# Patient Record
Sex: FEMALE | Race: WHITE | HISPANIC OR LATINO | Employment: UNEMPLOYED | URBAN - METROPOLITAN AREA
[De-identification: names, ages, dates, MRNs, and addresses within clinical notes are randomized per-mention and may not be internally consistent; named-entity substitution may affect disease eponyms.]

---

## 2017-03-24 ENCOUNTER — APPOINTMENT (OUTPATIENT)
Dept: PHYSICAL THERAPY | Facility: REHABILITATION | Age: 37
End: 2017-03-24
Payer: COMMERCIAL

## 2017-12-26 ENCOUNTER — HOSPITAL ENCOUNTER (OUTPATIENT)
Facility: MEDICAL CENTER | Age: 37
End: 2017-12-27
Attending: EMERGENCY MEDICINE | Admitting: INTERNAL MEDICINE
Payer: MEDICAID

## 2017-12-26 ENCOUNTER — APPOINTMENT (OUTPATIENT)
Dept: RADIOLOGY | Facility: MEDICAL CENTER | Age: 37
End: 2017-12-26
Attending: INTERNAL MEDICINE
Payer: MEDICAID

## 2017-12-26 ENCOUNTER — RESOLUTE PROFESSIONAL BILLING HOSPITAL PROF FEE (OUTPATIENT)
Dept: HOSPITALIST | Facility: MEDICAL CENTER | Age: 37
End: 2017-12-26
Payer: MEDICAID

## 2017-12-26 DIAGNOSIS — M54.59 INTRACTABLE LOW BACK PAIN: ICD-10-CM

## 2017-12-26 PROBLEM — E66.01 MORBID OBESITY (HCC): Status: ACTIVE | Noted: 2017-12-26

## 2017-12-26 PROBLEM — M54.9 INTRACTABLE BACK PAIN: Status: ACTIVE | Noted: 2017-12-26

## 2017-12-26 PROBLEM — M54.31 SCIATICA OF RIGHT SIDE: Status: ACTIVE | Noted: 2017-12-26

## 2017-12-26 LAB
APPEARANCE UR: CLEAR
BILIRUB UR QL STRIP.AUTO: NEGATIVE
COLOR UR: YELLOW
CRP SERPL HS-MCNC: 0.35 MG/DL (ref 0–0.75)
CULTURE IF INDICATED INDCX: NO UA CULTURE
ERYTHROCYTE [SEDIMENTATION RATE] IN BLOOD BY WESTERGREN METHOD: 10 MM/HOUR (ref 0–20)
GLUCOSE UR STRIP.AUTO-MCNC: NEGATIVE MG/DL
HCG UR QL: NEGATIVE
KETONES UR STRIP.AUTO-MCNC: NEGATIVE MG/DL
LEUKOCYTE ESTERASE UR QL STRIP.AUTO: NEGATIVE
MICRO URNS: NORMAL
NITRITE UR QL STRIP.AUTO: NEGATIVE
PH UR STRIP.AUTO: 5.5 [PH]
PROT UR QL STRIP: NEGATIVE MG/DL
RBC UR QL AUTO: NEGATIVE
SP GR UR REFRACTOMETRY: 1.01
SP GR UR STRIP.AUTO: 1.01
UROBILINOGEN UR STRIP.AUTO-MCNC: 0.2 MG/DL

## 2017-12-26 PROCEDURE — 99285 EMERGENCY DEPT VISIT HI MDM: CPT

## 2017-12-26 PROCEDURE — 96376 TX/PRO/DX INJ SAME DRUG ADON: CPT

## 2017-12-26 PROCEDURE — A9270 NON-COVERED ITEM OR SERVICE: HCPCS | Performed by: EMERGENCY MEDICINE

## 2017-12-26 PROCEDURE — 700101 HCHG RX REV CODE 250: Performed by: INTERNAL MEDICINE

## 2017-12-26 PROCEDURE — 99220 PR INITIAL OBSERVATION CARE,LEVL III: CPT | Performed by: INTERNAL MEDICINE

## 2017-12-26 PROCEDURE — 700102 HCHG RX REV CODE 250 W/ 637 OVERRIDE(OP): Performed by: INTERNAL MEDICINE

## 2017-12-26 PROCEDURE — 96372 THER/PROPH/DIAG INJ SC/IM: CPT | Mod: XU

## 2017-12-26 PROCEDURE — 81003 URINALYSIS AUTO W/O SCOPE: CPT

## 2017-12-26 PROCEDURE — G0378 HOSPITAL OBSERVATION PER HR: HCPCS

## 2017-12-26 PROCEDURE — 85652 RBC SED RATE AUTOMATED: CPT

## 2017-12-26 PROCEDURE — 96374 THER/PROPH/DIAG INJ IV PUSH: CPT

## 2017-12-26 PROCEDURE — 700111 HCHG RX REV CODE 636 W/ 250 OVERRIDE (IP): Performed by: INTERNAL MEDICINE

## 2017-12-26 PROCEDURE — 700102 HCHG RX REV CODE 250 W/ 637 OVERRIDE(OP): Performed by: EMERGENCY MEDICINE

## 2017-12-26 PROCEDURE — 72148 MRI LUMBAR SPINE W/O DYE: CPT

## 2017-12-26 PROCEDURE — 86140 C-REACTIVE PROTEIN: CPT

## 2017-12-26 PROCEDURE — 96372 THER/PROPH/DIAG INJ SC/IM: CPT

## 2017-12-26 PROCEDURE — 700111 HCHG RX REV CODE 636 W/ 250 OVERRIDE (IP): Performed by: EMERGENCY MEDICINE

## 2017-12-26 PROCEDURE — 81025 URINE PREGNANCY TEST: CPT

## 2017-12-26 PROCEDURE — A9270 NON-COVERED ITEM OR SERVICE: HCPCS | Performed by: INTERNAL MEDICINE

## 2017-12-26 RX ORDER — KETOROLAC TROMETHAMINE 30 MG/ML
15 INJECTION, SOLUTION INTRAMUSCULAR; INTRAVENOUS EVERY 6 HOURS
Status: DISCONTINUED | OUTPATIENT
Start: 2017-12-26 | End: 2017-12-27 | Stop reason: HOSPADM

## 2017-12-26 RX ORDER — CYCLOBENZAPRINE HCL 10 MG
10 TABLET ORAL ONCE
Status: COMPLETED | OUTPATIENT
Start: 2017-12-26 | End: 2017-12-26

## 2017-12-26 RX ORDER — MORPHINE SULFATE 4 MG/ML
1 INJECTION, SOLUTION INTRAMUSCULAR; INTRAVENOUS EVERY 4 HOURS PRN
Status: DISCONTINUED | OUTPATIENT
Start: 2017-12-26 | End: 2017-12-27 | Stop reason: HOSPADM

## 2017-12-26 RX ORDER — ONDANSETRON 4 MG/1
4 TABLET, ORALLY DISINTEGRATING ORAL EVERY 4 HOURS PRN
Status: DISCONTINUED | OUTPATIENT
Start: 2017-12-26 | End: 2017-12-27 | Stop reason: HOSPADM

## 2017-12-26 RX ORDER — BISACODYL 10 MG
10 SUPPOSITORY, RECTAL RECTAL
Status: DISCONTINUED | OUTPATIENT
Start: 2017-12-26 | End: 2017-12-27 | Stop reason: HOSPADM

## 2017-12-26 RX ORDER — PROMETHAZINE HYDROCHLORIDE 25 MG/1
12.5-25 SUPPOSITORY RECTAL EVERY 4 HOURS PRN
Status: DISCONTINUED | OUTPATIENT
Start: 2017-12-26 | End: 2017-12-27 | Stop reason: HOSPADM

## 2017-12-26 RX ORDER — HYDROMORPHONE HYDROCHLORIDE 2 MG/ML
1 INJECTION, SOLUTION INTRAMUSCULAR; INTRAVENOUS; SUBCUTANEOUS ONCE
Status: COMPLETED | OUTPATIENT
Start: 2017-12-26 | End: 2017-12-26

## 2017-12-26 RX ORDER — POLYETHYLENE GLYCOL 3350 17 G/17G
1 POWDER, FOR SOLUTION ORAL
Status: DISCONTINUED | OUTPATIENT
Start: 2017-12-26 | End: 2017-12-27 | Stop reason: HOSPADM

## 2017-12-26 RX ORDER — OXYCODONE HYDROCHLORIDE 5 MG/1
5 TABLET ORAL EVERY 4 HOURS PRN
Status: DISCONTINUED | OUTPATIENT
Start: 2017-12-26 | End: 2017-12-27 | Stop reason: HOSPADM

## 2017-12-26 RX ORDER — ACETAMINOPHEN 325 MG/1
650 TABLET ORAL EVERY 6 HOURS PRN
Status: DISCONTINUED | OUTPATIENT
Start: 2017-12-26 | End: 2017-12-27 | Stop reason: HOSPADM

## 2017-12-26 RX ORDER — ACETAMINOPHEN 500 MG
500 TABLET ORAL EVERY 6 HOURS
Status: DISCONTINUED | OUTPATIENT
Start: 2017-12-26 | End: 2017-12-27 | Stop reason: HOSPADM

## 2017-12-26 RX ORDER — PROMETHAZINE HYDROCHLORIDE 25 MG/1
12.5-25 TABLET ORAL EVERY 4 HOURS PRN
Status: DISCONTINUED | OUTPATIENT
Start: 2017-12-26 | End: 2017-12-27 | Stop reason: HOSPADM

## 2017-12-26 RX ORDER — ONDANSETRON 2 MG/ML
4 INJECTION INTRAMUSCULAR; INTRAVENOUS EVERY 4 HOURS PRN
Status: DISCONTINUED | OUTPATIENT
Start: 2017-12-26 | End: 2017-12-27 | Stop reason: HOSPADM

## 2017-12-26 RX ORDER — HYDROCODONE BITARTRATE AND ACETAMINOPHEN 5; 325 MG/1; MG/1
2 TABLET ORAL
Status: SHIPPED | COMMUNITY
End: 2021-09-05

## 2017-12-26 RX ORDER — AMOXICILLIN 250 MG
2 CAPSULE ORAL 2 TIMES DAILY
Status: DISCONTINUED | OUTPATIENT
Start: 2017-12-26 | End: 2017-12-27 | Stop reason: HOSPADM

## 2017-12-26 RX ORDER — DIAZEPAM 2 MG/1
2 TABLET ORAL EVERY 6 HOURS PRN
Status: DISCONTINUED | OUTPATIENT
Start: 2017-12-26 | End: 2017-12-27 | Stop reason: HOSPADM

## 2017-12-26 RX ORDER — KETOROLAC TROMETHAMINE 30 MG/ML
30 INJECTION, SOLUTION INTRAMUSCULAR; INTRAVENOUS ONCE
Status: COMPLETED | OUTPATIENT
Start: 2017-12-26 | End: 2017-12-26

## 2017-12-26 RX ORDER — LIDOCAINE 50 MG/G
1 PATCH TOPICAL EVERY 24 HOURS
Status: DISCONTINUED | OUTPATIENT
Start: 2017-12-26 | End: 2017-12-27 | Stop reason: HOSPADM

## 2017-12-26 RX ADMIN — KETOROLAC TROMETHAMINE 15 MG: 30 INJECTION, SOLUTION INTRAMUSCULAR at 17:16

## 2017-12-26 RX ADMIN — ACETAMINOPHEN 500 MG: 500 TABLET ORAL at 11:59

## 2017-12-26 RX ADMIN — HYDROMORPHONE HYDROCHLORIDE 1 MG: 2 INJECTION INTRAMUSCULAR; INTRAVENOUS; SUBCUTANEOUS at 05:30

## 2017-12-26 RX ADMIN — ACETAMINOPHEN 500 MG: 500 TABLET ORAL at 17:16

## 2017-12-26 RX ADMIN — KETOROLAC TROMETHAMINE 15 MG: 30 INJECTION, SOLUTION INTRAMUSCULAR at 11:07

## 2017-12-26 RX ADMIN — DIAZEPAM 2 MG: 2 TABLET ORAL at 17:14

## 2017-12-26 RX ADMIN — KETOROLAC TROMETHAMINE 30 MG: 30 INJECTION, SOLUTION INTRAMUSCULAR at 04:00

## 2017-12-26 RX ADMIN — OXYCODONE HYDROCHLORIDE 5 MG: 5 TABLET ORAL at 13:53

## 2017-12-26 RX ADMIN — LIDOCAINE 1 PATCH: 50 PATCH TOPICAL at 12:00

## 2017-12-26 RX ADMIN — ENOXAPARIN SODIUM 40 MG: 100 INJECTION SUBCUTANEOUS at 11:06

## 2017-12-26 RX ADMIN — CYCLOBENZAPRINE HYDROCHLORIDE 10 MG: 10 TABLET, FILM COATED ORAL at 04:00

## 2017-12-26 RX ADMIN — STANDARDIZED SENNA CONCENTRATE AND DOCUSATE SODIUM 2 TABLET: 8.6; 5 TABLET, FILM COATED ORAL at 19:59

## 2017-12-26 RX ADMIN — OXYCODONE HYDROCHLORIDE 5 MG: 5 TABLET ORAL at 19:59

## 2017-12-26 ASSESSMENT — ENCOUNTER SYMPTOMS
MYALGIAS: 0
LOSS OF CONSCIOUSNESS: 0
HEMOPTYSIS: 0
NECK PAIN: 0
ABDOMINAL PAIN: 0
WEAKNESS: 1
CHILLS: 0
BACK PAIN: 1
SHORTNESS OF BREATH: 0
BRUISES/BLEEDS EASILY: 0
BLURRED VISION: 0
TINGLING: 1
FEVER: 0
FOCAL WEAKNESS: 1
HEADACHES: 0
SPUTUM PRODUCTION: 0
DEPRESSION: 0
NAUSEA: 0
VOMITING: 0
SENSORY CHANGE: 1
DIZZINESS: 0
WEIGHT LOSS: 0

## 2017-12-26 ASSESSMENT — PATIENT HEALTH QUESTIONNAIRE - PHQ9
1. LITTLE INTEREST OR PLEASURE IN DOING THINGS: NOT AT ALL
SUM OF ALL RESPONSES TO PHQ9 QUESTIONS 1 AND 2: 0
SUM OF ALL RESPONSES TO PHQ QUESTIONS 1-9: 0
2. FEELING DOWN, DEPRESSED, IRRITABLE, OR HOPELESS: NOT AT ALL

## 2017-12-26 ASSESSMENT — PAIN SCALES - GENERAL
PAINLEVEL_OUTOF10: 5
PAINLEVEL_OUTOF10: 7
PAINLEVEL_OUTOF10: 7
PAINLEVEL_OUTOF10: 9
PAINLEVEL_OUTOF10: 10

## 2017-12-26 ASSESSMENT — LIFESTYLE VARIABLES
ALCOHOL_USE: NO
DO YOU DRINK ALCOHOL: NO
EVER_SMOKED: NEVER
EVER_SMOKED: NEVER

## 2017-12-26 ASSESSMENT — COPD QUESTIONNAIRES
DURING THE PAST 4 WEEKS HOW MUCH DID YOU FEEL SHORT OF BREATH: NONE/LITTLE OF THE TIME
DO YOU EVER COUGH UP ANY MUCUS OR PHLEGM?: NO/ONLY WITH OCCASIONAL COLDS OR INFECTIONS
HAVE YOU SMOKED AT LEAST 100 CIGARETTES IN YOUR ENTIRE LIFE: NO/DON'T KNOW
COPD SCREENING SCORE: 0

## 2017-12-26 NOTE — ASSESSMENT & PLAN NOTE
-Certainly concerning for possible recurrence of right-sided disc herniation  -MRI lumbar spine as outlined above consider neuropathic pain meds including gabapentin initiation during this hospitalization

## 2017-12-26 NOTE — H&P
HOSPITAL MEDICINE HISTORY/ PHYSICAL    Date of Service:  12/26/2017   10:53 AM       Patient ID:   Name: Christine Acuña YOB: 1980. Age: 37 y.o. female. MRN: [id number]    Admitting Attending:  Zeeshan Gonzáles     PCP : Kole Valerio M.D.          Chief Complaint:       Intractable back pain    History of Present Illness:    Arianne is a 37 y.o. female w/h/o morbid obesity and prior back surgery who presents with above chief complaint. Patient had a herniated disc several years ago and underwent microdiscectomy for the surgery. She complications including postoperative pseudomeningocele still as well as a postoperative seroma infection. She says starting her on December 23 she had progressively worsening right-sided back pain with associated right leg weakness and numbness. She denies any prior trauma and no obvious heavy lifting or twisting of the belly. She has any skin changes or problems the surgical incision site. She denies any cauda equina symptoms including no fecal or urinary incontinence and no saddle anesthesia. She says that she try taking Norco as at home with no relief in pain. She says she can walk although usually has to bend over forwards and the only position that she can get relief and is laying on her left side. She says this pain is almost identical to the pain she had several years ago before initial back surgery. She denies any associated nausea vomiting fevers chills muscle aches or associated skin changes.    Review of Systems:    Has Review of Systems   Constitutional: Negative for chills, fever and weight loss.   HENT: Negative for hearing loss.    Eyes: Negative for blurred vision.   Respiratory: Negative for hemoptysis, sputum production and shortness of breath.    Cardiovascular: Negative for chest pain and leg swelling.   Gastrointestinal: Negative for abdominal pain, nausea and vomiting.   Genitourinary: Negative for dysuria and urgency.  "  Musculoskeletal: Positive for back pain. Negative for myalgias and neck pain.   Skin: Negative for itching.   Neurological: Positive for tingling, sensory change, focal weakness and weakness. Negative for dizziness, loss of consciousness and headaches.   Endo/Heme/Allergies: Does not bruise/bleed easily.   Psychiatric/Behavioral: Negative for depression and suicidal ideas.   All other systems reviewed and are negative.    Please see HPI, all other systems were reviewed and are negative (AMA/CMS criteria)              Past Medical/ Family / Social history (PFSH):   Past Medical History:   Diagnosis Date   • Pain 14    lower back/L leg=7/10   • Acid reflux    • Anesthesia     Family Hx-father ; was alwasy told it was anesthesia \"related\"; not sure what the actual reaction was; pt-PONV   • Asthma    • Heart burn    • Indigestion    • Unspecified urinary incontinence        Past Surgical History:   Procedure Laterality Date   • IRRIGATION & DEBRIDEMENT ORTHO  2014    Performed by El Gomez M.D. at [facility]   • IRRIGATION & DEBRIDEMENT NEURO  2014    Performed by El Gomez M.D. at [facility]   • LUMBAR LAMINECTOMY DISKECTOMY  2014    Performed by El Gomez M.D. at [facility]   • INJ,EPIDURAL/LUMB/SAC SINGLE  2014    Performed by Alexis Cornell M.D. at [facility]   • TONSILLECTOMY  2013    Performed by Aaron Hutton M.D. at SURGERY SAME DAY [facility]   • NASAL SEPTAL RECONSTRUCTION  2013    Performed by Aaron Hutton M.D. at SURGERY [facility] DAY [facility]   • TURBINOPLASTY  2013    Performed by Aaron Hutton M[facility] at [facility]       Current Outpatient Medications:  No current facility-administered medications on file prior to encounter.      No current outpatient prescriptions on file prior to encounter.       Medication Allergy/Sensitivities:  Allergies " "  Allergen Reactions   • Latex Rash and Itching       Family History:  Denies any family history of back pain or diabetes     Social History:  Social History   Substance Use Topics   • Smoking status: Never Smoker   • Smokeless tobacco: Never Used      Comment: quit a year ago, smoked for 4 years a pack a day   • Alcohol use No     #################################################################  Physical Exam:   Vitals/ General Appearance:   Weight/BMI: Body mass index is 37.58 kg/m².  Blood pressure 114/58, pulse 89, temperature 36.9 °C (98.4 °F), resp. rate 16, height 1.676 m (5' 6\"), weight 105.6 kg (232 lb 12.9 oz), SpO2 95 %, unknown if currently breastfeeding.   Vitals:    12/26/17 0658 12/26/17 0933 12/26/17 0950 12/26/17 1029   BP:    114/58   Pulse: 90  85 89   Resp:  16 16 16   Temp:    36.9 °C (98.4 °F)   SpO2: 94%  97% 95%   Weight:    105.6 kg (232 lb 12.9 oz)   Height:    1.676 m (5' 6\")    Oxygen Therapy:  Pulse Oximetry: 95 %, O2 (LPM): 0, O2 Delivery: None (Room Air)    Constitutional:  well developed, obese  HENMT: Normocephalic, atraumatic, b/l ears normal, nose normal  Eyes:  EOMI, conjunctiva normal, no discharge  Neck: no tracheal deviation, supple  Cardiovascular: normal heart rate, normal rhythm, no murmurs, no rubs or gallops; no cyanosis, clubbing or edema  Lungs: Respiratory effort is normal, normal breath sounds, breath sounds clear to auscultation b/l, no rales, rhonchi or wheezing  Abdomen: soft, non-tender, no guarding or rebound  Skin: warm, dry, no erythema, no rash  Neurologic: Alert and oriented, strength5 out of 5 throughout, no focal deficits, CN II-XII normal, 1+ patellar deep tendon reflexes bilateral equal and symmetric, no clonus appreciated, positive straight leg test of the right leg equivocal on the left  Back-5 cm vertical lumbar incision scar appears well healed clean dry and intact and no obvious fluctuance or pain located in this region. There is no paravertebral or " vertebral tenderness appreciated either  Psychiatric: No anxiety or depression    #################################################################  Lab Data Review:    Objective   Recent Results (from the past 24 hour(s))   URINALYSIS CULTURE, IF INDICATED    Collection Time: 12/26/17  8:16 AM   Result Value Ref Range    Color Yellow     Character Clear     Specific Gravity 1.010 <1.035    Ph 5.5 5.0 - 8.0    Glucose Negative Negative mg/dL    Ketones Negative Negative mg/dL    Protein Negative Negative mg/dL    Bilirubin Negative Negative    Urobilinogen, Urine 0.2 Negative    Nitrite Negative Negative    Leukocyte Esterase Negative Negative    Occult Blood Negative Negative    Micro Urine Req see below     Culture Indicated No UA Culture   BETA-HCG QUALITATIVE URINE    Collection Time: 12/26/17  8:16 AM   Result Value Ref Range    Beta-Hcg Urine Negative Negative   REFRACTOMETER SG    Collection Time: 12/26/17  8:16 AM   Result Value Ref Range    Specific Gravity 1.009        (click the triangle to expand results)    My interpretation of lab results:     Imaging/Procedures Review:    MR-LUMBAR SPINE-W/O    (Results Pending)       EKG:   per my independent read:  None performed    Assessment and Plan:      * Intractable back pain- (present on admission)   Assessment & Plan    -Unclear etiology but differential includes possible re-damage or recurrence of her disc herniation, muscle skeletal, certainly her obesity is playing a role in this  -She has no current evidence of neurological emergency or cauda equina syndrome  -MRI of the lumbar spine without contrast to evaluate for further etiology, my suspicion for an epidural abscess is extremely low but we'll check an ESR and CRP and trend white blood cell count fever curve  -Do multi-modal pain therapy including scheduled Tylenol 500 mg every 6 hours, 0.5 mg of IV Dilaudid as needed every 4 hours, scheduled Toradol 15 mg every 6 hours, also do Valium low-dose as  needed for muscle spasm as well as lidocaine patch  -Initiated physical therapy and occupational therapy and strongly encouraged patient to lose weight outpatient        Morbid obesity (CMS-HCC)- (present on admission)   Assessment & Plan    -Encourage outpatient control        Sciatica of right side- (present on admission)   Assessment & Plan    -Certainly concerning for possible recurrence of right-sided disc herniation  -MRI lumbar spine as outlined above consider neuropathic pain meds including gabapentin initiation during this hospitalization              1. Prophylaxis: sc heparin  2. Code: Full code per patient with  present    This dictation was created using voice recognition software. The accuracy of the dictation is limited to the abilities of the software. I expect there may be some errors of grammar and possibly content.

## 2017-12-26 NOTE — ASSESSMENT & PLAN NOTE
-Unclear etiology but differential includes possible re-damage or recurrence of her disc herniation, muscle skeletal, certainly her obesity is playing a role in this  -She has no current evidence of neurological emergency or cauda equina syndrome  -MRI of the lumbar spine without contrast to evaluate for further etiology, my suspicion for an epidural abscess is extremely low but we'll check an ESR and CRP and trend white blood cell count fever curve  -Do multi-modal pain therapy including scheduled Tylenol 500 mg every 6 hours, 0.5 mg of IV Dilaudid as needed every 4 hours, scheduled Toradol 15 mg every 6 hours, also do Valium low-dose as needed for muscle spasm as well as lidocaine patch  -Initiated physical therapy and occupational therapy and strongly encouraged patient to lose weight outpatient

## 2017-12-26 NOTE — ED NOTES
Pt states her pain is resolved after medication administration. Educated pt on dangers of narcotic dependence and the need to complete previously prescribed PT to resolve underlying problem. PT verbalized understanding.

## 2017-12-26 NOTE — ED PROVIDER NOTES
ED Provider Note    CHIEF COMPLAINT  Chief Complaint   Patient presents with   • Low Back Pain     Pt. states low back pain since . Today she states inability to stand up straight. Pt. states she had a lower back surgery three years ago.Pt. states 10/10 pain that radiates and causes weakness to right leg. Pt. able to stand and walk with steady gait, but is not able to stand up straight due to pain.        [facility]    Primary care provider: Kole Valerio M.D.   History obtained from:Patient and her boyfriend  History limited by: None     Christine Acuña is a 37 y.o. female who presents to the ED complaining of mid lower back pain starting on . Patient states that she felt her back stiffening and the pain has progressively worsened since then. She denies any injury/trauma/inciting event. She reports low back surgery about 3 years ago and has been doing well. She states that the pain makes it hard for her to straighten up and she needs to walk bent forward. She took 2 Norco's tonight without much improvement. She has not taken anything else for her pain. She reports that she has taken Flexeril previously after her back surgery. She denies any possibility of pregnancy. She denies fever/diarrhea/constipation/dysuria/incontinence. She reports some weakness and numbness of the right lower thigh and leg. She denies any other weakness or sensory change. She reports slight nausea due to the severe pain but no vomiting. She also reports that the pain is worse when she is straining to have bowel movement. She denies pain anywhere else including chest or abdomen. She denies shortness of breath or difficulty breathing.    REVIEW OF SYSTEMS  Please see HPI for pertinent positives/negatives.  All other systems reviewed and are negative.     PAST MEDICAL HISTORY  Past Medical History:   Diagnosis Date   • Pain 14    lower back/L leg=7/10   • Acid reflux    • Anesthesia     Family Hx-father ; was  "alwasy told it was anesthesia \"related\"; not sure what the actual reaction was; pt-PONV   • Asthma    • Heart burn    • Indigestion    • Unspecified urinary incontinence         SURGICAL HISTORY  Past Surgical History:   Procedure Laterality Date   • IRRIGATION & DEBRIDEMENT ORTHO  9/7/2014    Performed by El Gomez M.D. at SURGERY [facility] ORS   • IRRIGATION & DEBRIDEMENT NEURO  9/5/2014    Performed by El Gomez M.D. at SURGERY [facility] ORS   • LUMBAR LAMINECTOMY DISKECTOMY  8/27/2014    Performed by El Gomez M.D. at SURGERY [facility] ORS   • INJ,EPIDURAL/LUMB/SAC SINGLE  6/5/2014    Performed by Alexis Cornell M.D. at SURGERY [facility] ORS   • TONSILLECTOMY  5/14/2013    Performed by Aaron Hutton M.D. at SURGERY SAME DAY [facility] ORS   • NASAL SEPTAL RECONSTRUCTION  5/14/2013    Performed by Aaron Hutton M.D. at SURGERY SAME DAY [facility] ORS   • TURBINOPLASTY  5/14/2013    Performed by Aaron Hutton M.D. at SURGERY SAME DAY [facility] ORS        SOCIAL HISTORY  Social History     Social History Main Topics   • Smoking status: Never Smoker   • Smokeless tobacco: Never Used      Comment: quit a year ago, smoked for 4 years a pack a day   • Alcohol use No   • Drug use: No   • Sexual activity: Not on file        FAMILY HISTORY  History reviewed. No pertinent family history.     CURRENT MEDICATIONS  Home Medications     Reviewed by Finn Hanson (Pharmacy Tech) on 12/26/17 at 1004  Med List Status: Complete   Medication Last Dose Status   hydrocodone-acetaminophen (NORCO) 5-325 MG Tab per tablet 12/25/2017 Active                 ALLERGIES  Allergies   Allergen Reactions   • Latex Rash and Itching        PHYSICAL EXAM  VITAL SIGNS: /99   Pulse 85   Temp 36.9 °C (98.4 °F)   Resp 16   Ht 1.676 m (5' 6\")   Wt 106.5 kg (234 lb 12.6 oz)   SpO2 97%   BMI 37.90 kg/m²  @KAVIN[[id number]::@     Pulse ox interpretation:96% I interpret this pulse ox as " normal     Constitutional: Well developed, well nourished, alert in slight discomfort, nontoxic appearance    HENT: No external signs of trauma, normocephalic, bilateral external ears normal, oropharynx moist and clear, nose normal    Eyes: PERRL, conjunctiva without erythema, no discharge, no icterus    Neck: Soft and supple, trachea midline, no stridor, no tenderness, no LAD, no JVD, good ROM    Cardiovascular: Regular rate and rhythm, no murmurs/rubs/gallops, strong distal pulses and good perfusion    Thorax & Lungs: No respiratory distress, CTAB   Abdomen: Soft, nontender, nondistended, no guarding, no rebound, normal BS    Back: Normal inspection, no CVA tenderness, diffuse tenderness mid lower lumbar without swelling/warmth/crepitus, limited range of motion due to discomfort, straight leg raising negative bilaterally, sensation intact to touch throughout, 5 over 5 strength equal bilateral lower extremities, DTRs are 1/4 and equal bilateral lower extremities  Extremities: No clubbing, no cyanosis, no edema, no gross deformity, good ROM, no tenderness, intact distal pulses with brisk cap refill    Skin: Warm, dry, no pallor/cyanosis, no rash noted    Lymphatic: No lymphadenopathy noted    Neuro: A/O times 3, no focal deficits noted    Psychiatric: Cooperative, normal mood and affect, normal judgement, appropriate for clinical situation          DIAGNOSTIC STUDIES / PROCEDURES        LABS  All labs reviewed by me.     Results for orders placed or performed during the hospital encounter of 12/26/17   URINALYSIS CULTURE, IF INDICATED   Result Value Ref Range    Color Yellow     Character Clear     Specific Gravity 1.010 <1.035    Ph 5.5 5.0 - 8.0    Glucose Negative Negative mg/dL    Ketones Negative Negative mg/dL    Protein Negative Negative mg/dL    Bilirubin Negative Negative    Urobilinogen, Urine 0.2 Negative    Nitrite Negative Negative    Leukocyte Esterase Negative Negative    Occult Blood Negative  "Negative    Micro Urine Req see below     Culture Indicated No UA Culture   BETA-HCG QUALITATIVE URINE   Result Value Ref Range    Beta-Hcg Urine Negative Negative   REFRACTOMETER SG   Result Value Ref Range    Specific Gravity 1.009         RADIOLOGY  The radiologist's interpretation of all radiological studies have been reviewed by me.     MR-LUMBAR SPINE-W/O    (Results Pending)          COURSE & MEDICAL DECISION MAKING  Nursing notes, VS, PMSFHx reviewed in chart.     Differential diagnoses considered include but are not limited to: Strain/sprain, dissection/AAA, cauda equina syndrome, DDD, herniated disc, compression Fx, spinal stenosis, ankylosing spondylitis, epidural abscess/mass, osteomyelitis, spinal hematoma, radiculopathy, sciatica, KS/renal colic, UTI/pyelo, cholecystitis/biliary colic, retrocecal appendicitis     Patient presents with her boyfriend to the ED with above complaint. She was noted to have lumbar pain but no signs of acute cord syndrome/cauda equina. UA was unremarkable. She was given Toradol and Flexeril without much improvement and subsequently given Dilaudid. She reports that the Dilaudid helped her pain but she continues to have pain when she tries to straighten up and walk. She is requesting admission so she can \"find out what is causing her pain.\" Discussed with Dr. Gonzáles who graciously agreed to admit patient for further care.      0850: D/W Dr. Gonzáles, hospitalist, who will admit the patient.        FINAL IMPRESSION  1. Intractable low back pain           DISPOSITION  Patient will be admitted to hospitalist for further care.        Electronically signed by: Kole Carter, 12/26/2017 3:31 AM      Portions of this record were made with voice recognition software.  Despite my review, spelling/grammar/context errors may still remain.  Interpretation of this chart should be taken in this context.    "

## 2017-12-26 NOTE — ED NOTES
"Christine Acuña  37 y.o.  Chief Complaint   Patient presents with   • Low Back Pain     Pt. states low back pain since 12/23. Today she states inability to stand up straight. Pt. states she had a lower back surgery three years ago.Pt. states 10/10 pain that radiates and causes weakness to right leg. Pt. able to stand and walk with steady gait, but is not able to stand up straight due to pain.     /99   Pulse 100   Temp 36.9 °C (98.4 °F)   Resp 16   Ht 1.676 m (5' 6\")   Wt 106.5 kg (234 lb 12.6 oz)   SpO2 96%   BMI 37.90 kg/m²   Pt. Returned to ER lobby and educated to inform triage RN of any new or worsening symptoms. Pt. Apologized to for the wait.   "

## 2017-12-26 NOTE — PROGRESS NOTES
"Patient arrived to unit via gurney from main ER at 1025.  Pt ambulated from [facility] to hospital bed, gait slightly unsteady.  Pt's weight obtained on standup scale, see flow sheet.    Admit profile and assessment completed.  Pt refused influenza vaccine.  Pt A&Ox4, reports numbness to right leg, denies incontinence.  Respirations even, unlabored on room air, diminished lung sounds to RLL and LLL.  Pt reports 7/10 lower back pain, described as \"dull\", medicated per MAR.    Bed in locked, lowest position.  Call light and belongings within reach.  Patient updated on POC, communications board updated.  Needs met, will continue to monitor   "

## 2017-12-26 NOTE — PROGRESS NOTES
Pain reassessment post medication administration:  Pt appears to be sleeping, chest rise and fall noted, NAD.  Call light and belongings within reach

## 2017-12-27 VITALS
OXYGEN SATURATION: 96 % | WEIGHT: 232.81 LBS | TEMPERATURE: 98.8 F | RESPIRATION RATE: 16 BRPM | BODY MASS INDEX: 37.41 KG/M2 | SYSTOLIC BLOOD PRESSURE: 105 MMHG | DIASTOLIC BLOOD PRESSURE: 54 MMHG | HEIGHT: 66 IN | HEART RATE: 62 BPM

## 2017-12-27 LAB
ANION GAP SERPL CALC-SCNC: 4 MMOL/L (ref 0–11.9)
BASOPHILS # BLD AUTO: 0.4 % (ref 0–1.8)
BASOPHILS # BLD: 0.02 K/UL (ref 0–0.12)
BUN SERPL-MCNC: 13 MG/DL (ref 8–22)
CALCIUM SERPL-MCNC: 8.7 MG/DL (ref 8.5–10.5)
CHLORIDE SERPL-SCNC: 106 MMOL/L (ref 96–112)
CO2 SERPL-SCNC: 26 MMOL/L (ref 20–33)
CREAT SERPL-MCNC: 0.79 MG/DL (ref 0.5–1.4)
EOSINOPHIL # BLD AUTO: 0.25 K/UL (ref 0–0.51)
EOSINOPHIL NFR BLD: 5 % (ref 0–6.9)
ERYTHROCYTE [DISTWIDTH] IN BLOOD BY AUTOMATED COUNT: 40.9 FL (ref 35.9–50)
GFR SERPL CREATININE-BSD FRML MDRD: >60 ML/MIN/1.73 M 2
GLUCOSE SERPL-MCNC: 87 MG/DL (ref 65–99)
HCT VFR BLD AUTO: 41.7 % (ref 37–47)
HGB BLD-MCNC: 14.1 G/DL (ref 12–16)
IMM GRANULOCYTES # BLD AUTO: 0.01 K/UL (ref 0–0.11)
IMM GRANULOCYTES NFR BLD AUTO: 0.2 % (ref 0–0.9)
LYMPHOCYTES # BLD AUTO: 1.78 K/UL (ref 1–4.8)
LYMPHOCYTES NFR BLD: 35.7 % (ref 22–41)
MAGNESIUM SERPL-MCNC: 2 MG/DL (ref 1.5–2.5)
MCH RBC QN AUTO: 30 PG (ref 27–33)
MCHC RBC AUTO-ENTMCNC: 33.8 G/DL (ref 33.6–35)
MCV RBC AUTO: 88.7 FL (ref 81.4–97.8)
MONOCYTES # BLD AUTO: 0.49 K/UL (ref 0–0.85)
MONOCYTES NFR BLD AUTO: 9.8 % (ref 0–13.4)
NEUTROPHILS # BLD AUTO: 2.43 K/UL (ref 2–7.15)
NEUTROPHILS NFR BLD: 48.9 % (ref 44–72)
NRBC # BLD AUTO: 0 K/UL
NRBC BLD-RTO: 0 /100 WBC
PLATELET # BLD AUTO: 168 K/UL (ref 164–446)
PMV BLD AUTO: 9.3 FL (ref 9–12.9)
POTASSIUM SERPL-SCNC: 3.5 MMOL/L (ref 3.6–5.5)
RBC # BLD AUTO: 4.7 M/UL (ref 4.2–5.4)
SODIUM SERPL-SCNC: 136 MMOL/L (ref 135–145)
WBC # BLD AUTO: 5 K/UL (ref 4.8–10.8)

## 2017-12-27 PROCEDURE — A9270 NON-COVERED ITEM OR SERVICE: HCPCS | Performed by: NURSE PRACTITIONER

## 2017-12-27 PROCEDURE — 700101 HCHG RX REV CODE 250: Performed by: INTERNAL MEDICINE

## 2017-12-27 PROCEDURE — 99217 PR OBSERVATION CARE DISCHARGE: CPT | Performed by: HOSPITALIST

## 2017-12-27 PROCEDURE — G8979 MOBILITY GOAL STATUS: HCPCS | Mod: CI

## 2017-12-27 PROCEDURE — 80048 BASIC METABOLIC PNL TOTAL CA: CPT

## 2017-12-27 PROCEDURE — A9270 NON-COVERED ITEM OR SERVICE: HCPCS | Performed by: INTERNAL MEDICINE

## 2017-12-27 PROCEDURE — 96376 TX/PRO/DX INJ SAME DRUG ADON: CPT

## 2017-12-27 PROCEDURE — 83735 ASSAY OF MAGNESIUM: CPT

## 2017-12-27 PROCEDURE — G0378 HOSPITAL OBSERVATION PER HR: HCPCS

## 2017-12-27 PROCEDURE — 700102 HCHG RX REV CODE 250 W/ 637 OVERRIDE(OP): Performed by: INTERNAL MEDICINE

## 2017-12-27 PROCEDURE — 700102 HCHG RX REV CODE 250 W/ 637 OVERRIDE(OP): Performed by: NURSE PRACTITIONER

## 2017-12-27 PROCEDURE — G8978 MOBILITY CURRENT STATUS: HCPCS | Mod: CI

## 2017-12-27 PROCEDURE — 700111 HCHG RX REV CODE 636 W/ 250 OVERRIDE (IP): Performed by: INTERNAL MEDICINE

## 2017-12-27 PROCEDURE — 85025 COMPLETE CBC W/AUTO DIFF WBC: CPT

## 2017-12-27 PROCEDURE — 97161 PT EVAL LOW COMPLEX 20 MIN: CPT

## 2017-12-27 PROCEDURE — 36415 COLL VENOUS BLD VENIPUNCTURE: CPT

## 2017-12-27 PROCEDURE — G8980 MOBILITY D/C STATUS: HCPCS | Mod: CI

## 2017-12-27 PROCEDURE — 96372 THER/PROPH/DIAG INJ SC/IM: CPT

## 2017-12-27 RX ORDER — POTASSIUM CHLORIDE 20 MEQ/1
20 TABLET, EXTENDED RELEASE ORAL ONCE
Status: COMPLETED | OUTPATIENT
Start: 2017-12-27 | End: 2017-12-27

## 2017-12-27 RX ORDER — KETOROLAC TROMETHAMINE 10 MG/1
10 TABLET, FILM COATED ORAL EVERY 6 HOURS PRN
Qty: 30 TAB | Refills: 0 | Status: SHIPPED | OUTPATIENT
Start: 2017-12-27 | End: 2019-07-19

## 2017-12-27 RX ADMIN — LIDOCAINE 1 PATCH: 50 PATCH TOPICAL at 08:36

## 2017-12-27 RX ADMIN — KETOROLAC TROMETHAMINE 15 MG: 30 INJECTION, SOLUTION INTRAMUSCULAR at 00:05

## 2017-12-27 RX ADMIN — ACETAMINOPHEN 500 MG: 500 TABLET ORAL at 06:10

## 2017-12-27 RX ADMIN — ACETAMINOPHEN 500 MG: 500 TABLET ORAL at 00:04

## 2017-12-27 RX ADMIN — OXYCODONE HYDROCHLORIDE 5 MG: 5 TABLET ORAL at 04:07

## 2017-12-27 RX ADMIN — KETOROLAC TROMETHAMINE 15 MG: 30 INJECTION, SOLUTION INTRAMUSCULAR at 06:10

## 2017-12-27 RX ADMIN — ENOXAPARIN SODIUM 40 MG: 100 INJECTION SUBCUTANEOUS at 08:35

## 2017-12-27 RX ADMIN — POTASSIUM CHLORIDE 20 MEQ: 1500 TABLET, EXTENDED RELEASE ORAL at 12:51

## 2017-12-27 RX ADMIN — KETOROLAC TROMETHAMINE 15 MG: 30 INJECTION, SOLUTION INTRAMUSCULAR at 12:52

## 2017-12-27 RX ADMIN — ACETAMINOPHEN 500 MG: 500 TABLET ORAL at 12:52

## 2017-12-27 ASSESSMENT — COGNITIVE AND FUNCTIONAL STATUS - GENERAL
MOBILITY SCORE: 24
SUGGESTED CMS G CODE MODIFIER MOBILITY: CH

## 2017-12-27 ASSESSMENT — GAIT ASSESSMENTS
GAIT LEVEL OF ASSIST: SUPERVISED
DISTANCE (FEET): 250

## 2017-12-27 ASSESSMENT — PAIN SCALES - GENERAL
PAINLEVEL_OUTOF10: 7
PAINLEVEL_OUTOF10: 7
PAINLEVEL_OUTOF10: 3

## 2017-12-27 ASSESSMENT — LIFESTYLE VARIABLES: DO YOU DRINK ALCOHOL: NO

## 2017-12-27 NOTE — PROGRESS NOTES
Pt resting calmly, with eyes closed, is stable with arousal. Pt c/o back pain, see flowchart, denies needs at this time. Call light and personal possessions within reach, will continue to monitor.

## 2017-12-27 NOTE — PROGRESS NOTES
IV dc'd.  Discharge instructions given to patient; patient verbalizes understanding, all questions answered.  Copy of DC summary provided, signed copy in chart.  1 prescription electronically sent to pt's pharmacy.   Pt states personal belongings are in possession.  Pt escorted off unit by tech without incident.

## 2017-12-27 NOTE — PROGRESS NOTES
Assessment per flow chart.  Pt A&Ox4, c/o lower back pain, see flowchart.  Right A/C IV site- c,d,i.  Pt's SpO2-96% on R/A.  Discussed POC with pt, pt verbalized understanding and agreement.  Pt instructed to call for assistance, pt's belonging's and call light within reach. Will continue to monitor.

## 2017-12-27 NOTE — PROGRESS NOTES
Assessment completed.  Pt A&Ox4.  Respirations even, unlabored on room air.  Pt reports 2/10 lower back pain, declines medication at this time.  Patient updated on POC, communications board updated.  Call light within reach.  Needs met, will continue to monitor

## 2017-12-27 NOTE — DISCHARGE SUMMARY
CHIEF COMPLAINT ON ADMISSION:  Intractable back pain.    HISTORY OF PRESENT ILLNESS:  For complete history and physical, please review   the note posted by Dr. Gonzáles on 12/26/2017.  In summary, this is a   37-year-old female with a history of morbid obesity and prior back surgery,   who presents with complaints of new onset of intractable back pain.  The   patient had a herniated disk several years prior, underwent microdiskectomy   for surgery.  On admission, MRI of the lumbar spine was unremarkable and   revealed findings that were consistent with prior MRIs.  The patient is likely   suffering from musculoskeletal inflammation and was treated with pain   medication and anti-inflammatories over admission.  The patient did have   improvement of her lower back pain and requires no further acute intervention   at this time.  The patient was instructed to use rest on anti-inflammatories   for treatment of her pain.  The patient at this time is stable, will be   discharged home in good stable condition.    DISCHARGE PROBLEM LIST:  1.  Intractable back pain, resolved.  2.  Sciatica, right side.  3.  Morbid obesity.    DISCHARGE FOLLOWUP:  The patient will follow up with primary care physician in   1-2 weeks after discharge.    DISCHARGE MEDICATIONS:  1.  Norco 5 mg tab, take 2 tabs by mouth 1 time daily as needed.  2.  Toradol 10 mg tab, take 1 tab by mouth every 6 hours as needed for   moderate-to-severe pain.    DIET:  Regular diet.    ACTIVITY:  As tolerated.    LABORATORY DATA:  Sodium 136, potassium 3.5, chloride 106, CO2 26, glucose 87,   BUN 13, creatinine 0.79.  White blood cells 5.0, hemoglobin 14.1, hematocrit   41.7, platelet count 160.    The total time of the discharge process was approximately 35 minutes.       ____________________________________     NAZARIO Khan    CSF / NTS    DD:  12/27/2017 12:37:44  DT:  12/27/2017 12:51:53    D#:  [id number]  Job#:  [id number]

## 2017-12-27 NOTE — DISCHARGE INSTRUCTIONS
Discharge Instructions    Discharged to home by car with relative. Discharged via wheelchair, hospital escort: Yes.  Special equipment needed: Not Applicable    Be sure to schedule a follow-up appointment with your primary care doctor or any specialists as instructed.     Discharge Plan:   Diet Plan: Discussed  Activity Level: Discussed  Confirmed Follow up Appointment: Patient to Call and Schedule Appointment  Confirmed Symptoms Management: Discussed  Medication Reconciliation Updated: Yes  Influenza Vaccine Indication: Patient Refuses    I understand that a diet low in cholesterol, fat, and sodium is recommended for good health. Unless I have been given specific instructions below for another diet, I accept this instruction as my diet prescription.   Other diet: Heart healthy     Special Instructions: None    · Is patient discharged on Warfarin / Coumadin?   No     · Is patient Post Blood Transfusion?  No    Depression / Suicide Risk    As you are discharged from this [facility] facility, it is important to learn how to keep safe from harming yourself.    Recognize the warning signs:  · Abrupt changes in personality, positive or negative- including increase in energy   · Giving away possessions  · Change in eating patterns- significant weight changes-  positive or negative  · Change in sleeping patterns- unable to sleep or sleeping all the time   · Unwillingness or inability to communicate  · Depression  · Unusual sadness, discouragement and loneliness  · Talk of wanting to die  · Neglect of personal appearance   · Rebelliousness- reckless behavior  · Withdrawal from people/activities they love  · Confusion- inability to concentrate     If you or a loved one observes any of these behaviors or has concerns about self-harm, here's what you can do:  · Talk about it- your feelings and reasons for harming yourself  · Remove any means that you might use to hurt yourself (examples: pills, rope, extension cords,  firearm)  · Get professional help from the community (Mental Health, Substance Abuse, psychological counseling)  · Do not be alone:Call your Safe Contact- someone whom you trust who will be there for you.  · Call your local CRISIS HOTLINE 968-8519 or 726-400-2544  · Call your local Children's Mobile Crisis Response Team [place] (581) 445-6595 or www.[place]  · Call the toll free National Suicide Prevention Hotlines   · National Suicide Prevention Lifeline 857-883-VWMK (4784)  · National Hope Line Network 800-SUICIDE (894-4643)

## 2017-12-27 NOTE — THERAPY
"Physical Therapy Evaluation completed.   Bed Mobility:  Supine to Sit: Modified Independent  Transfers: Sit to Stand: Supervised  Gait: Level Of Assist: Supervised with No Equipment Needed       Plan of Care: Patient with no further skilled PT needs in the acute care setting at this time  Discharge Recommendations: Equipment: No Equipment Needed. Post-acute therapy Discharge to home with outpatient for additional skilled therapy services.    Pt presents very near baseline regarding functional mobiltiy. She reports very minor pain to back with numbness to R LE, but formal testing indicated fine touch intact. At this time, pt does not require further acute skilled PT services.     See \"Rehab Therapy-Acute\" Patient Summary Report for complete documentation.     "

## 2019-04-15 ENCOUNTER — HOSPITAL ENCOUNTER (OUTPATIENT)
Dept: LAB | Facility: MEDICAL CENTER | Age: 39
End: 2019-04-15
Attending: FAMILY MEDICINE
Payer: MEDICAID

## 2019-04-15 LAB
FORWARD REASON: SPWHY: NORMAL
FORWARDED TO LAB: SPWHR: NORMAL
SPECIMEN SENT (2ND): SPWT2: NORMAL
SPECIMEN SENT: SPWT1: NORMAL

## 2019-04-17 LAB
FORWARD REASON: SPWHY: NORMAL
FORWARDED TO LAB: SPWHR: NORMAL
SPECIMEN SENT: SPWT1: NORMAL

## 2019-06-27 ENCOUNTER — APPOINTMENT (OUTPATIENT)
Dept: RADIOLOGY | Facility: IMAGING CENTER | Age: 39
End: 2019-06-27
Attending: PHYSICIAN ASSISTANT
Payer: MEDICAID

## 2019-06-27 ENCOUNTER — OFFICE VISIT (OUTPATIENT)
Dept: URGENT CARE | Facility: CLINIC | Age: 39
End: 2019-06-27
Payer: MEDICAID

## 2019-06-27 VITALS
BODY MASS INDEX: 37.28 KG/M2 | WEIGHT: 232 LBS | HEART RATE: 78 BPM | OXYGEN SATURATION: 98 % | SYSTOLIC BLOOD PRESSURE: 122 MMHG | HEIGHT: 66 IN | TEMPERATURE: 98.2 F | RESPIRATION RATE: 16 BRPM | DIASTOLIC BLOOD PRESSURE: 80 MMHG

## 2019-06-27 DIAGNOSIS — S63.502A SPRAIN OF LEFT WRIST, INITIAL ENCOUNTER: ICD-10-CM

## 2019-06-27 DIAGNOSIS — R05.3 PERSISTENT COUGH: ICD-10-CM

## 2019-06-27 PROCEDURE — 99204 OFFICE O/P NEW MOD 45 MIN: CPT | Performed by: PHYSICIAN ASSISTANT

## 2019-06-27 PROCEDURE — 73110 X-RAY EXAM OF WRIST: CPT | Mod: TC,LT | Performed by: PHYSICIAN ASSISTANT

## 2019-06-27 RX ORDER — AZITHROMYCIN 250 MG/1
TABLET, FILM COATED ORAL
Qty: 6 TAB | Refills: 0 | Status: SHIPPED | OUTPATIENT
Start: 2019-06-27 | End: 2019-07-19

## 2019-06-27 RX ORDER — NAPROXEN 500 MG/1
TABLET ORAL
Qty: 30 TAB | Refills: 0 | Status: SHIPPED | OUTPATIENT
Start: 2019-06-27 | End: 2019-07-19

## 2019-06-27 RX ORDER — DEXTROMETHORPHAN HYDROBROMIDE AND PROMETHAZINE HYDROCHLORIDE 15; 6.25 MG/5ML; MG/5ML
SYRUP ORAL
Qty: 180 ML | Refills: 0 | Status: SHIPPED | OUTPATIENT
Start: 2019-06-27 | End: 2019-07-19

## 2019-06-27 ASSESSMENT — ENCOUNTER SYMPTOMS
SORE THROAT: 1
VOMITING: 0
NAUSEA: 0
HEADACHES: 1
SINUS PAIN: 1
ABDOMINAL PAIN: 0
COUGH: 1
MYALGIAS: 1
FEVER: 0
RHINORRHEA: 1

## 2019-06-27 NOTE — PROGRESS NOTES
"Subjective:      Christine Acuña is a 38 y.o. female who presents with Otalgia (x3 weeks); Nasal Congestion (x3 weeks, bright yellow mucus); and Wrist Pain (x2 days, left wrist, nephew helped her stand up by grabbing wrist and has been hurting since)            URI    This is a new problem. The current episode started 1 to 4 weeks ago. The problem has been unchanged. There has been no fever. Associated symptoms include congestion, coughing, ear pain, headaches, joint pain, a plugged ear sensation, rhinorrhea, sinus pain and a sore throat. Pertinent negatives include no abdominal pain, chest pain, nausea or vomiting. She has tried nothing for the symptoms. The treatment provided no relief.     Patient also reports left wrist pain.  Nephew pulled her and she states she has had 2 days of pain with bending and carrying at the wrist.  Denies specific fall or injury other than this.  Denies numbness and tingling in the fingers    Past medical history: Is not pertinent to this examination  Past surgical history: Not pertinent to this examination  Family history: Is not pertinent to this examination  Allergies: Latex  Social history: Is reviewed in Epic    Review of Systems   Constitutional: Negative for fever.   HENT: Positive for congestion, ear pain, rhinorrhea, sinus pain and sore throat.    Respiratory: Positive for cough.    Cardiovascular: Negative for chest pain.   Gastrointestinal: Negative for abdominal pain, nausea and vomiting.   Musculoskeletal: Positive for joint pain and myalgias.   Neurological: Positive for headaches.   All other systems reviewed and are negative.         Objective:     /80   Pulse 78   Temp 36.8 °C (98.2 °F)   Resp 16   Ht 1.676 m (5' 6\")   Wt 105.2 kg (232 lb)   SpO2 98%   BMI 37.45 kg/m²      Physical Exam       Gen.: Patient is A and O ×3, no acute distress, well-nourished well-hydrated  Vitals: Are listed and unremarkable  HEENT: Heads normocephalic, atraumatic, " PERRLA, extraocular movements intact, TMs and oropharynx clear  Neck: Soft supple without cervical lymphadenopathy  Cardiovascular: Regular rate and rhythm normal S1 and S2. No murmurs, rubs or gallops  Lungs are clear to auscultation bilaterally. no wheezes rales or rhonchi  Abdomen is soft, nontender, nondistended with good bowel sounds, no hepatosplenomegaly  Skin: Is well perfused without evidence of rash or lesions  Neurological:  cranial nerves II through XII were assessed and intact.  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  No bruising, swelling or break in the skin noted  Neurovascularly: Intact with a 2 second cap refill, good distal pulses      Urgent care course x-ray of the left wrist was ordered and reviewed by me.  It was negative for any bony abnormality     Assessment/Plan:     1. Persistent cough  Discussed likely viral etiology.  Patient requesting antibiotic.  Contingent prescription given.  Only fill if symptoms worsen despite treatment  - promethazine-dextromethorphan (PROMETHAZINE-DM) 6.25-15 MG/5ML syrup; Take 5mls every six hours as needed for cough  Dispense: 180 mL; Refill: 0  - azithromycin (ZITHROMAX) 250 MG Tab; Take two tablets on day one, then on tablet the following four days  Dispense: 6 Tab; Refill: 0    2. Sprain of left wrist, initial encounter  Discussed negative x-ray at length.  Supportive care measures encouraged.  Wrist, Velcro brace given here  - DX-WRIST-COMPLETE 3+ LEFT; Future  - naproxen (NAPROSYN) 500 MG Tab; Take one pill twice a days as needed with food  Dispense: 30 Tab; Refill: 0

## 2019-07-19 ENCOUNTER — OFFICE VISIT (OUTPATIENT)
Dept: URGENT CARE | Facility: CLINIC | Age: 39
End: 2019-07-19
Payer: MEDICAID

## 2019-07-19 VITALS
WEIGHT: 232 LBS | SYSTOLIC BLOOD PRESSURE: 110 MMHG | HEIGHT: 66 IN | OXYGEN SATURATION: 98 % | RESPIRATION RATE: 16 BRPM | BODY MASS INDEX: 37.28 KG/M2 | TEMPERATURE: 98.5 F | HEART RATE: 93 BPM | DIASTOLIC BLOOD PRESSURE: 68 MMHG

## 2019-07-19 DIAGNOSIS — L01.00 IMPETIGO: ICD-10-CM

## 2019-07-19 PROCEDURE — 99214 OFFICE O/P EST MOD 30 MIN: CPT | Performed by: NURSE PRACTITIONER

## 2019-07-19 RX ORDER — SULFAMETHOXAZOLE AND TRIMETHOPRIM 800; 160 MG/1; MG/1
1 TABLET ORAL 2 TIMES DAILY
Qty: 14 TAB | Refills: 0 | Status: SHIPPED | OUTPATIENT
Start: 2019-07-19 | End: 2019-07-26

## 2019-07-19 ASSESSMENT — ENCOUNTER SYMPTOMS
MUSCULOSKELETAL NEGATIVE: 1
CHILLS: 0
ABDOMINAL PAIN: 0
WHEEZING: 0
HEADACHES: 0
STRIDOR: 0
BLURRED VISION: 0
VOMITING: 0
DOUBLE VISION: 0
PALPITATIONS: 0
CONSTIPATION: 0
COUGH: 0
DIZZINESS: 0
DIARRHEA: 0
SORE THROAT: 1
NAUSEA: 0
FEVER: 0

## 2019-07-20 NOTE — PROGRESS NOTES
"Subjective:   Christine Acuña is a 38 y.o. female who presents for Lip Laceration (sore on her lips, burning, tingling, painful, her lip got swollen, itchy a little bit x a week)        HPI   Patient with new onset lower lip swelling, redness, and sores that started a week ago. Denies fall or trauma or recent infection. Has been applying OTC remedies and vaseline without relief. States with mild pain, burning and itching sensation. Denies alleviating or aggravating factors.    Review of Systems   Constitutional: Negative for chills and fever.   HENT: Positive for sore throat. Negative for congestion, ear discharge and ear pain.         Lip redness, swelling.   Eyes: Negative for blurred vision and double vision.   Respiratory: Negative for cough, wheezing and stridor.    Cardiovascular: Negative for chest pain and palpitations.   Gastrointestinal: Negative for abdominal pain, constipation, diarrhea, nausea and vomiting.   Musculoskeletal: Negative.    Skin: Positive for itching. Negative for rash.   Neurological: Negative for dizziness and headaches.   All other systems reviewed and are negative.    Patient's PMH, SocHx, SurgHx, FamHx, Drug allergies and medications reviewed.     Objective:   /68   Pulse 93   Temp 36.9 °C (98.5 °F)   Resp 16   Ht 1.676 m (5' 6\")   Wt 105.2 kg (232 lb)   SpO2 98%   BMI 37.45 kg/m²   Physical Exam   Constitutional: She is oriented to person, place, and time. She appears well-developed and well-nourished. No distress.   HENT:   Head: Normocephalic.   Right Ear: Hearing, tympanic membrane and ear canal normal. Tympanic membrane is not erythematous. No middle ear effusion.   Left Ear: Hearing, tympanic membrane and ear canal normal. Tympanic membrane is not erythematous.  No middle ear effusion.   Nose: No rhinorrhea. Right sinus exhibits no maxillary sinus tenderness and no frontal sinus tenderness. Left sinus exhibits no maxillary sinus tenderness and no frontal " sinus tenderness.   Mouth/Throat: Uvula is midline, oropharynx is clear and moist and mucous membranes are normal. No posterior oropharyngeal erythema.       Eyes: Pupils are equal, round, and reactive to light. Conjunctivae, EOM and lids are normal.   Neck: Normal range of motion. No thyromegaly present.   Cardiovascular: Normal rate, regular rhythm and normal heart sounds.    Pulmonary/Chest: Effort normal and breath sounds normal. No respiratory distress. She has no wheezes.   Lymphadenopathy:        Head (right side): No submandibular and no tonsillar adenopathy present.        Head (left side): No submandibular and no tonsillar adenopathy present.   Neurological: She is alert and oriented to person, place, and time.   Skin: Skin is warm and dry. No abrasion, no bruising and no rash noted. Rash is not vesicular. She is not diaphoretic.   Psychiatric: She has a normal mood and affect. Her speech is normal and behavior is normal. Judgment and thought content normal.   Vitals reviewed.        Assessment/Plan:   Assessment    1. Impetigo  - sulfamethoxazole-trimethoprim (BACTRIM DS) 800-160 MG tablet; Take 1 Tab by mouth 2 times a day for 7 days.  Dispense: 14 Tab; Refill: 0    Other orders  - Acetaminophen (TYLENOL) 325 MG Cap; Take  by mouth.    Honey crusted lesions consistent with Impetigo. Advised to discard all vaseline/lipsticks/chapsticks. May continue with OTC pain remedies as prior. Advised to wash hands frequently. Did not prescribe Bactroban as not allowed to apply to oral mucosa.    Differential diagnosis, natural history, supportive care, and indications for immediate follow-up discussed.     **Please note that all invasive procedures during this visit were performed by myself and/or the Medical Assistant under the supervision of the PA or MD in office**

## 2020-02-28 ENCOUNTER — OFFICE VISIT (OUTPATIENT)
Dept: URGENT CARE | Facility: CLINIC | Age: 40
End: 2020-02-28
Payer: MEDICAID

## 2020-02-28 VITALS
RESPIRATION RATE: 15 BRPM | TEMPERATURE: 98.7 F | OXYGEN SATURATION: 95 % | DIASTOLIC BLOOD PRESSURE: 74 MMHG | SYSTOLIC BLOOD PRESSURE: 128 MMHG | HEART RATE: 105 BPM | WEIGHT: 232 LBS | BODY MASS INDEX: 37.28 KG/M2 | HEIGHT: 66 IN

## 2020-02-28 DIAGNOSIS — L03.116 CELLULITIS OF LEFT LOWER EXTREMITY: ICD-10-CM

## 2020-02-28 LAB — GLUCOSE BLD-MCNC: 123 MG/DL (ref 70–100)

## 2020-02-28 PROCEDURE — 99214 OFFICE O/P EST MOD 30 MIN: CPT | Performed by: FAMILY MEDICINE

## 2020-02-28 PROCEDURE — 82962 GLUCOSE BLOOD TEST: CPT | Performed by: FAMILY MEDICINE

## 2020-02-28 RX ORDER — SULFAMETHOXAZOLE AND TRIMETHOPRIM 800; 160 MG/1; MG/1
1 TABLET ORAL 2 TIMES DAILY
Qty: 14 TAB | Refills: 0 | Status: SHIPPED | OUTPATIENT
Start: 2020-02-28 | End: 2020-03-06

## 2020-02-29 NOTE — PROGRESS NOTES
"SUBJECTIVE      Chief Complaint   Patient presents with   • Toe Pain               Rash  This is a new problem. The problem has been gradually worsening since onset, 6 wk ago. Pain location:  Left 2nd toe.   The rash is characterized by redness, swelling and pain.  pain is mild, but constant and throbbing.    Pt was exposed to nothing. Pertinent negatives include no congestion, cough, fatigue, fever or shortness of breath. Past treatments include nothing.     History   Substance Use Topics   • Smoking status: Never Smoker    • Smokeless tobacco: No    • Alcohol Use: No      Past medical history was unremarkable and not pertinent to current issue           Review of Systems   Constitutional: Negative for fever and fatigue.   HENT: Negative for congestion.    Respiratory: Negative for cough and shortness of breath.    Cardiovascular: Negative for chest pain.   Gastrointestinal: Negative for abdominal pain.   Skin: Positive for rash.   Neurological: Negative for dizziness.   All other systems reviewed and are negative.         Objective:     /74   Pulse (!) 105   Temp 37.1 °C (98.7 °F) (Temporal)   Resp 15   Ht 1.676 m (5' 6\")   Wt 105.2 kg (232 lb)   SpO2 95%       Physical Exam   Constitutional: pt is oriented to person, place, and time. Pt appears well-developed and well-nourished. No distress.   HENT:   Head: Normocephalic and atraumatic.   Eyes: Conjunctivae are normal. No scleral icterus.   Cardiovascular: Normal rate and regular rhythm.    Pulmonary/Chest: Effort normal and breath sounds normal. No respiratory distress.        Neurological: pt is alert and oriented to person, place, and time. No cranial nerve deficit.   Skin: Skin is warm. Pt is not diaphoretic.      Area of erythema, warmth, tender to touch over left 2nd toe.   No red streaking, bullae or crepitus.         Nursing note and vitals reviewed.       blood sugar - 123       Assessment/Plan:     1. Cellulitis of left lower extremity     - " sulfamethoxazole-trimethoprim (BACTRIM DS) 800-160 MG tablet; Take 1 Tab by mouth 2 times a day for 7 days.  Dispense: 14 Tab; Refill: 0    '        Follow up in one week if no improvement, sooner if symptoms worsen.

## 2020-03-04 ENCOUNTER — APPOINTMENT (OUTPATIENT)
Dept: RADIOLOGY | Facility: MEDICAL CENTER | Age: 40
End: 2020-03-04
Attending: EMERGENCY MEDICINE
Payer: MEDICAID

## 2020-03-04 ENCOUNTER — HOSPITAL ENCOUNTER (EMERGENCY)
Facility: MEDICAL CENTER | Age: 40
End: 2020-03-05
Attending: EMERGENCY MEDICINE
Payer: MEDICAID

## 2020-03-04 DIAGNOSIS — L03.031 CELLULITIS OF TOE OF RIGHT FOOT: ICD-10-CM

## 2020-03-04 DIAGNOSIS — L08.9 SKIN INFECTION: ICD-10-CM

## 2020-03-04 LAB
ALBUMIN SERPL BCP-MCNC: 4.4 G/DL (ref 3.2–4.9)
ALBUMIN/GLOB SERPL: 1.7 G/DL
ALP SERPL-CCNC: 68 U/L (ref 30–99)
ALT SERPL-CCNC: 19 U/L (ref 2–50)
ANION GAP SERPL CALC-SCNC: 13 MMOL/L (ref 7–16)
AST SERPL-CCNC: 16 U/L (ref 12–45)
BASOPHILS # BLD AUTO: 0.5 % (ref 0–1.8)
BASOPHILS # BLD: 0.03 K/UL (ref 0–0.12)
BILIRUB SERPL-MCNC: 0.3 MG/DL (ref 0.1–1.5)
BUN SERPL-MCNC: 15 MG/DL (ref 8–22)
CALCIUM SERPL-MCNC: 8.8 MG/DL (ref 8.4–10.2)
CHLORIDE SERPL-SCNC: 101 MMOL/L (ref 96–112)
CO2 SERPL-SCNC: 22 MMOL/L (ref 20–33)
CREAT SERPL-MCNC: 0.72 MG/DL (ref 0.5–1.4)
EOSINOPHIL # BLD AUTO: 0.16 K/UL (ref 0–0.51)
EOSINOPHIL NFR BLD: 2.6 % (ref 0–6.9)
ERYTHROCYTE [DISTWIDTH] IN BLOOD BY AUTOMATED COUNT: 43.3 FL (ref 35.9–50)
GLOBULIN SER CALC-MCNC: 2.6 G/DL (ref 1.9–3.5)
GLUCOSE SERPL-MCNC: 98 MG/DL (ref 65–99)
HCG SERPL QL: NEGATIVE
HCT VFR BLD AUTO: 43.8 % (ref 37–47)
HGB BLD-MCNC: 14.3 G/DL (ref 12–16)
IMM GRANULOCYTES # BLD AUTO: 0.03 K/UL (ref 0–0.11)
IMM GRANULOCYTES NFR BLD AUTO: 0.5 % (ref 0–0.9)
LACTATE BLD-SCNC: 1 MMOL/L (ref 0.5–2)
LYMPHOCYTES # BLD AUTO: 2.11 K/UL (ref 1–4.8)
LYMPHOCYTES NFR BLD: 34.6 % (ref 22–41)
MCH RBC QN AUTO: 29.5 PG (ref 27–33)
MCHC RBC AUTO-ENTMCNC: 32.6 G/DL (ref 33.6–35)
MCV RBC AUTO: 90.3 FL (ref 81.4–97.8)
MONOCYTES # BLD AUTO: 0.65 K/UL (ref 0–0.85)
MONOCYTES NFR BLD AUTO: 10.7 % (ref 0–13.4)
NEUTROPHILS # BLD AUTO: 3.12 K/UL (ref 2–7.15)
NEUTROPHILS NFR BLD: 51.1 % (ref 44–72)
NRBC # BLD AUTO: 0 K/UL
NRBC BLD-RTO: 0 /100 WBC
PLATELET # BLD AUTO: 232 K/UL (ref 164–446)
PMV BLD AUTO: 9.2 FL (ref 9–12.9)
POTASSIUM SERPL-SCNC: 4 MMOL/L (ref 3.6–5.5)
PROCALCITONIN SERPL-MCNC: 0.03 NG/ML
PROT SERPL-MCNC: 7 G/DL (ref 6–8.2)
RBC # BLD AUTO: 4.85 M/UL (ref 4.2–5.4)
SODIUM SERPL-SCNC: 136 MMOL/L (ref 135–145)
WBC # BLD AUTO: 6.1 K/UL (ref 4.8–10.8)

## 2020-03-04 PROCEDURE — 700111 HCHG RX REV CODE 636 W/ 250 OVERRIDE (IP): Performed by: EMERGENCY MEDICINE

## 2020-03-04 PROCEDURE — 84703 CHORIONIC GONADOTROPIN ASSAY: CPT

## 2020-03-04 PROCEDURE — 73660 X-RAY EXAM OF TOE(S): CPT | Mod: LT

## 2020-03-04 PROCEDURE — 96365 THER/PROPH/DIAG IV INF INIT: CPT

## 2020-03-04 PROCEDURE — 85025 COMPLETE CBC W/AUTO DIFF WBC: CPT

## 2020-03-04 PROCEDURE — 700105 HCHG RX REV CODE 258: Performed by: EMERGENCY MEDICINE

## 2020-03-04 PROCEDURE — 87205 SMEAR GRAM STAIN: CPT

## 2020-03-04 PROCEDURE — 700102 HCHG RX REV CODE 250 W/ 637 OVERRIDE(OP): Performed by: EMERGENCY MEDICINE

## 2020-03-04 PROCEDURE — 99284 EMERGENCY DEPT VISIT MOD MDM: CPT

## 2020-03-04 PROCEDURE — 84145 PROCALCITONIN (PCT): CPT

## 2020-03-04 PROCEDURE — A9270 NON-COVERED ITEM OR SERVICE: HCPCS | Performed by: EMERGENCY MEDICINE

## 2020-03-04 PROCEDURE — 80053 COMPREHEN METABOLIC PANEL: CPT

## 2020-03-04 PROCEDURE — 96375 TX/PRO/DX INJ NEW DRUG ADDON: CPT

## 2020-03-04 PROCEDURE — 83605 ASSAY OF LACTIC ACID: CPT

## 2020-03-04 PROCEDURE — 87070 CULTURE OTHR SPECIMN AEROBIC: CPT

## 2020-03-04 RX ORDER — IBUPROFEN 600 MG/1
600 TABLET ORAL ONCE
Status: COMPLETED | OUTPATIENT
Start: 2020-03-04 | End: 2020-03-04

## 2020-03-04 RX ORDER — ONDANSETRON 2 MG/ML
4 INJECTION INTRAMUSCULAR; INTRAVENOUS ONCE
Status: COMPLETED | OUTPATIENT
Start: 2020-03-04 | End: 2020-03-04

## 2020-03-04 RX ORDER — CLINDAMYCIN HYDROCHLORIDE 300 MG/1
300 CAPSULE ORAL 3 TIMES DAILY
Qty: 15 CAP | Refills: 0 | Status: SHIPPED | OUTPATIENT
Start: 2020-03-04 | End: 2020-03-09

## 2020-03-04 RX ORDER — MORPHINE SULFATE 4 MG/ML
4 INJECTION, SOLUTION INTRAMUSCULAR; INTRAVENOUS ONCE
Status: COMPLETED | OUTPATIENT
Start: 2020-03-04 | End: 2020-03-04

## 2020-03-04 RX ORDER — SODIUM CHLORIDE, SODIUM LACTATE, POTASSIUM CHLORIDE, AND CALCIUM CHLORIDE .6; .31; .03; .02 G/100ML; G/100ML; G/100ML; G/100ML
1000 INJECTION, SOLUTION INTRAVENOUS ONCE
Status: COMPLETED | OUTPATIENT
Start: 2020-03-04 | End: 2020-03-05

## 2020-03-04 RX ADMIN — SODIUM CHLORIDE, POTASSIUM CHLORIDE, SODIUM LACTATE AND CALCIUM CHLORIDE 1000 ML: 600; 310; 30; 20 INJECTION, SOLUTION INTRAVENOUS at 23:09

## 2020-03-04 RX ADMIN — MORPHINE SULFATE 4 MG: 4 INJECTION INTRAVENOUS at 22:58

## 2020-03-04 RX ADMIN — AMPICILLIN AND SULBACTAM 3 G: 2; 1 INJECTION, POWDER, FOR SOLUTION INTRAMUSCULAR; INTRAVENOUS at 22:57

## 2020-03-04 RX ADMIN — IBUPROFEN 600 MG: 600 TABLET ORAL at 22:57

## 2020-03-04 RX ADMIN — ONDANSETRON 4 MG: 2 INJECTION INTRAMUSCULAR; INTRAVENOUS at 22:57

## 2020-03-05 VITALS
HEIGHT: 65 IN | WEIGHT: 239.42 LBS | SYSTOLIC BLOOD PRESSURE: 143 MMHG | HEART RATE: 70 BPM | TEMPERATURE: 97.3 F | OXYGEN SATURATION: 97 % | BODY MASS INDEX: 39.89 KG/M2 | RESPIRATION RATE: 16 BRPM | DIASTOLIC BLOOD PRESSURE: 86 MMHG

## 2020-03-05 LAB
GRAM STN SPEC: NORMAL
SIGNIFICANT IND 70042: NORMAL
SITE SITE: NORMAL
SOURCE SOURCE: NORMAL

## 2020-03-05 NOTE — ED PROVIDER NOTES
ED Provider Note    CHIEF COMPLAINT  Chief Complaint   Patient presents with   • Toe Pain       HPI  Patient is otherwise healthy 39-year-old female presents emergency room for evaluation of worsening toe pain and discoloration.  Patient states that she is unsure how this happened but at the end of February she began having some discoloration and signs of infection in part of her toe.  She was seen by physician and was placed on antibiotics, Bactrim at that time.  She is completed this regiment and has noticed over the last 48 hours that the pain is intensifying, severe in intensity and the redness and swelling is extending from the tip of the toe and coming towards the midfoot.  This is worse with bearing weight, movement and she reports active discharge from the distal aspect of the toe.  She denies any fevers but does report some subjective chills.  No history of diabetes, hypertension or immunosuppression or steroid use.      Review the chart shows an outpatient visit on 2/28 where was noted that she had normal blood sugars and was diagnosed with a cellulitis of the left lower extremity, second toe.  At that time there was no red streaking, bulla or crepitus appreciated and he has normal vital signs.  Patient was placed on Bactrim for 7 days    REVIEW OF SYSTEMS  See HPI for further details. All other systems are negative.     PAST MEDICAL HISTORY   has a past medical history of Acid reflux, Anesthesia, Asthma, Heart burn, Indigestion, Pain (08/22/14), and Unspecified urinary incontinence.    SOCIAL HISTORY  Social History     Tobacco Use   • Smoking status: Never Smoker   • Smokeless tobacco: Never Used   • Tobacco comment: quit a year ago, smoked for 4 years a pack a day   Substance and Sexual Activity   • Alcohol use: No   • Drug use: No   • Sexual activity: Not on file       SURGICAL HISTORY   has a past surgical history that includes tonsillectomy (5/14/2013); nasal septal reconstruction (5/14/2013);  "turbinoplasty (5/14/2013); inj,epidural/lumb/sac single (6/5/2014); lumbar laminectomy diskectomy (8/27/2014); irrigation & debridement neuro (9/5/2014); and irrigation & debridement ortho (9/7/2014).    CURRENT MEDICATIONS  Home Medications     Reviewed by Guru Martines R.N. (Registered Nurse) on 03/04/20 at 2001  Med List Status: Complete   Medication Last Dose Status   Acetaminophen (TYLENOL) 325 MG Cap 3/4/2020 Active   hydrocodone-acetaminophen (NORCO) 5-325 MG Tab per tablet 3/4/2020 Active   sulfamethoxazole-trimethoprim (BACTRIM DS) 800-160 MG tablet 3/4/2020 Active                ALLERGIES  Allergies   Allergen Reactions   • Latex Rash and Itching       PHYSICAL EXAM  VITAL SIGNS: BP (!) 165/99   Pulse 95   Temp 36.5 °C (97.7 °F) (Tympanic)   Resp 18   Ht 1.651 m (5' 5\")   Wt 108.6 kg (239 lb 6.7 oz)   LMP 03/01/2020   SpO2 97%   BMI 39.84 kg/m²   Pulse ox interpretation: I interpret this pulse ox as normal.  Genl: F sitting in gurney uncomfortably, speaking clearly, appears in mild distress   Head: NC/AT   ENT: Mucous membranes moist, posterior pharynx clear, uvula midline, nares patent bilaterally   Eyes: Normal sclera, pupils equal round reactive to light  Neck: Supple, FROM, no LAD appreciated  Pulmonary: Lungs are clear to auscultation bilaterally  Chest: No TTP  CV:  RRR, no murmur appreciated, pulses 2+ in both upper and lower extremities,  Abdomen: soft, NT/ND; no rebound/guarding, no masses palpated, no HSM  : no CVA or suprapubic tenderness  Musculoskeletal: Pain free ROM of the neck. Moving upper and lower extremities and spontaneous in coordinated fashion  Right Lower Extremity  - Skin: Skin breakdown and slight purulence is noted around the distal palmar aspect of the second digit.  There is swelling, tenderness and.  Erythema/warmth extending past the phalanx joint and towards the MTP.    - Motor: Full ROM at knee, ankle; 5/5 ankle dorsal/plantar flexion, EHL/FHL intact  - Sensation " intact to superficial/deep peroneal, tibial, saphenous, sural nerves  - 2+ dorsalis pedis and posterior tibialis, cap refill < 2 seconds x 5 digits  Neuro: A&Ox4 (person, place, time, situation), speech fluent, gait steady, no focal deficits appreciated  Psych: Patient has an appropriate affect and behavior  Skin: No rash or lesions.  No pallor or jaundice.  No cyanosis.  Warm and dry.     DIAGNOSTIC STUDIES / PROCEDURES    LABS  Labs Reviewed   CBC WITH DIFFERENTIAL - Abnormal; Notable for the following components:       Result Value    MCHC 32.6 (*)     All other components within normal limits   COMP METABOLIC PANEL   LACTIC ACID   CULTURE WOUND W/ GRAM STAIN    Narrative:     Wound swab when purulence is present   HCG QUAL SERUM   PROCALCITONIN   ESTIMATED GFR   BLOOD CULTURE   BLOOD CULTURE     RADIOLOGY  DX-TOE(S) 2+ LEFT   Final Result      1.  Soft tissue swelling of LEFT 2nd toe.   2.  No fracture or gross bony destruction.          COURSE & MEDICAL DECISION MAKING  Pertinent Labs & Imaging studies reviewed. (See chart for details)    DDX:  Cellulitis, abscess, fasciitis, osteomyelitis, sepsis    MDM    Initial evaluation at 2210:  Patient presents emergency room for the symptoms as described above.  The patient is afebrile, has borderline tachycardia and reports worsening toe pain despite being on antibiotics.  Further discussion with the patient shows that she was only placed on Bactrim for this soft tissue cellulitis.  Review of the wound site shows no gas, appearance of likely superficial cellulitis however x-rays were obtained and showed no progression or bony abnormalities.  Lab work for the broad differential as noted above after these demonstrated no acute metabolic abnormalities, no leukocytosis or leftward shift.  Normal lactate, normal procalcitonin.  An extensive discussion with the patient and she remains afebrile and feels much improved.  I prefer that she be on both combination of Bactrim and  Keflex and she has completed her regimen of Bactrim prior to have strep coverage and will place her on clindamycin for 5 days.  If the patient has any progression or worsening pain within the next 24 to 48 hours she needs to return to the emergency room for admission for IV antibiotics.     I extensively talked with the patient that she did not necessarily failed outpatient antibiotics that she only had partial coverage it was recommended at this point changing her medication regiment, which she is amenable to.  She is aware of the possible complications, the need for reevaluation and the importance of possible admission should she have any worsening symptomology.    HYDRATION: Based on the patient's presentation of Tachycardia the patient was given IV fluids. IV Hydration was used because oral hydration was not adequate alone. Upon recheck following hydration, the patient was improved.    FINAL IMPRESSION  Visit Diagnoses     ICD-10-CM   1. Cellulitis of toe of right foot L03.031   2. Skin infection L08.9       Electronically signed by: George Britton M.D., 3/4/2020 9:56 PM

## 2020-03-07 LAB
BACTERIA WND AEROBE CULT: NORMAL
GRAM STN SPEC: NORMAL
SIGNIFICANT IND 70042: NORMAL
SITE SITE: NORMAL
SOURCE SOURCE: NORMAL

## 2020-10-16 ENCOUNTER — OFFICE VISIT (OUTPATIENT)
Dept: URGENT CARE | Facility: CLINIC | Age: 40
End: 2020-10-16
Payer: MEDICAID

## 2020-10-16 VITALS
HEART RATE: 90 BPM | HEIGHT: 66 IN | OXYGEN SATURATION: 97 % | SYSTOLIC BLOOD PRESSURE: 122 MMHG | RESPIRATION RATE: 16 BRPM | DIASTOLIC BLOOD PRESSURE: 86 MMHG | BODY MASS INDEX: 38.41 KG/M2 | WEIGHT: 239 LBS | TEMPERATURE: 99.6 F

## 2020-10-16 DIAGNOSIS — Z20.828 EXPOSURE TO VIRAL DISEASE: Primary | ICD-10-CM

## 2020-10-16 DIAGNOSIS — R50.9 FEVER, UNSPECIFIED FEVER CAUSE: ICD-10-CM

## 2020-10-16 DIAGNOSIS — R52 BODY ACHES: ICD-10-CM

## 2020-10-16 DIAGNOSIS — B34.9 NONSPECIFIC SYNDROME SUGGESTIVE OF VIRAL ILLNESS: ICD-10-CM

## 2020-10-16 LAB
FLUAV+FLUBV AG SPEC QL IA: NEGATIVE
INT CON NEG: NEGATIVE
INT CON POS: POSITIVE

## 2020-10-16 PROCEDURE — 87804 INFLUENZA ASSAY W/OPTIC: CPT | Mod: CS | Performed by: PHYSICIAN ASSISTANT

## 2020-10-16 PROCEDURE — 99214 OFFICE O/P EST MOD 30 MIN: CPT | Mod: CS | Performed by: PHYSICIAN ASSISTANT

## 2020-10-16 ASSESSMENT — ENCOUNTER SYMPTOMS
VOMITING: 0
EYE PAIN: 0
MYALGIAS: 1
ABDOMINAL PAIN: 0
DIZZINESS: 1
NAUSEA: 0
FEVER: 1
COUGH: 1
SPUTUM PRODUCTION: 0
PALPITATIONS: 0
SHORTNESS OF BREATH: 0
HEADACHES: 1
DIARRHEA: 0
CONSTIPATION: 0
CHILLS: 1
SORE THROAT: 0

## 2020-10-16 ASSESSMENT — FIBROSIS 4 INDEX: FIB4 SCORE: 0.62

## 2020-10-16 NOTE — LETTER
October 16, 2020    To Whom It May Concern:         This is confirmation that Christine Miguel attended her scheduled appointment with Mauricio Stout P.A.-C. on 10/16/20. Your employee was seen in our clinic today.  A concern for COVID-19 has been identified and testing is in progress.     We are asking you to excuse absences while following self-isolation protocol per Center for Disease Control (CDC) guidelines.  Your employee will be able to access test results through our electronic delivery system called [employer].     If the results of testing are negative, and once there has been no fever (temperature >100.4 F) for at least 72 hours without treatment, and no vomiting or diarrhea for at least 48 hours, then return to work is approved.    If the results of testing are positive then your employee will be contacted by the [facility] or [facility] for further instructions on duration of self-isolation and return to work protocol. In general, this will also follow the CDC guidelines with a minimum of 10 days from the onset of symptoms and without fever, vomiting, or diarrhea as above.     In general, repeat testing is not necessary and not offered through our [facility].     This is the only note that will be provided from [facility] for this visit.  Your employee will require an appointment with a primary care provider if FMLA or disability forms are required.         If you have any questions please do not hesitate to call me at the phone number listed below.    Sincerely,          Mauricio Stout P.A.-C.  [phone]

## 2020-10-16 NOTE — PROGRESS NOTES
"Subjective:   Christine Ita Miguel is a 39 y.o. female who presents for Headache (body aches, loss of taste and smell) and Otalgia (both ears)      This is a 39-year-old female presents to urgent care complaining of 5 days of worsening symptoms which began with body aches, loss of taste and smell, bilateral ear pressure and otalgia as well as generalized malaise and fatigue.  She has been taking over-the-counter anti-inflammatories which is helped with her body aches and symptoms.  She describes a waxing and waning time course.  She had some Tylenol ibuprofen today which would likely augment her temperature.  She has been out of work.  She does note that her close friend who spends numerous hours at her house and recent memory tested positive for COVID-19      Review of Systems   Constitutional: Positive for chills, fever and malaise/fatigue.   HENT: Positive for ear pain. Negative for congestion, hearing loss, sore throat and tinnitus.    Eyes: Negative for pain.   Respiratory: Positive for cough. Negative for sputum production and shortness of breath.    Cardiovascular: Negative for chest pain and palpitations.   Gastrointestinal: Negative for abdominal pain, constipation, diarrhea, nausea and vomiting.   Genitourinary: Negative for dysuria.   Musculoskeletal: Positive for myalgias.   Skin: Negative for rash.   Neurological: Positive for dizziness and headaches.       Medications, Allergies, and current problem list reviewed today in Epic.     Objective:     /86 (Patient Position: Sitting)   Pulse 90   Temp 37.6 °C (99.6 °F) (Temporal)   Resp 16   Ht 1.676 m (5' 6\")   Wt 108.4 kg (239 lb)   SpO2 97%     Physical Exam  Vitals signs reviewed.   Constitutional:       General: She is not in acute distress.     Appearance: Normal appearance. She is ill-appearing. She is not toxic-appearing.   HENT:      Head: Normocephalic and atraumatic.      Right Ear: External ear normal.      Left Ear: External " ear normal.      Nose: Congestion and rhinorrhea present.      Mouth/Throat:      Mouth: Mucous membranes are moist.      Pharynx: Oropharynx is clear. Posterior oropharyngeal erythema present. No oropharyngeal exudate.   Eyes:      Conjunctiva/sclera: Conjunctivae normal.      Pupils: Pupils are equal, round, and reactive to light.   Cardiovascular:      Rate and Rhythm: Normal rate and regular rhythm.   Pulmonary:      Effort: Pulmonary effort is normal.      Breath sounds: Normal breath sounds.   Abdominal:      Tenderness: There is no abdominal tenderness. There is no guarding or rebound.   Musculoskeletal:      Right lower leg: No edema.      Left lower leg: No edema.   Lymphadenopathy:      Cervical: No cervical adenopathy.   Skin:     General: Skin is warm and dry.      Capillary Refill: Capillary refill takes less than 2 seconds.      Findings: No rash.   Neurological:      Mental Status: She is alert and oriented to person, place, and time.         Lab Results/POC Test Results     FLU NEGATIVE             Assessment/Plan:     Diagnosis and associated orders:     1. Exposure to viral disease     2. Fever, unspecified fever cause     3. Body aches     4. Nonspecific syndrome suggestive of viral illness        Comments/MDM:     Due to insurance issues this patient's Covid swab would be sent to [facility] labs resulting in a 7 to 21-day turnaround time to get results.  As a result of this I have encouraged the patient to follow-up with the health department who can offer next day testing and typically will get results in 3 to 5 days.  I discussed this with the patient and ensured that they realize the importance of follow-up.  Patient's vital signs are reassuring and they appear hemodynamically stable and do not require higher level care at this time  I discussed self isolation and provided printed instructions (if applicable)  I discussed ER precautions and provided printed instructions (if applicable)  I discussed  returning to clinic for mild symptoms or reevaluation  I educated the patient on possibility of a false-negative test and indications for repeat testing  I instructed the patient to try to follow up with their PCP (if applicable) for follow up care  If requested, I provided the patient with a work note to provide to their employer or school regarding returning to work and discontinuation of self isolation.  All questions were answered and patient demonstrated verbal understanding of above.  I followed all reasonable PPE precautions during this encounter including but not limited to use of an N95 mask, gloves, and gown if indicated.    •          Differential diagnosis, natural history, supportive care, and indications for immediate follow-up discussed.    Advised the patient to follow-up with the primary care physician for recheck, reevaluation, and consideration of further management.    Please note that this dictation was created using voice recognition software. I have made a reasonable attempt to correct obvious errors, but I expect that there are errors of grammar and possibly content that I did not discover before finalizing the note.    This note was electronically signed by Mauricio Stout PA-C

## 2020-10-16 NOTE — PATIENT INSTRUCTIONS
Viral syndrome and Novel Coronavirus (COVID-19)       You have a viral syndrome which may include symptoms like muscle aches, fevers, chills, runny nose, cough, sneezing, sore throat, vomiting or diarrhea.  One of the potential viruses you may have is SARSCoV-2, the virus that causes COVID-19, also known as the novel coronavirus.  You may be just as likely to have a different viral infection such as the common cold or flu.  Most patients with COVID -19 have mild symptoms and recover on their own. Resting, staying hydrated, and sleeping are typically helpful.  As of today's visit, you are well enough to go home and treat your symptoms with oral fluids, medicines for fevers, cough, pain, etc.        COVID 19 testing is not performed on most people with mild symptoms who are being discharged from the Emergency Department or Clinic.      If COVID 19 testing was performed, the results will not be available for up to 4 days.  Please DO NOT CONTACT THE EMERGENCY DEPARTMENT OR CLINIC FOR RESULTS OF THIS TEST.       You will be contacted by a member of the [facility] team with your results and for further discussion.       Please follow the precautions below:      • Stay home except to get medical care.   • As advised by the Centers for Disease Control and Prevention (CDC), we recommend you stay in your home and minimize contact with others to avoid spreading this infection.   • The elderly or anyone with significant medical issues may have more severe symptoms from this infection. We recommend separation, also known as self-isolation, for at least 7 days after your first day of symptoms and several more after that if you are still sick. The most important action is wait for at least  a week and several more days after you feel well before returning to you regular activities, work or school. If you become sicker, like difficulty breathing, chest pain, you are unable to eat or drink enough, or have severe vomiting,  "diarrhea or weakness, you may need to return to the Emergency Department or contact your clinic provider for re-evaluation.    • You should restrict activities outside your home, except for getting medical care. Do not go to work, school, or public areas. Avoid using public transportation, ride-sharing, or taxis.   • Separate yourself from other people and animals in your home.   • As much as possible, you should stay in a specific room and away from other people in your home. Also, you should use a separate bathroom, if available.   • Avoid sharing personal household items. You should not share dishes, drinking glasses, cups, eating utensils, towels, or bedding with other people in your home. After using these items, they should be washed thoroughly with soap and water.         Precautions continued:    • Clean all \"high-touch\" surfaces every day high touch surfaces include counters, tabletops, doorknobs, bathroom fixtures, toilets, phones, keyboards, tablets, and bedside tables. Also, clean any surfaces that may have blood, stool, or body fluids on them. Use a household cleaning   spray or wipe, according to the label instructions. Labels contain instructions for safe and effective use of the cleaning product including precautions you should take when applying the product, such as wearing gloves and making sure you have good ventilation during use of the product.   • Clean your hands often. Wash your hands often with soap and water for at least 20 seconds. If soap and water are not available, clean your hands with an alcohol-based hand  that contains at least 60% alcohol, covering all surfaces of your hands and rubbing them together until they feel dry. Soap and water should be used preferentially if hands are visibly dirty. Avoid touching your eyes, nose, and mouth with unwashed hands.   • Cover your coughs and sneezes    • Cover your mouth and nose with a tissue when you cough or sneeze   • Throw used " tissues in a lined trash can; immediately wash your hands with soap and water for at least 20 seconds or clean your hands with an alcohol-based hand  that contains at least 60 to 95% alcohol, covering all surfaces of your hands and rubbing them together until they feel dry. Soap and water should be used preferentially if hands are visibly dirty.     • When seeking care at a healthcare facility:    · Seek prompt medical attention if your illness is worsening (e.g., difficulty breathing).    · Put on a facemask before you enter the facility.    · These steps will help the healthcare provider's office to keep other people in the office or waiting room from getting infected or exposed.    · If possible, put on a facemask before emergency medical services arrive.      Please see the resources below for more information.      CDC Corona Website https://www.cdc.gov/coronavirus/2019-ncov/index.html    General Information https://www.cdc.gov/coronavirus/2019-ncov/faq.html      Located within [facility]: [phone]     [facility] Line [phone]

## 2021-08-18 ENCOUNTER — OFFICE VISIT (OUTPATIENT)
Dept: URGENT CARE | Facility: CLINIC | Age: 41
End: 2021-08-18
Payer: MEDICAID

## 2021-08-18 VITALS
TEMPERATURE: 97.6 F | SYSTOLIC BLOOD PRESSURE: 124 MMHG | WEIGHT: 220 LBS | HEART RATE: 92 BPM | HEIGHT: 63 IN | DIASTOLIC BLOOD PRESSURE: 86 MMHG | RESPIRATION RATE: 14 BRPM | OXYGEN SATURATION: 96 % | BODY MASS INDEX: 38.98 KG/M2

## 2021-08-18 DIAGNOSIS — L03.011 PARONYCHIA OF FINGER OF RIGHT HAND: ICD-10-CM

## 2021-08-18 PROCEDURE — 99213 OFFICE O/P EST LOW 20 MIN: CPT | Performed by: PHYSICIAN ASSISTANT

## 2021-08-18 RX ORDER — SULFAMETHOXAZOLE AND TRIMETHOPRIM 800; 160 MG/1; MG/1
1 TABLET ORAL 2 TIMES DAILY
Qty: 14 TABLET | Refills: 0 | Status: SHIPPED | OUTPATIENT
Start: 2021-08-18 | End: 2021-08-25

## 2021-08-18 ASSESSMENT — ENCOUNTER SYMPTOMS
HEADACHES: 0
DIZZINESS: 0
ABDOMINAL PAIN: 0
TINGLING: 0
DIARRHEA: 0
PALPITATIONS: 0
NAUSEA: 0
FEVER: 0
CHILLS: 0
VOMITING: 0
MYALGIAS: 1

## 2021-08-18 ASSESSMENT — FIBROSIS 4 INDEX: FIB4 SCORE: 0.63

## 2021-08-18 NOTE — PROGRESS NOTES
Subjective:   Christine Miguel is a 40 y.o. female who presents for Digit Pain (x 2 weeks on R hand middle finger.  Pt. is having pain, numbness and discharge.  )      HPI:  This is a very pleasant 40-year-old female presenting to the clinic with right middle finger pain x2 weeks.  Patient states she was scraping wax off a table when she may have introduced some bacteria to her right middle finger. She has had continued pain and redness since this incident.  Pain has greatly increased over the last few days.  States is extremely tender to the touch.  She has noticed a small amount of purulent discharge expressed from the lateral nail fold.  She also has some numbness to the distal pulp of the right middle finger.  Denies any loss of range of motion or pain with finger movement.  Has not tried any OTC medications at this time for symptoms. Denies any ascending redness, fevers, chills or myalgias.    Review of Systems   Constitutional: Negative for chills, fever and malaise/fatigue.   Cardiovascular: Negative for chest pain and palpitations.   Gastrointestinal: Negative for abdominal pain, diarrhea, nausea and vomiting.   Musculoskeletal: Positive for myalgias. Negative for joint pain.   Skin:        Redness to the right middle finger.  Purulent discharge present.   Neurological: Negative for dizziness, tingling and headaches.       Medications:    • Acetaminophen Caps  • HYDROcodone-acetaminophen Tabs  • sulfamethoxazole-trimethoprim    Allergies: Latex    Problem List: Christine Miguel does not have any pertinent problems on file.    Surgical History:  Past Surgical History:   Procedure Laterality Date   • IRRIGATION & DEBRIDEMENT ORTHO  9/7/2014    Performed by El Gomez M.D. at SURGERY [facility]   • IRRIGATION & DEBRIDEMENT NEURO  9/5/2014    Performed by El Gomez M.D. at SURGERY [facility]   • LUMBAR LAMINECTOMY DISKECTOMY  8/27/2014    Performed by  "El Gomez M.D. at SURGERY [facility] ORS   • INJ,EPIDURAL/LUMB/SAC SINGLE  6/5/2014    Performed by Alexis Cornell M.D. at SURGERY [facility] ORS   • TONSILLECTOMY  5/14/2013    Performed by Aaron Hutton M.D. at SURGERY SAME DAY [facility] ORS   • NASAL SEPTAL RECONSTRUCTION  5/14/2013    Performed by Aaron Hutton M.D. at SURGERY SAME DAY [facility] ORS   • TURBINOPLASTY  5/14/2013    Performed by Aaron Hutton M.D. at SURGERY SAME DAY [facility] ORS       Past Social Hx: Christine Miguel  reports that she has never smoked. She has never used smokeless tobacco. She reports that she does not drink alcohol and does not use drugs.     Past Family Hx:  Christine Miguel family history is not on file.     Problem list, medications, and allergies reviewed by myself today in Epic.     Objective:     /86   Pulse 92   Temp 36.4 °C (97.6 °F) (Temporal)   Resp 14   Ht 1.6 m (5' 3\")   Wt 99.8 kg (220 lb)   SpO2 96%   BMI 38.97 kg/m²     Physical Exam  Constitutional:       General: She is not in acute distress.     Appearance: Normal appearance. She is not ill-appearing, toxic-appearing or diaphoretic.   Eyes:      Conjunctiva/sclera: Conjunctivae normal.   Cardiovascular:      Rate and Rhythm: Normal rate and regular rhythm.   Musculoskeletal:      Cervical back: Normal range of motion.      Comments: Patient has full range of motion of the right hand and all digits.  Sensation intact.   strength 5/5.   Skin:     Capillary Refill: Capillary refill takes less than 2 seconds.      Comments: Redness and swelling to the lateral nail folds of the right middle finger.  No active drainage present.  Tenderness to palpation over the lateral nail folds.  Mild tenderness to palpation over the distal pulp.  No obvious signs of felon formation.  No underlying fluctuance.  No ascending redness.  No tenderness to palpation along the flexor tendon sheath.   Neurological:      " Mental Status: She is alert.           Assessment/Plan:     Comments/MDM:     • Signs and symptoms consistent with paronychia.  Recommended warm soaks and Epson salt gradually retracting nail fold.  We will place the patient on a course of Bactrim for underlying infection.  There is no area of fluctuance present on exam.  I&D not indicated at this time.  No signs of felon formation.  No tenderness to the flexor tendon sheath.  Strict return precautions discussed.  Call with questions or concerns.     Diagnosis and associated orders:     1. Paronychia of finger of right hand  sulfamethoxazole-trimethoprim (BACTRIM DS) 800-160 MG tablet            Differential diagnosis, natural history, supportive care, and indications for immediate follow-up discussed.    Advised the patient to follow-up with the primary care physician for recheck, reevaluation, and consideration of further management.    Please note that this dictation was created using voice recognition software. I have made reasonable attempt to correct obvious errors, but I expect that there are errors of grammar and possibly content that I did not discover before finalizing the note.    This note was electronically signed by JAYCE Bravo PA-C

## 2021-08-29 ENCOUNTER — HOSPITAL ENCOUNTER (EMERGENCY)
Facility: MEDICAL CENTER | Age: 41
End: 2021-08-29
Attending: EMERGENCY MEDICINE
Payer: MEDICAID

## 2021-08-29 ENCOUNTER — APPOINTMENT (OUTPATIENT)
Dept: RADIOLOGY | Facility: MEDICAL CENTER | Age: 41
End: 2021-08-29
Attending: EMERGENCY MEDICINE
Payer: MEDICAID

## 2021-08-29 VITALS
DIASTOLIC BLOOD PRESSURE: 78 MMHG | OXYGEN SATURATION: 96 % | HEART RATE: 89 BPM | BODY MASS INDEX: 39.68 KG/M2 | TEMPERATURE: 98 F | SYSTOLIC BLOOD PRESSURE: 135 MMHG | RESPIRATION RATE: 18 BRPM | WEIGHT: 223.99 LBS

## 2021-08-29 DIAGNOSIS — I99.8 ISCHEMIA OF FINGER: ICD-10-CM

## 2021-08-29 LAB
ALBUMIN SERPL BCP-MCNC: 4.2 G/DL (ref 3.2–4.9)
ALBUMIN/GLOB SERPL: 1.9 G/DL
ALP SERPL-CCNC: 64 U/L (ref 30–99)
ALT SERPL-CCNC: 15 U/L (ref 2–50)
ANION GAP SERPL CALC-SCNC: 12 MMOL/L (ref 7–16)
AST SERPL-CCNC: 15 U/L (ref 12–45)
BASOPHILS # BLD AUTO: 0.4 % (ref 0–1.8)
BASOPHILS # BLD: 0.03 K/UL (ref 0–0.12)
BILIRUB SERPL-MCNC: 0.4 MG/DL (ref 0.1–1.5)
BUN SERPL-MCNC: 21 MG/DL (ref 8–22)
CALCIUM SERPL-MCNC: 8.9 MG/DL (ref 8.4–10.2)
CHLORIDE SERPL-SCNC: 104 MMOL/L (ref 96–112)
CO2 SERPL-SCNC: 22 MMOL/L (ref 20–33)
CREAT SERPL-MCNC: 0.87 MG/DL (ref 0.5–1.4)
EOSINOPHIL # BLD AUTO: 0.15 K/UL (ref 0–0.51)
EOSINOPHIL NFR BLD: 2.1 % (ref 0–6.9)
ERYTHROCYTE [DISTWIDTH] IN BLOOD BY AUTOMATED COUNT: 42.7 FL (ref 35.9–50)
GLOBULIN SER CALC-MCNC: 2.2 G/DL (ref 1.9–3.5)
GLUCOSE SERPL-MCNC: 107 MG/DL (ref 65–99)
HCT VFR BLD AUTO: 40.8 % (ref 37–47)
HGB BLD-MCNC: 13.7 G/DL (ref 12–16)
IMM GRANULOCYTES # BLD AUTO: 0.01 K/UL (ref 0–0.11)
IMM GRANULOCYTES NFR BLD AUTO: 0.1 % (ref 0–0.9)
LACTATE BLD-SCNC: 1.3 MMOL/L (ref 0.5–2)
LYMPHOCYTES # BLD AUTO: 2.58 K/UL (ref 1–4.8)
LYMPHOCYTES NFR BLD: 35.3 % (ref 22–41)
MCH RBC QN AUTO: 30.7 PG (ref 27–33)
MCHC RBC AUTO-ENTMCNC: 33.6 G/DL (ref 33.6–35)
MCV RBC AUTO: 91.5 FL (ref 81.4–97.8)
MONOCYTES # BLD AUTO: 0.63 K/UL (ref 0–0.85)
MONOCYTES NFR BLD AUTO: 8.6 % (ref 0–13.4)
NEUTROPHILS # BLD AUTO: 3.91 K/UL (ref 2–7.15)
NEUTROPHILS NFR BLD: 53.5 % (ref 44–72)
NRBC # BLD AUTO: 0 K/UL
NRBC BLD-RTO: 0 /100 WBC
PLATELET # BLD AUTO: 188 K/UL (ref 164–446)
PMV BLD AUTO: 9.1 FL (ref 9–12.9)
POTASSIUM SERPL-SCNC: 4.5 MMOL/L (ref 3.6–5.5)
PROT SERPL-MCNC: 6.4 G/DL (ref 6–8.2)
RBC # BLD AUTO: 4.46 M/UL (ref 4.2–5.4)
SODIUM SERPL-SCNC: 138 MMOL/L (ref 135–145)
WBC # BLD AUTO: 7.3 K/UL (ref 4.8–10.8)

## 2021-08-29 PROCEDURE — 700101 HCHG RX REV CODE 250: Performed by: EMERGENCY MEDICINE

## 2021-08-29 PROCEDURE — 303977 HCHG I & D

## 2021-08-29 PROCEDURE — 93005 ELECTROCARDIOGRAM TRACING: CPT | Performed by: EMERGENCY MEDICINE

## 2021-08-29 PROCEDURE — 80053 COMPREHEN METABOLIC PANEL: CPT

## 2021-08-29 PROCEDURE — 700117 HCHG RX CONTRAST REV CODE 255: Performed by: EMERGENCY MEDICINE

## 2021-08-29 PROCEDURE — 85025 COMPLETE CBC W/AUTO DIFF WBC: CPT

## 2021-08-29 PROCEDURE — 93922 UPR/L XTREMITY ART 2 LEVELS: CPT | Mod: 52,RT

## 2021-08-29 PROCEDURE — 71275 CT ANGIOGRAPHY CHEST: CPT

## 2021-08-29 PROCEDURE — 83605 ASSAY OF LACTIC ACID: CPT

## 2021-08-29 PROCEDURE — 99284 EMERGENCY DEPT VISIT MOD MDM: CPT

## 2021-08-29 RX ORDER — HYDROCODONE BITARTRATE AND ACETAMINOPHEN 5; 325 MG/1; MG/1
1 TABLET ORAL ONCE
Status: DISCONTINUED | OUTPATIENT
Start: 2021-08-29 | End: 2021-08-29 | Stop reason: HOSPADM

## 2021-08-29 RX ORDER — TRAMADOL HYDROCHLORIDE 50 MG/1
50 TABLET ORAL EVERY 4 HOURS PRN
Qty: 12 TABLET | Refills: 0 | Status: SHIPPED | OUTPATIENT
Start: 2021-08-29 | End: 2021-09-02

## 2021-08-29 RX ORDER — ASPIRIN 81 MG/1
81 TABLET, CHEWABLE ORAL DAILY
Qty: 100 TABLET | Refills: 0 | Status: SHIPPED | OUTPATIENT
Start: 2021-08-29 | End: 2022-04-28

## 2021-08-29 RX ORDER — CEPHALEXIN 500 MG/1
500 CAPSULE ORAL 4 TIMES DAILY
Qty: 40 CAPSULE | Refills: 0 | Status: SHIPPED | OUTPATIENT
Start: 2021-08-29 | End: 2022-06-27

## 2021-08-29 RX ORDER — SULFAMETHOXAZOLE AND TRIMETHOPRIM 800; 160 MG/1; MG/1
1 TABLET ORAL 2 TIMES DAILY
Status: SHIPPED | COMMUNITY
End: 2022-04-28

## 2021-08-29 RX ORDER — LIDOCAINE HYDROCHLORIDE 20 MG/ML
20 INJECTION, SOLUTION INFILTRATION; PERINEURAL ONCE
Status: COMPLETED | OUTPATIENT
Start: 2021-08-29 | End: 2021-08-29

## 2021-08-29 RX ADMIN — IOHEXOL 100 ML: 350 INJECTION, SOLUTION INTRAVENOUS at 06:52

## 2021-08-29 RX ADMIN — LIDOCAINE HYDROCHLORIDE 20 ML: 20 INJECTION, SOLUTION INFILTRATION; PERINEURAL at 05:00

## 2021-08-29 ASSESSMENT — PAIN DESCRIPTION - PAIN TYPE
TYPE: ACUTE PAIN
TYPE: ACUTE PAIN

## 2021-08-29 ASSESSMENT — FIBROSIS 4 INDEX: FIB4 SCORE: 0.63

## 2021-08-29 NOTE — ED TRIAGE NOTES
Chief Complaint   Patient presents with   • Digit Pain     Patient went to urgent care 7 days ago due to R third finger injury when wax went under finger nail. Patient was given ABX and took full course. Patient presents today with 10/10 pain to digit, tip is swollen with old drainage.     /99   Pulse 87   Temp 36.7 °C (98 °F) (Oral)   Resp 18   Wt 102 kg (223 lb 15.8 oz)   SpO2 100%

## 2021-08-29 NOTE — ED PROVIDER NOTES
ED Provider Note    CHIEF COMPLAINT  Chief Complaint   Patient presents with   • Digit Pain     Patient went to urgent care 7 days ago due to R third finger injury when wax went under finger nail. Patient was given ABX and took full course. Patient presents today with 10/10 pain to digit, tip is swollen with old drainage.       HPI  Christine Ita Miguel is a 40 y.o. female who presents with chief complaint of ongoing pain in her right third digit.  Patient's reports that she has had some pain there for the last few weeks.  She had some wax get stuck under her fingernail, that area then became red a few weeks later, she was started on Bactrim and finished her antibiotics yesterday but she reports her pain never improved.  She reports that a scab lesion began shortly before starting the Bactrim on the tip of her finger, she does not recall any trauma.  Patient denies any cold sores or vaginal lesions.    REVIEW OF SYSTEMS  ROS  See HPI for further details. All other systems are negative.     PAST MEDICAL HISTORY   has a past medical history of Acid reflux, Anesthesia, Asthma, Heart burn, Indigestion, Pain (08/22/14), and Unspecified urinary incontinence.    SOCIAL HISTORY  Social History     Tobacco Use   • Smoking status: Never Smoker   • Smokeless tobacco: Never Used   • Tobacco comment: quit a year ago, smoked for 4 years a pack a day   Vaping Use   • Vaping Use: Never used   Substance and Sexual Activity   • Alcohol use: No   • Drug use: No   • Sexual activity: Not on file       SURGICAL HISTORY   has a past surgical history that includes tonsillectomy (5/14/2013); nasal septal reconstruction (5/14/2013); turbinoplasty (5/14/2013); inj,epidural/lumb/sac single (6/5/2014); lumbar laminectomy diskectomy (8/27/2014); irrigation & debridement neuro (9/5/2014); and irrigation & debridement ortho (9/7/2014).    CURRENT MEDICATIONS  Home Medications     Reviewed by Li Bello R.N. (Registered Nurse) on  08/29/21 at 0436  Med List Status: Complete   Medication Last Dose Status   Acetaminophen (TYLENOL) 325 MG Cap  Active   hydrocodone-acetaminophen (NORCO) 5-325 MG Tab per tablet  Active   sulfamethoxazole-trimethoprim (BACTRIM DS) 800-160 MG tablet 8/28/2021 Active                ALLERGIES  Allergies   Allergen Reactions   • Latex Rash and Itching       PHYSICAL EXAM  Vitals:    08/29/21 0423   BP: 160/99   Pulse: 87   Resp: 18   Temp: 36.7 °C (98 °F)   SpO2: 100%       Physical Exam  Constitutional:       Appearance: She is well-developed.   HENT:      Head: Normocephalic and atraumatic.   Eyes:      Conjunctiva/sclera: Conjunctivae normal.   Pulmonary:      Effort: Pulmonary effort is normal.   Musculoskeletal:      Cervical back: Normal range of motion and neck supple.      Comments: Third digit on right hand, distal phalanx with some tenderness of the finger pad, there is some mild erythema here, there is an associated small scabbed area at the tip of patient's finger   Skin:     General: Skin is warm.   Neurological:      Mental Status: She is alert and oriented to person, place, and time.   Psychiatric:         Behavior: Behavior normal.       Incision and drainage  Patient verbally consented for incision and drainage of felon on her right third digit  Digit anesthetized using a digital block, 2% lidocaine  Bilateral incisions were made on ulnar and radial aspect of the distal phalanx, a apical incision was also made, using a sterile probe I attempted to break any loculations, despite this no exudate was expressed    COURSE & MEDICAL DECISION MAKING  Pertinent Labs & Imaging studies reviewed. (See chart for details)    Patient here with felon of third digit of her right hand.  She has failed outpatient antibiotics.  She is very well-appearing otherwise without any fevers and has very reassuring vitals.  Cap refill is mildly decreased, and patient reports a pulse oximeter does not read on her affected finger  when she uses her at home pulse ox.  This could be secondary to infection versus possible vascular problem, will perform bedside drainage to see if there is anything drainable  I was unable to drain any exudate, suspect possible vascular etiology.  Will check arterial duplex and EKG but patient appears to be in NSR on exam.  Her distal pulses are great, suspect possible isolated distal digital ischemia.  Patient does not have any history of HSV or herpetic lesions, Aubrey is possible though less likely.  I will discussed the case with vascular surgery  Vascular surgery agrees with the arterial duplex, they also recommend checking a CTA of patient's subclavian artery as often embolic events arise from pathology here.  I am awaiting EKG.  Work-up is ongoing, patient will be signed out to my oncoming partner for further work-up and final disposition.  I suspect patient will be able to go home with vascular surgery follow-up as this appears very chronic in nature, as patient has had pain for weeks.  Vascular surgery, Dr. Torres which was comfortable with this when I spoke with him.    The patient will return for worsening symptoms and is stable at the time of discharge. The patient verbalizes understanding and will comply.    FINAL IMPRESSION  1.  Distal phalanx ischemia         Electronically signed by: Seth Gustafson M.D., 8/29/2021 4:47 AM

## 2021-08-29 NOTE — ED PROVIDER NOTES
ED Provider Note    8:19 AM  Sign out from Dr. Gustafson. Pt awaiting ct and u/s of right upper ext.      CT-CTA CHEST WITH & W/O-POST PROCESS   Final Result      1.  No thoracic aortic aneurysm or dissection.   2.  Subclavian arteries are normal in caliber and patent.  LEFT subclavian artery is partially secured.   3.  Probable medial RIGHT lower lobe atelectasis.               US-WBI SINGLE LEVEL UNILAT RIGHT             I spoke to Dr. Torres over [employer].  We discussed the pts ct scan, and the pts ppg of the digits, absent flow of 3,4,5 and diminished 1,2. He is ok with follow up as an outpatient, and nothing further.  No meds.

## 2021-08-29 NOTE — DISCHARGE INSTRUCTIONS
You have some diminished blood flow to your right third finger.  I will give you another course of antibiotics to prophylax against another infection and started on aspirin.  You will have to follow-up closely with vascular surgery.  Please take aspirin daily.

## 2021-08-29 NOTE — ED NOTES
Dressing by tech. Discharged to home with instructions to call vascular surgery on Monday. Patient to keep finger covered except when in shower. Prescriptions sent to pharmacy.

## 2021-09-04 ENCOUNTER — HOSPITAL ENCOUNTER (EMERGENCY)
Facility: MEDICAL CENTER | Age: 41
End: 2021-09-05
Attending: EMERGENCY MEDICINE
Payer: MEDICAID

## 2021-09-04 DIAGNOSIS — I99.8 ISCHEMIC FINGER: ICD-10-CM

## 2021-09-04 DIAGNOSIS — M79.644 PAIN OF FINGER OF RIGHT HAND: ICD-10-CM

## 2021-09-04 ASSESSMENT — FIBROSIS 4 INDEX: FIB4 SCORE: 0.82

## 2021-09-04 ASSESSMENT — PAIN DESCRIPTION - DESCRIPTORS: DESCRIPTORS: ACHING

## 2021-09-05 VITALS
TEMPERATURE: 98 F | BODY MASS INDEX: 38.01 KG/M2 | DIASTOLIC BLOOD PRESSURE: 65 MMHG | SYSTOLIC BLOOD PRESSURE: 127 MMHG | WEIGHT: 222.66 LBS | HEART RATE: 89 BPM | RESPIRATION RATE: 20 BRPM | HEIGHT: 64 IN | OXYGEN SATURATION: 100 %

## 2021-09-05 LAB
EKG IMPRESSION: NORMAL
EKG IMPRESSION: NORMAL

## 2021-09-05 PROCEDURE — 93005 ELECTROCARDIOGRAM TRACING: CPT | Performed by: EMERGENCY MEDICINE

## 2021-09-05 PROCEDURE — 700102 HCHG RX REV CODE 250 W/ 637 OVERRIDE(OP): Performed by: EMERGENCY MEDICINE

## 2021-09-05 PROCEDURE — 99284 EMERGENCY DEPT VISIT MOD MDM: CPT

## 2021-09-05 PROCEDURE — A9270 NON-COVERED ITEM OR SERVICE: HCPCS | Performed by: EMERGENCY MEDICINE

## 2021-09-05 RX ORDER — OXYCODONE HYDROCHLORIDE 5 MG/1
5 TABLET ORAL EVERY 6 HOURS PRN
Qty: 10 TABLET | Refills: 0 | Status: SHIPPED | OUTPATIENT
Start: 2021-09-05 | End: 2021-09-08

## 2021-09-05 RX ORDER — CLOPIDOGREL 300 MG/1
300 TABLET, FILM COATED ORAL ONCE
Status: COMPLETED | OUTPATIENT
Start: 2021-09-05 | End: 2021-09-05

## 2021-09-05 RX ORDER — CLOPIDOGREL BISULFATE 75 MG/1
75 TABLET ORAL DAILY
Qty: 30 TABLET | Refills: 0 | Status: SHIPPED | OUTPATIENT
Start: 2021-09-05 | End: 2022-04-28

## 2021-09-05 RX ORDER — OXYCODONE HYDROCHLORIDE AND ACETAMINOPHEN 5; 325 MG/1; MG/1
1 TABLET ORAL ONCE
Status: COMPLETED | OUTPATIENT
Start: 2021-09-05 | End: 2021-09-05

## 2021-09-05 RX ADMIN — CLOPIDOGREL BISULFATE 300 MG: 300 TABLET, FILM COATED ORAL at 01:49

## 2021-09-05 RX ADMIN — OXYCODONE HYDROCHLORIDE AND ACETAMINOPHEN 1 TABLET: 5; 325 TABLET ORAL at 01:49

## 2021-09-05 NOTE — DISCHARGE INSTRUCTIONS
You are seen in the emergency department for painful finger.  You have reduced blood flow to your finger and are being started on a second blood thinning medicine to help with this.    Please follow with vascular surgery.    You are being discharged with instructions to take acetaminophen (Tylenol). If you take other over-the-counter medications, please check the ingredient list to ensure that they do not contain acetaminophen. Acetaminophen is a common medication included in cold medications. Please do not exceed more than 3000 mg of acetaminophen in a 24-hour period by all sources or severe liver failure can occur.    You are being sent home with an opioid pain medication. This medication causes sedation and you should not drive or operate machinery while taking it. You should not use alcohol or recreational drugs while taking this medication. Side effects of this medication can include itching, nausea, and constipation.    There is a risk of addiction to this medication and you should only take the smallest amount necessary to control your symptoms. You should use other non-opioid pain medications for your baseline pain and only use this medication if necessary.     There are many alternatives to pain medications, such as massage, acupuncture, and meditation. Check with your health insurance regarding their coverage of these complementary treatments.    We are not able to refill opioid or other controlled substance prescriptions in the emergency department. If you are having ongoing pain that requires opioids, please see your primary care provider or specialist for further prescriptions.       Please return to the emergency department seek medical attention if you develop:  Chest pain, shortness of breath, coughing up blood, any other new or concerning findings

## 2021-09-05 NOTE — ED NOTES
Discharge teaching and paperwork provided regarding ischemic finger and all questions/concerns answered. VSS, vascular assessment stable. Given information regarding home care and reasons to follow up with ED or primary MD. Patient provided with oxycodone & plavix Rx. Pt discharged & ambulatory out of the ED.

## 2021-09-05 NOTE — ED TRIAGE NOTES
"Chief Complaint   Patient presents with   • Digit Pain       39 yo F to triage for above complaint. Patient was seens on 08/29 for middle finger on the right hand that has become infected. Pt states intially it was from a wax from a candle that got stuck under her nail. Pt was given Bactrim to completed and told to follow up with vascular surgery, but unable to be seen. Per chart review MD was concerned for distal phalanx ischemia, pt has a CTA and arterial duplex completed while in the ER. Pt reports numbness up right arm at this time. Distal pulses are present. Middle finger is swollen, darkness on the top of finer Pt feels that her symptoms are worse and wanting to get scene. GCS 15.     Pt is alert and oriented, speaking in full sentences, follows commands and responds appropriately to questions. NAD. Resp are even and unlabored.      Pt placed in lobby. Pt educated on triage process. Pt encouraged to alert staff for any changes.     Patient and staff wearing appropriate PPE    BP (!) 179/110   Pulse 94   Temp 36.8 °C (98.2 °F) (Temporal)   Resp 16   Ht 1.626 m (5' 4\")   Wt 101 kg (222 lb 10.6 oz)   LMP 07/20/2021   SpO2 100%   BMI 38.22 kg/m²   "

## 2021-09-05 NOTE — ED PROVIDER NOTES
"ED Provider Note      Means of Arrival: Private Vehicle  History obtained from: Patient, medical record    CHIEF COMPLAINT  Chief Complaint   Patient presents with   • Digit Pain       HPI  Christine Ita Miguel is a 40 y.o. female who presents with ongoing finger pain of her right third and fourth finger.  She reports that approximately a month ago she has some hot wax on her fingernail, but over the past approximately 10 days it has worsened.  She received antibiotics for this and was seen at [facility] on , and advised to follow-up with vascular surgery but has been unable to make an appointment as apparently they are out of town she is unable to be seen until the end of this month.  He has ongoing pain despite her tramadol that is minimally relieved by it.    REVIEW OF SYSTEMS  CONSTITUTIONAL:  No fever.  CARDIOVASCULAR:  No chest discomfort.  RESPIRATORY:  No pleuritic chest pain.  GASTROINTESTINAL:  No abdominal pain.    See HPI for further details.   All other systems are negative.     PAST MEDICAL HISTORY  Past Medical History:   Diagnosis Date   • Acid reflux    • Anesthesia     Family Hx-father ; was alwasy told it was anesthesia \"related\"; not sure what the actual reaction was; pt-PONV   • Asthma    • Heart burn    • Indigestion    • Pain 14    lower back/L leg=7/10   • Unspecified urinary incontinence        FAMILY HISTORY  History reviewed. No pertinent family history.    SOCIAL HISTORY   reports that she has never smoked. She has never used smokeless tobacco. She reports that she does not drink alcohol and does not use drugs.    SURGICAL HISTORY  Past Surgical History:   Procedure Laterality Date   • IRRIGATION & DEBRIDEMENT ORTHO  2014    Performed by El Gomez M.D. at SURGERY [facility]   • IRRIGATION & DEBRIDEMENT NEURO  2014    Performed by El Gomez M.D. at SURGERY [facility]   • LUMBAR LAMINECTOMY DISKECTOMY  2014    " "Performed by El Gomez M.D. at SURGERY [facility] ORS   • INJ,EPIDURAL/LUMB/SAC SINGLE  6/5/2014    Performed by Alexis Cornell M.D. at SURGERY [facility]S   • TONSILLECTOMY  5/14/2013    Performed by Aaron Hutton M.D. at SURGERY SAME DAY [facility] ORS   • NASAL SEPTAL RECONSTRUCTION  5/14/2013    Performed by Aaron Hutton M.D. at SURGERY SAME DAY [facility] ORS   • TURBINOPLASTY  5/14/2013    Performed by Aaron Hutton M.D. at SURGERY SAME DAY [facility] ORS       CURRENT MEDICATIONS  Home Medications     Reviewed by Emelia Mcfarland R.N. (Registered Nurse) on 09/04/21 at 2306  Med List Status: Partial   Medication Last Dose Status   Acetaminophen (TYLENOL) 325 MG Cap  Active   aspirin (ASA) 81 MG Chew Tab chewable tablet  Active   cephALEXin (KEFLEX) 500 MG Cap  Active   hydrocodone-acetaminophen (NORCO) 5-325 MG Tab per tablet  Active   sulfamethoxazole-trimethoprim (BACTRIM DS) 800-160 MG tablet  Active                ALLERGIES  Allergies   Allergen Reactions   • Latex Rash and Itching       PHYSICAL EXAM  VITAL SIGNS: BP (!) 179/110   Pulse 94   Temp 36.8 °C (98.2 °F) (Temporal)   Resp 16   Ht 1.626 m (5' 4\")   Wt 101 kg (222 lb 10.6 oz)   LMP 07/20/2021   SpO2 100%   BMI 38.22 kg/m²    Gen: Alert  HENT: ATNC  Eyes: Normal conjunctiva  Neck: trachea midline  Resp: no respiratory distress  CV: No JVD, RRR, no murmurs, rubs, gallops  Abd: non-distended  Ext: No deformities pallor and duskiness of right third and fourth digit with significantly delayed capillary refill.  Normal capillary refill in all other fingers.  2+ radial pulse.  Psych: normal mood  Neuro: speech fluent         EKG  Results for orders placed or performed during the hospital encounter of 09/04/21   EKG (NOW)   Result Value Ref Range    Report       [facility] Emergency Dept.    Test Date:  2021-09-05  Pt Name:    ADELINA KENNY       Department: ER  MRN:        [id number]            "           Room:       [facility]  Gender:     Female                       Technician: [id number]  :        1980                   Requested By:JARET SHRESTHA  Order #:    [id number]                    Reading MD: Jaret Shrestha    Measurements  Intervals                                Axis  Rate:       87                           P:          47  ME:         160                          QRS:        20  QRSD:       80                           T:          41  QT:         368  QTc:        443    Interpretive Statements  SINUS RHYTHM  Compared to ECG 2021 05:51:47  No significant changes  Electronically Signed On 2021 0:56:14 PDT by Jaret Shrestha          COURSE & MEDICAL DECISION MAKING  Pertinent Labs & Imaging studies reviewed. (See chart for details)    Review of medical record:  No Reading Provider Prelim 2021    Jad Mcleod M.D.  [phone] 2021    Narrative & Impression  UE Arterial   Physiologic Exam      Vascular Laboratory   CONCLUSIONS   Normal WBI.   Absent 3, 4, 5th digit flow could be artifactual or secondary to embolic    disease.  Recommend clinical correlation.    Negative CTA of the chest for PE that day.    Patient presents with ongoing pain from her fingers, which appear to be clinically ischemic.  She has good radial pulses.  No evidence of large arterial occlusion.  The patient is on antibiotics for presumed infection associated with this.  He denies history of IV drug abuse.  No clinical stigmata of endocarditis.  Patient is in sinus rhythm, no evidence of atrial fibrillation.  She has been unable to get into see Dr. Torres and is concerned given the ongoing pain and worsening appearance of her fingers.    Case discussed with Dr. Pierre, vascular surgery, who recommended adding Plavix to the aspirin and outpatient follow-up.    EKG demonstrates no atrial fibrillation.    In prescribing controlled substances to this patient, I certify that I have obtained and reviewed the  medical history of M[place] Ita Miguel. I have also made a good loren effort to obtain applicable records from other providers who have treated the patient and records did not demonstrate any increased risk of substance abuse that would prevent me from prescribing controlled substances.     I have conducted a physical exam and documented it. I have reviewed Ms. Vu Miguel’s prescription history as maintained by the [place] Prescription Monitoring Program.     I have assessed the patient’s risk for abuse, dependency, and addiction using the validated Opioid Risk Tool available at https://www.mdcalc.com/otewjq-hoba-jbed-ort-narcotic-abuse.     Given the above, I believe the benefits of controlled substance therapy outweigh the risks. The reasons for prescribing controlled substances include non-narcotic, oral analgesic alternatives have been inadequate for pain control. Accordingly, I have discussed the risk and benefits, treatment plan, and alternative therapies with the patient.   The risks of the medication, including constipation, addiction and altered mental status were discussed with the patient and they expressed understanding. They were encouraged to use the minimum dose necessary to control their breakthrough pain.    The patient was given return precautions, anticipatory guidance, and the opportunity to ask questions prior to discharge.     Appropriate PPE were worn at this encounter.     FINAL IMPRESSION  1. Ischemic finger    2. Pain of finger of right hand           DISPOSITION:  Patient will be discharged home in stable condition.    FOLLOW UP:  Heather Pierre M.D.  [address]  [address]  [facility] [id number] [phone]    Schedule an appointment as soon as possible for a visit       [facility], Emergency Dept  [address] [facility] [id number] [phone]    If symptoms worsen      OUTPATIENT MEDICATIONS:  New Prescriptions    CLOPIDOGREL (PLAVIX) 75 MG TAB     Take 1 Tablet by mouth every day.    OXYCODONE IMMEDIATE-RELEASE (ROXICODONE) 5 MG TAB    Take 1 Tablet by mouth every 6 hours as needed for Severe Pain for up to 3 days.       This dictation was created using voice recognition software. The accuracy of the dictation is limited to the abilities of the software. I expect there may be some errors of grammar and possibly content. The nursing notes were reviewed and certain aspects of this information were incorporated into this note.

## 2021-09-20 ENCOUNTER — HOSPITAL ENCOUNTER (OUTPATIENT)
Dept: CARDIOLOGY | Facility: MEDICAL CENTER | Age: 41
End: 2021-09-20
Attending: SURGERY
Payer: MEDICAID

## 2021-09-20 DIAGNOSIS — I74.4 ARTERY OF EXTREMITY, EMBOLISM AND THROMBOSIS (HCC): ICD-10-CM

## 2021-09-20 LAB
LV EJECT FRACT  99904: 65
LV EJECT FRACT MOD 2C 99903: 71.98
LV EJECT FRACT MOD 4C 99902: 59.26
LV EJECT FRACT MOD BP 99901: 66.41

## 2021-09-20 PROCEDURE — 93306 TTE W/DOPPLER COMPLETE: CPT | Mod: 26 | Performed by: INTERNAL MEDICINE

## 2021-09-20 PROCEDURE — 93306 TTE W/DOPPLER COMPLETE: CPT

## 2022-04-24 ENCOUNTER — OFFICE VISIT (OUTPATIENT)
Dept: URGENT CARE | Facility: CLINIC | Age: 42
End: 2022-04-24
Payer: MEDICAID

## 2022-04-24 VITALS
OXYGEN SATURATION: 95 % | HEART RATE: 101 BPM | HEIGHT: 65 IN | RESPIRATION RATE: 18 BRPM | BODY MASS INDEX: 39.15 KG/M2 | TEMPERATURE: 99.1 F | WEIGHT: 235 LBS | DIASTOLIC BLOOD PRESSURE: 80 MMHG | SYSTOLIC BLOOD PRESSURE: 128 MMHG

## 2022-04-24 DIAGNOSIS — S41.101A NON-HEALING WOUND OF UPPER EXTREMITY, RIGHT, INITIAL ENCOUNTER: ICD-10-CM

## 2022-04-24 DIAGNOSIS — Z00.00 HEALTH CARE MAINTENANCE: ICD-10-CM

## 2022-04-24 PROCEDURE — 99213 OFFICE O/P EST LOW 20 MIN: CPT | Performed by: NURSE PRACTITIONER

## 2022-04-24 RX ORDER — CEPHALEXIN 500 MG/1
500 CAPSULE ORAL 4 TIMES DAILY
Qty: 20 CAPSULE | Refills: 0 | Status: SHIPPED | OUTPATIENT
Start: 2022-04-24 | End: 2022-04-28

## 2022-04-24 ASSESSMENT — FIBROSIS 4 INDEX: FIB4 SCORE: 0.84

## 2022-04-25 ASSESSMENT — ENCOUNTER SYMPTOMS
SENSORY CHANGE: 1
WEAKNESS: 0
MYALGIAS: 1
FOCAL WEAKNESS: 0

## 2022-04-25 NOTE — PATIENT INSTRUCTIONS
Raynaud Phenomenon    Raynaud phenomenon is a condition that affects the blood vessels (arteries) that carry blood to your fingers and toes. The arteries that supply blood to your ears, lips, nipples, or the tip of your nose might also be affected. Raynaud phenomenon causes the arteries to become narrow temporarily (spasm). As a result, the flow of blood to the affected areas is temporarily decreased. This usually occurs in response to cold temperatures or stress. During an attack, the skin in the affected areas turns white, then blue, and finally red. You may also feel tingling or numbness in those areas.  Attacks usually last for only a brief period, and then the blood flow to the area returns to normal. In most cases, Raynaud phenomenon does not cause serious health problems.  What are the causes?  In many cases, the cause of this condition is not known. The condition may occur on its own (primary Raynaud phenomenon) or may be associated with other diseases or factors (secondary Raynaud phenomenon).  Possible causes may include:  · Diseases or medical conditions that damage the arteries.  · Injuries and repetitive actions that hurt the hands or feet.  · Being exposed to certain chemicals.  · Taking medicines that narrow the arteries.  · Other medical conditions, such as lupus, scleroderma, rheumatoid arthritis, thyroid problems, blood disorders, Sjogren syndrome, or atherosclerosis.  What increases the risk?  The following factors may make you more likely to develop this condition:  · Being 20-40 years old.  · Being female.  · Having a family history of Raynaud phenomenon.  · Living in a cold climate.  · Smoking.  What are the signs or symptoms?  Symptoms of this condition usually occur when you are exposed to cold temperatures or when you have emotional stress. The symptoms may last for a few minutes or up to several hours. They usually affect your fingers but may also affect your toes, nipples, lips, ears, or  the tip of your nose. Symptoms may include:  · Changes in skin color. The skin in the affected areas will turn pale or white. The skin may then change from white to bluish to red as normal blood flow returns to the area.  · Numbness, tingling, or pain in the affected areas.  In severe cases, symptoms may include:  · Skin sores.  · Tissues decaying and dying (gangrene).  How is this diagnosed?  This condition may be diagnosed based on:  · Your symptoms and medical history.  · A physical exam. During the exam, you may be asked to put your hands in cold water to check for a reaction to cold temperature.  · Tests, such as:  ? Blood tests to check for other diseases or conditions.  ? A test to check the movement of blood through your arteries and veins (vascular ultrasound).  ? A test in which the skin at the base of your fingernail is examined under a microscope (nailfold capillaroscopy).  How is this treated?  Treatment for this condition often involves making lifestyle changes and taking steps to control your exposure to cold temperatures. For more severe cases, medicine (calcium channel blockers) may be used to improve blood flow. Surgery is sometimes done to block the nerves that control the affected arteries, but this is rare.  Follow these instructions at home:  Avoiding cold temperatures  Take these steps to avoid exposure to cold:  · If possible, stay indoors during cold weather.  · When you go outside during cold weather, dress in layers and wear mittens, a hat, a scarf, and warm footwear.  · Wear mittens or gloves when handling ice or frozen food.  · Use holders for glasses or cans containing cold drinks.  · Let warm water run for a while before taking a shower or bath.  · Warm up the car before driving in cold weather.  Lifestyle    · If possible, avoid stressful and emotional situations. Try to find ways to manage your stress, such as:  ? Exercise.  ? Yoga.  ? Meditation.  ? Biofeedback.  · Do not use any  products that contain nicotine or tobacco, such as cigarettes and e-cigarettes. If you need help quitting, ask your health care provider.  · Avoid secondhand smoke.  · Limit your use of caffeine.  ? Switch to decaffeinated coffee, tea, and soda.  ? Avoid chocolate.  · Avoid vibrating tools and machinery.  General instructions  · Protect your hands and feet from injuries, cuts, or bruises.  · Avoid wearing tight rings or wristbands.  · Wear loose fitting socks and comfortable, roomy shoes.  · Take over-the-counter and prescription medicines only as told by your health care provider.  Contact a health care provider if:  · Your discomfort becomes worse despite lifestyle changes.  · You develop sores on your fingers or toes that do not heal.  · Your fingers or toes turn black.  · You have breaks in the skin on your fingers or toes.  · You have a fever.  · You have pain or swelling in your joints.  · You have a rash.  · Your symptoms occur on only one side of your body.  Summary  · Raynaud phenomenon is a condition that affects the arteries that carry blood to your fingers, toes, ears, lips, nipples, or the tip of your nose.  · In many cases, the cause of this condition is not known.  · Symptoms of this condition include changes in skin color, and numbness and tingling of the affected area.  · Treatment for this condition includes lifestyle changes, reducing exposure to cold temperatures, and using medicines for severe cases of the condition.  · Contact your health care provider if your condition worsens despite treatment.  This information is not intended to replace advice given to you by your health care provider. Make sure you discuss any questions you have with your health care provider.  Document Released: 12/15/2001 Document Revised: 12/21/2018 Document Reviewed: 01/29/2018  Elsevier Patient Education © 2020 [employer]

## 2022-04-26 ENCOUNTER — TELEPHONE (OUTPATIENT)
Dept: VASCULAR LAB | Facility: MEDICAL CENTER | Age: 42
End: 2022-04-26
Payer: MEDICAID

## 2022-04-26 NOTE — PROGRESS NOTES
"Subjective:     Christine Miguel is a 41 y.o. female who presents for Finger Injury (Possible infection, x3 months, not getting better, (R) index finger/)      HPI  Pt presents for evaluation of a new problem. Christine is a pleasant 41 year old female who presents to  today with complaints of a non healing wound to her right index finger. She notes she developed a cut underneath her finger nail approximately 3 months ago that has failed to heal with OTC treatment and care. She has had similar problems in the past with her fingers, however, this is the longest she has suffered with a non healing wound. She was seen by vascular medicine in the past as he fingers are very cold/ painful and turn blue at times. She states this specialist was very unhelpful to her symptoms and she did not receive any diagnoses or treatment. Her symptoms of pain remain unchanged. She notes getting her wound wet and palpation of this sore are extremely painful. Negative for drainage.     Review of Systems   Musculoskeletal: Positive for myalgias. Negative for joint pain.   Neurological: Positive for sensory change. Negative for focal weakness and weakness.       PMH:   Past Medical History:   Diagnosis Date   • Acid reflux    • Anesthesia     Family Hx-father ; was alwasy told it was anesthesia \"related\"; not sure what the actual reaction was; pt-PONV   • Asthma    • Heart burn    • Indigestion    • Pain 14    lower back/L leg=7/10   • Unspecified urinary incontinence      ALLERGIES:   Allergies   Allergen Reactions   • Latex Rash and Itching     SURGHX:   Past Surgical History:   Procedure Laterality Date   • IRRIGATION & DEBRIDEMENT ORTHO  2014    Performed by El Gomez M.D. at SURGERY [facility]   • IRRIGATION & DEBRIDEMENT NEURO  2014    Performed by El Gomez M.D. at SURGERY [facility]   • LUMBAR LAMINECTOMY DISKECTOMY  2014    Performed by El Gomez, " "M.D. at SURGERY [facility] ORS   • INJ,EPIDURAL/LUMB/SAC SINGLE  6/5/2014    Performed by Alexis Cornell M.D. at SURGERY [facility] ORS   • TONSILLECTOMY  5/14/2013    Performed by Aaron Hutton M.D. at SURGERY SAME DAY [facility] ORS   • NASAL SEPTAL RECONSTRUCTION  5/14/2013    Performed by Aaron Hutton M.D. at SURGERY SAME DAY [facility] ORS   • TURBINOPLASTY  5/14/2013    Performed by Aaron Hutton M.D. at SURGERY SAME DAY [facility] ORS     SOCHX:   Social History     Socioeconomic History   • Marital status: Single   Tobacco Use   • Smoking status: Never Smoker   • Smokeless tobacco: Never Used   • Tobacco comment: quit a year ago, smoked for 4 years a pack a day   Vaping Use   • Vaping Use: Never used   Substance and Sexual Activity   • Alcohol use: No   • Drug use: No   Social History Narrative    ** Merged History Encounter **          FH: No family history on file.      Objective:   /80   Pulse (!) 101   Temp 37.3 °C (99.1 °F) (Temporal)   Resp 18   Ht 1.651 m (5' 5\")   Wt 107 kg (235 lb)   SpO2 95%   BMI 39.11 kg/m²     Physical Exam  Vitals and nursing note reviewed.   Constitutional:       General: She is not in acute distress.     Appearance: Normal appearance. She is normal weight. She is not ill-appearing or toxic-appearing.   HENT:      Head: Normocephalic.      Right Ear: External ear normal.      Left Ear: External ear normal.      Nose: No congestion or rhinorrhea.      Mouth/Throat:      Pharynx: No oropharyngeal exudate or posterior oropharyngeal erythema.   Eyes:      General:         Right eye: No discharge.         Left eye: No discharge.      Pupils: Pupils are equal, round, and reactive to light.   Cardiovascular:      Rate and Rhythm: Normal rate and regular rhythm.   Pulmonary:      Effort: Pulmonary effort is normal.   Abdominal:      General: Abdomen is flat.   Musculoskeletal:         General: Normal range of motion.      Cervical back: Normal range of motion and " neck supple.   Skin:     General: Skin is dry.      Comments: Ulceration present to distal right index finger directly under nail. Scab and callous appearing skin is present. When this area is palpated it does feel as there is a concave effect to her skin. Negative for fluctuance, erythema or drainage. Fingertip is cold to touch and cap refill is >3 seconds in this digit only.    Neurological:      General: No focal deficit present.      Mental Status: She is alert and oriented to person, place, and time. Mental status is at baseline.   Psychiatric:         Mood and Affect: Mood normal.         Behavior: Behavior normal.         Thought Content: Thought content normal.         Judgment: Judgment normal.         Assessment/Plan:   Assessment    1. Non-healing wound of upper extremity, right, initial encounter  Referral back to [facility] PCP    Referral to Vascular Medicine    mupirocin (BACTROBAN) 2 % Ointment    Referral to Wound Clinic    cephALEXin (KEFLEX) 500 MG Cap   2. Health care maintenance  Referral back to [facility] PCP   No indication for I & D at this time. PT was urgently referred back to vascular, PCP and wound care. Her symptoms are consistent with Raynaud's however, connective tissue disease/sclerosis cannot be discounted as differential.   She was empirically started of antibiotics for wound.   AVS handout given and reviewed with patient. Pt educated on red flags and when to seek treatment back in ER or UC.

## 2022-04-27 SDOH — ECONOMIC STABILITY: HOUSING INSECURITY: IN THE LAST 12 MONTHS, HOW MANY PLACES HAVE YOU LIVED?: 1

## 2022-04-27 SDOH — ECONOMIC STABILITY: TRANSPORTATION INSECURITY
IN THE PAST 12 MONTHS, HAS THE LACK OF TRANSPORTATION KEPT YOU FROM MEDICAL APPOINTMENTS OR FROM GETTING MEDICATIONS?: NO

## 2022-04-27 SDOH — ECONOMIC STABILITY: HOUSING INSECURITY
IN THE LAST 12 MONTHS, WAS THERE A TIME WHEN YOU DID NOT HAVE A STEADY PLACE TO SLEEP OR SLEPT IN A SHELTER (INCLUDING NOW)?: NO

## 2022-04-27 SDOH — HEALTH STABILITY: PHYSICAL HEALTH: ON AVERAGE, HOW MANY MINUTES DO YOU ENGAGE IN EXERCISE AT THIS LEVEL?: 30 MIN

## 2022-04-27 SDOH — ECONOMIC STABILITY: INCOME INSECURITY: IN THE LAST 12 MONTHS, WAS THERE A TIME WHEN YOU WERE NOT ABLE TO PAY THE MORTGAGE OR RENT ON TIME?: NO

## 2022-04-27 SDOH — ECONOMIC STABILITY: FOOD INSECURITY: WITHIN THE PAST 12 MONTHS, YOU WORRIED THAT YOUR FOOD WOULD RUN OUT BEFORE YOU GOT MONEY TO BUY MORE.: NEVER TRUE

## 2022-04-27 SDOH — ECONOMIC STABILITY: FOOD INSECURITY: WITHIN THE PAST 12 MONTHS, THE FOOD YOU BOUGHT JUST DIDN'T LAST AND YOU DIDN'T HAVE MONEY TO GET MORE.: NEVER TRUE

## 2022-04-27 SDOH — ECONOMIC STABILITY: TRANSPORTATION INSECURITY
IN THE PAST 12 MONTHS, HAS LACK OF RELIABLE TRANSPORTATION KEPT YOU FROM MEDICAL APPOINTMENTS, MEETINGS, WORK OR FROM GETTING THINGS NEEDED FOR DAILY LIVING?: NO

## 2022-04-27 SDOH — ECONOMIC STABILITY: TRANSPORTATION INSECURITY
IN THE PAST 12 MONTHS, HAS LACK OF TRANSPORTATION KEPT YOU FROM MEETINGS, WORK, OR FROM GETTING THINGS NEEDED FOR DAILY LIVING?: NO

## 2022-04-27 SDOH — ECONOMIC STABILITY: INCOME INSECURITY: HOW HARD IS IT FOR YOU TO PAY FOR THE VERY BASICS LIKE FOOD, HOUSING, MEDICAL CARE, AND HEATING?: NOT HARD AT ALL

## 2022-04-27 SDOH — HEALTH STABILITY: MENTAL HEALTH
STRESS IS WHEN SOMEONE FEELS TENSE, NERVOUS, ANXIOUS, OR CAN'T SLEEP AT NIGHT BECAUSE THEIR MIND IS TROUBLED. HOW STRESSED ARE YOU?: NOT AT ALL

## 2022-04-27 SDOH — HEALTH STABILITY: PHYSICAL HEALTH: ON AVERAGE, HOW MANY DAYS PER WEEK DO YOU ENGAGE IN MODERATE TO STRENUOUS EXERCISE (LIKE A BRISK WALK)?: 3 DAYS

## 2022-04-27 ASSESSMENT — SOCIAL DETERMINANTS OF HEALTH (SDOH)
DO YOU BELONG TO ANY CLUBS OR ORGANIZATIONS SUCH AS CHURCH GROUPS UNIONS, FRATERNAL OR ATHLETIC GROUPS, OR SCHOOL GROUPS?: YES
HOW OFTEN DO YOU HAVE A DRINK CONTAINING ALCOHOL: NEVER
IN A TYPICAL WEEK, HOW MANY TIMES DO YOU TALK ON THE PHONE WITH FAMILY, FRIENDS, OR NEIGHBORS?: MORE THAN THREE TIMES A WEEK
HOW OFTEN DO YOU ATTEND CHURCH OR RELIGIOUS SERVICES?: MORE THAN 4 TIMES PER YEAR
WITHIN THE PAST 12 MONTHS, YOU WORRIED THAT YOUR FOOD WOULD RUN OUT BEFORE YOU GOT THE MONEY TO BUY MORE: NEVER TRUE
HOW HARD IS IT FOR YOU TO PAY FOR THE VERY BASICS LIKE FOOD, HOUSING, MEDICAL CARE, AND HEATING?: NOT HARD AT ALL
ARE YOU MARRIED, WIDOWED, DIVORCED, SEPARATED, NEVER MARRIED, OR LIVING WITH A PARTNER?: NEVER MARRIED
HOW OFTEN DO YOU HAVE SIX OR MORE DRINKS ON ONE OCCASION: NEVER
HOW OFTEN DO YOU ATTEND CHURCH OR RELIGIOUS SERVICES?: MORE THAN 4 TIMES PER YEAR
HOW OFTEN DO YOU ATTENT MEETINGS OF THE CLUB OR ORGANIZATION YOU BELONG TO?: MORE THAN 4 TIMES PER YEAR
HOW MANY DRINKS CONTAINING ALCOHOL DO YOU HAVE ON A TYPICAL DAY WHEN YOU ARE DRINKING: PATIENT DECLINED
HOW OFTEN DO YOU GET TOGETHER WITH FRIENDS OR RELATIVES?: ONCE A WEEK
IN A TYPICAL WEEK, HOW MANY TIMES DO YOU TALK ON THE PHONE WITH FAMILY, FRIENDS, OR NEIGHBORS?: MORE THAN THREE TIMES A WEEK
ARE YOU MARRIED, WIDOWED, DIVORCED, SEPARATED, NEVER MARRIED, OR LIVING WITH A PARTNER?: NEVER MARRIED
DO YOU BELONG TO ANY CLUBS OR ORGANIZATIONS SUCH AS CHURCH GROUPS UNIONS, FRATERNAL OR ATHLETIC GROUPS, OR SCHOOL GROUPS?: YES
HOW OFTEN DO YOU ATTENT MEETINGS OF THE CLUB OR ORGANIZATION YOU BELONG TO?: MORE THAN 4 TIMES PER YEAR
HOW OFTEN DO YOU GET TOGETHER WITH FRIENDS OR RELATIVES?: ONCE A WEEK

## 2022-04-27 ASSESSMENT — LIFESTYLE VARIABLES
HOW MANY STANDARD DRINKS CONTAINING ALCOHOL DO YOU HAVE ON A TYPICAL DAY: PATIENT DECLINED
HOW OFTEN DO YOU HAVE SIX OR MORE DRINKS ON ONE OCCASION: NEVER
HOW OFTEN DO YOU HAVE A DRINK CONTAINING ALCOHOL: NEVER

## 2022-04-28 ENCOUNTER — OFFICE VISIT (OUTPATIENT)
Dept: INTERNAL MEDICINE | Facility: OTHER | Age: 42
End: 2022-04-28
Payer: MEDICAID

## 2022-04-28 ENCOUNTER — TELEPHONE (OUTPATIENT)
Dept: INTERNAL MEDICINE | Facility: OTHER | Age: 42
End: 2022-04-28

## 2022-04-28 VITALS
BODY MASS INDEX: 39.75 KG/M2 | OXYGEN SATURATION: 96 % | HEART RATE: 89 BPM | TEMPERATURE: 99.3 F | SYSTOLIC BLOOD PRESSURE: 128 MMHG | HEIGHT: 65 IN | WEIGHT: 238.6 LBS | DIASTOLIC BLOOD PRESSURE: 87 MMHG

## 2022-04-28 DIAGNOSIS — I99.8 ISCHEMIA OF DIGITS OF HAND: ICD-10-CM

## 2022-04-28 DIAGNOSIS — E66.9 OBESITY (BMI 35.0-39.9 WITHOUT COMORBIDITY): ICD-10-CM

## 2022-04-28 DIAGNOSIS — Z12.31 ENCOUNTER FOR SCREENING MAMMOGRAM FOR BREAST CANCER: ICD-10-CM

## 2022-04-28 DIAGNOSIS — R53.83 FATIGUE, UNSPECIFIED TYPE: ICD-10-CM

## 2022-04-28 PROCEDURE — 99205 OFFICE O/P NEW HI 60 MIN: CPT | Mod: GC | Performed by: STUDENT IN AN ORGANIZED HEALTH CARE EDUCATION/TRAINING PROGRAM

## 2022-04-28 RX ORDER — TRAMADOL HYDROCHLORIDE 50 MG/1
50 TABLET ORAL 2 TIMES DAILY PRN
Qty: 60 TABLET | Refills: 0 | Status: SHIPPED | OUTPATIENT
Start: 2022-04-28 | End: 2022-05-03

## 2022-04-28 RX ORDER — ASPIRIN 81 MG/1
81 TABLET, CHEWABLE ORAL DAILY
Qty: 30 TABLET | Refills: 3 | Status: SHIPPED | OUTPATIENT
Start: 2022-04-28 | End: 2022-08-10 | Stop reason: SDUPTHER

## 2022-04-28 RX ORDER — NIFEDIPINE 30 MG
30 TABLET, EXTENDED RELEASE ORAL DAILY
Qty: 30 TABLET | Refills: 1 | Status: SHIPPED | OUTPATIENT
Start: 2022-04-28 | End: 2022-06-27

## 2022-04-28 ASSESSMENT — PATIENT HEALTH QUESTIONNAIRE - PHQ9: CLINICAL INTERPRETATION OF PHQ2 SCORE: 0

## 2022-04-28 ASSESSMENT — FIBROSIS 4 INDEX: FIB4 SCORE: 0.84

## 2022-04-28 NOTE — PROGRESS NOTES
Christine Miguel is a 41 y.o. female here to establish care     HPI: 41-year-old with a past medical history of sciatica, asthma, obesity is here to establish care and discuss the following issues.    Digital ischemia: Patient reports purplish discoloration of the digits of the right hand occasionally and whitish discoloration on exposure to cold over the last 1 to 2 years.  She was seen in the ER couple of times for the same issues at which time she had an extensive work-up with CTA revealing no thrombus in the subclavian artery, negative echocardiogram.  However LM did show absent digit flow 3 6, 4, 5.  She was referred over to vascular surgery and she was started on aspirin and a blood pressure medicine(unclear which one).  However patient states that she used it about 2 months and then was asked to discontinue as she had no symptoms.  Patient reports that purplish discoloration has improved while on those medications.  However over the last 3 months patient noticed a nonhealing wound on the tip of her right index finger with no signs of infection.  She does endorse numbness and ex excessive pain especially at night in the right index finger.  However she was at the urgent care and was given antibiotics for the same and a referral to a different vascular surgeon.  Patient is in the process of scheduling an appointment with vascular surgery.    In addition to the above patient also complains of generalized fatigue and weakness associated with hair loss.  Review of systems otherwise negative.        Current medicines (including changes today)  Current Outpatient Medications   Medication Sig Dispense Refill   • aspirin (ASA) 81 MG Chew Tab chewable tablet Chew 1 Tablet every day. 30 Tablet 3   • NIFEdipine (ADALAT CC) 30 MG CR tablet Take 1 Tablet by mouth every day. 30 Tablet 1   • traMADol (ULTRAM) 50 MG Tab Take 1 Tablet by mouth 2 times a day as needed for Severe Pain for up to 30 days. 60 Tablet 0  "  • cephALEXin (KEFLEX) 500 MG Cap Take 1 Capsule by mouth 4 times a day. 40 Capsule 0     No current facility-administered medications for this visit.     She  has a past medical history of Acid reflux, Anesthesia, Asthma, Heart burn, Indigestion, Pain (08/22/14), and Unspecified urinary incontinence.    She has no past medical history of Addisons disease ([facility]), Allergy, Anemia, Anxiety, Arrhythmia, Arthritis, Blood transfusion without reported diagnosis, Cancer ([facility]), CHF (congestive heart failure) ([facility]), Clotting disorder ([facility]), Cold, COPD (chronic obstructive pulmonary disease) ([facility]), Cushings syndrome ([facility]), Dental disorder, Depression, Diabetes ([facility]), Diabetic neuropathy ([facility]), Glaucoma, Goiter, Heart attack ([facility]), Heart murmur, HIV (human immunodeficiency virus infection) ([facility]), Hyperlipidemia, Hypertension, IBD (inflammatory bowel disease), Kidney disease, Meningitis, Migraine, or Pulmonary emphysema ([facility]).  She  has a past surgical history that includes tonsillectomy (5/14/2013); nasal septal reconstruction (5/14/2013); turbinoplasty (5/14/2013); inj,epidural/lumb/sac single (6/5/2014); lumbar laminectomy diskectomy (8/27/2014); irrigation & debridement neuro (9/5/2014); and irrigation & debridement ortho (9/7/2014).  Social History     Tobacco Use   • Smoking status: Never Smoker   • Smokeless tobacco: Never Used   • Tobacco comment: quit a year ago, smoked for 4 years a pack a day   Vaping Use   • Vaping Use: Never used   Substance Use Topics   • Alcohol use: No   • Drug use: No     Social History     Social History Narrative    ** Merged History Encounter **          No family history on file.  No family status information on file.       ROS  See HPI     Objective:     Physical Exam:  /87 (BP Location: Left arm, Patient Position: Sitting, BP Cuff Size: Large adult)   Pulse 89   Temp 37.4 °C (99.3 °F) (Temporal)   Ht 1.651 m (5' 5\")   Wt 108 kg (238 lb 9.6 oz)   SpO2 96%  Body mass index is " 39.71 kg/m².  Constitutional: Alert. Well appearing. No distress.  Skin: Blackish discoloration noted at the tip of the right index finger, no redness or swelling associated with it.  Tenderness noted on palpation.  Capillary refill more than 3 seconds.  Extremities cold.  Eye: Equal, round and reactive to light, conjunctiva clear, lids normal.  ENMT: Moist mucous membranes. Normal dentition. Oropharynx clear.  Neck: Trachea midline, no masses, no thyromegaly. No cervical or supraclavicular lymphadenopathy.  Respiratory: Normal effort. Lungs are clear to auscultation bilaterally.  Cardiovascular: Regular rate and rhythm. Normal S1/S2. No murmurs, rubs or gallops.   Abdomen: Soft, non-tender, non-distended. No masses, no hepatosplenomegaly.  Neuro: Moves all four extremities. No facial droop.  Psych: Answers questions appropriately. Normal affect and mood.      Assessment and Plan:     1. Ischemia of digits of hand  Patient has known ischemia of the digits with previous LM suggesting absent flow to 3, 4, 5 digits.  She has been discontinued on the aspirin/blood pressure medication unclear on reason.  However she will definitely benefit from being restarted on aspirin and possibly nifedipine.  -Start aspirin and nifedipine 30, advised patient to schedule an appointment with vascular ASAP.  -Will also check lab work to rule out other causes of nonhealing wounds including checking for diabetes.  Labs as ordered below.  -Will try tramadol for pain  -Also check GEOVANY, PT/INR.  Check iron panel/fit in case patient needs to be started on Plavix in addition to aspirin..  - Controlled Substance Treatment Agreement  - CBC WITH DIFFERENTIAL; Future  - HEMOGLOBIN A1C; Future  - TSH WITH REFLEX TO FT4; Future  - ANTI-NUCLEAR ANTIBODY SERUM; Future  - Prothrombin Time; Future  - OCCULT BLOOD FECES IMMUNOASSAY; Future  - IRON/TOTAL IRON BIND; Future  - FERRITIN; Future  - traMADol (ULTRAM) 50 MG Tab; Take 1 Tablet by mouth 2 times a  day as needed for Severe Pain for up to 30 days.  Dispense: 60 Tablet; Refill: 0    2. Fatigue, unspecified type  Generalized fatigue with associated hair loss, possible hypothyroidism vs anemia.  -Check CBC, A1c, TSH      3. Obesity (BMI 35.0-39.9 without comorbidity)  BMI is elevated, discussed with patient about lifestyle changes.  -Check TSH and lipid profile, follow-up in about a month  - TSH WITH REFLEX TO FT4; Future  - Lipid Profile; Future    4. Encounter for screening mammogram for breast cancer  -Due for screening mammogram patient to follow-up on this once acute issues have been addressed.  -Discussed during follow-up visit      .  Follow up: Return in about 1 month (around 5/28/2022).         PLEASE NOTE: This note was created using voice recognition software.

## 2022-04-28 NOTE — PATIENT INSTRUCTIONS
-Get fasting labs done, start aspirin and nifedipine.  -call vascular asap today for appointment soon.  -f/u in 1 month.

## 2022-04-28 NOTE — TELEPHONE ENCOUNTER
Patient called stating her tramadol needs a PA. I let her know that I had not received anything from the pharmacy, but if she can ask them to send it to us so we can start the process for her

## 2022-04-29 ENCOUNTER — TELEPHONE (OUTPATIENT)
Dept: INTERNAL MEDICINE | Facility: OTHER | Age: 42
End: 2022-04-29
Payer: MEDICAID

## 2022-04-29 NOTE — TELEPHONE ENCOUNTER
DOCUMENTATION OF PAR STATUS:    1. Name of Medication & Dose: Tramadol 50mmg     2. Name of Prescription Coverage Company & phone #: [employer]    3. Date Prior Auth Submitted: 04/29/22    4. What information was given to obtain insurance decision? ICD-10 codes, tried and failed meds     5. Prior Auth Status? Pending    6. Patient Notified: yes

## 2022-04-29 NOTE — TELEPHONE ENCOUNTER
Patient lvm after hours yesterday stating Dr. Briceno wanted to know the name of her vascular doctor and what medications they had her on. She states the name of the doctor is Dr. Heather Pierre and the medications they had her on were plavix and procardia

## 2022-05-02 DIAGNOSIS — E66.9 OBESITY (BMI 35.0-39.9 WITHOUT COMORBIDITY): ICD-10-CM

## 2022-05-02 DIAGNOSIS — R53.83 FATIGUE, UNSPECIFIED TYPE: ICD-10-CM

## 2022-05-02 DIAGNOSIS — I99.8 ISCHEMIA OF DIGITS OF HAND: ICD-10-CM

## 2022-05-02 NOTE — TELEPHONE ENCOUNTER
Approved for a 30 day supply.  Sent approval to pharmacy. Spoke to patient as well and notified. She let me know [facility] and needs the script for the 20 days [facility] to [facility] [address]

## 2022-05-03 ENCOUNTER — TELEPHONE (OUTPATIENT)
Dept: INTERNAL MEDICINE | Facility: OTHER | Age: 42
End: 2022-05-03
Payer: MEDICAID

## 2022-05-03 DIAGNOSIS — I99.8 ISCHEMIA OF DIGITS OF HAND: ICD-10-CM

## 2022-05-03 RX ORDER — TRAMADOL HYDROCHLORIDE 50 MG/1
50 TABLET ORAL 2 TIMES DAILY PRN
Qty: 40 TABLET | Refills: 0 | Status: SHIPPED | OUTPATIENT
Start: 2022-05-03 | End: 2022-05-23

## 2022-05-16 ENCOUNTER — TELEPHONE (OUTPATIENT)
Dept: INTERNAL MEDICINE | Facility: OTHER | Age: 42
End: 2022-05-16
Payer: MEDICAID

## 2022-05-16 DIAGNOSIS — I99.8 ISCHEMIA OF DIGITS OF HAND: ICD-10-CM

## 2022-05-16 DIAGNOSIS — R53.83 FATIGUE, UNSPECIFIED TYPE: ICD-10-CM

## 2022-05-16 NOTE — TELEPHONE ENCOUNTER
Please notify patient that we have given referal to rheumatology give positive GEOVANY titre which might suggest underlying rheumatological condition like systemic sclerosis. Cholesterol elevated, but we can discuss this during follow up appointment. Advice patient to keep her appointment as scheduled to discuss further.

## 2022-05-31 ENCOUNTER — OFFICE VISIT (OUTPATIENT)
Dept: INTERNAL MEDICINE | Facility: OTHER | Age: 42
End: 2022-05-31
Payer: MEDICAID

## 2022-05-31 VITALS
TEMPERATURE: 98.8 F | OXYGEN SATURATION: 97 % | WEIGHT: 239.2 LBS | DIASTOLIC BLOOD PRESSURE: 70 MMHG | SYSTOLIC BLOOD PRESSURE: 120 MMHG | HEIGHT: 65 IN | BODY MASS INDEX: 39.85 KG/M2 | HEART RATE: 103 BPM

## 2022-05-31 DIAGNOSIS — Z12.31 ENCOUNTER FOR SCREENING MAMMOGRAM FOR MALIGNANT NEOPLASM OF BREAST: ICD-10-CM

## 2022-05-31 DIAGNOSIS — R76.8 POSITIVE ANA (ANTINUCLEAR ANTIBODY): ICD-10-CM

## 2022-05-31 DIAGNOSIS — Z12.4 SCREENING FOR CERVICAL CANCER: ICD-10-CM

## 2022-05-31 DIAGNOSIS — I99.8 ISCHEMIA OF DIGITS OF HAND: ICD-10-CM

## 2022-05-31 PROCEDURE — 99214 OFFICE O/P EST MOD 30 MIN: CPT | Mod: GC | Performed by: STUDENT IN AN ORGANIZED HEALTH CARE EDUCATION/TRAINING PROGRAM

## 2022-05-31 PROCEDURE — 99999 PR NO CHARGE: CPT | Performed by: STUDENT IN AN ORGANIZED HEALTH CARE EDUCATION/TRAINING PROGRAM

## 2022-05-31 RX ORDER — TRAMADOL HYDROCHLORIDE 50 MG/1
50 TABLET ORAL EVERY 4 HOURS PRN
COMMUNITY
End: 2022-05-31 | Stop reason: SDUPTHER

## 2022-05-31 RX ORDER — ATORVASTATIN CALCIUM 40 MG/1
40 TABLET, FILM COATED ORAL NIGHTLY
Qty: 30 TABLET | Refills: 1 | Status: SHIPPED | OUTPATIENT
Start: 2022-05-31 | End: 2022-08-10 | Stop reason: SDUPTHER

## 2022-05-31 RX ORDER — TRAMADOL HYDROCHLORIDE 50 MG/1
50 TABLET ORAL EVERY 4 HOURS PRN
Qty: 30 TABLET | Refills: 0 | Status: SHIPPED | OUTPATIENT
Start: 2022-05-31 | End: 2022-07-05 | Stop reason: SDUPTHER

## 2022-05-31 ASSESSMENT — FIBROSIS 4 INDEX: FIB4 SCORE: 0.84

## 2022-06-01 NOTE — PATIENT INSTRUCTIONS
-start atorvastatin daily.  F/u with vascular and also rheumatology.  -use nifedipine if symptoms recur let us know.  -f/u in 2months

## 2022-06-01 NOTE — PROGRESS NOTES
Christine Ita Miguel is a 41 y.o. female with past medical history of sciatica, asthma presents today for follow-up of ischemia of her digits and Pap smear.  Ischemia of digits: Patient previously endorsed purplish discoloration of the digits of her right and left hand, was at the ER and LM showed decreased indexes CTA was negative for subclavian artery thrombosis, patient last visit was started on aspirin and nifedipine and is pending vascular evaluation on June 22.  She was given tramadol for pain control which seems to help.  No worsening in the meantime.  GEOVANY titers were checked and came back positive for centromere pattern, referral to rheumatology has been provided.  However patient states that the referral was sent over to [facility] [place] and she will try herself to find a rheumatologist locally and if that is not possible then will consider  referral to rheumatology at [facility].  -Patient also states that she has not had a Pap smear in the last 5 years and is willing to get it done at this time no increased vaginal discharge reported.  Patient is also requesting screening mammogram done, no lumps per patient.  Review of systems is otherwise negative.          Current medicines (including changes today)  Current Outpatient Medications   Medication Sig Dispense Refill   • atorvastatin (LIPITOR) 40 MG Tab Take 1 Tablet by mouth every evening. 30 Tablet 1   • traMADol (ULTRAM) 50 MG Tab Take 1 Tablet by mouth every four hours as needed for Severe Pain for up to 30 days. 30 Tablet 0   • aspirin (ASA) 81 MG Chew Tab chewable tablet Chew 1 Tablet every day. 30 Tablet 3   • NIFEdipine (ADALAT CC) 30 MG CR tablet Take 1 Tablet by mouth every day. (Patient not taking: Reported on 5/31/2022) 30 Tablet 1   • cephALEXin (KEFLEX) 500 MG Cap Take 1 Capsule by mouth 4 times a day. 40 Capsule 0     No current facility-administered medications for this visit.     She  has a past medical history of Acid reflux, Anesthesia,  "Asthma, Heart burn, Indigestion, Pain (08/22/14), and Unspecified urinary incontinence.    She has no past medical history of Addisons disease ([facility]), Allergy, Anemia, Anxiety, Arrhythmia, Arthritis, Blood transfusion without reported diagnosis, Cancer ([facility]), CHF (congestive heart failure) ([facility]), Clotting disorder ([facility]), Cold, COPD (chronic obstructive pulmonary disease) ([facility]), Cushings syndrome ([facility]), Dental disorder, Depression, Diabetes ([facility]), Diabetic neuropathy ([facility]), Glaucoma, Goiter, Heart attack ([facility]), Heart murmur, HIV (human immunodeficiency virus infection) ([facility]), Hyperlipidemia, Hypertension, IBD (inflammatory bowel disease), Kidney disease, Meningitis, Migraine, or Pulmonary emphysema ([facility]).  She  has a past surgical history that includes tonsillectomy (5/14/2013); nasal septal reconstruction (5/14/2013); turbinoplasty (5/14/2013); inj,epidural/lumb/sac single (6/5/2014); lumbar laminectomy diskectomy (8/27/2014); irrigation & debridement neuro (9/5/2014); and irrigation & debridement ortho (9/7/2014).  Social History     Tobacco Use   • Smoking status: Never Smoker   • Smokeless tobacco: Never Used   • Tobacco comment: quit a year ago, smoked for 4 years a pack a day   Vaping Use   • Vaping Use: Never used   Substance Use Topics   • Alcohol use: No   • Drug use: No     Social History     Social History Narrative    ** Merged History Encounter **          No family history on file.  No family status information on file.       ROS  See HPI     Objective:     Physical Exam:  /70 (BP Location: Left arm, Patient Position: Sitting, BP Cuff Size: Adult)   Pulse (!) 103   Temp 37.1 °C (98.8 °F) (Temporal)   Ht 1.651 m (5' 5\")   Wt 109 kg (239 lb 3.2 oz)   SpO2 97%  Body mass index is 39.8 kg/m².  Constitutional: Alert. Well appearing. No distress.  Skin: Warm, dry, good turgor, no visible rashes.  Eye: Equal, round and reactive to light, conjunctiva clear, lids normal.  ENMT: Moist mucous membranes. " Normal dentition. Oropharynx clear.  Neck: Trachea midline, no masses, no thyromegaly. No cervical or supraclavicular lymphadenopathy.  Respiratory: Normal effort. Lungs are clear to auscultation bilaterally.  Cardiovascular: Regular rate and rhythm. Normal S1/S2. No murmurs, rubs or gallops.   Abdomen: Soft, non-tender, non-distended. No masses, no hepatosplenomegaly.  Neuro: Moves all four extremities. No facial droop.  Psych: Answers questions appropriately. Normal affect and mood.  : No external lesion around vagina, no significant discharge noted.    Assessment and Plan:     1. Screening for cervical cancer  -Pap smear performed in clinic today.  - THINPREP PAP, REFLEX HPV ON ASC-US AND ABOVE; Future    2. Encounter for screening mammogram for malignant neoplasm of breast  - [id number]; Future    3. Positive GEOVANY (antinuclear antibody)  1: 1280, centromere pattern  -Referral to rheumatology provided,    4. Ischemia of digits of hand  Ischemia of the digits of the right and left as prior, pending vascular evaluation on June 22.  -Continue aspirin, restart nifedipine, patient will let us know if she is having any side effects, at that point we will consider amlodipine  -Patient also was noted to have a positive GEOVANY titer, with centromere pattern concern for systemic sclerosis, referral to rheumatology has been given.  However patient would like to try for a local rheumatologist prior to scheduling an appointment at [facility].  - traMADol (ULTRAM) 50 MG Tab; Take 1 Tablet by mouth every four hours as needed for Severe Pain for up to 30 days.  Dispense: 30 Tablet; Refill: 0        Follow up: Return in about 2 months (around 7/31/2022).         PLEASE NOTE: This note was created using voice recognition software.

## 2022-06-03 ENCOUNTER — HOSPITAL ENCOUNTER (OUTPATIENT)
Dept: RADIOLOGY | Facility: MEDICAL CENTER | Age: 42
End: 2022-06-03
Attending: STUDENT IN AN ORGANIZED HEALTH CARE EDUCATION/TRAINING PROGRAM
Payer: COMMERCIAL

## 2022-06-03 DIAGNOSIS — Z12.31 ENCOUNTER FOR SCREENING MAMMOGRAM FOR MALIGNANT NEOPLASM OF BREAST: ICD-10-CM

## 2022-06-06 ENCOUNTER — TELEPHONE (OUTPATIENT)
Dept: INTERNAL MEDICINE | Facility: OTHER | Age: 42
End: 2022-06-06

## 2022-06-06 DIAGNOSIS — N63.0 LUMP OR MASS IN BREAST: ICD-10-CM

## 2022-06-06 NOTE — TELEPHONE ENCOUNTER
Received call from [facility] stating patient is complaining of lumps and bumps on her breasts. They do not have an order for a diagnostic mammogram so they would like for the doctor to place on for diagnostic mammo bilateral with u/s if indicated

## 2022-06-06 NOTE — TELEPHONE ENCOUNTER
I placed ultrasound order, I am unable to find the exact order for diagnostic mammogram, can you please find out let me know what exactly to place

## 2022-06-08 ENCOUNTER — TELEPHONE (OUTPATIENT)
Dept: HOSPITALIST | Facility: MEDICAL CENTER | Age: 42
End: 2022-06-08

## 2022-06-08 DIAGNOSIS — N63.0 MULTIPLE BENIGN LUMPS OF BREAST: ICD-10-CM

## 2022-06-13 ENCOUNTER — TELEPHONE (OUTPATIENT)
Dept: INTERNAL MEDICINE | Facility: OTHER | Age: 42
End: 2022-06-13

## 2022-06-13 NOTE — TELEPHONE ENCOUNTER
Patient called to know her pap smear results. I checked in her chart and see that we have not received those results back as of yet. I let her know I would look into the lab and once we get those results in we will contact her

## 2022-06-14 ENCOUNTER — HOSPITAL ENCOUNTER (OUTPATIENT)
Dept: RADIOLOGY | Facility: MEDICAL CENTER | Age: 42
End: 2022-06-14
Attending: STUDENT IN AN ORGANIZED HEALTH CARE EDUCATION/TRAINING PROGRAM
Payer: COMMERCIAL

## 2022-06-14 DIAGNOSIS — N63.0 MULTIPLE BENIGN LUMPS OF BREAST: ICD-10-CM

## 2022-06-14 DIAGNOSIS — N63.0 LUMP OR MASS IN BREAST: ICD-10-CM

## 2022-06-14 PROCEDURE — G0279 TOMOSYNTHESIS, MAMMO: HCPCS

## 2022-06-14 PROCEDURE — 76642 ULTRASOUND BREAST LIMITED: CPT | Mod: RT

## 2022-06-17 DIAGNOSIS — Z12.4 SCREENING FOR CERVICAL CANCER: ICD-10-CM

## 2022-06-17 NOTE — TELEPHONE ENCOUNTER
Haven't received results. Called [employer] again. Spoke with [facility] once again. They let me know they would be sending out the results to our fax once again   Patient notified via [employer] message. Encouraged to return the call if necessary.

## 2022-06-23 ENCOUNTER — TELEPHONE (OUTPATIENT)
Dept: HOSPITALIST | Facility: MEDICAL CENTER | Age: 42
End: 2022-06-23

## 2022-06-27 ENCOUNTER — OFFICE VISIT (OUTPATIENT)
Dept: VASCULAR LAB | Facility: MEDICAL CENTER | Age: 42
End: 2022-06-27
Attending: INTERNAL MEDICINE
Payer: COMMERCIAL

## 2022-06-27 VITALS
HEART RATE: 83 BPM | DIASTOLIC BLOOD PRESSURE: 78 MMHG | SYSTOLIC BLOOD PRESSURE: 114 MMHG | WEIGHT: 238.8 LBS | HEIGHT: 65 IN | BODY MASS INDEX: 39.79 KG/M2

## 2022-06-27 DIAGNOSIS — E78.5 DYSLIPIDEMIA: ICD-10-CM

## 2022-06-27 DIAGNOSIS — R73.03 PREDIABETES: ICD-10-CM

## 2022-06-27 DIAGNOSIS — E66.9 OBESITY (BMI 30-39.9): ICD-10-CM

## 2022-06-27 DIAGNOSIS — Z79.02 LONG TERM (CURRENT) USE OF ANTITHROMBOTICS/ANTIPLATELETS: ICD-10-CM

## 2022-06-27 DIAGNOSIS — S61.209A FINGER WOUND, SIMPLE, OPEN, INITIAL ENCOUNTER: ICD-10-CM

## 2022-06-27 DIAGNOSIS — I73.00 RAYNAUD'S PHENOMENON (SECONDARY): ICD-10-CM

## 2022-06-27 PROBLEM — E66.01 MORBID OBESITY (HCC): Status: RESOLVED | Noted: 2017-12-26 | Resolved: 2022-06-27

## 2022-06-27 PROCEDURE — 99205 OFFICE O/P NEW HI 60 MIN: CPT | Performed by: FAMILY MEDICINE

## 2022-06-27 PROCEDURE — 99212 OFFICE O/P EST SF 10 MIN: CPT

## 2022-06-27 RX ORDER — AMLODIPINE BESYLATE 5 MG/1
5 TABLET ORAL
Qty: 90 TABLET | Refills: 3 | Status: SHIPPED | OUTPATIENT
Start: 2022-06-27 | End: 2022-08-01

## 2022-06-27 ASSESSMENT — ENCOUNTER SYMPTOMS
CLAUDICATION: 0
ORTHOPNEA: 0
MYALGIAS: 0
CHILLS: 0
WEAKNESS: 0
PALPITATIONS: 0
FEVER: 0
COUGH: 0
NAUSEA: 0
BRUISES/BLEEDS EASILY: 0

## 2022-06-27 ASSESSMENT — FIBROSIS 4 INDEX: FIB4 SCORE: 0.84

## 2022-06-27 NOTE — PATIENT INSTRUCTIONS
MEDITERRANEAN DIET PATIENT GUIDES:   1) MEDITERRANEAN LIVING GUIDE: https://www.mediterraneanliving.com/  2) MED PLATE METHOD: https://www.nutrition.va.gov/docs/UpdatedPatientEd/Mediterraneandiet.pdf  3) SAMPLE MENU: https://www.hc1.com/wp-content/uploads/2013/06/The-Mediterranean-Diet.pdf  For more information about eating the Med Way, visit [employer].    7 SIMPLE STEPS TO FOLLOW THE MEDITERRANEAN DIET:   https://[employer].com/7-simple-steps-to-follow-the-mediterranean-diet/    Eating like those who live in the Mediterranean region has been shown to promote health and decrease risk of many chronic diseases.   Following a traditional Mediterranean-style eating pattern has been shown to   -decrease some forms of cancer,   -protect against cognitive decline,   -improve eye health,   -decrease the risk of type 2 diabetes,   -help manage blood pressure,   -reduce cardiovascular disease, and is   -more effective than a low-fat diet for weight loss.     Eating the Mediterranean way is not only healthy, it is delicious and satisfying.   Foods that you once thought of as too high in fat or unhealthy, including nuts, olive oil, olives, and whole grains, become an everyday part of your diet.   The following simple steps will help you eat the Med Way every day.    CHANGE YOUR PROTEINS   Replace some of the meat in your dish with plant proteins such as beans, nuts, and seeds often.  Eat fish and seafood at least two to three times per week. Include fatty fish, such as mackerel or salmon at least once a week. Eat fried fish only occasionally.  Choose white-meat poultry such as turkey or chicken breast.  Limit red meat and/or choose lean red meat.  Greatly limit or eliminate processed meats.    2. SWAP YOUR FATS   Choose olive oil.  Replace solid fats such as butter and margarine with olive oil or canola oil.  Use olive oil for cooking, in dressings, and marinades.  Aim to consume at least four  tablespoons of olive oil a day, while keeping within your calorie budget.    3. EAT MORE VEGETABLES   Get at least three servings (three cups) of vegetables per day.  Choose a variety of colors.  Eat more dark green leafy vegetables such as collards, kale, spinach, chard, and turnip greens.    4. EAT MORE FRUITS   Get at least two servings (two cups) of fruits per day.  Choose a variety of colors.  Include berries often.    5. SNACK ON NUTS AND SEEDS   Choose at least three ounces (three small handfuls) of nuts and seeds per week, while keeping within your calorie budget.  Avoid candied, honey-roasted, and heavily salted nuts and seeds.    6. MAKE YOUR GRAINS WHOLE   Eat grains as grains  Choose whole grains such as oatmeal, quinoa, brown rice, and popcorn.  Look for “whole” in the first ingredient on the ingredient list (e.g., “whole wheat”) when choosing bread, pasta, and other grain-based foods.    7.  RETHINK SWEETS   Limit your sugar intake  Choose no more than three servings per week of high-sugar foods and drinks such as sugar-sweetened snacks, candies, desserts, or beverages.

## 2022-06-27 NOTE — PROGRESS NOTES
"INITIAL VASCULAR MEDICINE VISIT  [place] Miguel is a 41 y.o. female  who presents today 22  for   Chief Complaint   Patient presents with   • Follow-Up     initially referred by Lilly Abreu A.P.RN for eval for vasc etiology of RUE non-healing wound, orginally ref 22      Subjective         RUE non-healing wound:  Started 2 years ago (age 39) with episodes, can be fingers and toes and has been progressive with ulcerations forms.   Seen by  2022 for R index finger non-healing wound.  Had subung lac 3mo prior.  Reports prior episodes of non-healing finger wounds in the past.  Hx of recurrent cold/painful fingers with intermittent cyanosis.  Has WBI normal in past  and has apparently been seen by vasc specialist w/o formal dx or tx in the past.   (No prior visits of [facility] vasc med or available  Has seen by Dr. Pierre in latter half of  and informed no interventions available but started nifedipine, DAPT, atorva.  Currently on ASA only, stopped nifedipine due to HA   - pending rheum eval in [place]     HTN:  Prior on nifedipine but no longer taking, had used in the past - caused HA  Has not tried amlodipine.      Hyperlipidemia:  Stable, no current concerns  Current treatment: lifestyle changes  and High intensity  atorva   Baseline lipid    Latest Reference Range & Units 12 12:10   Cholesterol,Tot 100 - 199 mg/dL 218 (H)   Triglycerides 0 - 149 mg/dL 86   HDL >=40 mg/dL 37 !   LDL <100 mg/dL 164     Antiplatelet/anticoagulation: Yes, Details: ASA, no bleeding noted     PreDM:   no prior dx of T2D or meds, no sx      CKD:  No       Past Medical History:   Diagnosis Date   • Acid reflux    • Anesthesia     Family Hx-father ; was alwasy told it was anesthesia \"related\"; not sure what the actual reaction was; pt-PONV   • Asthma    • Heart burn    • Indigestion    • Pain 14    lower back/L leg=7/10   • Unspecified urinary incontinence      Past Surgical History: "   Procedure Laterality Date   • IRRIGATION & DEBRIDEMENT ORTHO  2014    Performed by El Gomez M.D. at SURGERY [facility] ORS   • IRRIGATION & DEBRIDEMENT NEURO  2014    Performed by El Gomez M.D. at SURGERY [facility] ORS   • LUMBAR LAMINECTOMY DISKECTOMY  2014    Performed by El Gomez M.D. at SURGERY [facility] ORS   • INJ,EPIDURAL/LUMB/SAC SINGLE  2014    Performed by Alexis Cornell M.D. at SURGERY [facility]   • TONSILLECTOMY  2013    Performed by Aaron Hutton M.D. at SURGERY SAME DAY [facility] ORS   • NASAL SEPTAL RECONSTRUCTION  2013    Performed by Aaron Hutton M.D. at SURGERY SAME DAY [facility] ORS   • TURBINOPLASTY  2013    Performed by Aaron Hutton M.D. at SURGERY SAME DAY [facility] ORS        Current Outpatient Medications:   •  amLODIPine, 5 mg, Oral, QHS  •  atorvastatin, 40 mg, Oral, Nightly, Taking  •  traMADol, 50 mg, Oral, Q4HRS PRN, PRN  •  aspirin, 81 mg, Oral, DAILY, Taking     Allergies   Allergen Reactions   • Latex Rash and Itching   • Nifedipine Vomiting     Headache, vomiting     Family History   Problem Relation Age of Onset   • Rheumatologic Disease Mother         RA   • Heart Attack Father          MI (age 45)   • Kidney Disease Father         ESRD on HD   • Diabetes Father    • Heart Disease Sister       Social History     Tobacco Use   • Smoking status: Never Smoker   • Smokeless tobacco: Never Used   Vaping Use   • Vaping Use: Never used   Substance Use Topics   • Alcohol use: No   • Drug use: No     Review of Systems   Constitutional: Negative for chills and fever.   Respiratory: Negative for cough.    Cardiovascular: Negative for chest pain, palpitations, orthopnea, claudication and leg swelling.   Gastrointestinal: Negative for nausea.   Musculoskeletal: Negative for myalgias.   Neurological: Negative for weakness.   Endo/Heme/Allergies: Does not bruise/bleed easily.           Objective  "  Vitals:    06/27/22 0817   BP: 114/78   BP Location: Right arm   Patient Position: Sitting   BP Cuff Size: Large adult   Pulse: 83   Weight: 108 kg (238 lb 12.8 oz)   Height: 1.651 m (5' 5\")      BP Readings from Last 5 Encounters:   06/27/22 114/78   05/31/22 120/70   04/28/22 128/87   04/24/22 128/80   09/05/21 127/65      Body mass index is 39.74 kg/m².  Physical Exam  Vitals reviewed.   Constitutional:       General: She is not in acute distress.     Appearance: Normal appearance.   HENT:      Head: Normocephalic and atraumatic.   Neck:      Thyroid: No thyroid mass.      Vascular: Normal carotid pulses.      Trachea: Trachea normal.   Cardiovascular:      Rate and Rhythm: Normal rate and regular rhythm.      Chest Wall: PMI is not displaced.      Pulses: Normal pulses.           Carotid pulses are 2+ on the right side and 2+ on the left side.       Radial pulses are 2+ on the right side and 2+ on the left side.        Dorsalis pedis pulses are 2+ on the right side and 2+ on the left side.        Posterior tibial pulses are 2+ on the right side and 2+ on the left side.      Heart sounds: Normal heart sounds.      Comments: Mild cyanosis in cold exam room today at bilat fingers, no well-demarcated, no pallor or rash  - tip of R index with mild eschar - no pus or malodor, healing c/d/i  Pulmonary:      Effort: Pulmonary effort is normal.      Breath sounds: Normal breath sounds.   Musculoskeletal:      Cervical back: Full passive range of motion without pain.      Right lower leg: No edema.      Left lower leg: No edema.   Skin:     General: Skin is warm and dry.      Capillary Refill: Capillary refill takes less than 2 seconds.      Coloration: Skin is cyanotic. Skin is not pale.      Nails: There is no clubbing.   Neurological:      General: No focal deficit present.      Mental Status: She is alert and oriented to person, place, and time. Mental status is at baseline.      Cranial Nerves: Cranial nerves are " intact. No cranial nerve deficit.      Coordination: Coordination is intact. Coordination normal.      Gait: Gait is intact. Gait normal.   Psychiatric:         Mood and Affect: Mood normal.         Behavior: Behavior normal.           No results found for: LIPOPROTA   No results found for: APOB           Lab Results   Component Value Date    SODIUM 138 2021    POTASSIUM 4.5 2021    CHLORIDE 104 2021    CO2 22 2021    GLUCOSE 107 (H) 2021    BUN 21 2021    CREATININE 0.87 2021    IFAFRICA >60 2021    IFNOTAFR >60 2021        Lab Results   Component Value Date    WBC 7.3 2021    RBC 4.46 2021    HEMOGLOBIN 13.7 2021    HEMATOCRIT 40.8 2021    MCV 91.5 2021    MCH 30.7 2021    MCHC 33.6 2021    MPV 9.1 2021         No results found for: MICROALBCALC, MALBCRT, MALBEXCR, HFJPOZ25, MICROALBUR, MICRALB, UMICROALBUM, MICROALBTIM      Latest Reference Range & Units 14 04:15 10/28/15 12:04 16 11:08 04/15/16 14:17 17 11:05   Hepatitis B Surface Antigen Negative   Negative      HIV 1/0/2 Non Reactive   see below      Rubella IgG Antibody IU/mL  135.40      Strep Gp B DNA PCR Negative     Negative    Syphilis, Treponemal Qual Non Reactive   Non Reactive Non Reactive     Stat C-Reactive Protein 0.00 - 0.75 mg/dL 0.32    0.35       VASCULAR IMAGING:   Last EKG:   Results for orders placed or performed during the hospital encounter of 21   EKG (NOW)   Result Value Ref Range    Report       [facility] Emergency Dept.    Test Date:  2021  Pt Name:    ADELINA KENNY       Department: ER  MRN:        [id number]                      Room:       [facility]  Gender:     Female                       Technician: [id number]  :        [id number]                   Requested By:JARET SHRESTHA  Order #:    [id number]                    Reading MD: Jaret Shrestha    Measurements  Intervals                                 Axis  Rate:       87                           P:          47  AK:         160                          QRS:        20  QRSD:       80                           T:          41  QT:         368  QTc:        443    Interpretive Statements  SINUS RHYTHM  Compared to ECG 08/29/2021 05:51:47  No significant changes  Electronically Signed On 9-5-2021 0:56:14 PDT by Scott Shrestha       WBI 8/2021  Normal WBI.   Absent 3, 4, 5th digit flow could be artifactual or secondary to embolic    disease.  Recommend clinical correlation.   Right.    Arterial doppler waveforms are of high amplitude and triphasic.   Wrist brachial index is within normal limits. WBI=1.05   PPG of the digits demonstrates absent flow in the right 3rd, 4th, and 5th    digits. The 1st and 2nd digits appear to have slightly diminished flow in    comparsion to the contralateral digits.    CT chest 8/2021   1.  No thoracic aortic aneurysm or dissection.  2.  Subclavian arteries are normal in caliber and patent.  LEFT subclavian artery is partially secured.  3.  Probable medial RIGHT lower lobe atelectasis.  Thoracic aorta is normal in caliber.  No dissection demonstrated.  Great vessel origins are intact.  RIGHT subclavian artery is normal in caliber and patent.  LEFT subclavian artery is partially obscured by streak artifact however also appears normal in caliber and patent.    Echo 9/2021   No prior study is available for comparison.   Normal left ventricular systolic function.  The left ventricular ejection fraction is visually estimated to be 65%.  No significant valve abnormalities.          Medical Decision Making:  Today's Assessment / Status / Plan:     1. Finger wound, simple, open, initial encounter  ANTICARDIOLIPIN AB IGG,IGM,IGA    ANTITHROMBIN III FUNC/IMMUNOL    BETA -2 GLYCOPROTEIN I AB LIZZETH    D-DIMER    LUPUS ANTICOAGULANT    FACTOR V LEIDEN MUTATION    FACTOR II DNA ANALYSIS    PROTEIN C FUNCTIONAL    PROTEIN S FUNCTIONAL    CCP  ANTIBODY    GEOVANY REFLEXIVE PROFILE    MPO/KY-3 (ANCA) ABS    Sed Rate    CRP QUANTITIVE (NON-CARDIAC)    CBC WITHOUT DIFFERENTIAL    HEP C VIRUS ANTIBODY    US-EXTREMITY ARTERY UPPER BILAT W/WBI (COMBO)    US-EXTREMITY ARTERY LOWER BILAT W/LM (COMBO)    amLODIPine (NORVASC) 5 MG Tab   2. Raynaud's phenomenon (secondary)  CCP ANTIBODY    GEOVANY REFLEXIVE PROFILE    MPO/KY-3 (ANCA) ABS    Sed Rate    CRP QUANTITIVE (NON-CARDIAC)    CBC WITHOUT DIFFERENTIAL    HEP C VIRUS ANTIBODY    US-EXTREMITY ARTERY LOWER BILAT W/LM (COMBO)    amLODIPine (NORVASC) 5 MG Tab   3. Obesity (BMI 30-39.9)     4. Prediabetes  HEMOGLOBIN A1C (Glycohemoglobin GHB Total/A1C with MBG Estimate)   5. Dyslipidemia  Comp Metabolic Panel    Lipid Profile    Lipoprotein (a)   6. Long term (current) use of antithrombotics/antiplatelets         Patient Type: Primary Prevention    Etiology of Established CVD if Present:     1) recurrent non-healing finger wounds, most recently right index 4/2022   ddx includes Raynaud's, Buerger's, pernio, Achenbach's, acrocyanosis, vs other small vessel / vasospastic d/o   - high prob secondary raynaud's due to later adulthood (around 39yo) onset with intense pain and ischemic ulcerations with pain and classic discolorations affecting fingers/toes.   Pernio is usually acute, self-limiting due to cold, but can have a chronic presentation with ischemic changes.   - fhx of RA but not SLE, she has no nephritic findings or recurrent malar rash or arthralgia  - no COVID prior to initial episode   -   No hx of smoking so buerger's less likely, non-self-limiting or painless so achenbach's and acrocyanosis r/o  - needs formal assessment for rheum conditions chaparrita in light of mom's RA dx however no indications of SLE or CREST or scleroderma - pending eval with rheum in [place]     - prior normal WBI with PPGs showing absent flow - recommend for repeat analysis indicative of small vessel and/or vasospastic dz   - CTA chest wnl w/o  proximal plaque or stenosis, no indications of thoracic Ao diss, plaque  - prior echo wnl, though not FIDENCIO   Plan:  - repeat WBI and check LM with PPGs for flow status   - consider CTA RUE   - continue cold exposure avoidance and rewarming techniques  - update labs for small vessel, ANCA-assoc, rheum, HCV  - update metabolic labs and hypercoag labs -  by 1 week  - may need cold agluttins and further serologic marker eval based upon initial eval   - possible FIDENCIO to r/o endocarditis veg though TTE showed normal valves and no LV thrombus   Meds:  - continue ASA 81mg, atorva  - start amlodipine 5mg daily   - consider PDE5i in future    BLOOD PRESSURE MANAGEMENT:  ACC/AHA (2017) goal <130/80  Home BP at goal: yes  Office BP at goal:  yes   - hx of preg-induced HTN on nifedipine   Plan:   - continue healthy diet, activity, weight mgmt   Monitoring:   - routine clinic-based BP measurements at least once annually   Medications: start amlodipine 5mg daily for vasodilt effects than BP     LIPID MANAGEMENT:   Qualifies for Statin Therapy Based on 2018 ACC/AHA Guidelines: yes, Primary Prevention - 40-74yo, LDLc >70, <190 w/o DM  The ASCVD Risk score (Katiana VANESSA Jr, et al., 2013) failed to calculate.  Major ASCVD events: None  High-risk condition: N/A  Risk-enhancers: Metabolic syndrome   Currently on Statin: Yes  Tx goals: LDL-C <100 (consider non-HDL-C <130, apoB <90)  At goal? No recent labs available   Plan:   - reinforced ongoing TLC measures as noted   - monitor labs   Meds:   - continue atorva 40mg daily     ANTITHROMBOTIC/ANTIPLATELET THERAPY: continue ASA 81mg daily for potential antithromb benefit due to non-healing wounds     GLYCEMIC STATUS: Prediabetic      Plan:  - update a1c   - continue healthy diet, weight reduction, daily physical activity   - consider metformin up to 850mg BID to slow or offset progression to T2D as per DPP trial   - monitor labs Q6-12mo    LIFESTYLE INTERVENTIONS:    SMOKING:   reports  that she has never smoked. She has never used smokeless tobacco.   - continued complete avoidance of all tobacco products     PHYSICAL ACTIVITY: continue healthy activity to improve CV fitness.  In general, targeting >150min/week of moderate-level activity.     WEIGHT MANAGEMENT AND NUTRITION:  Mediterranean style dietary approach , Low carbohydrate (10-20% CHO content) dietary approach  and Weight reduction approach - reduce caloric intake by 300kcal/day to achieve 1-2lb weight loss per week     OTHER: none     Instructed to follow-up with PCP for remainder of adult medical needs: yes  We will partner with other providers in the management of established vascular disease and cardiometabolic risk factors.    Studies to Be Obtained: LM w/ PPG, WBI with PPG, consider CTA RUE based upon results    Labs to Be Obtained: as noted above     Follow up in: 5-6 weeks     Total time: 63min - chart review/prep, review of other providers' records, imaging/lab review, face-to-face time for history/examination, ordering, prescribing,  review of results/meds/ treatment plan with patient/family/caregiver, documentation in EMR, care coordination (as needed)    Scott Acuña M.D.  [facility]   [facility]   [phone]

## 2022-06-28 ENCOUNTER — TELEPHONE (OUTPATIENT)
Dept: INTERNAL MEDICINE | Facility: OTHER | Age: 42
End: 2022-06-28

## 2022-06-28 NOTE — TELEPHONE ENCOUNTER
1. Caller Name: Pt                      Call Back Number: [phone] (home)     2. Message: Patient lvm stating she went to see Dr. Acuña and was told they would not prescribe tramadol to her. Wondering if we can send in a script for her as her finger is really hurting. Would like to know what she should do if she can't get a prescription refill on her tramadol?    3. Patient approves office to leave a detailed voicemail/MyChart message: N\A

## 2022-07-05 DIAGNOSIS — I99.8 ISCHEMIA OF DIGITS OF HAND: ICD-10-CM

## 2022-07-05 RX ORDER — TRAMADOL HYDROCHLORIDE 50 MG/1
50 TABLET ORAL 2 TIMES DAILY PRN
Qty: 30 TABLET | Refills: 0 | Status: SHIPPED | OUTPATIENT
Start: 2022-07-05 | End: 2022-08-04

## 2022-07-12 ENCOUNTER — OFFICE VISIT (OUTPATIENT)
Dept: INTERNAL MEDICINE | Facility: OTHER | Age: 42
End: 2022-07-12
Payer: MEDICAID

## 2022-07-12 ENCOUNTER — HOSPITAL ENCOUNTER (OUTPATIENT)
Dept: RADIOLOGY | Facility: MEDICAL CENTER | Age: 42
End: 2022-07-12
Attending: INTERNAL MEDICINE
Payer: MEDICAID

## 2022-07-12 VITALS
BODY MASS INDEX: 40.29 KG/M2 | TEMPERATURE: 98.3 F | HEIGHT: 65 IN | HEART RATE: 89 BPM | SYSTOLIC BLOOD PRESSURE: 122 MMHG | OXYGEN SATURATION: 97 % | DIASTOLIC BLOOD PRESSURE: 74 MMHG | WEIGHT: 241.8 LBS

## 2022-07-12 DIAGNOSIS — I99.8 ISCHEMIA OF DIGITS OF HAND: ICD-10-CM

## 2022-07-12 DIAGNOSIS — M79.89 SWELLING OF RIGHT HAND: ICD-10-CM

## 2022-07-12 DIAGNOSIS — R53.83 FATIGUE, UNSPECIFIED TYPE: ICD-10-CM

## 2022-07-12 DIAGNOSIS — M34.1 CREST SYNDROME (HCC): ICD-10-CM

## 2022-07-12 DIAGNOSIS — R76.8 POSITIVE ANA (ANTINUCLEAR ANTIBODY): ICD-10-CM

## 2022-07-12 DIAGNOSIS — R32 ENURESIS: ICD-10-CM

## 2022-07-12 PROCEDURE — 73110 X-RAY EXAM OF WRIST: CPT | Mod: RT

## 2022-07-12 PROCEDURE — 99214 OFFICE O/P EST MOD 30 MIN: CPT | Mod: GC | Performed by: INTERNAL MEDICINE

## 2022-07-12 RX ORDER — PREDNISONE 20 MG/1
TABLET ORAL
Qty: 10 TABLET | Refills: 0 | Status: SHIPPED | OUTPATIENT
Start: 2022-07-12 | End: 2022-09-28

## 2022-07-12 ASSESSMENT — FIBROSIS 4 INDEX: FIB4 SCORE: 0.84

## 2022-07-12 NOTE — PROGRESS NOTES
Christine Miguel is a 41 y.o. female who presents today with the following:    CC: Follow up visit     HPI:    41-year-old female with past medical history of sciatica, asthma, recurrent nonhealing finger wounds, positive GEOVANY and history of fingers turning purple/blue presents to the clinic today for a follow-up visit with complaints as follows:      1. Hand swelling  Started last night.  Denies any trauma to the hand, no recent injury, no scabs.  Endorses ongoing pain around 4/10 in intensity due to which she had some difficulty sleeping.  Endorses swelling in the hand which is more on the second digit along with some right leg redness.  Denies any fevers or chills.  Saw vascular 6/27, started on amlodipine 5 mg Qday , next appt with them on Aug/1     2. Review of Pap smear results  [facility]    3. Urinary complaints  Ongoing, endorses leakage of urine with laughing/sneezing/coughing.  Endorses 3 episodes over the last 1 month when she had no control of her urine and she ended up putting herself.  She has 5 kids all born vaginally    4. Screening for breast cancer  Mammogram 6/14   No mammographic or sonographic evidence of breast malignancy. Specifically no mammographic or sonographic correlate to the patient's clinical presentation  Recommend yearly mammographic screening evaluation  These results were given to the patient at the time of visit.     ROS:     Negative than the ones mentioned in HPI  General: No fevers, chills, night sweats, weight loss or gain  HEENT: No hearing changes, vision changes, eye pain, ear pain, nasal discharge, sore throat  Neck: No swelling in neck  Pulmonary: No shortness of breath, cough, sputum, or hemoptysis  Cardiovascular: No chest pain, palpitations, or LE swelling  GI: No nausea, vomiting, diarrhea, constipation, abdominal pain, hematochezia or melena  : Endorses leakage of urine with  "laughing/sneezing/coughing  Neuro: No focal weakness, no general weakness, no headaches, no lightheadedness, no dizziness   Psych: No anxiety or depression    Past Medical History:   Diagnosis Date   • Acid reflux    • Anesthesia     Family Hx-father ; was alwasy told it was anesthesia \"related\"; not sure what the actual reaction was; pt-PONV   • Asthma    • Heart burn    • Indigestion    • Pain 14    lower back/L leg=7/10   • Unspecified urinary incontinence        Past Surgical History:   Procedure Laterality Date   • IRRIGATION & DEBRIDEMENT ORTHO  2014    Performed by El Gomez M.D. at SURGERY [facility] ORS   • IRRIGATION & DEBRIDEMENT NEURO  2014    Performed by El Gomez M.D. at SURGERY [facility] ORS   • LUMBAR LAMINECTOMY DISKECTOMY  2014    Performed by El Gomez M.D. at SURGERY [facility] ORS   • INJ,EPIDURAL/LUMB/SAC SINGLE  2014    Performed by Alexis Cornell M.D. at SURGERY [facility]   • TONSILLECTOMY  2013    Performed by Aaron Hutton M.D. at SURGERY SAME DAY [facility] ORS   • NASAL SEPTAL RECONSTRUCTION  2013    Performed by Aaron Hutton M.D. at SURGERY SAME DAY [facility] ORS   • TURBINOPLASTY  2013    Performed by Aaron Hutton M.D. at SURGERY SAME DAY [facility] ORS       Family History   Problem Relation Age of Onset   • Rheumatologic Disease Mother         RA   • Heart Attack Father          MI (age 45)   • Kidney Disease Father         ESRD on HD   • Diabetes Father    • Heart Disease Sister        Social History     Tobacco Use   • Smoking status: Never Smoker   • Smokeless tobacco: Never Used   Vaping Use   • Vaping Use: Never used   Substance Use Topics   • Alcohol use: No   • Drug use: No       Current Outpatient Medications   Medication Sig Dispense Refill   • predniSONE (DELTASONE) 20 MG Tab 1 table daily for 5 days then decrease to half a tablet for 5 days then decrease to 1/4th of a tablet " "daily till you run out of the prescription 10 Tablet 0   • traMADol (ULTRAM) 50 MG Tab Take 1 Tablet by mouth 2 times a day as needed for Severe Pain for up to 30 days. 30 Tablet 0   • amLODIPine (NORVASC) 5 MG Tab Take 1 Tablet by mouth at bedtime for 360 days. 90 Tablet 3   • atorvastatin (LIPITOR) 40 MG Tab Take 1 Tablet by mouth every evening. 30 Tablet 1   • aspirin (ASA) 81 MG Chew Tab chewable tablet Chew 1 Tablet every day. 30 Tablet 3     No current facility-administered medications for this visit.       Physical Exam:  /74 (BP Location: Left arm, Patient Position: Sitting, BP Cuff Size: Adult)   Pulse 89   Temp 36.8 °C (98.3 °F) (Temporal)   Ht 1.651 m (5' 5\")   Wt 110 kg (241 lb 12.8 oz)   SpO2 97%   BMI 40.24 kg/m²   General: Well developed, well nourished female, in no distress.  HEENT: NC/AT, PERRL, EOMI, no scleral icterus or conjunctival pallor, fair dentition/denture in, no nasal discharge or oral erythema or exudates.   Neck: Supple, No cervical or supraclavicular LAD  CV:RRR, no murmurs gallops or Rubs, no JVD  Pulm: LCAB, no crackles, rales, rhonchi, or wheezing  GI: Normal bowel sounds, abdomen soft, nontender, nondistended to deep or light palpation in all 4 quadrants, no HSM.  MSK: Right hand and lower one third of the arm is swollen.  More swelling on the hand and on the 2nd digit of the right hand along with erythema and warmth.  No tenderness to palpation appreciated, however patient does endorse some discomfort to palpation  Neuro: Patient is alert and oriented x3, no focal deficits  Psych: Appropriate mood and affect        Assessment and Plan:     Hand swelling  At risk for ischemia of digits of hand  Positive GEOVANY (antinuclear antibody)  Less likely an infectious etiology/cellulitis in the absence of fevers/chills/pain at the site.  Most likely related to her underlying rheumatological condition  Plan:  -X-ray wrist to be done urgently today, we will follow-up on " results  -Starting patient on steroids, prednisone 20 mg for 5 days and then taper off slowly.  -Early follow-up in a week to assess response  -Patient described that the rheumatologist's office she was referred to is now closed and they do not have a rheumatologist there anymore.  Requesting a new referral.  New referral placed on today's visit  -Labs for scleroderma including anticentromere antibody, anti-SCL 70 and CMP to check for calcium have been ordered      - Referral to Rheumatology  - ANTI-CENTROMERE B AB  - ANTI SCL-70 ABS; Future  - Comp Metabolic Panel; Future      Stress incontinence  -Counseled her in detail about Kegel exercises.  Handout provided to her as well.  Discussed discharge with use on YouTube for additional information as well and try them daily  -We will follow-up for response on subsequent visit      Screening for cervical cancer  Pap smear results reviewed with patient today that were negative for malignancy      Obesity with BMI 40.24  -Not fully addressed on today's visit  -Follow-up on subscapularis      Screening for breast cancer  Mammogram 6/14   No mammographic or sonographic evidence of breast malignancy. Specifically no mammographic or sonographic correlate to the patient's clinical presentation  Recommend yearly mammographic screening evaluation  These results were given to the patient at the time of visit.     Return in about 1 week (around 7/19/2022).    Patient Instructions   Thank you for visiting today!    Please follow-up in 1 week     Please follow-up on referrals and schedule an appointment with Rheumatology     Please get the labs done at the earliest    If you start developing compartment syndrome (pain and tense swelling in your hand and arm) like we discussed, please go the urgent care or nearest emergency department.     Please start prednisone today and please get the xray done today       If you have any questions or concerns please feel free to contact us at  [phone].    If you feel like you are experiencing a medical emergency please seek immediate medical attention at an urgent care or in the emergency department.        Signed by: Sandra Logan M.D.

## 2022-07-12 NOTE — PATIENT INSTRUCTIONS
Thank you for visiting today!    Please follow-up in 1 week     Please follow-up on referrals and schedule an appointment with Rheumatology     Please get the labs done at the earliest    If you start developing compartment syndrome (pain and tense swelling in your hand and arm) like we discussed, please go the urgent care or nearest emergency department.     Please start prednisone today and please get the xray done today       If you have any questions or concerns please feel free to contact us at [phone].    If you feel like you are experiencing a medical emergency please seek immediate medical attention at an urgent care or in the emergency department.

## 2022-07-20 ENCOUNTER — APPOINTMENT (OUTPATIENT)
Dept: INTERNAL MEDICINE | Facility: OTHER | Age: 42
End: 2022-07-20

## 2022-07-22 DIAGNOSIS — R76.8 POSITIVE ANA (ANTINUCLEAR ANTIBODY): ICD-10-CM

## 2022-07-22 DIAGNOSIS — R32 ENURESIS: ICD-10-CM

## 2022-07-22 DIAGNOSIS — I99.8 ISCHEMIA OF DIGITS OF HAND: ICD-10-CM

## 2022-07-22 DIAGNOSIS — R53.83 FATIGUE, UNSPECIFIED TYPE: ICD-10-CM

## 2022-07-22 DIAGNOSIS — M79.89 SWELLING OF RIGHT HAND: ICD-10-CM

## 2022-08-01 ENCOUNTER — OFFICE VISIT (OUTPATIENT)
Dept: VASCULAR LAB | Facility: MEDICAL CENTER | Age: 42
End: 2022-08-01
Attending: FAMILY MEDICINE
Payer: MEDICAID

## 2022-08-01 VITALS
HEART RATE: 85 BPM | BODY MASS INDEX: 40.15 KG/M2 | WEIGHT: 241 LBS | HEIGHT: 65 IN | DIASTOLIC BLOOD PRESSURE: 73 MMHG | SYSTOLIC BLOOD PRESSURE: 104 MMHG

## 2022-08-01 DIAGNOSIS — E66.9 OBESITY (BMI 30-39.9): ICD-10-CM

## 2022-08-01 DIAGNOSIS — R76.8 CYCLIC CITRULLINATED PEPTIDE (CCP) ANTIBODY POSITIVE: ICD-10-CM

## 2022-08-01 DIAGNOSIS — E78.41 ELEVATED LIPOPROTEIN(A): Chronic | ICD-10-CM

## 2022-08-01 DIAGNOSIS — R76.8 ANA POSITIVE: ICD-10-CM

## 2022-08-01 DIAGNOSIS — S61.209A FINGER WOUND, SIMPLE, OPEN, INITIAL ENCOUNTER: ICD-10-CM

## 2022-08-01 DIAGNOSIS — R73.03 PREDIABETES: ICD-10-CM

## 2022-08-01 DIAGNOSIS — Z79.02 LONG TERM (CURRENT) USE OF ANTITHROMBOTICS/ANTIPLATELETS: ICD-10-CM

## 2022-08-01 DIAGNOSIS — I73.00 RAYNAUD'S PHENOMENON (SECONDARY): ICD-10-CM

## 2022-08-01 DIAGNOSIS — Z82.49 FAMILY HISTORY OF PREMATURE CORONARY HEART DISEASE: ICD-10-CM

## 2022-08-01 DIAGNOSIS — E78.5 DYSLIPIDEMIA: ICD-10-CM

## 2022-08-01 PROCEDURE — 99215 OFFICE O/P EST HI 40 MIN: CPT | Performed by: FAMILY MEDICINE

## 2022-08-01 PROCEDURE — 99212 OFFICE O/P EST SF 10 MIN: CPT

## 2022-08-01 RX ORDER — AMLODIPINE BESYLATE 10 MG/1
10 TABLET ORAL
Qty: 90 TABLET | Refills: 3 | Status: SHIPPED | OUTPATIENT
Start: 2022-08-01 | End: 2023-05-01

## 2022-08-01 ASSESSMENT — ENCOUNTER SYMPTOMS
CLAUDICATION: 0
MYALGIAS: 0
WEAKNESS: 0
PALPITATIONS: 0
NAUSEA: 0
COUGH: 0
BRUISES/BLEEDS EASILY: 0
FEVER: 0
CHILLS: 0
ORTHOPNEA: 0

## 2022-08-01 ASSESSMENT — FIBROSIS 4 INDEX: FIB4 SCORE: 0.84

## 2022-08-01 NOTE — PROGRESS NOTES
FOLLOW-UP VASCULAR MEDICINE VISIT  Christine Ita Miguel is a. female  who presents 08/01/22  for   Chief Complaint   Patient presents with   • Follow-Up     initially referred by Lilly Abreu A.P.RN for eval for vasc etiology of RUE non-healing wound, orginally ref 4/24/22      Subjective         RUE non-healing wound:  Interval hx/concerns: had recent new R index finger wound that is improving, less painful.  Had interval hand puffiness.  Has completed both sets of labs.  Had bee on steroids per PCP with some relief.  Still penidng with rheum MD in [facility] (apparently Medicaid had scheduled her with someone who is no longer in practice and now pending next appt with actual working rheum MD).   Pending WBI/art duplex 8/15.    pertinent hx  Started 2 years ago (age 39) with episodes, can be fingers and toes and has been progressive with ulcerations forms.   Seen by  4/2022 for R index finger non-healing wound.  Had subung lac 3mo prior.  Reports prior episodes of non-healing finger wounds in the past.  Hx of recurrent cold/painful fingers with intermittent cyanosis.  Has WBI normal in past 2021 and has apparently been seen by vasc specialist w/o formal dx or tx in the past.   (No prior visits of [facility] vasc med or available  Has seen by Dr. Pierre in latter half of 2021 and informed no interventions available but started nifedipine, DAPT, atorva.  Currently on ASA only, stopped nifedipine due to HA     HTN:  Started amlodipine, no ADRs.  No checking home BP - stable, no dizz with positional changes     Hyperlipidemia:  Stable, no current concerns  Current treatment: lifestyle changes  and High intensity  atorva   Baseline lipid    Latest Reference Range & Units 01/03/12 12:10   Cholesterol,Tot 100 - 199 mg/dL 218 (H)   Triglycerides 0 - 149 mg/dL 86   HDL >=40 mg/dL 37 !   LDL <100 mg/dL 164     Antiplatelet/anticoagulation: Yes, Details: ASA, no bleeding noted     PreDM:   no prior dx of T2D or meds, no  "sx           Current Outpatient Medications:   •  amLODIPine, 10 mg, Oral, QHS  •  predniSONE, 1 table daily for 5 days then decrease to half a tablet for 5 days then decrease to 1/4th of a tablet daily till you run out of the prescription, Taking  •  traMADol, 50 mg, Oral, BID PRN, PRN  •  atorvastatin, 40 mg, Oral, Nightly, Taking  •  aspirin, 81 mg, Oral, DAILY, Taking     Family History   Problem Relation Age of Onset   • Rheumatologic Disease Mother         RA   • Heart Attack Father          MI (age 45)   • Kidney Disease Father         ESRD on HD   • Diabetes Father    • Heart Disease Sister       Social History     Tobacco Use   • Smoking status: Never Smoker   • Smokeless tobacco: Never Used   Vaping Use   • Vaping Use: Never used   Substance Use Topics   • Alcohol use: No   • Drug use: No     Review of Systems   Constitutional: Negative for chills and fever.   Respiratory: Negative for cough.    Cardiovascular: Negative for chest pain, palpitations, orthopnea, claudication and leg swelling.   Gastrointestinal: Negative for nausea.   Musculoskeletal: Negative for myalgias.   Skin:        Dry, cracking skin at fingers     Neurological: Negative for weakness.   Endo/Heme/Allergies: Does not bruise/bleed easily.           Objective   Vitals:    22 0807   BP: 104/73   BP Location: Left arm   Patient Position: Sitting   BP Cuff Size: Large adult   Pulse: 85   Weight: 109 kg (241 lb)   Height: 1.651 m (5' 5\")      BP Readings from Last 5 Encounters:   22 104/73   22 122/74   22 114/78   22 120/70   22 128/87      Body mass index is 40.1 kg/m².  Physical Exam  Vitals reviewed.   Constitutional:       General: She is not in acute distress.     Appearance: Normal appearance.   HENT:      Head: Normocephalic and atraumatic.   Neck:      Thyroid: No thyroid mass.      Vascular: Normal carotid pulses.      Trachea: Trachea normal.   Cardiovascular:      Rate and Rhythm: Normal " rate and regular rhythm.      Chest Wall: PMI is not displaced.      Pulses: Normal pulses.           Carotid pulses are 2+ on the right side and 2+ on the left side.       Radial pulses are 2+ on the right side and 2+ on the left side.        Dorsalis pedis pulses are 2+ on the right side and 2+ on the left side.        Posterior tibial pulses are 2+ on the right side and 2+ on the left side.      Heart sounds: Normal heart sounds.      Comments: Mild cyanosis in cold exam room today at bilat fingers, no well-demarcated, no pallor or rash  - tip of R index with mild eschar - no pus or malodor, healing c/d/i  Pulmonary:      Effort: Pulmonary effort is normal.      Breath sounds: Normal breath sounds.   Musculoskeletal:      Cervical back: Full passive range of motion without pain.      Right lower leg: No edema.      Left lower leg: No edema.   Skin:     General: Skin is warm and dry.      Capillary Refill: Capillary refill takes less than 2 seconds.      Coloration: Skin is cyanotic. Skin is not pale.      Nails: There is no clubbing.   Neurological:      General: No focal deficit present.      Mental Status: She is alert and oriented to person, place, and time. Mental status is at baseline.      Cranial Nerves: Cranial nerves are intact. No cranial nerve deficit.      Coordination: Coordination is intact. Coordination normal.      Gait: Gait is intact. Gait normal.   Psychiatric:         Mood and Affect: Mood normal.         Behavior: Behavior normal.       Labs:    QUest 6/27/22   Lp(a) 181      HDL 42  LDL 62   CMP wnl   a1c wnl  Cbc wnl   ESR, CRP, ANCA (MPO ab, PR3 ab) - wnl   GEOVANY - positive >1:1280 (centromere pattern)  CCP ab 71 (elevated)   HCV ab neg     [facility] 7/20/22   CMP - wnl   SCL70 ab - neg   AT III activity wnl   FVL neg   Prot C wnl   PGM neg   B2GP1 ab wnl  ACL ab wnl   Lupus AC neg   D-dimer 0.32           No results found for: LIPOPROTA   No results found for: APOB           Lab  Results   Component Value Date    SODIUM 138 2021    POTASSIUM 4.5 2021    CHLORIDE 104 2021    CO2 22 2021    GLUCOSE 107 (H) 2021    BUN 21 2021    CREATININE 0.87 2021    IFAFRICA >60 2021    IFNOTAFR >60 2021        Lab Results   Component Value Date    WBC 7.3 2021    RBC 4.46 2021    HEMOGLOBIN 13.7 2021    HEMATOCRIT 40.8 2021    MCV 91.5 2021    MCH 30.7 2021    MCHC 33.6 2021    MPV 9.1 2021         No results found for: MICROALBCALC, MALBCRT, MALBEXCR, KEXFEU63, MICROALBUR, MICRALB, UMICROALBUM, MICROALBTIM      Latest Reference Range & Units 14 04:15 10/28/15 12:04 16 11:08 04/15/16 14:17 17 11:05   Hepatitis B Surface Antigen Negative   Negative      HIV 1/0/2 Non Reactive   see below      Rubella IgG Antibody IU/mL  135.40      Strep Gp B DNA PCR Negative     Negative    Syphilis, Treponemal Qual Non Reactive   Non Reactive Non Reactive     Stat C-Reactive Protein 0.00 - 0.75 mg/dL 0.32    0.35       VASCULAR IMAGING:   Last EKG:   Results for orders placed or performed during the hospital encounter of 21   EKG (NOW)   Result Value Ref Range    Report       [facility] Emergency Dept.    Test Date:  2021  Pt Name:    ADELINA KENNY       Department: ER  MRN:        [id number]                      Room:        20  Gender:     Female                       Technician: [id number]  :        [id number]                   Requested By:JARET SHRESTHA  Order #:    [id number]                    Reading MD: Jaret Shrestha    Measurements  Intervals                                Axis  Rate:       87                           P:          47  OR:         160                          QRS:        20  QRSD:       80                           T:          41  QT:         368  QTc:        443    Interpretive Statements  SINUS RHYTHM  Compared to ECG 2021 05:51:47  No  significant changes  Electronically Signed On 9-5-2021 0:56:14 PDT by Scott Shrestha       WBI 8/2021  Normal WBI.   Absent 3, 4, 5th digit flow could be artifactual or secondary to embolic    disease.  Recommend clinical correlation.   Right.    Arterial doppler waveforms are of high amplitude and triphasic.   Wrist brachial index is within normal limits. WBI=1.05   PPG of the digits demonstrates absent flow in the right 3rd, 4th, and 5th    digits. The 1st and 2nd digits appear to have slightly diminished flow in    comparsion to the contralateral digits.    CT chest 8/2021   1.  No thoracic aortic aneurysm or dissection.  2.  Subclavian arteries are normal in caliber and patent.  LEFT subclavian artery is partially secured.  3.  Probable medial RIGHT lower lobe atelectasis.  Thoracic aorta is normal in caliber.  No dissection demonstrated.  Great vessel origins are intact.  RIGHT subclavian artery is normal in caliber and patent.  LEFT subclavian artery is partially obscured by streak artifact however also appears normal in caliber and patent.    Echo 9/2021   No prior study is available for comparison.   Normal left ventricular systolic function.  The left ventricular ejection fraction is visually estimated to be 65%.  No significant valve abnormalities.     WBI/art duplex - pending 8/15/22           Medical Decision Making:  Today's Assessment / Status / Plan:     1. Finger wound, simple, open, initial encounter     2. Raynaud's phenomenon (secondary)  CENTROMERE AB, IGG    COMPLEMENT C4    COMPLEMENT C3    RHEUMATOID FACTOR QUANT    CONNECTIVE TISSUE DISEASES PROFILE    amLODIPine (NORVASC) 10 MG Tab   3. Prediabetes     4. Dyslipidemia     5. Long term (current) use of antithrombotics/antiplatelets     6. Obesity (BMI 30-39.9)     7. Family history of premature coronary heart disease     8. Cyclic citrullinated peptide (CCP) antibody positive  CENTROMERE AB, IGG    COMPLEMENT C4    COMPLEMENT C3    RHEUMATOID  FACTOR QUANT    CONNECTIVE TISSUE DISEASES PROFILE   9. GEOVANY positive  CENTROMERE AB, IGG    COMPLEMENT C4    COMPLEMENT C3    RHEUMATOID FACTOR QUANT    CONNECTIVE TISSUE DISEASES PROFILE   10. Elevated lipoprotein(a)      baseline 181       Patient Type: Primary Prevention    Etiology of Established CVD if Present:     1) recurrent non-healing finger wounds, most recently right index 4/2022 - improving  - now L index finger cracking - improving   Rheum w/u positive for high CCP ab and GEOVANY strong positive with centromere pattern - these would be indicative of RA, CREST, MCTD   - presumed secondary Raynaud's based upon this profile  - high prob secondary raynaud's due to later adulthood (around 41yo) onset with intense pain and ischemic ulcerations with pain and classic discolorations affecting fingers/toes.    - thrombophilia w/u neg,  ANCA neg   - fhx of RA but not SLE, she has no nephritic findings or recurrent malar rash or arthralgia  - no COVID prior to initial episode    - pending eval with rheum in [place]     - prior normal WBI with PPGs showing absent flow - recommend for repeat analysis indicative of small vessel and/or vasospastic dz   - CTA chest wnl w/o proximal plaque or stenosis, no indications of thoracic Ao diss, plaque  - prior echo wnl, though not FIDENCIO   - literature supports CCB as 1st line with other agents including prostaglandin based tx, ARB, SSRI, PDE5i moving forward and dependent upon severity and recurrences of episodes   Plan:  - gave multiple handouts on RA, CREST, CCP ab  - repeat WBI and check LM with PPGs for flow status - pending 8/15   - consider CTA RUE   - continue cold exposure avoidance and rewarming techniques  - update additional 2nd tier labs to help with dx, needs formal eval with rheum for review, dx, and tx decisions moving forward as outside of my scope   - possible FIDENCIO to r/o endocarditis veg though TTE showed normal valves and no LV thrombus    Meds:  - continue ASA 81mg,  atorva  - increase amlodipine to 10mg daily - monitor for hypotension   - consider addition of cilostazol, ARB, SSRI if worsening over time   - consider PDE5i in future vs     BLOOD PRESSURE MANAGEMENT:  ACC/AHA (2017) goal <130/80  Home BP at goal: yes  Office BP at goal:  yes   - hx of preg-induced HTN on nifedipine   Plan:   - continue healthy diet, activity, weight mgmt   Monitoring:   - routine clinic-based BP measurements at least once annually   Medications: start amlodipine 5mg daily for vasodilt effects than BP     LIPID MANAGEMENT:   Qualifies for Statin Therapy Based on 2018 ACC/AHA Guidelines: yes, Primary Prevention - 40-76yo, LDLc >70, <190 w/o DM  The ASCVD Risk score (Katiana VANESSA Jr, et al., 2013) failed to calculate.  Major ASCVD events: None  High-risk condition: N/A  Risk-enhancers: Metabolic syndrome  and Lp(a) >50   - reviewed with patient this is an independent risk factor for ASCVD but is currently controversial if lowering has impact on outcomes in primary prevention, so targeting LDLc lowering is primary objective for now and consider lp(a) lowering if worsening ascvd risk or if ascvd event occurs - definitely prothrombotic and needs ASA   Currently on Statin: Yes  Tx goals: LDL-C <100 (consider non-HDL-C <130, apoB <90)  At goal? Yes, 7/2022   Plan:   - reinforced ongoing TLC measures as noted   - monitor labs   Meds:   - continue atorva 40mg daily     ANTITHROMBOTIC/ANTIPLATELET THERAPY: continue ASA 81mg daily for potential antithromb benefit due to non-healing wounds and high lp(a)     GLYCEMIC STATUS: Prediabetic      Plan:  - update a1c   - continue healthy diet, weight reduction, daily physical activity   - consider metformin up to 850mg BID to slow or offset progression to T2D as per DPP trial   - monitor labs Q6-12mo    LIFESTYLE INTERVENTIONS:    SMOKING:   reports that she has never smoked. She has never used smokeless tobacco.   - continued complete avoidance of all tobacco products      PHYSICAL ACTIVITY: continue healthy activity to improve CV fitness.  In general, targeting >150min/week of moderate-level activity.     WEIGHT MANAGEMENT AND NUTRITION:  Mediterranean style dietary approach , Low carbohydrate (10-20% CHO content) dietary approach  and Weight reduction approach - reduce caloric intake by 300kcal/day to achieve 1-2lb weight loss per week     OTHER: none     Instructed to follow-up with PCP for remainder of adult medical needs: yes  We will partner with other providers in the management of established vascular disease and cardiometabolic risk factors.    Studies to Be Obtained: LM w/ PPG, WBI with PPG, consider CTA RUE based upon results    Labs to Be Obtained: as noted above     Follow up in:  With rheum MD,  2mo with vasc med     Total time: 49min - chart review/prep, review of other providers' records, imaging/lab review, face-to-face time for history/examination, ordering, prescribing,  review of results/meds/ treatment plan with patient/family/caregiver, documentation in EMR, care coordination (as needed)    Scott Acuña M.D.  [facility]   [facility]   [phone]

## 2022-08-10 RX ORDER — ATORVASTATIN CALCIUM 40 MG/1
40 TABLET, FILM COATED ORAL NIGHTLY
Qty: 90 TABLET | Refills: 3 | Status: SHIPPED | OUTPATIENT
Start: 2022-08-10 | End: 2023-10-09

## 2022-08-10 RX ORDER — ASPIRIN 81 MG/1
81 TABLET, CHEWABLE ORAL DAILY
Qty: 90 TABLET | Refills: 3 | Status: SHIPPED | OUTPATIENT
Start: 2022-08-10 | End: 2023-02-07

## 2022-08-10 RX ORDER — ASPIRIN 81 MG/1
81 TABLET, CHEWABLE ORAL DAILY
Qty: 30 TABLET | Refills: 3 | Status: CANCELLED | OUTPATIENT
Start: 2022-08-10

## 2022-08-10 NOTE — TELEPHONE ENCOUNTER
Pt called and states she needs refill on her cholesterol medication sent to [facility] on [address], she also is requesting that we call her Rheumatologist at [phone] to get her scheduled, she states they do not have her lab work and without that they are unable to schedule her, wondering why they dont have her labs. Pt got labs done at [facility]- I have printed the labs from [facility] and will send them to Rheum, their fax number is [phone]

## 2022-08-10 NOTE — TELEPHONE ENCOUNTER
Sandra Logan M.D.  You 42 minutes ago (12:26 PM)     MEGA  *faxing the labs (not prescription)       Sandra Logan M.D.  You 43 minutes ago (12:25 PM)     MEGA  I have sent the requested prescriptions to [facility] pharmacy [facility]).   Thank you for faxing them the prescription. Please double check with her rheumatologist's office if something else is required from our part and if not they can please go ahead and set up an appointment with her.

## 2022-08-15 ENCOUNTER — HOSPITAL ENCOUNTER (OUTPATIENT)
Dept: RADIOLOGY | Facility: MEDICAL CENTER | Age: 42
End: 2022-08-15
Attending: FAMILY MEDICINE
Payer: MEDICAID

## 2022-08-15 DIAGNOSIS — S61.209A FINGER WOUND, SIMPLE, OPEN, INITIAL ENCOUNTER: ICD-10-CM

## 2022-08-15 PROCEDURE — 93922 UPR/L XTREMITY ART 2 LEVELS: CPT | Mod: 26 | Performed by: INTERNAL MEDICINE

## 2022-08-15 PROCEDURE — 93922 UPR/L XTREMITY ART 2 LEVELS: CPT

## 2022-08-16 ENCOUNTER — OFFICE VISIT (OUTPATIENT)
Dept: INTERNAL MEDICINE | Facility: OTHER | Age: 42
End: 2022-08-16
Payer: MEDICAID

## 2022-08-16 VITALS
HEIGHT: 65 IN | SYSTOLIC BLOOD PRESSURE: 110 MMHG | BODY MASS INDEX: 40.45 KG/M2 | TEMPERATURE: 98.3 F | WEIGHT: 242.8 LBS | OXYGEN SATURATION: 98 % | HEART RATE: 86 BPM | DIASTOLIC BLOOD PRESSURE: 76 MMHG

## 2022-08-16 DIAGNOSIS — I99.8 ISCHEMIA OF DIGITS OF HAND: ICD-10-CM

## 2022-08-16 DIAGNOSIS — S61.209A OPEN WOUND OF FINGER, INITIAL ENCOUNTER: ICD-10-CM

## 2022-08-16 DIAGNOSIS — M79.89 SWELLING OF RIGHT HAND: ICD-10-CM

## 2022-08-16 DIAGNOSIS — M34.1 CREST SYNDROME (HCC): ICD-10-CM

## 2022-08-16 DIAGNOSIS — R76.8 POSITIVE ANA (ANTINUCLEAR ANTIBODY): ICD-10-CM

## 2022-08-16 PROCEDURE — 99213 OFFICE O/P EST LOW 20 MIN: CPT | Mod: GE | Performed by: INTERNAL MEDICINE

## 2022-08-16 RX ORDER — TRAMADOL HYDROCHLORIDE 50 MG/1
25 TABLET ORAL NIGHTLY PRN
Qty: 15 TABLET | Refills: 0 | Status: SHIPPED | OUTPATIENT
Start: 2022-08-16 | End: 2022-08-16 | Stop reason: SDUPTHER

## 2022-08-16 ASSESSMENT — FIBROSIS 4 INDEX: FIB4 SCORE: 0.84

## 2022-08-16 NOTE — PROGRESS NOTES
"Christine Ita Miguel is a 41 y.o. female who presents today with the following:    CC: Follow up visit     HPI:  PMH is significant for HLD/DLD, obesity, family h/o premature CAD, raynaud's phenomenon with recurrent non-healing wounds (finger)      Labs reviewed 6 from    Lipid panel with lipoprotein (a) 181, cholesterol 127, triglycerides 145, HDL 42 and LDL 62     A1c 5.0      Cr 0.69,    Calcium 9.3   Total protein 6.6     Patient has been following with vascular for her Raynaud's and regular nonhealing wounds, extensive testing has been done.  Rheumatology referral was placed in the past however patient has been able to follow-up with them, per patient they need labs faxed to them before they can schedule her.     Describes new small wound at the left second finger, that started about 2 days ago.  Denies any trauma prior factors predisposing to the wound, continues to keep the fingers warm.  Endorses pain at the site.      ROS:       General: No fevers, chills, night sweats, weight loss or gain  HEENT: No hearing changes, vision changes, eye pain, ear pain, nasal discharge, sore throat  Neck: No swelling in neck  Pulmonary: No shortness of breath, cough, sputum, or hemoptysis  Cardiovascular: No chest pain, palpitations, or LE swelling  GI: No nausea, vomiting, diarrhea, constipationGU: No dysuria or frequency  Neuro: No focal weakness, no general weakness  Psych: No anxiety or depression.  Has been having difficulty sleeping due to pain    Past Medical History:   Diagnosis Date    Acid reflux     Anesthesia     Family Hx-father ; was alwaroverto told it was anesthesia \"related\"; not sure what the actual reaction was; pt-PONV    Asthma     Dyslipidemia 2022    Heart burn     Indigestion     Pain 14    lower back/L leg=7/10    Unspecified urinary incontinence        Past Surgical History:   Procedure Laterality Date    IRRIGATION & DEBRIDEMENT ORTHO  2014    Performed by " El Gomez M.D. at SURGERY [facility] ORS    IRRIGATION & DEBRIDEMENT NEURO  2014    Performed by El Gomez M.D. at SURGERY [facility] ORS    LUMBAR LAMINECTOMY DISKECTOMY  2014    Performed by El Gomez M.D. at SURGERY [facility] ORS    INJ,EPIDURAL/LUMB/SAC SINGLE  2014    Performed by Alexis Cornell M.D. at SURGERY SURGICAL [facility] ORS    TONSILLECTOMY  2013    Performed by Aaron Hutton M.D. at SURGERY SAME DAY [facility] ORS    NASAL SEPTAL RECONSTRUCTION  2013    Performed by Aaron Hutton M.D. at SURGERY SAME DAY [facility] ORS    TURBINOPLASTY  2013    Performed by Aaron Hutton M.D. at SURGERY SAME DAY [facility] ORS       Family History   Problem Relation Age of Onset    Rheumatologic Disease Mother         RA    Heart Attack Father          MI (age 45)    Kidney Disease Father         ESRD on HD    Diabetes Father     Heart Disease Sister        Social History     Tobacco Use    Smoking status: Never    Smokeless tobacco: Never   Vaping Use    Vaping Use: Never used   Substance Use Topics    Alcohol use: No    Drug use: No       Current Outpatient Medications   Medication Sig Dispense Refill    traMADol (ULTRAM) 50 MG Tab Take 0.5 Tablets by mouth at bedtime as needed for Severe Pain or Moderate Pain for up to 30 days. 15 Tablet 0    atorvastatin (LIPITOR) 40 MG Tab Take 1 Tablet by mouth every evening. 90 Tablet 3    aspirin (ASA) 81 MG Chew Tab chewable tablet Chew 1 Tablet every day. 90 Tablet 3    amLODIPine (NORVASC) 10 MG Tab Take 1 Tablet by mouth at bedtime for 360 days. 90 Tablet 3    predniSONE (DELTASONE) 20 MG Tab 1 table daily for 5 days then decrease to half a tablet for 5 days then decrease to 1/4th of a tablet daily till you run out of the prescription 10 Tablet 0     No current facility-administered medications for this visit.       Physical Exam:  /76 (BP Location: Left arm, Patient Position: Sitting, BP Cuff  "Size: Adult)   Pulse 86   Temp 36.8 °C (98.3 °F) (Temporal)   Ht 1.651 m (5' 5\")   Wt 110 kg (242 lb 12.8 oz)   SpO2 98%   BMI 40.40 kg/m²   General: Well developed, well nourished female, in no distress.  HEENT: NC/AT, PERRL, EOMI, no scleral icterus or conjunctival pallor, fair dentition/denture in, no nasal discharge or oral erythema or exudates.   Neck: Supple, No cervical or supraclavicular LAD  CV:RRR, no murmurs gallops or Rubs, no JVD  Pulm: LCAB, no crackles, rales, rhonchi, or wheezing  GI: Normal bowel sounds, abdomen soft, nontender, nondistended to deep or light palpation in all 4 quadrants, no HSM.  MSK: Radial and dorsalis pedis pulses 2+ and equal bilaterally, respectively.  Strength 5 out of 5 in upper and lower extremities.  No lower extremity edema  Neuro: Patient is alert and oriented x3, no focal deficits  Psych: Appropriate mood and affect        Assessment and Plan:     Raynaud's phenomenon  Possible crest  Recurrent nonhealing ulcers    Presents with a recent ulcer that started about 2 days ago at the left second finger, describes it is very painful.  She has been having difficulty sleeping at night due to pain from her ulcers.  Has been able to follow-up with rheumatology as they want updated labs faxed to them.     Plan:  -Continue Tylenol 3 times daily for pain  -Tramadol 25 mg at bedtime as needed to help with pain and sleep  -Continue aspirin, amlodipine, atorvastatin per vascular's recommendations  -Faxing all of patient's labs to her rheumatologist's office today, patient has been requested to call them tomorrow and to obtain labs if they are still unable to schedule her.  -Counseled her to apply Neosporin/topical antibiotic on her finger ulcer daily.  -Educated her on warning signs and when to return back to the clinic or the ER       Return in about 5 weeks (around 9/20/2022).        Signed by: Sandra Logan M.D.   "

## 2022-08-16 NOTE — PATIENT INSTRUCTIONS
Thank you for visiting today!    Please follow-up in 5 weeks     Please follow-up on referrals and schedule an appointment with rheumatology     Please get over the counter neosporin or antibiotic ointment and apply it daily at your finger wound, please come back to see us if it continues to worsen     If you have any questions or concerns please feel free to contact us at [phone].    If you feel like you are experiencing a medical emergency please seek immediate medical attention at an urgent care or in the emergency department.

## 2022-08-17 RX ORDER — TRAMADOL HYDROCHLORIDE 50 MG/1
25 TABLET ORAL NIGHTLY PRN
Qty: 15 TABLET | Refills: 0 | Status: SHIPPED | OUTPATIENT
Start: 2022-08-17 | End: 2022-09-16

## 2022-08-17 NOTE — TELEPHONE ENCOUNTER
Tramodol Issue- Patient comment: Medication has not been sent over to [facility]     Please resend to [facility]

## 2022-08-17 NOTE — TELEPHONE ENCOUNTER
Spoke with pharmacist and called in RX for Tramadol, she said it will be ready for pt to  in about 2 hours. Called pt and let her know.

## 2022-08-23 ENCOUNTER — TELEPHONE (OUTPATIENT)
Dept: INTERNAL MEDICINE | Facility: OTHER | Age: 42
End: 2022-08-23

## 2022-08-23 NOTE — TELEPHONE ENCOUNTER
Called and discussed concerns.     Soonest available rheumatology appointment in on 10/28/22, Patient will keep that appointment and cont following with us Q4-5 weeks for additional medical needs. Advised to come see us ASAP (call in ASAP) if a new lesion/ulcer starts appearing or if her current   wound doesn't heal or worsens.     Alarm s/s discussed indetail with the patient

## 2022-09-07 ENCOUNTER — HOSPITAL ENCOUNTER (OUTPATIENT)
Dept: RADIOLOGY | Facility: MEDICAL CENTER | Age: 42
End: 2022-09-07
Attending: FAMILY MEDICINE
Payer: MEDICAID

## 2022-09-27 ENCOUNTER — HOSPITAL ENCOUNTER (OUTPATIENT)
Facility: MEDICAL CENTER | Age: 42
End: 2022-09-27
Attending: INTERNAL MEDICINE
Payer: MEDICAID

## 2022-09-27 ENCOUNTER — HOSPITAL ENCOUNTER (OUTPATIENT)
Dept: RADIOLOGY | Facility: MEDICAL CENTER | Age: 42
End: 2022-09-27
Attending: INTERNAL MEDICINE
Payer: MEDICAID

## 2022-09-27 ENCOUNTER — OFFICE VISIT (OUTPATIENT)
Dept: INTERNAL MEDICINE | Facility: OTHER | Age: 42
End: 2022-09-27
Payer: MEDICAID

## 2022-09-27 VITALS
HEIGHT: 64 IN | BODY MASS INDEX: 41.15 KG/M2 | WEIGHT: 241 LBS | SYSTOLIC BLOOD PRESSURE: 127 MMHG | DIASTOLIC BLOOD PRESSURE: 84 MMHG

## 2022-09-27 DIAGNOSIS — M34.1 CREST (CALCINOSIS, RAYNAUD'S PHENOMENON, ESOPHAGEAL DYSFUNCTION, SCLERODACTYLY, TELANGIECTASIA) (HCC): ICD-10-CM

## 2022-09-27 DIAGNOSIS — B37.31 VAGINAL CANDIDIASIS: ICD-10-CM

## 2022-09-27 DIAGNOSIS — M15.9 PRIMARY OSTEOARTHRITIS INVOLVING MULTIPLE JOINTS: ICD-10-CM

## 2022-09-27 DIAGNOSIS — N89.8 VAGINAL DISCHARGE: ICD-10-CM

## 2022-09-27 DIAGNOSIS — R30.9 PAINFUL URINATION: ICD-10-CM

## 2022-09-27 LAB
APPEARANCE UR: CLEAR
BILIRUB UR STRIP-MCNC: NORMAL MG/DL
COLOR UR AUTO: YELLOW
GLUCOSE UR STRIP.AUTO-MCNC: NORMAL MG/DL
KETONES UR STRIP.AUTO-MCNC: NORMAL MG/DL
LEUKOCYTE ESTERASE UR QL STRIP.AUTO: NORMAL
NITRITE UR QL STRIP.AUTO: NORMAL
PH UR STRIP.AUTO: 5 [PH] (ref 5–8)
PROT UR QL STRIP: NORMAL MG/DL
RBC UR QL AUTO: NORMAL
SP GR UR STRIP.AUTO: 1.02
UROBILINOGEN UR STRIP-MCNC: 0.2 MG/DL

## 2022-09-27 PROCEDURE — 81002 URINALYSIS NONAUTO W/O SCOPE: CPT | Performed by: INTERNAL MEDICINE

## 2022-09-27 PROCEDURE — 73562 X-RAY EXAM OF KNEE 3: CPT | Mod: RT

## 2022-09-27 PROCEDURE — 99214 OFFICE O/P EST MOD 30 MIN: CPT | Mod: GC | Performed by: INTERNAL MEDICINE

## 2022-09-27 PROCEDURE — 73562 X-RAY EXAM OF KNEE 3: CPT | Mod: LT

## 2022-09-27 PROCEDURE — 73522 X-RAY EXAM HIPS BI 3-4 VIEWS: CPT

## 2022-09-27 RX ORDER — DULOXETIN HYDROCHLORIDE 30 MG/1
30 CAPSULE, DELAYED RELEASE ORAL DAILY
Qty: 90 CAPSULE | Refills: 1 | Status: SHIPPED | OUTPATIENT
Start: 2022-09-27 | End: 2022-10-14

## 2022-09-27 RX ORDER — FLUCONAZOLE 150 MG/1
150 TABLET ORAL DAILY
COMMUNITY
End: 2022-09-27 | Stop reason: SDUPTHER

## 2022-09-27 RX ORDER — METRONIDAZOLE 500 MG/1
500 TABLET ORAL 2 TIMES DAILY
Qty: 14 TABLET | Refills: 0 | Status: CANCELLED | OUTPATIENT
Start: 2022-09-27 | End: 2022-10-04

## 2022-09-27 RX ORDER — FLUCONAZOLE 100 MG/1
150 TABLET ORAL DAILY
Qty: 2 TABLET | Refills: 0 | Status: SHIPPED | OUTPATIENT
Start: 2022-09-27 | End: 2022-09-27

## 2022-09-27 RX ORDER — FLUCONAZOLE 150 MG/1
150 TABLET ORAL DAILY
Qty: 1 TABLET | Refills: 0 | Status: SHIPPED | OUTPATIENT
Start: 2022-09-27 | End: 2022-10-07 | Stop reason: SDUPTHER

## 2022-09-27 ASSESSMENT — FIBROSIS 4 INDEX: FIB4 SCORE: 0.84

## 2022-09-27 NOTE — PATIENT INSTRUCTIONS
Thank you for visiting today!    Please follow-up in 5 weeks    If you have any questions or concerns please feel free to contact us at [phone].    If you feel like you are experiencing a medical emergency please seek immediate medical attention at an urgent care or in the emergency department.

## 2022-09-28 ENCOUNTER — TELEPHONE (OUTPATIENT)
Dept: INTERNAL MEDICINE | Facility: OTHER | Age: 42
End: 2022-09-28
Payer: MEDICAID

## 2022-09-28 DIAGNOSIS — M15.9 PRIMARY OSTEOARTHRITIS INVOLVING MULTIPLE JOINTS: ICD-10-CM

## 2022-09-28 LAB
FORWARD REASON: SPWHY: NORMAL
FORWARDED TO LAB: SPWHR: NORMAL
SPECIMEN SENT: SPWT1: NORMAL

## 2022-09-28 ASSESSMENT — ENCOUNTER SYMPTOMS
BLURRED VISION: 0
HEARTBURN: 0
COUGH: 0
FALLS: 0
CHILLS: 0
PALPITATIONS: 0
ROS SKIN COMMENTS: VAGINAL ITCHING
DOUBLE VISION: 0
FEVER: 0
NAUSEA: 0
MYALGIAS: 0
HEMOPTYSIS: 0
DIZZINESS: 0
ORTHOPNEA: 0

## 2022-09-28 NOTE — TELEPHONE ENCOUNTER
Called and discussed xray results.   Encouraged weight loss, topical diclofenac, oral NSAIDs for uncontrolled pain (labs reviewed with normal BUN,Cr). Cont duloxetine    Patient requesting ortho referral      Follow up with us in 5 weeks

## 2022-09-28 NOTE — PROGRESS NOTES
CC: Vaginal itching     HPI:   Christine presents today with past medical history of hyperlipidemia, obesity, family history of premature coronary artery disease, Raynaud's phenomenon with recurrent nonhealing wounds (following up with vascular, first appointment with rheumatology end of next month)    1- Osteoarthritis   Patient is here with an acute visit with chief complaint of increasing right knee and right hip pain.  On exam the range of motion is normal, no bony abnormalities appreciated, knee and hip flexion and extension, internal and external rotation preserved with and without resistance.  However, patient endorses that she had an MRI done around 2016-results reviewed today which was concerning for osteoarthritis of the hip and knee.  Describes that the pain gets so severe at times that is difficult for her to walk up and down the stairs, sometimes wakes up at night due to the pain as well.    2- Mood complaints   3- Likely situational depression   She has 4 kids, is not together with their father.  Has a fiancé, describes she has a good relationship, however there have been ups and downs.  She is not sure if she can trust him with her kids  And her children are her first priority, the youngest of which-6 years old is a special needs child.  PHQ 2 was positive, however patient was uncomfortable in completing the entire scale today  Endorses being increasingly worried about her kids, she hasn't been able to go back to work, is hoping she will be able to start working again by the end of this year, however looks unlikely with her condition and the working diagnosis of crest syndrome.,  However, is hoping she will be able to begin working early next year    3- Vaginal Itching   4- STI screen   Urinary burning and itching  Describes she initially had symptoms as if she was having a urinary tract infection with some burning and itching.  The burning has since resolved, however she continues to experience  "itching around her vaginal area.  She is monogamous with her partner.  On examination, significant erythema appreciated in the vaginal, no erythema on labia majora.  Mild milky discharge appreciated in the vaginal area as well    Health Maintenance: Completed    ROS:  Review of Systems   Constitutional:  Negative for chills and fever.   HENT:  Negative for hearing loss.    Eyes:  Negative for blurred vision and double vision.   Respiratory:  Negative for cough and hemoptysis.    Cardiovascular:  Negative for chest pain, palpitations and orthopnea.   Gastrointestinal:  Negative for heartburn and nausea.   Genitourinary:  Positive for dysuria and frequency. Negative for hematuria.   Musculoskeletal:  Positive for joint pain. Negative for falls and myalgias.   Skin:  Positive for itching. Negative for rash.        Vaginal itching    Neurological:  Negative for dizziness.     Objective:     Exam:  /84 (BP Location: Right arm, Patient Position: Sitting, BP Cuff Size: Large adult)   Pulse (!) (P) 103   Temp (P) 36.8 °C (98.2 °F) (Temporal)   Ht 1.626 m (5' 4\")   Wt 109 kg (241 lb)   SpO2 (P) 99%   BMI 41.37 kg/m²  Body mass index is 41.37 kg/m².    Physical Exam  Constitutional:       Appearance: She is not toxic-appearing.   HENT:      Head: Normocephalic and atraumatic.      Nose: Nose normal. No congestion.      Mouth/Throat:      Mouth: Mucous membranes are moist.   Eyes:      Extraocular Movements: Extraocular movements intact.      Pupils: Pupils are equal, round, and reactive to light.   Cardiovascular:      Rate and Rhythm: Normal rate and regular rhythm.      Pulses: Normal pulses.      Heart sounds: Normal heart sounds.   Pulmonary:      Effort: Pulmonary effort is normal.      Breath sounds: Normal breath sounds.   Abdominal:      General: Bowel sounds are normal.      Palpations: Abdomen is soft.   Genitourinary:     Comments: Significant erythema appreciated in the vaginal, no erythema on labia " majora.  Mild milky discharge appreciated in the vaginal area as well  Musculoskeletal:         General: Normal range of motion.      Comments: Range of motion is normal, no bony abnormalities appreciated, knee and hip flexion and extension, internal and external rotation preserved with and without resistance   Skin:     General: Skin is warm.      Capillary Refill: Capillary refill takes less than 2 seconds.   Neurological:      Mental Status: She is alert and oriented to person, place, and time.       A chaperone was offered to the patient during today's exam. Chaperone name: Dr. Martinez  was present.    Labs: Reviewed     Assessment & Plan:     41 y.o. female with the following -       1- Osteoarthritis   Patient is here with an acute visit with chief complaint of increasing right knee and right hip pain.  On exam the range of motion is normal, no bony abnormalities appreciated, knee and hip flexion and extension, internal and external rotation preserved with and without resistance.  However, patient endorses that she had an MRI done around 2016-results reviewed today which was concerning for osteoarthritis of the hip and knee.  Describes that the pain gets so severe at times that is difficult for her to walk up and down the stairs, sometimes wakes up at night due to the pain as well.  Plan:  -Hip and knee xrays ordered   -Topical diclofenac gel  -Duloxetine 30 mg Qday  -Early f/u in 5 weeks       2- Mood complaints   3- Likely situational depression   She has 4 kids, is not together with their father.  Has a fiancé, describes she has a good relationship, however there have been ups and downs.  She is not sure if she can trust him with her kids  And her children are her first priority, the youngest of which-6 years old is a special needs child.  PHQ 2 was positive, however patient was uncomfortable in completing the entire scale today  Endorses being increasingly worried about her kids, she hasn't been able to  go back to work, is hoping she will be able to start working again by the end of this year, however looks unlikely with her condition and the working diagnosis of crest syndrome.,  However, is hoping she will be able to begin working early next year  Plan:  -Starting an antidepressant that will help both with mood as well as pain discussed, patient was initially skeptical, however agreed after discussion.  Duloxetine 30 mg daily has been added.  Early follow-up in 5 weeks      3- Vaginal Itching   4- STI screen   Urinary burning and itching  Describes she initially had symptoms as if she was having a urinary tract infection with some burning and itching.  The burning has since resolved, however she continues to experience itching around her vaginal area.  She is monogamous with her partner.  On examination, significant erythema appreciated in the vaginal, no erythema on labia majora.  Mild milky discharge appreciated in the vaginal area as well  Plan:  -At clinic UA with moderate blood and small leukocyte esterase only.  Her UTI symptoms have since resolved, however experiences itching in her vagina  -STI screen ordered per patient's approval including chlamydia/gonorrhea, trichomonas and yeast culture  -1 dose of fluconazole 150 mg ([facility] 7/22 reviewed)   -Early f/u in 5 weeks         I spent a total of 30 minutes with record review, exam, communication with the patient, communication with other providers, and documentation of this encounter.      Return in about 5 weeks (around 11/1/2022).    Please note that this dictation was created using voice recognition software. I have made every reasonable attempt to correct obvious errors, but I expect that there are errors of grammar and possibly content that I did not discover before finalizing the note.

## 2022-10-05 ENCOUNTER — TELEPHONE (OUTPATIENT)
Dept: INTERNAL MEDICINE | Facility: OTHER | Age: 42
End: 2022-10-05
Payer: MEDICAID

## 2022-10-07 ENCOUNTER — TELEPHONE (OUTPATIENT)
Dept: INTERNAL MEDICINE | Facility: OTHER | Age: 42
End: 2022-10-07
Payer: MEDICAID

## 2022-10-07 DIAGNOSIS — N89.8 VAGINAL DISCHARGE: ICD-10-CM

## 2022-10-07 DIAGNOSIS — B37.31 VAGINAL CANDIDIASIS: ICD-10-CM

## 2022-10-07 DIAGNOSIS — R30.9 PAINFUL URINATION: ICD-10-CM

## 2022-10-07 RX ORDER — FLUCONAZOLE 150 MG/1
150 TABLET ORAL
Qty: 3 TABLET | Refills: 0 | Status: SHIPPED | OUTPATIENT
Start: 2022-10-07 | End: 2022-11-18

## 2022-10-07 NOTE — TELEPHONE ENCOUNTER
Called to update patient about her STI screen, didn't receive call, sending a [employer] msg to update

## 2022-10-12 ENCOUNTER — TELEPHONE (OUTPATIENT)
Dept: INTERNAL MEDICINE | Facility: OTHER | Age: 42
End: 2022-10-12
Payer: MEDICAID

## 2022-10-12 NOTE — TELEPHONE ENCOUNTER
Hi Li, this pt was referred to [facility] but they do not take her insurance, can we please process this referral to a new place?  
Li Murray-Angie Goodrich 13 minutes ago (4:33 PM)     DI  Re-Routed to [facility]     Li      
Sent pt my chart message letting her know  
done

## 2022-10-14 ENCOUNTER — OFFICE VISIT (OUTPATIENT)
Dept: VASCULAR LAB | Facility: MEDICAL CENTER | Age: 42
End: 2022-10-14
Attending: FAMILY MEDICINE
Payer: MEDICAID

## 2022-10-14 VITALS
SYSTOLIC BLOOD PRESSURE: 117 MMHG | BODY MASS INDEX: 40.47 KG/M2 | HEART RATE: 94 BPM | WEIGHT: 242.9 LBS | DIASTOLIC BLOOD PRESSURE: 78 MMHG | HEIGHT: 65 IN

## 2022-10-14 DIAGNOSIS — S61.209D FINGER WOUND, SIMPLE, OPEN, SUBSEQUENT ENCOUNTER: ICD-10-CM

## 2022-10-14 DIAGNOSIS — E66.9 OBESITY (BMI 30-39.9): ICD-10-CM

## 2022-10-14 DIAGNOSIS — R13.19 OTHER DYSPHAGIA: ICD-10-CM

## 2022-10-14 DIAGNOSIS — E78.41 ELEVATED LIPOPROTEIN(A): ICD-10-CM

## 2022-10-14 DIAGNOSIS — R76.8 CYCLIC CITRULLINATED PEPTIDE (CCP) ANTIBODY POSITIVE: ICD-10-CM

## 2022-10-14 DIAGNOSIS — R76.8 ANA POSITIVE: ICD-10-CM

## 2022-10-14 DIAGNOSIS — I73.00 RAYNAUD'S PHENOMENON (SECONDARY): ICD-10-CM

## 2022-10-14 DIAGNOSIS — Z82.49 FAMILY HISTORY OF PREMATURE CORONARY HEART DISEASE: ICD-10-CM

## 2022-10-14 DIAGNOSIS — M34.1 CREST SYNDROME (HCC): ICD-10-CM

## 2022-10-14 DIAGNOSIS — E78.5 DYSLIPIDEMIA: ICD-10-CM

## 2022-10-14 DIAGNOSIS — R73.03 PREDIABETES: ICD-10-CM

## 2022-10-14 DIAGNOSIS — K21.9 GASTROESOPHAGEAL REFLUX DISEASE WITHOUT ESOPHAGITIS: ICD-10-CM

## 2022-10-14 DIAGNOSIS — Z79.02 LONG TERM (CURRENT) USE OF ANTITHROMBOTICS/ANTIPLATELETS: ICD-10-CM

## 2022-10-14 PROCEDURE — 99214 OFFICE O/P EST MOD 30 MIN: CPT | Performed by: FAMILY MEDICINE

## 2022-10-14 PROCEDURE — 99212 OFFICE O/P EST SF 10 MIN: CPT

## 2022-10-14 RX ORDER — OMEPRAZOLE 40 MG/1
40 CAPSULE, DELAYED RELEASE ORAL DAILY
Qty: 90 CAPSULE | Refills: 3 | Status: SHIPPED | OUTPATIENT
Start: 2022-10-14 | End: 2022-11-18

## 2022-10-14 RX ORDER — LOSARTAN POTASSIUM 25 MG/1
25 TABLET ORAL DAILY
Qty: 90 TABLET | Refills: 3 | Status: SHIPPED | OUTPATIENT
Start: 2022-10-14 | End: 2023-10-09

## 2022-10-14 ASSESSMENT — ENCOUNTER SYMPTOMS
FEVER: 0
MYALGIAS: 0
NAUSEA: 0
ORTHOPNEA: 0
COUGH: 0
CLAUDICATION: 0
WEAKNESS: 0
CHILLS: 0
PALPITATIONS: 0
BRUISES/BLEEDS EASILY: 0

## 2022-10-14 ASSESSMENT — FIBROSIS 4 INDEX: FIB4 SCORE: 0.84

## 2022-10-14 NOTE — PROGRESS NOTES
FOLLOW-UP VASCULAR MEDICINE VISIT  Christine Ita Miguel is a. female  who presents 10/14/22 for   Chief Complaint   Patient presents with    Follow-Up       initially referred by Lilly Abreu A.P.RN for eval for vasc etiology of RUE non-healing wound, orginally ref 4/24/22      Subjective         RUE non-healing wound:  Interval hx/concerns:  improvements overall in finger status but still has to bandage to avoid worsening trauma.  No new lesions. Bandages at index of bilat hands.  Had WBI and labs.    Does reports hx of GERD, worsening dysphagia and halitosis that is worsening.  No current PPI.    Intermittent red spots on face, diffuse arthralgias.    Pending with rheum 10/28 in  due to insurance.       pertinent hx  Started 2 years ago (age 39) with episodes, can be fingers and toes and has been progressive with ulcerations forms.   Seen by  4/2022 for R index finger non-healing wound.  Had subung lac 3mo prior.  Reports prior episodes of non-healing finger wounds in the past.  Hx of recurrent cold/painful fingers with intermittent cyanosis.  Has WBI normal in past 2021 and has apparently been seen by vasc specialist w/o formal dx or tx in the past.   (No prior visits of [facility] vasc med or available  Has seen by Dr. Pierre in latter half of 2021 and informed no interventions available but started nifedipine, DAPT, atorva.  Currently on ASA only, stopped nifedipine due to HA     HTN:  Started amlodipine, no ADRs.  No checking home BP - stable, no dizz with positional changes     Hyperlipidemia:  Stable, no current concerns  Current treatment: lifestyle changes  and High intensity  atorva   Baseline lipid    Latest Reference Range & Units 01/03/12 12:10   Cholesterol,Tot 100 - 199 mg/dL 218 (H)   Triglycerides 0 - 149 mg/dL 86   HDL >=40 mg/dL 37 !   LDL <100 mg/dL 164     Antiplatelet/anticoagulation: Yes, Details: ASA , no bleeding noted     PreDM:   no prior dx of T2D or meds, no sx        "    Current Outpatient Medications:     losartan, 25 mg, Oral, DAILY    omeprazole, 40 mg, Oral, DAILY    fluconazole, 150 mg, Oral, Q3 DAYS, Taking    atorvastatin, 40 mg, Oral, Nightly, Taking    aspirin, 81 mg, Oral, DAILY, Taking    amLODIPine, 10 mg, Oral, QHS, Taking     Family History   Problem Relation Age of Onset    Rheumatologic Disease Mother         RA    Heart Attack Father          MI (age 45)    Kidney Disease Father         ESRD on HD    Diabetes Father     Heart Disease Sister       Social History     Tobacco Use    Smoking status: Never    Smokeless tobacco: Never   Vaping Use    Vaping Use: Never used   Substance Use Topics    Alcohol use: No    Drug use: No     Review of Systems   Constitutional:  Negative for chills and fever.   Respiratory:  Negative for cough.    Cardiovascular:  Negative for chest pain, palpitations, orthopnea, claudication and leg swelling.   Gastrointestinal:  Negative for nausea.   Musculoskeletal:  Negative for myalgias.   Skin:         Dry, cracking skin at fingers     Neurological:  Negative for weakness.   Endo/Heme/Allergies:  Does not bruise/bleed easily.         Objective   Vitals:    10/14/22 1320 10/14/22 1323   BP: 109/76 117/78   BP Location: Left arm Left arm   Patient Position: Sitting Sitting   BP Cuff Size: Large adult Large adult   Pulse: (!) 105 94   Weight: 110 kg (242 lb 14.4 oz)    Height: 1.651 m (5' 5\")         BP Readings from Last 5 Encounters:   10/14/22 117/78   22 127/84   22 110/76   22 104/73   22 122/74      Body mass index is 40.42 kg/m².  Physical Exam  Constitutional:       Appearance: Normal appearance.   HENT:      Head: Normocephalic.   Eyes:      Extraocular Movements: Extraocular movements intact.      Conjunctiva/sclera: Conjunctivae normal.   Pulmonary:      Effort: Pulmonary effort is normal.   Musculoskeletal:      Cervical back: Normal range of motion.   Skin:     General: Skin is dry.      Coloration: " Skin is not pale.      Findings: No rash.   Neurological:      General: No focal deficit present.      Mental Status: She is alert and oriented to person, place, and time.   Psychiatric:         Mood and Affect: Mood normal.         Behavior: Behavior normal.     Labs:    QUest 6/27/22   Lp(a) 181      HDL 42  LDL 62   CMP wnl   a1c wnl  Cbc wnl   ESR, CRP, ANCA (MPO ab, PR3 ab) - wnl   GEOVANY - positive >1:1280 (centromere pattern)  CCP ab 71 (elevated)   HCV ab neg     [facility] 7/20/22   CMP - wnl   SCL70 ab - neg   AT III activity wnl   FVL neg   Prot C wnl   PGM neg   B2GP1 ab wnl  ACL ab wnl   Lupus AC neg   D-dimer 0.32       [facility] 8/2022   C3, C4, RA factor - wnl                   No results found for: LIPOPROTA   No results found for: APOB           Lab Results   Component Value Date    SODIUM 138 08/29/2021    POTASSIUM 4.5 08/29/2021    CHLORIDE 104 08/29/2021    CO2 22 08/29/2021    GLUCOSE 107 (H) 08/29/2021    BUN 21 08/29/2021    CREATININE 0.87 08/29/2021    IFAFRICA >60 08/29/2021    IFNOTAFR >60 08/29/2021        Lab Results   Component Value Date    WBC 7.3 08/29/2021    RBC 4.46 08/29/2021    HEMOGLOBIN 13.7 08/29/2021    HEMATOCRIT 40.8 08/29/2021    MCV 91.5 08/29/2021    MCH 30.7 08/29/2021    MCHC 33.6 08/29/2021    MPV 9.1 08/29/2021         No results found for: MICROALBCALC, MALBCRT, MALBEXCR, CODMEG38, MICROALBUR, MICRALB, UMICROALBUM, MICROALBTIM      Latest Reference Range & Units 09/16/14 04:15 10/28/15 12:04 01/30/16 11:08 04/15/16 14:17 12/26/17 11:05   Hepatitis B Surface Antigen Negative   Negative      HIV 1/0/2 Non Reactive   see below      Rubella IgG Antibody IU/mL  135.40      Strep Gp B DNA PCR Negative     Negative    Syphilis, Treponemal Qual Non Reactive   Non Reactive Non Reactive     Stat C-Reactive Protein 0.00 - 0.75 mg/dL 0.32    0.35       VASCULAR IMAGING:   Last EKG:   Results for orders placed or performed during the hospital encounter of 09/04/21   EKG (NOW)    Result Value Ref Range    Report       [facility] Emergency Dept.    Test Date:  2021  Pt Name:    ADELINA KENNY       Department: ER  MRN:        [id number]                      Room:        20  Gender:     Female                       Technician: [id number]  :        1980                   Requested By:JARET SHRESTHA  Order #:    [id number]                    Reading MD: Jaret Shrestha    Measurements  Intervals                                Axis  Rate:       87                           P:          47  NH:         160                          QRS:        20  QRSD:       80                           T:          41  QT:         368  QTc:        443    Interpretive Statements  SINUS RHYTHM  Compared to ECG 2021 05:51:47  No significant changes  Electronically Signed On 2021 0:56:14 PDT by Jaret Shrestha       WBI 2021  Normal WBI.   Absent 3, 4, 5th digit flow could be artifactual or secondary to embolic    disease.  Recommend clinical correlation.   Right.    Arterial doppler waveforms are of high amplitude and triphasic.   Wrist brachial index is within normal limits. WBI=1.05   PPG of the digits demonstrates absent flow in the right 3rd, 4th, and 5th    digits. The 1st and 2nd digits appear to have slightly diminished flow in    comparsion to the contralateral digits.    CT chest 2021   1.  No thoracic aortic aneurysm or dissection.  2.  Subclavian arteries are normal in caliber and patent.  LEFT subclavian artery is partially secured.  3.  Probable medial RIGHT lower lobe atelectasis.  Thoracic aorta is normal in caliber.  No dissection demonstrated.  Great vessel origins are intact.  RIGHT subclavian artery is normal in caliber and patent.  LEFT subclavian artery is partially obscured by streak artifact however also appears normal in caliber and patent.    Echo 2021   No prior study is available for comparison.   Normal left ventricular systolic function.  The left  ventricular ejection fraction is visually estimated to be 65%.  No significant valve abnormalities.     WBI/art duplex 8/2022    Wrist-brachial indices are normal.   FBI:   Right-  1.07   Left-     0.98   Right 3rd and 4th finger waveform are diminished, most likely due to    microvascular disease.   Left 2nd and 3rd finger waveforms are diminished, most likely due to    microvascular disease.           Medical Decision Making:  Today's Assessment / Status / Plan:     1. Finger wound, simple, open, subsequent encounter        2. Raynaud's phenomenon (secondary)  losartan (COZAAR) 25 MG Tab      3. Prediabetes        4. Dyslipidemia        5. Long term (current) use of antithrombotics/antiplatelets        6. Obesity (BMI 30-39.9)        7. Family history of premature coronary heart disease        8. Cyclic citrullinated peptide (CCP) antibody positive        9. GEOVANY positive        10. Elevated lipoprotein(a)        11. CREST syndrome (HCC)  losartan (COZAAR) 25 MG Tab    Referral to Gastroenterology      12. Gastroesophageal reflux disease without esophagitis  Referral to Gastroenterology    omeprazole (PRILOSEC) 40 MG delayed-release capsule      13. Other dysphagia  Referral to Gastroenterology    omeprazole (PRILOSEC) 40 MG delayed-release capsule          Patient Type: Primary Prevention    Etiology of Established CVD if Present:     1) secondary Raynaud's related to CREST syndrome   - recurrent non-healing finger wounds, most recently right index 4/2022 - improving  - prior normal WBI with PPGs showing absent flow , repeat duplex 8/2022 shows nl WBI/FBI with reduced waveforms to R 3r/4th and L 2nd/3rd fingers   - serology testing indicative of CREST syndrome, possible RA overlay per CCP ab elevations as well   - strong centromere ab pattern and GEOVANY 1:1280 with high CCP ab  - high prob secondary raynaud's due to later adulthood (around 39yo) onset with intense pain and ischemic ulcerations with pain and classic  discolorations affecting fingers/toes.    - thrombophilia w/u neg,  ANCA neg   - fhx of RA but not SLE, she has no nephritic findings or recurrent malar rash or arthralgia  - no COVID prior to initial episode    - pending eval with rheum in [place]     - CTA chest wnl w/o proximal plaque or stenosis, no indications of thoracic Ao diss, plaque  - prior echo wnl, though not FIDENCIO   - literature supports CCB as 1st line with other agents including prostaglandin based tx, ARB, SSRI, PDE5i moving forward and dependent upon severity and recurrences of episodes   Plan:  - defer further CREST-specific tx to rheum MD for definitive dx and tx  - ref to GI due to GERD and start PPI     Meds:  - continue ASA 81mg, atorva  - continue amlodipine to 10mg daily - monitor for hypotension   - start losartan 25mg daily to increase vasodilation   - add cilostazol vs SSRI as next agent   - consider PDE5i in future vs oral iloprost if worsening over time     BLOOD PRESSURE MANAGEMENT:  ACC/AHA (2017) goal <130/80  Home BP at goal: yes  Office BP at goal:  yes   - hx of preg-induced HTN on nifedipine   Plan:   - continue healthy diet, activity, weight mgmt   Monitoring:   - routine clinic-based BP measurements at least once annually   Medications: start amlodipine 5mg daily for vasodilt effects than BP     LIPID MANAGEMENT:   Qualifies for Statin Therapy Based on 2018 ACC/AHA Guidelines: yes, Primary Prevention - 40-74yo, LDLc >70, <190 w/o DM  The ASCVD Risk score ([place] VANESSA Jr, et al., 2013) failed to calculate.  Major ASCVD events: None  High-risk condition: N/A  Risk-enhancers: Metabolic syndrome  and Lp(a) >50   - reviewed with patient this is an independent risk factor for ASCVD but is currently controversial if lowering has impact on outcomes in primary prevention, so targeting LDLc lowering is primary objective for now and consider lp(a) lowering if worsening ascvd risk or if ascvd event occurs - definitely prothrombotic and needs ASA    Currently on Statin: Yes  Tx goals: LDL-C <100 (consider non-HDL-C <130, apoB <90)  At goal? Yes, 7/2022   Plan:   - reinforced ongoing TLC measures as noted   - monitor labs   Meds:   - continue atorva 40mg daily     ANTITHROMBOTIC/ANTIPLATELET THERAPY: continue ASA 81mg daily for potential antithromb benefit due to non-healing wounds and high lp(a)     GLYCEMIC STATUS: Prediabetic      Plan:  - update a1c   - continue healthy diet, weight reduction, daily physical activity   - consider metformin up to 850mg BID to slow or offset progression to T2D as per DPP trial   - monitor labs Q6-12mo    LIFESTYLE INTERVENTIONS:    SMOKING:   reports that she has never smoked. She has never used smokeless tobacco.   - continued complete avoidance of all tobacco products     PHYSICAL ACTIVITY: continue healthy activity to improve CV fitness.  In general, targeting >150min/week of moderate-level activity.     WEIGHT MANAGEMENT AND NUTRITION:  Mediterranean style dietary approach , Low carbohydrate (10-20% CHO content) dietary approach  and Weight reduction approach - reduce caloric intake by 300kcal/day to achieve 1-2lb weight loss per week     OTHER: none     Instructed to follow-up with PCP for remainder of adult medical needs: yes  We will partner with other providers in the management of established vascular disease and cardiometabolic risk factors.    Studies to Be Obtained: none   Labs to Be Obtained: as noted above     Follow up in:  rheum in Oct, new ref to GI,  vasc med in 3mo       Scott Acuña M.D.  [facility]   [facility]   [phone]

## 2022-10-20 ENCOUNTER — TELEPHONE (OUTPATIENT)
Dept: INTERNAL MEDICINE | Facility: OTHER | Age: 42
End: 2022-10-20
Payer: MEDICAID

## 2022-10-20 NOTE — TELEPHONE ENCOUNTER
Gilberto Jefferson, the referral for ortho was denied, per Dr. Logan can we please sent to another location where her insurance is accepted?

## 2022-10-20 NOTE — TELEPHONE ENCOUNTER
Li Murray-Jojonister  You 37 minutes ago (1:50 PM)     BRANDON Carpenter Pt has been re-routed to [facility] in [place].     Thank you!   Li

## 2022-11-09 ENCOUNTER — APPOINTMENT (OUTPATIENT)
Dept: INTERNAL MEDICINE | Facility: OTHER | Age: 42
End: 2022-11-09

## 2022-11-18 ENCOUNTER — OFFICE VISIT (OUTPATIENT)
Dept: INTERNAL MEDICINE | Facility: OTHER | Age: 42
End: 2022-11-18
Payer: MEDICAID

## 2022-11-18 VITALS
SYSTOLIC BLOOD PRESSURE: 115 MMHG | BODY MASS INDEX: 40.25 KG/M2 | HEIGHT: 65 IN | HEART RATE: 82 BPM | WEIGHT: 241.6 LBS | OXYGEN SATURATION: 98 % | TEMPERATURE: 98.2 F | DIASTOLIC BLOOD PRESSURE: 66 MMHG

## 2022-11-18 DIAGNOSIS — H53.8 BLURRED VISION: ICD-10-CM

## 2022-11-18 DIAGNOSIS — E04.1 THYROID NODULE: ICD-10-CM

## 2022-11-18 DIAGNOSIS — R31.9 HEMATURIA, UNSPECIFIED TYPE: ICD-10-CM

## 2022-11-18 DIAGNOSIS — M34.1 CREST (CALCINOSIS, RAYNAUD'S PHENOMENON, ESOPHAGEAL DYSFUNCTION, SCLERODACTYLY, TELANGIECTASIA) (HCC): ICD-10-CM

## 2022-11-18 DIAGNOSIS — Z86.39 HISTORY OF THYROID NODULE: ICD-10-CM

## 2022-11-18 DIAGNOSIS — R06.09 DYSPNEA ON EXERTION: ICD-10-CM

## 2022-11-18 DIAGNOSIS — E87.70 HYPERVOLEMIA, UNSPECIFIED HYPERVOLEMIA TYPE: ICD-10-CM

## 2022-11-18 DIAGNOSIS — R06.02 SHORTNESS OF BREATH: ICD-10-CM

## 2022-11-18 DIAGNOSIS — B37.81 ESOPHAGEAL CANDIDIASIS (HCC): ICD-10-CM

## 2022-11-18 LAB
INT CON NEG: NORMAL
INT CON POS: NORMAL
POC URINE PREGNANCY TEST: NEGATIVE

## 2022-11-18 PROCEDURE — 99214 OFFICE O/P EST MOD 30 MIN: CPT | Mod: GC | Performed by: INTERNAL MEDICINE

## 2022-11-18 PROCEDURE — 81025 URINE PREGNANCY TEST: CPT | Performed by: INTERNAL MEDICINE

## 2022-11-18 RX ORDER — FUROSEMIDE 20 MG/1
20 TABLET ORAL DAILY
Qty: 30 TABLET | Refills: 0 | Status: SHIPPED | OUTPATIENT
Start: 2022-11-18 | End: 2023-02-07

## 2022-11-18 RX ORDER — SILDENAFIL CITRATE 20 MG/1
TABLET ORAL
COMMUNITY
Start: 2022-11-04 | End: 2022-11-18

## 2022-11-18 RX ORDER — TRAMADOL HYDROCHLORIDE 50 MG/1
TABLET ORAL
COMMUNITY
End: 2022-11-18

## 2022-11-18 RX ORDER — FLUCONAZOLE 200 MG/1
TABLET ORAL
Qty: 15 TABLET | Refills: 0 | Status: SHIPPED | OUTPATIENT
Start: 2022-11-18 | End: 2023-02-07

## 2022-11-18 ASSESSMENT — ENCOUNTER SYMPTOMS
BLOOD IN STOOL: 1
COUGH: 0
CONSTIPATION: 1
DIZZINESS: 1
HEADACHES: 0
HEARTBURN: 1
PHOTOPHOBIA: 0
SHORTNESS OF BREATH: 1
WHEEZING: 0
PND: 1
ORTHOPNEA: 1
CHILLS: 1
BLURRED VISION: 1
SPUTUM PRODUCTION: 0
NERVOUS/ANXIOUS: 0
MYALGIAS: 1
FEVER: 0

## 2022-11-18 ASSESSMENT — FIBROSIS 4 INDEX: FIB4 SCORE: 0.84

## 2022-11-18 NOTE — PATIENT INSTRUCTIONS
Thank you for visiting today!    Please follow-up in 2 weeks     Please follow-up on referrals and schedule an appointment with cardiology and ophthalmology     Please get over the counter miralax and use it daily to soften the stool for regular bowel movements     Please get the CT chest, echocardiogram done and pulmonary function testing done     Please take 20 mg of lasix daily for 2 weeks and then get blood work done. Please see us in 2 weeks     Please start taking diflucan daily for 2 weeks     Please get lab (urine) work done at least 5 days before next visit.    Please try and eat healthy, get at least 30 minutes of cardiovascular exercise a day to help keep your health as best as it can be.    If you have any questions or concerns please feel free to contact us at [phone].    If you feel like you are experiencing a medical emergency please seek immediate medical attention at an urgent care or in the emergency department.

## 2022-11-18 NOTE — PROGRESS NOTES
CC: Follow up visit     HPI:   Christine presents today with past medical history of osteoarthritis bilateral knees, Raynaud's phenomena, underlying rheumatological condition-likely crest syndrome established both with vascular surgery and rheumatology last week's here today for follow-up visit.    Describes she has been having shortness of breath on walking a few steps, also complains of  shortness of breath with lying flat, occasional swelling of her legs.  She has been on amlodipine 10 mg and losartan 25 mg however has been having some episodes of blurry vision daily, also endorses an episode of dizziness every day around 4 PM.    She recently saw vascular surgery in [place] who recommended following up with cardiology, getting an echocardiogram and high-resolution chest CT scan done.  She is also due to see an eye doctor before starting methotrexate    Has been having ongoing odynophagia that has been worsening, endorses having white lesions on her tongue that she can scrape off going all the way to her throat.    Endorses bright red blood on the napkin on wiping after bowel movement      Health Maintenance: Completed    ROS:  Review of Systems   Constitutional:  Positive for chills and malaise/fatigue. Negative for fever.   HENT:  Negative for hearing loss and tinnitus.    Eyes:  Positive for blurred vision. Negative for photophobia.   Respiratory:  Positive for shortness of breath. Negative for cough, sputum production and wheezing.    Cardiovascular:  Positive for orthopnea, leg swelling and PND. Negative for chest pain.   Gastrointestinal:  Positive for blood in stool, constipation and heartburn. Negative for melena.   Genitourinary:  Negative for dysuria and urgency.   Musculoskeletal:  Positive for myalgias.   Skin:  Negative for itching and rash.   Neurological:  Positive for dizziness. Negative for headaches.   Psychiatric/Behavioral:  The patient is not nervous/anxious.      Objective:     Exam:  BP  "115/66 (BP Location: Left arm, Patient Position: Sitting, BP Cuff Size: Large adult)   Pulse 82   Temp 36.8 °C (98.2 °F) (Temporal)   Ht 1.651 m (5' 5\")   Wt 110 kg (241 lb 9.6 oz)   SpO2 98%   BMI 40.20 kg/m²  Body mass index is 40.2 kg/m².    Physical Exam  Constitutional:       Appearance: Normal appearance.   HENT:      Head: Normocephalic and atraumatic.      Right Ear: External ear normal.      Left Ear: External ear normal.      Mouth/Throat:      Mouth: Mucous membranes are moist.      Comments: Only sparse white deposits appreciated, Ms King confirmed she has scraped them off recently   Eyes:      Pupils: Pupils are equal, round, and reactive to light.   Cardiovascular:      Rate and Rhythm: Normal rate and regular rhythm.      Pulses: Normal pulses.      Heart sounds: Normal heart sounds.   Pulmonary:      Effort: Pulmonary effort is normal.      Comments: Crackles appreciated at the bases   Abdominal:      General: Bowel sounds are normal.      Palpations: Abdomen is soft.      Tenderness: There is no abdominal tenderness.   Musculoskeletal:         General: Normal range of motion.      Right lower leg: Edema present.      Left lower leg: Edema present.   Skin:     General: Skin is warm.      Capillary Refill: Capillary refill takes less than 2 seconds.   Neurological:      Mental Status: She is alert and oriented to person, place, and time.       A chaperone was offered to the patient during today's exam. Chaperone name: Dr. Villasenor  was present.    Labs: Reviewed    Assessment & Plan:     41 y.o. female with the following -     Raynaud's phenomena  Crest syndrome  Concerns of pulmonary hypertension  Dyspnea on exertion   Concerns of volume overload   -Get BNP done, start Lasix 20 mg IV daily for 2 weeks, then get BMP done and see us again in 2 weeks  -We spoke about elevating legs when sitting down and leg elevation in bed to improve peripheral edema  -Echocardiogram has been " ordered  -High-resolution chest CT scan has been ordered  -Pulmonary function testing has also been ordered to establish baseline as patient will be starting methotrexate soon that can also lead to pulmonary fibrosis  -Continue losartan, amlodipine, aspirin and atorvastatin  -We discussed keeping both core and extremities warm to prevent Raynaud's      Esophageal candidiasis  -POCT A1c, negative  -Start fluconazole, 400 mg on day 1 followed by 200 mg once daily for 2 weeks total  -He is established with GI and has an endoscopy scheduled in 1 month per patient      Episodes of blurry vision  Baseline ophthalmological exam  -Referral to ophthalmology has been placed      Concerns of thyroid nodule  -On examination, TSH with reflex to T4 as well as thyroid ultrasound has been ordered      Hematuria  -On prior UAs, repeat urinalysis-complete has been ordered      External hemorrhoids  Constipation   -High-fiber diet, MiraLAX supplementation, avoid straining.        I spent a total of 30 minutes with record review, exam, communication with the patient, communication with other providers, and documentation of this encounter.      Return in about 5 weeks (around 12/23/2022).    Please note that this dictation was created using voice recognition software. I have made every reasonable attempt to correct obvious errors, but I expect that there are errors of grammar and possibly content that I did not discover before finalizing the note.

## 2022-11-21 ENCOUNTER — OFFICE VISIT (OUTPATIENT)
Dept: URGENT CARE | Facility: CLINIC | Age: 42
End: 2022-11-21
Payer: MEDICAID

## 2022-11-21 VITALS
OXYGEN SATURATION: 99 % | DIASTOLIC BLOOD PRESSURE: 80 MMHG | HEIGHT: 65 IN | SYSTOLIC BLOOD PRESSURE: 128 MMHG | RESPIRATION RATE: 24 BRPM | BODY MASS INDEX: 40.35 KG/M2 | TEMPERATURE: 98.2 F | WEIGHT: 242.2 LBS | HEART RATE: 92 BPM

## 2022-11-21 DIAGNOSIS — R10.2 PELVIC PAIN: ICD-10-CM

## 2022-11-21 DIAGNOSIS — N93.9 VAGINA BLEEDING: ICD-10-CM

## 2022-11-21 PROCEDURE — 99213 OFFICE O/P EST LOW 20 MIN: CPT | Performed by: FAMILY MEDICINE

## 2022-11-21 RX ORDER — OMEPRAZOLE 40 MG/1
40 CAPSULE, DELAYED RELEASE ORAL DAILY
COMMUNITY
End: 2022-12-28 | Stop reason: SDUPTHER

## 2022-11-21 ASSESSMENT — FIBROSIS 4 INDEX: FIB4 SCORE: 0.84

## 2022-11-22 ENCOUNTER — HOSPITAL ENCOUNTER (OUTPATIENT)
Dept: RADIOLOGY | Facility: MEDICAL CENTER | Age: 42
End: 2022-11-22
Attending: FAMILY MEDICINE
Payer: MEDICAID

## 2022-11-22 DIAGNOSIS — R10.2 PELVIC PAIN: ICD-10-CM

## 2022-11-22 DIAGNOSIS — N93.9 VAGINA BLEEDING: ICD-10-CM

## 2022-11-22 PROCEDURE — 76830 TRANSVAGINAL US NON-OB: CPT

## 2022-11-22 NOTE — PROGRESS NOTES
Subjective:     Christine Miguel is a 41 y.o. female who presents for Abdominal Pain (X 4-5 days left side ovary, bleeding, getting worse)    HPI  Pt presents for evaluation of an acute problem  Patient with abdominal pain for the last 4 to 5 days  Abdominal pain is worse in the left side  Has vaginal bleeding  Pain in the left side seems to come and go  Pain and vaginal bleeding seems to happen at the same time  Vaginal bleeding does not quite feel like a regular menstrual period  Able to tolerate p.o. intake with no difficulties  No nausea or vomiting  No constipation or diarrhea  No fever    Review of Systems   Constitutional:  Negative for fever.   Gastrointestinal:  Positive for abdominal pain.   Skin:  Negative for rash.     PMH:  has a past medical history of Acid reflux, Anesthesia, Asthma, Dyslipidemia (8/1/2022), Heart burn, Indigestion, Oligohydramnios antepartum-IOL 4/24 9 AM (4/15/2016), Pain (08/22/14), and Unspecified urinary incontinence.    She has no past medical history of Addisons disease ([facility]), Allergy, Anemia, Anxiety, Arrhythmia, Arthritis, Blood transfusion without reported diagnosis, Cancer ([facility]), CHF (congestive heart failure) ([facility]), Clotting disorder ([facility]), Cold, COPD (chronic obstructive pulmonary disease) ([facility]), Cushings syndrome ([facility]), Dental disorder, Depression, Diabetes ([facility]), Diabetic neuropathy ([facility]), Glaucoma, Goiter, Heart attack ([facility]), Heart murmur, HIV (human immunodeficiency virus infection) ([facility]), Hypertension, IBD (inflammatory bowel disease), Kidney disease, Meningitis, Migraine, or Pulmonary emphysema ([facility]).  MEDS:   Current Outpatient Medications:     omeprazole (PRILOSEC) 40 MG delayed-release capsule, Take 40 mg by mouth every day., Disp: , Rfl:     furosemide (LASIX) 20 MG Tab, Take 1 Tablet by mouth every day., Disp: 30 Tablet, Rfl: 0    fluconazole (DIFLUCAN) 200 MG Tab, Use 2 tablets on day one, then take one tablet daily for 2 weeks, Disp: 15 Tablet,  "Rfl: 0    losartan (COZAAR) 25 MG Tab, Take 1 Tablet by mouth every day for 360 days., Disp: 90 Tablet, Rfl: 3    atorvastatin (LIPITOR) 40 MG Tab, Take 1 Tablet by mouth every evening., Disp: 90 Tablet, Rfl: 3    aspirin (ASA) 81 MG Chew Tab chewable tablet, Chew 1 Tablet every day., Disp: 90 Tablet, Rfl: 3    amLODIPine (NORVASC) 10 MG Tab, Take 1 Tablet by mouth at bedtime for 360 days., Disp: 90 Tablet, Rfl: 3    ASPIRIN 81 PO, aspirin, Disp: , Rfl:   ALLERGIES:   Allergies   Allergen Reactions    Latex Rash and Itching    Nifedipine Vomiting     Headache, vomiting    Sildenafil      SURGHX:   Past Surgical History:   Procedure Laterality Date    IRRIGATION & DEBRIDEMENT ORTHO  9/7/2014    Performed by El Gomez M.D. at SURGERY [facility] ORS    IRRIGATION & DEBRIDEMENT NEURO  9/5/2014    Performed by El Gomez M.D. at SURGERY [facility] ORS    LUMBAR LAMINECTOMY DISKECTOMY  8/27/2014    Performed by El Gomez M.D. at SURGERY [facility] ORS    INJ,EPIDURAL/LUMB/SAC SINGLE  6/5/2014    Performed by Alexis Cornell M.D. at SURGERY [facility] ORS    TONSILLECTOMY  5/14/2013    Performed by Aaron Hutton M.D. at SURGERY SAME DAY [facility] ORS    NASAL SEPTAL RECONSTRUCTION  5/14/2013    Performed by Aaron Hutton M.D. at SURGERY SAME DAY [facility] ORS    TURBINOPLASTY  5/14/2013    Performed by Aaron Hutton M.D. at SURGERY SAME DAY [facility] ORS     SOCHX:  reports that she has never smoked. She has never used smokeless tobacco. She reports that she does not drink alcohol and does not use drugs.     Objective:   /80 (BP Location: Left arm, Patient Position: Sitting, BP Cuff Size: Large adult long)   Pulse 92   Temp 36.8 °C (98.2 °F) (Temporal)   Resp (!) 24   Ht 1.651 m (5' 5\") Comment: per pt  Wt 110 kg (242 lb 3.2 oz)   SpO2 99%   BMI 40.30 kg/m²     Physical Exam  Constitutional:       General: She is not in acute distress.     Appearance: She is " well-developed. She is not diaphoretic.   Pulmonary:      Effort: Pulmonary effort is normal.   Abdominal:      General: Abdomen is flat. Bowel sounds are normal. There is no distension.      Palpations: Abdomen is soft.      Tenderness: There is no right CVA tenderness, left CVA tenderness, guarding or rebound.      Comments: +TTP focally in the left lower quadrant   Neurological:      Mental Status: She is alert.       Assessment/Plan:   Assessment    1. Pelvic pain  - US-PELVIC COMPLETE (TRANSABDOMINAL/TRANSVAGINAL) (COMBO); Future    2. Vagina bleeding  - US-PELVIC COMPLETE (TRANSABDOMINAL/TRANSVAGINAL) (COMBO); Future    Other orders  - omeprazole (PRILOSEC) 40 MG delayed-release capsule; Take 40 mg by mouth every day.    Patient with vaginal bleeding and left lower pelvic pain.  Suspect this is more likely a hemorrhagic cyst based on history and exam.  Will obtain ultrasound to further evaluate.  Did review risks and benefits of being seen in the ER for more immediate imaging and treatment and patient feels that the pain is mild enough to wait for an outpatient ultrasound.  Given strict ER precautions if pain should worsen.  We will follow-up test results.

## 2022-11-23 ENCOUNTER — PATIENT MESSAGE (OUTPATIENT)
Dept: MEDICAL GROUP | Facility: CLINIC | Age: 42
End: 2022-11-23

## 2022-11-23 DIAGNOSIS — N83.202 HEMORRHAGIC CYST OF LEFT OVARY: ICD-10-CM

## 2022-11-26 ENCOUNTER — HOSPITAL ENCOUNTER (EMERGENCY)
Facility: MEDICAL CENTER | Age: 42
End: 2022-11-26
Attending: EMERGENCY MEDICINE
Payer: MEDICAID

## 2022-11-26 ENCOUNTER — APPOINTMENT (OUTPATIENT)
Dept: RADIOLOGY | Facility: MEDICAL CENTER | Age: 42
End: 2022-11-26
Attending: EMERGENCY MEDICINE
Payer: MEDICAID

## 2022-11-26 VITALS
HEART RATE: 81 BPM | TEMPERATURE: 98.6 F | SYSTOLIC BLOOD PRESSURE: 100 MMHG | DIASTOLIC BLOOD PRESSURE: 53 MMHG | OXYGEN SATURATION: 94 % | BODY MASS INDEX: 40.15 KG/M2 | HEIGHT: 65 IN | RESPIRATION RATE: 20 BRPM | WEIGHT: 241 LBS

## 2022-11-26 DIAGNOSIS — R10.32 LEFT LOWER QUADRANT PAIN: ICD-10-CM

## 2022-11-26 DIAGNOSIS — R07.9 CHEST PAIN, UNSPECIFIED TYPE: Primary | ICD-10-CM

## 2022-11-26 DIAGNOSIS — M25.562 ACUTE PAIN OF LEFT KNEE: ICD-10-CM

## 2022-11-26 LAB
ALBUMIN SERPL BCP-MCNC: 4.2 G/DL (ref 3.2–4.9)
ALBUMIN/GLOB SERPL: 1.6 G/DL
ALP SERPL-CCNC: 106 U/L (ref 30–99)
ALT SERPL-CCNC: 25 U/L (ref 2–50)
ANION GAP SERPL CALC-SCNC: 12 MMOL/L (ref 7–16)
AST SERPL-CCNC: 20 U/L (ref 12–45)
BASOPHILS # BLD AUTO: 0.4 % (ref 0–1.8)
BASOPHILS # BLD: 0.04 K/UL (ref 0–0.12)
BILIRUB SERPL-MCNC: 0.4 MG/DL (ref 0.1–1.5)
BUN SERPL-MCNC: 16 MG/DL (ref 8–22)
CALCIUM SERPL-MCNC: 9.1 MG/DL (ref 8.5–10.5)
CHLORIDE SERPL-SCNC: 104 MMOL/L (ref 96–112)
CO2 SERPL-SCNC: 23 MMOL/L (ref 20–33)
CREAT SERPL-MCNC: 0.8 MG/DL (ref 0.5–1.4)
EKG IMPRESSION: NORMAL
EOSINOPHIL # BLD AUTO: 0.18 K/UL (ref 0–0.51)
EOSINOPHIL NFR BLD: 1.7 % (ref 0–6.9)
ERYTHROCYTE [DISTWIDTH] IN BLOOD BY AUTOMATED COUNT: 43.3 FL (ref 35.9–50)
GFR SERPLBLD CREATININE-BSD FMLA CKD-EPI: 94 ML/MIN/1.73 M 2
GLOBULIN SER CALC-MCNC: 2.6 G/DL (ref 1.9–3.5)
GLUCOSE SERPL-MCNC: 126 MG/DL (ref 65–99)
HCT VFR BLD AUTO: 43.6 % (ref 37–47)
HGB BLD-MCNC: 14.6 G/DL (ref 12–16)
IMM GRANULOCYTES # BLD AUTO: 0.04 K/UL (ref 0–0.11)
IMM GRANULOCYTES NFR BLD AUTO: 0.4 % (ref 0–0.9)
LIPASE SERPL-CCNC: 31 U/L (ref 11–82)
LYMPHOCYTES # BLD AUTO: 3.06 K/UL (ref 1–4.8)
LYMPHOCYTES NFR BLD: 29.2 % (ref 22–41)
MCH RBC QN AUTO: 30 PG (ref 27–33)
MCHC RBC AUTO-ENTMCNC: 33.5 G/DL (ref 33.6–35)
MCV RBC AUTO: 89.7 FL (ref 81.4–97.8)
MONOCYTES # BLD AUTO: 0.92 K/UL (ref 0–0.85)
MONOCYTES NFR BLD AUTO: 8.8 % (ref 0–13.4)
NEUTROPHILS # BLD AUTO: 6.25 K/UL (ref 2–7.15)
NEUTROPHILS NFR BLD: 59.5 % (ref 44–72)
NRBC # BLD AUTO: 0 K/UL
NRBC BLD-RTO: 0 /100 WBC
PLATELET # BLD AUTO: 243 K/UL (ref 164–446)
PMV BLD AUTO: 9.4 FL (ref 9–12.9)
POTASSIUM SERPL-SCNC: 3.6 MMOL/L (ref 3.6–5.5)
PROT SERPL-MCNC: 6.8 G/DL (ref 6–8.2)
RBC # BLD AUTO: 4.86 M/UL (ref 4.2–5.4)
SODIUM SERPL-SCNC: 139 MMOL/L (ref 135–145)
TROPONIN T SERPL-MCNC: <6 NG/L (ref 6–19)
TROPONIN T SERPL-MCNC: <6 NG/L (ref 6–19)
WBC # BLD AUTO: 10.5 K/UL (ref 4.8–10.8)

## 2022-11-26 PROCEDURE — 85025 COMPLETE CBC W/AUTO DIFF WBC: CPT

## 2022-11-26 PROCEDURE — 73564 X-RAY EXAM KNEE 4 OR MORE: CPT | Mod: LT

## 2022-11-26 PROCEDURE — 99284 EMERGENCY DEPT VISIT MOD MDM: CPT

## 2022-11-26 PROCEDURE — 93005 ELECTROCARDIOGRAM TRACING: CPT | Performed by: EMERGENCY MEDICINE

## 2022-11-26 PROCEDURE — 84484 ASSAY OF TROPONIN QUANT: CPT

## 2022-11-26 PROCEDURE — 36415 COLL VENOUS BLD VENIPUNCTURE: CPT

## 2022-11-26 PROCEDURE — 71045 X-RAY EXAM CHEST 1 VIEW: CPT

## 2022-11-26 PROCEDURE — 83690 ASSAY OF LIPASE: CPT

## 2022-11-26 PROCEDURE — 80053 COMPREHEN METABOLIC PANEL: CPT

## 2022-11-26 PROCEDURE — 93005 ELECTROCARDIOGRAM TRACING: CPT

## 2022-11-26 ASSESSMENT — ENCOUNTER SYMPTOMS
TINGLING: 0
NAUSEA: 0
FOCAL WEAKNESS: 0
ABDOMINAL PAIN: 1
DIZZINESS: 0
VOMITING: 0
FEVER: 0
SHORTNESS OF BREATH: 1
LOSS OF CONSCIOUSNESS: 0
CHILLS: 0

## 2022-11-26 ASSESSMENT — HEART SCORE
TROPONIN: LESS THAN OR EQUAL TO NORMAL LIMIT
AGE: <45
RISK FACTORS: 1-2 RISK FACTORS
ECG: NORMAL
HEART SCORE: 2
HISTORY: MODERATELY SUSPICIOUS

## 2022-11-26 ASSESSMENT — FIBROSIS 4 INDEX: FIB4 SCORE: 0.87

## 2022-11-26 NOTE — ED TRIAGE NOTES
"Chief Complaint   Patient presents with    Chest Pain     Sharp chest pain under left breast that started around 0145 while she was sitting on the couch relaxing. Pt endorses SOB x3 weeks.      Pt BIB [facility] for above complaint. Prior to arrival pt had 10/10 chest pain under left breast. Pt received 324mg aspirin and 1 dose nitro. Pt currently has 0/10 chest pain.     /68   Pulse 66   Temp 37.1 °C (98.8 °F) (Temporal)   Resp 19   Ht 1.651 m (5' 5\")   Wt 109 kg (241 lb)   LMP  (LMP Unknown) Comment: bleeding from cyst. recent neg preg.  BMI 40.10 kg/m²     "

## 2022-11-26 NOTE — ED PROVIDER NOTES
ED Provider Note    Means of Arrival: EMS  History obtained from: patient    CHIEF COMPLAINT  Chief Complaint   Patient presents with    Chest Pain     Sharp chest pain under left breast that started around 0145 while she was sitting on the couch relaxing. Pt endorses SOB x3 weeks.        HPI  Christine Ita Miguel is a 42 y.o. female with past medical history of CREST syndrome who presents chest pain, left lower quadrant pain, and left knee pain.  Patient states that she was recently diagnosed with a left ovarian cyst that was ruptured.  While she was watching TV tonight she suddenly had worsening pain in the left lower quadrant which then progressively got worse, that then developed into chest pain and worsening shortness of breath.  Nothing made the pain better or worse.  She did have some worsening shortness of breath that she has been complaining of shortness of breath for the past 3 weeks.  She denies lightheadedness or passing out.  She denies history of MI, PE, DVT, recent mobilizations, surgeries or malignancies.  She does have a history of crest syndrome.  She states she has experienced some shortness of breath and a little episode of chest pain several weeks ago for which her primary care provider has ordered an echo.  She does have significant family history of cardiac disease, however, she herself does not.  She also complains of left knee pain.  She states that she has significant arthritis bilateral knees and several days ago twisted her left knee.  Is now become swollen and she is having a hard time walking on it due to the pain.  Patient states she has only mild discomfort in her chest her main complaint now is her left knee.  She was given aspirin and nitro by EMS.    REVIEW OF SYSTEMS  Review of Systems   Constitutional:  Negative for chills and fever.   Respiratory:  Positive for shortness of breath.    Cardiovascular:  Positive for chest pain.   Gastrointestinal:  Positive for abdominal  "pain. Negative for nausea and vomiting.   Genitourinary:  Negative for dysuria.   Musculoskeletal:  Positive for joint pain.   Neurological:  Negative for dizziness, tingling, focal weakness and loss of consciousness.       See HPI for further details.   All other systems are negative.     PAST MEDICAL HISTORY  Past Medical History:   Diagnosis Date    Acid reflux     Anesthesia     Family Hx-father ; was alwasy told it was anesthesia \"related\"; not sure what the actual reaction was; pt-PONV    Asthma     Dyslipidemia 2022    Heart burn     Indigestion     Oligohydramnios antepartum-IOL  9 AM 4/15/2016    IMO load 2020    Pain 14    lower back/L leg=7/10    Unspecified urinary incontinence        FAMILY HISTORY  Family History   Problem Relation Age of Onset    Rheumatologic Disease Mother         RA    Heart Attack Father          MI (age 45)    Kidney Disease Father         ESRD on HD    Diabetes Father     Heart Disease Sister        SOCIAL HISTORY   reports that she has never smoked. She has never used smokeless tobacco. She reports that she does not drink alcohol and does not use drugs.    SURGICAL HISTORY  Past Surgical History:   Procedure Laterality Date    IRRIGATION & DEBRIDEMENT ORTHO  2014    Performed by El Gomez M.D. at SURGERY [facility] ORS    IRRIGATION & DEBRIDEMENT NEURO  2014    Performed by El Gomez M.D. at SURGERY [facility] ORS    LUMBAR LAMINECTOMY DISKECTOMY  2014    Performed by El Gomez M.D. at SURGERY [facility] ORS    INJ,EPIDURAL/LUMB/SAC SINGLE  2014    Performed by Alexis Cornell M.D. at SURGERY [facility] ORS    TONSILLECTOMY  2013    Performed by Aaron Hutton M.D. at SURGERY SAME DAY [facility] ORS    NASAL SEPTAL RECONSTRUCTION  2013    Performed by Aaron Hutton M.D. at SURGERY SAME DAY [facility] ORS    TURBINOPLASTY  2013    Performed by Aaron Hutton M.D. at SURGERY " "SAME DAY [facility] ORS       CURRENT MEDICATIONS  Home Medications       Reviewed by Mireya Blanchard R.N. (Registered Nurse) on 11/26/22 at 0218  Med List Status: Not Addressed     Medication Last Dose Status   amLODIPine (NORVASC) 10 MG Tab  Active   aspirin (ASA) 81 MG Chew Tab chewable tablet  Active   ASPIRIN 81 PO  Active   atorvastatin (LIPITOR) 40 MG Tab  Active   fluconazole (DIFLUCAN) 200 MG Tab  Active   furosemide (LASIX) 20 MG Tab  Active   losartan (COZAAR) 25 MG Tab  Active   omeprazole (PRILOSEC) 40 MG delayed-release capsule  Active                    ALLERGIES  Allergies   Allergen Reactions    Latex Rash and Itching    Nifedipine Vomiting     Headache, vomiting    Sildenafil        PHYSICAL EXAM  VITAL SIGNS: /53   Pulse 81   Temp 37 °C (98.6 °F)   Resp 20   Ht 1.651 m (5' 5\")   Wt 109 kg (241 lb)   LMP  (LMP Unknown) Comment: bleeding from cyst. recent neg preg.  SpO2 94%   BMI 40.10 kg/m²    Physical Exam  Vitals and nursing note reviewed.   Constitutional:       General: She is not in acute distress.     Appearance: She is well-developed.   HENT:      Head: Normocephalic and atraumatic.      Right Ear: External ear normal.      Left Ear: External ear normal.   Eyes:      Extraocular Movements: Extraocular movements intact.      Pupils: Pupils are equal, round, and reactive to light.   Cardiovascular:      Rate and Rhythm: Normal rate and regular rhythm.      Heart sounds: No murmur heard.  Pulmonary:      Effort: Pulmonary effort is normal. No respiratory distress.      Breath sounds: Normal breath sounds.   Abdominal:      General: Abdomen is flat. Bowel sounds are normal.      Palpations: Abdomen is soft.      Comments: Mild tenderness to the left lower quadrant without rebound or guarding.   Musculoskeletal:      Cervical back: Normal range of motion and neck supple.      Right lower leg: No edema.      Left lower leg: No edema.      Comments: Decreased range of motion of left " knee secondary to pain.  Mild effusion.  No overlying erythema, warmth, induration or lesions.  Neurovascular intact distally.   Skin:     General: Skin is warm and dry.   Neurological:      General: No focal deficit present.      Mental Status: She is alert and oriented to person, place, and time.           RADIOLOGY/PROCEDURES  DX-KNEE COMPLETE 4+ LEFT   Final Result      No evidence of fracture or dislocation.   No change in moderate osteoarthritis.      DX-CHEST-PORTABLE (1 VIEW)   Final Result         No acute cardiac or pulmonary abnormality is identified.          LABS  Labs Reviewed   CBC WITH DIFFERENTIAL - Abnormal; Notable for the following components:       Result Value    MCHC 33.5 (*)     Monos (Absolute) 0.92 (*)     All other components within normal limits    Narrative:     Biotin intake of greater than 5 mg per day may interfere with  troponin levels, causing false low values.   COMP METABOLIC PANEL - Abnormal; Notable for the following components:    Glucose 126 (*)     Alkaline Phosphatase 106 (*)     All other components within normal limits    Narrative:     Biotin intake of greater than 5 mg per day may interfere with  troponin levels, causing false low values.   LIPASE    Narrative:     Biotin intake of greater than 5 mg per day may interfere with  troponin levels, causing false low values.   TROPONIN    Narrative:     Biotin intake of greater than 5 mg per day may interfere with  troponin levels, causing false low values.   TROPONIN    Narrative:     Biotin intake of greater than 5 mg per day may interfere with  troponin levels, causing false low values.   ESTIMATED GFR    Narrative:     Biotin intake of greater than 5 mg per day may interfere with  troponin levels, causing false low values.        EKG  Results for orders placed or performed during the hospital encounter of 11/26/22   EKG   Result Value Ref Range    Report       [facility] Emergency Dept.    Test Date:   2022  Pt Name:    CHRISTINE KENNY       Department: ER  MRN:        [id number]                      Room:       RD 02  Gender:     Female                       Technician: [id number]  :        1980                   Requested By:ER TRIAGE PROTOCOL  Order #:    [id number]                    Reading MD: Libby Asencio    Measurements  Intervals                                Axis  Rate:       95                           P:          52  MT:         151                          QRS:        22  QRSD:       87                           T:          53  QT:         362  QTc:        455    Interpretive Statements  Sinus rhythm  Compared to ECG 2021 00:50:36  No significant changes  Rate 95, normal intervals, normal axis, no ST elevations, mild depression in  V3  V4 less than 1 mm, no pathologic Q waves, no Wellens or Brugada, no  significant  change compared to EKG performed 2021  Electronically Signed On 2022 5:58:17 PST by Libby Asencio        Heart score:2    COURSE & MEDICAL DECISION MAKING  Pertinent Labs & Imaging studies reviewed. (See chart for details)    Christine Kenny is a 42 y.o. female with past medical history of CREST syndrome who presents chest pain, left lower quadrant pain, and left knee pain.  Patient states that she was recently diagnosed with a left ovarian cyst that was ruptured.  Differential includes: ACS, PE, pneumothorax, esophageal spasm, pericarditis, myocarditis, costochondritis, anxiety, ruptured ovarian cyst, knee fracture, knee effusion, knee arthritis.    Patient is afebrile, vital signs reassuring.  She really has very mild discomfort to her chest at this current moment.  EKG no evidence of acute ischemic change.  Initial troponin is normal.  Chest x-ray unremarkable.  Abdomen soft benign with only mild discomfort to the left lower quadrant.  Patient has mild effusion to the left knee with diffuse tenderness.  No overlying erythema, warmth,  or lesions.  Neurovascularly intact distally.  X-ray of knee unremarkable other than arthritis.  Repeat troponin is unchanged and normal.  Patient seems to think that maybe her symptoms were due to anxiety with the increasing abdominal pain.  I have low suspicion for ACS at this current moment, however, I referred her to cardiology for further evaluation.  She is also encouraged to follow-up with orthopedics for her knee pain.  She is given strict ER return precautions.  She is comfortable to plan and discharged in good condition with her family.    Appropriate PPE were worn at this encounter.     FINAL IMPRESSION  1. Chest pain, unspecified type Acute   2. Left lower quadrant pain Acute   3. Acute pain of left knee Acute        The patient will return for new or worsening symptoms and is stable at the time of discharge.    The patient is referred to a primary physician for blood pressure management, diabetic screening, and for all other preventative health concerns.      DISPOSITION:  Patient will be discharged home in stable condition.    FOLLOW UP:  [facility], Emergency Dept  [address] [id number] [phone]    As needed, If symptoms worsen    [facility]- [address]  [address]  [facility] [address]  [phone]    Call the office on Monday to schedule follow-up for your episode of chest pain and recent ER visit for further evaluation      OUTPATIENT MEDICATIONS:  Discharge Medication List as of 11/26/2022  6:23 AM            This dictation was created using voice recognition software. The accuracy of the dictation is limited to the abilities of the software. I expect there may be some errors of grammar and possibly content. The nursing notes were reviewed and certain aspects of this information were incorporated into this note.

## 2022-11-26 NOTE — ED NOTES
Patient given crutches and instructed on their appropriate use. Patient verbalized and demonstrated appropriate use. PIV removed. Patient given discharge instructions and verbalized understanding appropriately, denies any further questions or concerns at this time. Patient is alert and oriented and ambulates with a steady gait. Discharged to self.

## 2022-11-29 ENCOUNTER — APPOINTMENT (OUTPATIENT)
Dept: INTERNAL MEDICINE | Facility: OTHER | Age: 42
End: 2022-11-29

## 2022-11-29 ASSESSMENT — ENCOUNTER SYMPTOMS
ABDOMINAL PAIN: 1
FEVER: 0

## 2022-12-08 ENCOUNTER — ANCILLARY PROCEDURE (OUTPATIENT)
Dept: CARDIOLOGY | Facility: MEDICAL CENTER | Age: 42
End: 2022-12-08
Attending: INTERNAL MEDICINE
Payer: MEDICAID

## 2022-12-08 DIAGNOSIS — R06.09 DYSPNEA ON EXERTION: ICD-10-CM

## 2022-12-08 DIAGNOSIS — M34.1 CREST (CALCINOSIS, RAYNAUD'S PHENOMENON, ESOPHAGEAL DYSFUNCTION, SCLERODACTYLY, TELANGIECTASIA) (HCC): ICD-10-CM

## 2022-12-08 DIAGNOSIS — R06.02 SHORTNESS OF BREATH: ICD-10-CM

## 2022-12-08 LAB
LV EJECT FRACT MOD 2C 99903: 71.31
LV EJECT FRACT MOD 4C 99902: 73.36
LV EJECT FRACT MOD BP 99901: 71.68

## 2022-12-08 PROCEDURE — 93306 TTE W/DOPPLER COMPLETE: CPT

## 2022-12-08 PROCEDURE — 93306 TTE W/DOPPLER COMPLETE: CPT | Mod: 26 | Performed by: INTERNAL MEDICINE

## 2022-12-09 ENCOUNTER — HOSPITAL ENCOUNTER (OUTPATIENT)
Dept: RADIOLOGY | Facility: MEDICAL CENTER | Age: 42
End: 2022-12-09
Attending: INTERNAL MEDICINE
Payer: MEDICAID

## 2022-12-09 DIAGNOSIS — R06.09 DYSPNEA ON EXERTION: ICD-10-CM

## 2022-12-09 DIAGNOSIS — R06.02 SHORTNESS OF BREATH: ICD-10-CM

## 2022-12-09 DIAGNOSIS — M34.1 CREST (CALCINOSIS, RAYNAUD'S PHENOMENON, ESOPHAGEAL DYSFUNCTION, SCLERODACTYLY, TELANGIECTASIA) (HCC): ICD-10-CM

## 2022-12-09 PROCEDURE — 71250 CT THORAX DX C-: CPT

## 2022-12-23 DIAGNOSIS — I99.8 ISCHEMIA OF DIGITS OF HAND: ICD-10-CM

## 2022-12-23 DIAGNOSIS — M34.1 CREST (CALCINOSIS, RAYNAUD'S PHENOMENON, ESOPHAGEAL DYSFUNCTION, SCLERODACTYLY, TELANGIECTASIA) (HCC): ICD-10-CM

## 2023-01-20 ENCOUNTER — DOCUMENTATION (OUTPATIENT)
Dept: VASCULAR LAB | Facility: MEDICAL CENTER | Age: 43
End: 2023-01-20

## 2023-01-20 ENCOUNTER — APPOINTMENT (OUTPATIENT)
Dept: VASCULAR LAB | Facility: MEDICAL CENTER | Age: 43
End: 2023-01-20
Payer: MEDICAID

## 2023-01-20 NOTE — PROGRESS NOTES
Christine Miguel has made a same day cancellation for f/u appt with [facility] clinic   Scheduling staff to contact to reschedule follow-up and remind patient to give 24h notice in the future for cancellations.     Vascular Medicine Clinic   [facility]   [phone]

## 2023-02-07 ENCOUNTER — OFFICE VISIT (OUTPATIENT)
Dept: INTERNAL MEDICINE | Facility: OTHER | Age: 43
End: 2023-02-07
Payer: MEDICAID

## 2023-02-07 VITALS
TEMPERATURE: 98.9 F | HEIGHT: 66 IN | WEIGHT: 241.8 LBS | DIASTOLIC BLOOD PRESSURE: 60 MMHG | HEART RATE: 104 BPM | BODY MASS INDEX: 38.86 KG/M2 | OXYGEN SATURATION: 99 % | SYSTOLIC BLOOD PRESSURE: 93 MMHG

## 2023-02-07 DIAGNOSIS — M25.562 LEFT KNEE PAIN, UNSPECIFIED CHRONICITY: ICD-10-CM

## 2023-02-07 DIAGNOSIS — E66.01 CLASS 3 SEVERE OBESITY DUE TO EXCESS CALORIES WITH SERIOUS COMORBIDITY AND BODY MASS INDEX (BMI) OF 40.0 TO 44.9 IN ADULT (HCC): ICD-10-CM

## 2023-02-07 DIAGNOSIS — M15.3 OTHER SECONDARY OSTEOARTHRITIS OF MULTIPLE SITES: ICD-10-CM

## 2023-02-07 DIAGNOSIS — M25.362 PATELLOFEMORAL INSTABILITY OF LEFT KNEE WITH PAIN: ICD-10-CM

## 2023-02-07 DIAGNOSIS — N30.90 CYSTITIS: ICD-10-CM

## 2023-02-07 DIAGNOSIS — M25.562 PATELLOFEMORAL INSTABILITY OF LEFT KNEE WITH PAIN: ICD-10-CM

## 2023-02-07 PROBLEM — M15.9 DEGENERATIVE JOINT DISEASE INVOLVING MULTIPLE JOINTS: Status: ACTIVE | Noted: 2023-02-07

## 2023-02-07 PROBLEM — E66.09 OBESITY DUE TO EXCESS CALORIES WITH SERIOUS COMORBIDITY: Status: ACTIVE | Noted: 2023-02-07

## 2023-02-07 PROCEDURE — 99214 OFFICE O/P EST MOD 30 MIN: CPT | Mod: GC | Performed by: INTERNAL MEDICINE

## 2023-02-07 RX ORDER — SULFAMETHOXAZOLE AND TRIMETHOPRIM 400; 80 MG/1; MG/1
2 TABLET ORAL 2 TIMES DAILY
Qty: 12 TABLET | Refills: 0 | Status: SHIPPED | OUTPATIENT
Start: 2023-02-07 | End: 2023-03-20

## 2023-02-07 RX ORDER — ATORVASTATIN CALCIUM 40 MG/1
40 TABLET, FILM COATED ORAL DAILY
COMMUNITY
End: 2023-02-07

## 2023-02-07 RX ORDER — POLYETHYLENE GLYCOL 3350, SODIUM SULFATE ANHYDROUS, SODIUM BICARBONATE, SODIUM CHLORIDE, POTASSIUM CHLORIDE 236; 22.74; 6.74; 5.86; 2.97 G/4L; G/4L; G/4L; G/4L; G/4L
POWDER, FOR SOLUTION ORAL
COMMUNITY
Start: 2022-12-19 | End: 2023-03-10

## 2023-02-07 RX ORDER — DULOXETIN HYDROCHLORIDE 60 MG/1
60 CAPSULE, DELAYED RELEASE ORAL DAILY
Qty: 90 CAPSULE | Refills: 1 | Status: SHIPPED | OUTPATIENT
Start: 2023-02-07 | End: 2023-03-10

## 2023-02-07 RX ORDER — HYDROXYCHLOROQUINE SULFATE 200 MG/1
200 TABLET, FILM COATED ORAL DAILY
COMMUNITY
Start: 2023-01-30

## 2023-02-07 RX ORDER — HYDROXYCHLOROQUINE SULFATE 200 MG/1
TABLET, FILM COATED ORAL DAILY
COMMUNITY
End: 2023-02-07

## 2023-02-07 RX ORDER — ASPIRIN 81 MG/1
81 TABLET ORAL DAILY
Status: ON HOLD | COMMUNITY
End: 2023-07-15

## 2023-02-07 ASSESSMENT — PATIENT HEALTH QUESTIONNAIRE - PHQ9: CLINICAL INTERPRETATION OF PHQ2 SCORE: 0

## 2023-02-07 ASSESSMENT — ENCOUNTER SYMPTOMS
FEVER: 0
VOMITING: 0
WEIGHT LOSS: 0
DIZZINESS: 0
COUGH: 0
EYE PAIN: 0
CHILLS: 0
ORTHOPNEA: 0
PALPITATIONS: 0
NERVOUS/ANXIOUS: 0
MYALGIAS: 0
HEMOPTYSIS: 0
NAUSEA: 0

## 2023-02-07 ASSESSMENT — FIBROSIS 4 INDEX: FIB4 SCORE: 0.69

## 2023-02-07 NOTE — PATIENT INSTRUCTIONS
Thank you for visiting today!    Please follow-up in 5 weeks     Please follow up with nutrition specialist   Please get the knee MRI done   Please start taking duloxetine   Please start taking the antibiotic for UTI     Please try and eat healthy, get at least 30 minutes of cardiovascular exercise a day to help keep your health as best as it can be.    If you have any questions or concerns please feel free to contact us at [phone].    If you feel like you are experiencing a medical emergency please seek immediate medical attention at an urgent care or in the emergency department.

## 2023-02-07 NOTE — PROGRESS NOTES
CC: Follow up visit, knee pain     HPI:   Christine presents today with PMHx of CREST syndrome (f/u with vascular and rheumatology), GERD (s/p endoscopy with GI concerning for gastritis, biopsy results pending, has an appt with them tomorrow), atypical chest pain (established with cards, lexiscan ordered by them- pending) here today to review her labs and for a f/u visit.       ## Cystitis   ## Simple UTI   ## Stress incontinence   Ongoing urinary burning for last 2 weeks with mild itching. Has been hydrating herself well. Urine light and good volume, however with burning. UA + for trace blood, leukocyte esterase, WBCs concerning for UTI   Stress incontinence, has appt with ob-gyn in 2 weeks       ## Osteoarthritis multiple joints   ## Subacute traumatic pain of left knee   Osteoarthritis b/l hips and knees. Saw ortho who refused surgery saying she needs to loose weight prior to that. She hit her left knee on her car 11/22 and has had ongoing severe pain of the left knee since. Concern- ACL/ other ligament tear? Ortho refused further imaging.   Tried diclofenac gel which only helps momentarily.   Tried PT with minimal improvement   Is now motivated to loose weight to improve her overall health       ## Morbid obesity   ## Obesity with BMI 39.03  Is very motivated to loose weight and get healthy for her family. Has kids and would like herself to stay healthy for her family           Problem   Obesity Due to Excess Calories With Serious Comorbidity       Health Maintenance: Completed    ROS:  Review of Systems   Constitutional:  Negative for chills, fever and weight loss.   HENT:  Negative for hearing loss.    Eyes:  Negative for pain.   Respiratory:  Negative for cough and hemoptysis.    Cardiovascular:  Negative for palpitations and orthopnea.   Gastrointestinal:  Negative for nausea and vomiting.   Genitourinary:  Positive for dysuria and frequency.   Musculoskeletal:  Positive for joint pain. Negative for  "myalgias.   Skin:  Negative for rash.   Neurological:  Negative for dizziness.   Psychiatric/Behavioral:  The patient is not nervous/anxious.      Objective:     Exam:  BP 93/60 (BP Location: Right arm, Patient Position: Sitting, BP Cuff Size: Adult)   Pulse (!) 104   Temp 37.2 °C (98.9 °F) (Temporal)   Ht 1.676 m (5' 6\")   Wt 110 kg (241 lb 12.8 oz)   SpO2 99%   BMI 39.03 kg/m²  Body mass index is 39.03 kg/m².    Physical Exam  Constitutional:       General: She is not in acute distress.     Appearance: She is not ill-appearing.   HENT:      Head: Normocephalic and atraumatic.      Right Ear: External ear normal.      Left Ear: External ear normal.      Nose: Nose normal. No congestion.      Mouth/Throat:      Mouth: Mucous membranes are moist.   Eyes:      Extraocular Movements: Extraocular movements intact.      Pupils: Pupils are equal, round, and reactive to light.   Cardiovascular:      Rate and Rhythm: Normal rate.   Musculoskeletal:         General: Tenderness present. No swelling or deformity. Normal range of motion.      Right lower leg: No edema.      Left lower leg: No edema.      Comments: Left knee pain on palpation     Intact flexion, internal and ext rotation b/l knees against resistance   Intact extension right knee against resistance   Left knee extension limited 2/2 pain and discomfort    Skin:     General: Skin is warm.      Capillary Refill: Capillary refill takes less than 2 seconds.   Neurological:      Mental Status: She is alert and oriented to person, place, and time.   Psychiatric:         Mood and Affect: Mood normal.           Labs: Reviewed     Assessment & Plan:     42 y.o. female with the following -       ## Cystitis   ## Simple UTI   ## Stress incontinence   Ongoing urinary burning for last 2 weeks with mild itching. Has been hydrating herself well. Urine light and good volume, however with burning. UA + for trace blood, leukocyte esterase, WBCs concerning for UTI   Stress " incontinence, has appt with ob-gyn in 2 weeks   Plan:  - Start bactrim bid x 3 days       ## Osteoarthritis multiple joints   ## Subacute traumatic pain of left knee   Osteoarthritis b/l hips and knees. Saw ortho who refused surgery saying she needs to loose weight prior to that. She hit her left knee on her car 11/22 and has had ongoing severe pain of the left knee since. Concern- ACL/ other ligament tear? Ortho refused further imaging.   Tried diclofenac gel which only helps momentarily.   Tried PT with minimal improvement   Is now motivated to loose weight to improve her overall health   - Will get MRI of knee to r/o meniscal tear / tendon rupture   - Start duloxetine (has prior 30 mg tabs at home, will start from 30 mg and slowly titrate up to 60 mg)  - PRN topical diclofenac gel   - Cont non-pharmacologic interventions including weight loss as mentioned below       ## Morbid obesity   ## Obesity with BMI 39.03  Is very motivated to loose weight and get healthy for her family. Has kids and would like herself to stay healthy for her family   Plan:  - I advise reducing sugars/carbohydrates/saturated fats/alcohol, and eating more vegetables and lean meats such as fish/chicken/turkey. I also recommend 30 minutes of cardiovascular exercise most days of the week.  - Nutrition referral placed. Ms Womack is very motivated to work with nutrition services to loose weight and improve her overall health         I spent a total of 30 minutes with record review, exam, communication with the patient, communication with other providers, and documentation of this encounter.      Return in about 5 weeks (around 3/14/2023).    Please note that this dictation was created using voice recognition software. I have made every reasonable attempt to correct obvious errors, but I expect that there are errors of grammar and possibly content that I did not discover before finalizing the note.

## 2023-02-09 ENCOUNTER — HOSPITAL ENCOUNTER (OUTPATIENT)
Dept: RADIOLOGY | Facility: MEDICAL CENTER | Age: 43
End: 2023-02-09
Attending: INTERNAL MEDICINE
Payer: MEDICAID

## 2023-02-09 DIAGNOSIS — Z86.39 HISTORY OF THYROID NODULE: ICD-10-CM

## 2023-02-09 PROCEDURE — 76536 US EXAM OF HEAD AND NECK: CPT

## 2023-02-10 ENCOUNTER — TELEPHONE (OUTPATIENT)
Dept: INTERNAL MEDICINE | Facility: OTHER | Age: 43
End: 2023-02-10

## 2023-02-10 NOTE — TELEPHONE ENCOUNTER
Called to discuss the results of sleep study and left a msg     - Moderate sleep apnea recommending nasal CPAP. They also recommended considering ENT referral for surgical evaluation.

## 2023-02-13 ENCOUNTER — PATIENT MESSAGE (OUTPATIENT)
Dept: INTERNAL MEDICINE | Facility: OTHER | Age: 43
End: 2023-02-13

## 2023-02-13 DIAGNOSIS — G47.33 OSA (OBSTRUCTIVE SLEEP APNEA): ICD-10-CM

## 2023-02-15 ENCOUNTER — APPOINTMENT (OUTPATIENT)
Dept: RADIOLOGY | Facility: MEDICAL CENTER | Age: 43
End: 2023-02-15
Attending: INTERNAL MEDICINE
Payer: MEDICAID

## 2023-02-15 DIAGNOSIS — M15.3 OTHER SECONDARY OSTEOARTHRITIS OF MULTIPLE SITES: ICD-10-CM

## 2023-02-15 DIAGNOSIS — M25.362 PATELLOFEMORAL INSTABILITY OF LEFT KNEE WITH PAIN: ICD-10-CM

## 2023-02-15 DIAGNOSIS — M25.562 LEFT KNEE PAIN, UNSPECIFIED CHRONICITY: ICD-10-CM

## 2023-02-15 DIAGNOSIS — M25.562 PATELLOFEMORAL INSTABILITY OF LEFT KNEE WITH PAIN: ICD-10-CM

## 2023-02-15 PROCEDURE — 73721 MRI JNT OF LWR EXTRE W/O DYE: CPT | Mod: LT

## 2023-02-24 RX ORDER — ALBUTEROL SULFATE 90 UG/1
2 AEROSOL, METERED RESPIRATORY (INHALATION) EVERY 4 HOURS PRN
Qty: 1 EACH | Refills: 0 | Status: SHIPPED | OUTPATIENT
Start: 2023-02-24

## 2023-02-24 NOTE — PROGRESS NOTES
Called and discussed all results in detail.      ## Knee MRI   Showing meniscal tear, severe OA  S/p 8 weeks of PT with minimal improvement  Tried diclofenac gel without improvement   Duloxetine isn't helping much either   She will call her ortho doc and set up an appt     ## Sleep apnea  S/p sleep study confirming sleep apnea. DME order placed for CPAP     ## URI symptoms   Describes has a h/o asthma, was previously on albuterol inhalers but they . Requesting albuterol PRN (describes tolerating it well)  Will call clinic today to set up an appt to discuss her resp symptoms in detail   PFTs done at [facility] [place], pending official read

## 2023-03-08 DIAGNOSIS — E66.01 CLASS 3 SEVERE OBESITY DUE TO EXCESS CALORIES WITH SERIOUS COMORBIDITY AND BODY MASS INDEX (BMI) OF 40.0 TO 44.9 IN ADULT (HCC): ICD-10-CM

## 2023-03-10 ENCOUNTER — OFFICE VISIT (OUTPATIENT)
Dept: VASCULAR LAB | Facility: MEDICAL CENTER | Age: 43
End: 2023-03-10
Attending: FAMILY MEDICINE
Payer: MEDICAID

## 2023-03-10 VITALS
HEART RATE: 77 BPM | SYSTOLIC BLOOD PRESSURE: 107 MMHG | HEIGHT: 66 IN | WEIGHT: 233 LBS | BODY MASS INDEX: 37.45 KG/M2 | DIASTOLIC BLOOD PRESSURE: 71 MMHG

## 2023-03-10 DIAGNOSIS — E78.41 ELEVATED LIPOPROTEIN(A): ICD-10-CM

## 2023-03-10 DIAGNOSIS — M34.1 CREST SYNDROME (HCC): ICD-10-CM

## 2023-03-10 DIAGNOSIS — E78.5 DYSLIPIDEMIA: ICD-10-CM

## 2023-03-10 DIAGNOSIS — I73.00 RAYNAUD'S PHENOMENON (SECONDARY): ICD-10-CM

## 2023-03-10 DIAGNOSIS — R73.03 PREDIABETES: ICD-10-CM

## 2023-03-10 PROCEDURE — 99214 OFFICE O/P EST MOD 30 MIN: CPT | Performed by: FAMILY MEDICINE

## 2023-03-10 PROCEDURE — 99212 OFFICE O/P EST SF 10 MIN: CPT

## 2023-03-10 RX ORDER — FLUOXETINE 10 MG/1
10 CAPSULE ORAL DAILY
Qty: 30 CAPSULE | Refills: 2 | Status: SHIPPED | OUTPATIENT
Start: 2023-03-10 | End: 2023-05-05

## 2023-03-10 RX ORDER — FUROSEMIDE 20 MG/1
20 TABLET ORAL
COMMUNITY
End: 2023-03-20

## 2023-03-10 ASSESSMENT — ENCOUNTER SYMPTOMS
PALPITATIONS: 0
FEVER: 0
PND: 0
CLAUDICATION: 0
CHILLS: 0
SPUTUM PRODUCTION: 0
BRUISES/BLEEDS EASILY: 0
COUGH: 0
ORTHOPNEA: 0
SHORTNESS OF BREATH: 0
WHEEZING: 0
HEMOPTYSIS: 0

## 2023-03-10 ASSESSMENT — FIBROSIS 4 INDEX: FIB4 SCORE: 0.69

## 2023-03-11 NOTE — PROGRESS NOTES
FOLLOW-UP VASCULAR MEDICINE VISIT  03/10/23     Christine Ita Miguel is a. female  who presents for  Chief Complaint   Patient presents with    Follow-Up   initially referred by Lilly Abreu A.P.RN for eval for vasc etiology of RUE non-healing wound, orginally ref 4/24/22      Subjective         Interval hx/concerns:  no new wounds but having daily pains in fingers ongoing.    Seen by rheum MD - started plaquenil.  Had CT chest, echo.    Seen by GI and GERD improved, had EGD.     RUE non-healing wound:  Improving    pertinent hx  Started 2 years ago (age 39) with episodes, can be fingers and toes and has been progressive with ulcerations forms.   Seen by UC 4/2022 for R index finger non-healing wound.  Had subung lac 3mo prior.  Reports prior episodes of non-healing finger wounds in the past.  Hx of recurrent cold/painful fingers with intermittent cyanosis.  Has WBI normal in past 2021 and has apparently been seen by vasc specialist w/o formal dx or tx in the past.   (No prior visits of [facility] vasc med or available  Has seen by Dr. Pierre in latter half of 2021 and informed no interventions available but started nifedipine, DAPT, atorva.  Currently on ASA only, stopped nifedipine due to HA     HTN:  Stable on meds   No checking home BP - stable, no dizz with positional changes     Hyperlipidemia:  Stable, no current concerns  Current treatment: lifestyle changes  and High intensity  atorva   Baseline lipid    Latest Reference Range & Units 01/03/12 12:10   Cholesterol,Tot 100 - 199 mg/dL 218 (H)   Triglycerides 0 - 149 mg/dL 86   HDL >=40 mg/dL 37 !   LDL <100 mg/dL 164     Antiplatelet/anticoagulation: Yes, Details: ASA , no bleeding noted        Current Outpatient Medications:     furosemide, 20 mg, Oral, DAILY    FLUoxetine, 10 mg, Oral, DAILY    albuterol, 2 Puff, Inhalation, Q4HRS PRN, Taking    aspirin, 81 mg, Oral, DAILY, Taking    hydroxychloroquine, , Taking    sulfamethoxazole-trimethoprim, 2  "Tablet, Oral, BID, Taking    omeprazole, 40 mg, Oral, DAILY, Taking    losartan, 25 mg, Oral, DAILY, Taking    atorvastatin, 40 mg, Oral, Nightly, Taking    amLODIPine, 10 mg, Oral, QHS, Taking     Family History   Problem Relation Age of Onset    Rheumatologic Disease Mother         RA    Heart Attack Father          MI (age 45)    Kidney Disease Father         ESRD on HD    Diabetes Father     Heart Disease Sister       Social History     Tobacco Use    Smoking status: Never    Smokeless tobacco: Never   Vaping Use    Vaping Use: Never used   Substance Use Topics    Alcohol use: No    Drug use: No     Review of Systems   Constitutional:  Negative for chills, fever and malaise/fatigue.   Respiratory:  Negative for cough, hemoptysis, sputum production, shortness of breath and wheezing.    Cardiovascular:  Negative for chest pain, palpitations, orthopnea, claudication, leg swelling and PND.   Skin:  Negative for itching and rash.   Endo/Heme/Allergies:  Does not bruise/bleed easily.         Objective   Vitals:    03/10/23 1550   BP: 107/71   BP Location: Left arm   Patient Position: Sitting   BP Cuff Size: Large adult   Pulse: 77   Weight: 106 kg (233 lb)   Height: 1.676 m (5' 6\")        BP Readings from Last 5 Encounters:   03/10/23 107/71   23 93/60   22 100/53   22 128/80   22 115/66      Body mass index is 37.61 kg/m².  Physical Exam  Constitutional:       Appearance: Normal appearance.   HENT:      Head: Normocephalic.   Eyes:      Extraocular Movements: Extraocular movements intact.      Conjunctiva/sclera: Conjunctivae normal.   Pulmonary:      Effort: Pulmonary effort is normal.   Musculoskeletal:      Cervical back: Normal range of motion.   Skin:     General: Skin is dry.      Coloration: Skin is not pale.      Findings: No rash.   Neurological:      General: No focal deficit present.      Mental Status: She is alert and oriented to person, place, and time.   Psychiatric:         " Mood and Affect: Mood normal.         Behavior: Behavior normal.     Labs:    QUest 6/27/22   Lp(a) 181      HDL 42  LDL 62   CMP wnl   a1c wnl  Cbc wnl   ESR, CRP, ANCA (MPO ab, PR3 ab) - wnl   GEOVANY - positive >1:1280 (centromere pattern)  CCP ab 71 (elevated)   HCV ab neg     [facility] 7/20/22   CMP - wnl   SCL70 ab - neg   AT III activity wnl   FVL neg   Prot C wnl   PGM neg   B2GP1 ab wnl  ACL ab wnl   Lupus AC neg   D-dimer 0.32       [facility] 8/2022   C3, C4, RA factor - wnl                   No results found for: LIPOPROTA   No results found for: APOB           Lab Results   Component Value Date    SODIUM 139 11/26/2022    POTASSIUM 3.6 11/26/2022    CHLORIDE 104 11/26/2022    CO2 23 11/26/2022    GLUCOSE 126 (H) 11/26/2022    BUN 16 11/26/2022    CREATININE 0.80 11/26/2022    IFAFRICA >60 08/29/2021    IFNOTAFR >60 08/29/2021        Lab Results   Component Value Date    WBC 10.5 11/26/2022    RBC 4.86 11/26/2022    HEMOGLOBIN 14.6 11/26/2022    HEMATOCRIT 43.6 11/26/2022    MCV 89.7 11/26/2022    MCH 30.0 11/26/2022    MCHC 33.5 (L) 11/26/2022    MPV 9.4 11/26/2022         No results found for: MICROALBCALC, MALBCRT, MALBEXCR, [id number], MICROALBUR, MICRALB, UMICROALBUM, MICROALBTIM      Latest Reference Range & Units 09/16/14 04:15 10/28/15 12:04 01/30/16 11:08 04/15/16 14:17 12/26/17 11:05   Hepatitis B Surface Antigen Negative   Negative      HIV 1/0/2 Non Reactive   see below      Rubella IgG Antibody IU/mL  135.40      Strep Gp B DNA PCR Negative     Negative    Syphilis, Treponemal Qual Non Reactive   Non Reactive Non Reactive     Stat C-Reactive Protein 0.00 - 0.75 mg/dL 0.32    0.35       VASCULAR IMAGING:     WBI 8/2021  Normal WBI.   Absent 3, 4, 5th digit flow could be artifactual or secondary to embolic    disease.  Recommend clinical correlation.   Right.    Arterial doppler waveforms are of high amplitude and triphasic.   Wrist brachial index is within normal limits. WBI=1.05   PPG of the  digits demonstrates absent flow in the right 3rd, 4th, and 5th    digits. The 1st and 2nd digits appear to have slightly diminished flow in    comparsion to the contralateral digits.    CT chest 8/2021   1.  No thoracic aortic aneurysm or dissection.  2.  Subclavian arteries are normal in caliber and patent.  LEFT subclavian artery is partially secured.  3.  Probable medial RIGHT lower lobe atelectasis.  Thoracic aorta is normal in caliber.  No dissection demonstrated.  Great vessel origins are intact.  RIGHT subclavian artery is normal in caliber and patent.  LEFT subclavian artery is partially obscured by streak artifact however also appears normal in caliber and patent.    Echo 9/2021   No prior study is available for comparison.   Normal left ventricular systolic function.  The left ventricular ejection fraction is visually estimated to be 65%.  No significant valve abnormalities.     WBI/art duplex 8/2022    Wrist-brachial indices are normal.   FBI:   Right-  1.07   Left-     0.98   Right 3rd and 4th finger waveform are diminished, most likely due to    microvascular disease.   Left 2nd and 3rd finger waveforms are diminished, most likely due to    microvascular disease.    Echo 12/2022  Normal left ventricular size, thickness, systolic function, and   diastolic function.  The left ventricular ejection fraction is visually estimated to be 60-  65%.  The right ventricle is borderline dilated in size with preserved   systolic function.  Estimated right ventricular systolic pressure is mildly elevated at 40   mmHg.  Normal left atrial size.  No significant valvular abnormalities.   Normal inferior vena cava size and inspiratory collapse.    CT chest 12/2022  1.  No CT evidence of interstitial lung disease.   2.  No significant incidental findings are identified.         Medical Decision Making:  Today's Assessment / Status / Plan:     1. Raynaud's phenomenon (secondary)  Referral to Vascular Medicine    FLUoxetine  (PROZAC) 10 MG Cap    Comp Metabolic Panel    Lipid Profile    CRP HIGH SENSITIVE (CARDIAC)      2. Dyslipidemia  Comp Metabolic Panel    Lipid Profile    CRP HIGH SENSITIVE (CARDIAC)      3. Prediabetes  HEMOGLOBIN A1C (Glycohemoglobin GHB Total/A1C with MBG Estimate)      4. Elevated lipoprotein(a)        5. CREST syndrome (HCC)          Patient Type: Primary Prevention    Etiology of Established CVD if Present:     1) secondary Raynaud's related to CREST syndrome   - recurrent non-healing finger wounds, most recently right index 4/2022 - improving  - prior normal WBI with PPGs showing absent flow , repeat duplex 8/2022 shows nl WBI/FBI with reduced waveforms to R 3r/4th and L 2nd/3rd fingers   - serology testing indicative of CREST syndrome, possible RA overlay per CCP ab elevations as well   - strong centromere ab pattern and GEOVANY 1:1280 with high CCP ab  - high prob secondary raynaud's due to later adulthood (around 41yo) onset with intense pain and ischemic ulcerations with pain and classic discolorations affecting fingers/toes.    - thrombophilia w/u neg,  ANCA neg   - fhx of RA but not SLE, she has no nephritic findings or recurrent malar rash or arthralgia  - no COVID prior to initial episode    - pending eval with rheum in [place]     - CTA chest wnl w/o proximal plaque or stenosis, no indications of thoracic Ao diss, plaque  - prior echo wnl, though not FIDENCIO   - literature supports CCB as 1st line with other agents including prostaglandin based tx, ARB, SSRI, PDE5i moving forward and dependent upon severity and recurrences of episodes   Plan:  - defer further CREST-specific tx to rheum MD for definitive dx and tx   Meds:  - continue ASA 81mg, atorva  - continue amlodipine to 10mg daily - monitor for hypotension   - continue losartan 25mg daily to increase vasodilation   - start fluoxetine 10mg daily, then increase to 20mg   - consider cilostazol as next agent   -  oral iloprost if worsening over time     BLOOD  PRESSURE MANAGEMENT:  ACC/AHA (2017) goal <130/80  Home BP at goal: yes  Office BP at goal:  yes   - hx of preg-induced HTN on nifedipine   Plan:   - continue healthy diet, activity, weight mgmt   Monitoring:   - routine clinic-based BP measurements at least once annually   Medications:   - continue amlodipine 10mg daily for Raynaud's   - continue losartan 25mg daily for vasodilt    LIPID MANAGEMENT:   Qualifies for Statin Therapy Based on 2018 ACC/AHA Guidelines: yes, Primary Prevention - 40-74yo, LDLc >70, <190 w/o DM  The ASCVD Risk score (Cong KUHN, et al., 2019) failed to calculate.  Major ASCVD events: None  High-risk condition: N/A  Risk-enhancers: Metabolic syndrome  and Lp(a) >50   - reviewed with patient this is an independent risk factor for ASCVD but is currently controversial if lowering has impact on outcomes in primary prevention, so targeting LDLc lowering is primary objective for now and consider lp(a) lowering if worsening ascvd risk or if ascvd event occurs - definitely prothrombotic and needs ASA   Currently on Statin: Yes  Tx goals: LDL-C <100 (consider non-HDL-C <130, apoB <90)  At goal? Yes, 7/2022   Plan:   - reinforced ongoing TLC measures as noted   - monitor labs   Meds:   - continue atorva 40mg daily     ANTITHROMBOTIC/ANTIPLATELET THERAPY: continue ASA 81mg daily for potential antithromb benefit due to non-healing wounds and high lp(a)     GLYCEMIC STATUS: Prediabetic      Plan:  - update a1c   - continue healthy diet, weight reduction, daily physical activity   - consider metformin up to 850mg BID to slow or offset progression to T2D as per DPP trial   - monitor labs Q6-12mo    LIFESTYLE INTERVENTIONS:    SMOKING:   reports that she has never smoked. She has never used smokeless tobacco.   - continued complete avoidance of all tobacco products     PHYSICAL ACTIVITY: continue healthy activity to improve CV fitness.  In general, targeting >150min/week of moderate-level activity.      WEIGHT MANAGEMENT AND NUTRITION:  Mediterranean style dietary approach , Low carbohydrate (10-20% CHO content) dietary approach  and Weight reduction approach - reduce caloric intake by 300kcal/day to achieve 1-2lb weight loss per week     OTHER:     # CREST syndrome - stable, on meds   - ongoing care with rheum MD - requested notes     # GERD - stable, continue PPI       Instructed to follow-up with PCP for remainder of adult medical needs: yes  We will partner with other providers in the management of established vascular disease and cardiometabolic risk factors.    Studies to Be Obtained: none   Labs to Be Obtained: as noted above     Follow up in:  2mo      Scott Acuña M.D.  [facility]   [facility]   [phone]

## 2023-03-13 ENCOUNTER — DOCUMENTATION (OUTPATIENT)
Dept: VASCULAR LAB | Facility: MEDICAL CENTER | Age: 43
End: 2023-03-13

## 2023-03-13 NOTE — PROGRESS NOTES
Requested most recent OV notes w/ Labs from Dr. Moore office at Fax#[phone]    Pending records

## 2023-03-16 ENCOUNTER — APPOINTMENT (OUTPATIENT)
Dept: ADMISSIONS | Facility: MEDICAL CENTER | Age: 43
End: 2023-03-16
Attending: SPECIALIST
Payer: MEDICAID

## 2023-03-20 ENCOUNTER — OFFICE VISIT (OUTPATIENT)
Dept: INTERNAL MEDICINE | Facility: OTHER | Age: 43
End: 2023-03-20
Payer: MEDICAID

## 2023-03-20 ENCOUNTER — PRE-ADMISSION TESTING (OUTPATIENT)
Dept: ADMISSIONS | Facility: MEDICAL CENTER | Age: 43
End: 2023-03-20
Attending: SPECIALIST
Payer: MEDICAID

## 2023-03-20 VITALS
SYSTOLIC BLOOD PRESSURE: 126 MMHG | OXYGEN SATURATION: 95 % | HEART RATE: 88 BPM | DIASTOLIC BLOOD PRESSURE: 67 MMHG | WEIGHT: 240.6 LBS | BODY MASS INDEX: 40.08 KG/M2 | TEMPERATURE: 97.6 F | HEIGHT: 65 IN

## 2023-03-20 DIAGNOSIS — E78.5 DYSLIPIDEMIA: ICD-10-CM

## 2023-03-20 DIAGNOSIS — E66.01 MORBID OBESITY (HCC): ICD-10-CM

## 2023-03-20 PROCEDURE — 99213 OFFICE O/P EST LOW 20 MIN: CPT | Mod: GE | Performed by: INTERNAL MEDICINE

## 2023-03-20 RX ORDER — DULOXETIN HYDROCHLORIDE 30 MG/1
30 CAPSULE, DELAYED RELEASE ORAL DAILY
COMMUNITY
End: 2023-03-20 | Stop reason: CLARIF

## 2023-03-20 ASSESSMENT — FIBROSIS 4 INDEX: FIB4 SCORE: 0.69

## 2023-03-20 ASSESSMENT — PATIENT HEALTH QUESTIONNAIRE - PHQ9: CLINICAL INTERPRETATION OF PHQ2 SCORE: 0

## 2023-03-20 NOTE — PROGRESS NOTES
"CC: Follow up, obesity     HPI:   Christine presents today with PMHx of CREST syndrome (following up with rheumatology and vascular), GERD (established with GI, recently started on bid PPI- now changing to pantoprazole with GI), atypical chest pain (established with cards, lexiscan pending- they had to reschedule), here for a follow up of obesity     Describes she has been trying to loose weight on her own without success. She has had knee pain for a while with MRI showing lateral meniscal tear and severe OA. However her ortho surgeon wouldn't operate on her because of the weight. He referral her to Dr Haji (bariatric surgeon)- she hasn't seen him yet but would be happy if she can loose weight without surgery.   Is very motivated and has good support from family. Prior dysuria/UTI symptoms resolved. No other acute complaints/issues.               Health Maintenance: Completed    ROS:  Review of Systems   Constitutional:  Negative for chills and fever.   HENT:  Negative for hearing loss.    Eyes:  Negative for blurred vision and double vision.   Respiratory:  Negative for cough and hemoptysis.    Cardiovascular:  Negative for chest pain.   Gastrointestinal:  Negative for nausea and vomiting.   Genitourinary:  Negative for dysuria.   Musculoskeletal:  Negative for myalgias.   Skin:  Negative for itching.   Neurological:  Negative for headaches.   Psychiatric/Behavioral:  The patient is not nervous/anxious.      Objective:     Exam:  /67 (BP Location: Left arm, Patient Position: Sitting, BP Cuff Size: Adult long)   Pulse 88   Temp 36.4 °C (97.6 °F) (Temporal)   Ht 1.651 m (5' 5\")   Wt 109 kg (240 lb 9.6 oz)   SpO2 95%   BMI 40.04 kg/m²  Body mass index is 40.04 kg/m².    Physical Exam  Constitutional:       General: She is not in acute distress.     Appearance: She is not ill-appearing.   HENT:      Head: Normocephalic and atraumatic.      Right Ear: External ear normal.      Left Ear: External ear " normal.      Nose: Nose normal.      Mouth/Throat:      Mouth: Mucous membranes are moist.   Eyes:      Extraocular Movements: Extraocular movements intact.      Pupils: Pupils are equal, round, and reactive to light.   Cardiovascular:      Rate and Rhythm: Normal rate.   Pulmonary:      Effort: Pulmonary effort is normal.   Musculoskeletal:         General: Normal range of motion.      Cervical back: Normal range of motion.   Skin:     General: Skin is warm.   Neurological:      Mental Status: She is alert and oriented to person, place, and time.   Psychiatric:         Mood and Affect: Mood normal.           Labs: Reviewed     Assessment & Plan:     42 y.o. female with the following -     # Morbid obesity   - Non-pharmacologic interventions: We discussed nutrition guidelines, regular CV workout most days of the week. Printed hand outs provided for help with nutrition.   Nutrition referral placed   - Discussed semaglutide, common side effects including N,V GI side effects discussed. No family hx of medullary thyroid CA. No family hx of any thyroid/PT cancers  - Will start from low dose with early f/u in 4-5 weeks to assess response and side effects   - Cont atorvastatin   - Check A1c      Return in about 5 weeks (around 4/24/2023).    Please note that this dictation was created using voice recognition software. I have made every reasonable attempt to correct obvious errors, but I expect that there are errors of grammar and possibly content that I did not discover before finalizing the note.

## 2023-03-20 NOTE — PATIENT INSTRUCTIONS
Thank you for visiting today!  Please follow-up in 5 weeks   Please follow-up on referrals and schedule an appointment with nutrition referral   Please try and eat healthy, get at least 30 minutes of cardiovascular exercise a day to help keep your health as best as it can be.  If you have any questions or concerns please feel free to contact us at [phone].  If you feel like you are experiencing a medical emergency please seek immediate medical attention at an urgent care or in the emergency department.

## 2023-03-20 NOTE — PROGRESS NOTES
Teaching Physician Attestation      Level of Participation    I discussed with the resident physician the patient's history, exam, assessment and plan in detail.  Topics listed in my addendum were the focus of the visit.  Healthcare maintenance was not addressed this visit unless listed as a topic in my addendum.  I agree with plan as written along with the following additions/modifications:          Morbid obesity  -cont atorv 40  -recnet glucose 110s, chdcking a1c.  -healthy eating handout given, also referral to nutrition services, apprecieate support  -start semaglutide to help kick start weight loss, but healthy diet is long term solution  -no cp or sob currently, cardio and vascular also follwing appreicate   -also established with bariatrics, appreciate support  -rtc 1 month    Bp not directly addressed but at goal today, bmp recently normal.    UTI sx resolved.    Appreciate subspecialty support for CREST, meniscal tear

## 2023-03-21 ASSESSMENT — ENCOUNTER SYMPTOMS
BLURRED VISION: 0
NAUSEA: 0
COUGH: 0
CHILLS: 0
HEMOPTYSIS: 0
HEADACHES: 0
NERVOUS/ANXIOUS: 0
MYALGIAS: 0
FEVER: 0
VOMITING: 0
DOUBLE VISION: 0

## 2023-03-21 NOTE — OR NURSING
RN preadmission appointment completed with Christine Miguel. I advised Giancarlo to refer to Dr. Rodriguez regarding aspirin administration prior to surgery. I also told her to hold Losartan and Lasix the morning of surgery per anesthesia guidelines.

## 2023-03-22 ENCOUNTER — TELEPHONE (OUTPATIENT)
Dept: INTERNAL MEDICINE | Facility: OTHER | Age: 43
End: 2023-03-22

## 2023-03-22 NOTE — TELEPHONE ENCOUNTER
Called and clarified the following      1- Chest pain   Hasn't had any further episodes of chest pain. She is still pending a stress test with cardiology (they had to reschedule the test last 2 times as mentioned in my note)   - We discussed avoiding high intensity cardio until cleared by cardiology and negative stress test       2- Semaglutide dosing   Clarified dosing. She hasn't picked up the prescription yet.       3- Mention of chronic infarct on MRI of left knee   - Patient's knee MRI shows chronic infarct. 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 She has a follow up appt in about a month with them. He did not mention anything about an infarct on any prior appointments.    She will call them to request them to specifically comment on the infarct and how to manage this.

## 2023-03-23 ENCOUNTER — PRE-ADMISSION TESTING (OUTPATIENT)
Dept: ADMISSIONS | Facility: MEDICAL CENTER | Age: 43
End: 2023-03-23
Attending: SPECIALIST
Payer: MEDICAID

## 2023-03-23 DIAGNOSIS — Z01.812 PRE-OPERATIVE LABORATORY EXAMINATION: ICD-10-CM

## 2023-03-23 LAB
ALBUMIN SERPL BCP-MCNC: 3.9 G/DL (ref 3.2–4.9)
ALBUMIN/GLOB SERPL: 1.3 G/DL
ALP SERPL-CCNC: 87 U/L (ref 30–99)
ALT SERPL-CCNC: 20 U/L (ref 2–50)
ANION GAP SERPL CALC-SCNC: 12 MMOL/L (ref 7–16)
AST SERPL-CCNC: 12 U/L (ref 12–45)
BILIRUB SERPL-MCNC: 0.4 MG/DL (ref 0.1–1.5)
BUN SERPL-MCNC: 17 MG/DL (ref 8–22)
CALCIUM ALBUM COR SERPL-MCNC: 9.2 MG/DL (ref 8.5–10.5)
CALCIUM SERPL-MCNC: 9.1 MG/DL (ref 8.5–10.5)
CHLORIDE SERPL-SCNC: 104 MMOL/L (ref 96–112)
CO2 SERPL-SCNC: 22 MMOL/L (ref 20–33)
CREAT SERPL-MCNC: 0.7 MG/DL (ref 0.5–1.4)
ERYTHROCYTE [DISTWIDTH] IN BLOOD BY AUTOMATED COUNT: 42.9 FL (ref 35.9–50)
GFR SERPLBLD CREATININE-BSD FMLA CKD-EPI: 110 ML/MIN/1.73 M 2
GLOBULIN SER CALC-MCNC: 3 G/DL (ref 1.9–3.5)
GLUCOSE SERPL-MCNC: 99 MG/DL (ref 65–99)
HCT VFR BLD AUTO: 46.4 % (ref 37–47)
HGB BLD-MCNC: 15.2 G/DL (ref 12–16)
MCH RBC QN AUTO: 29 PG (ref 27–33)
MCHC RBC AUTO-ENTMCNC: 32.8 G/DL (ref 33.6–35)
MCV RBC AUTO: 88.4 FL (ref 81.4–97.8)
PLATELET # BLD AUTO: 193 K/UL (ref 164–446)
PMV BLD AUTO: 9.5 FL (ref 9–12.9)
POTASSIUM SERPL-SCNC: 4.3 MMOL/L (ref 3.6–5.5)
PROT SERPL-MCNC: 6.9 G/DL (ref 6–8.2)
RBC # BLD AUTO: 5.25 M/UL (ref 4.2–5.4)
SODIUM SERPL-SCNC: 138 MMOL/L (ref 135–145)
WBC # BLD AUTO: 4.8 K/UL (ref 4.8–10.8)

## 2023-03-23 PROCEDURE — 36415 COLL VENOUS BLD VENIPUNCTURE: CPT

## 2023-03-23 PROCEDURE — 85027 COMPLETE CBC AUTOMATED: CPT

## 2023-03-23 PROCEDURE — 80053 COMPREHEN METABOLIC PANEL: CPT

## 2023-03-24 NOTE — H&P
"    IDENTIFICATION:  The patient is a very pleasant 42-year-old multipara (para 4   with 4 previous vaginal deliveries).     CHIEF COMPLAINT:  The patient complains of nonmenstrual pelvic pain and this   pain is on the left side of her pelvis.     HISTORY OF PRESENT ILLNESS:  I first saw the patient on 2/23/2023.  She was   referred to me because of multiple reasons including left sided pelvic pain   and in November of last year an ultrasound (transabdominal and transvaginal   pelvic ultrasound) was performed at [facility] (was   performed on 11/22/2022) and according to the report, this was performed   because of pelvic pain and abnormal uterine bleeding.  The report stated that   the study revealed an \"abnormal left adnexal (cyst) containing a 4.7 cm cystic   structure with lace-like pattern consistent with hemorrhagic cyst or   endometrioma.  At that time, she was complaining of left sided pelvic pain and   dyspareunia.  She told me that she was interested in tubal sterilization   procedure and so she was scheduled for laparoscopy with left oophorectomy and   bilateral salpingectomy.  I then later saw her in the office on 3/31/2023 and   at that time, I performed a transvaginal pelvic ultrasound and this ultrasound   revealed that there was no longer any ovarian/adnexal cyst either on the   right or the left.  So I explained to her that we do not need to perform an   oophorectomy and that we can simply perform bilateral salpingectomy and she   agreed and so we are scheduled for bilateral salpingectomy.  She would like to   have a tubal sterilization procedure.     PAST MEDICAL HISTORY:  The patient says she has a history of asthma,   hypertension and has been diagnosed as having CREST syndrome and Raynaud's   syndrome and gastroesophageal reflux and says that she has been told that she   has right ventricular enlargement.  She also has sleep apnea.  She also is   obese.     PAST SURGICAL " HISTORY:  The patient has undergone lumbar laminectomy and   tonsillectomy in 2013 and nasal septum reconstruction and turbinoplasty.     MEDICATIONS:  The patient says she takes losartan, amlodipine, furosemide,   Ambien and hydroxychloroquine.     ALLERGIES:  THE PATIENT SAYS SHE IS ALLERGIC TO NIFEDIPINE, DULOXETINE AND   SILDENAFIL.     SOCIAL HISTORY:  The patient denies smoking.  She says she only rarely   consumes alcoholic beverages.  She denies use of recreational drugs.     REVIEW OF SYSTEMS:  GENERAL:  The patient does not report any fevers or chills or sweats.  PULMONARY:  The patient does not report any coughing or wheezing or chest pain   or shortness of breath.  CARDIOVASCULAR:  The patient does not report any palpitations, dyspnea, or   chest pain.  GASTROINTESTINAL:  The patient does not report any nausea, vomiting, diarrhea,   constipation, hematochezia, or melena.  GENITOURINARY:  The patient does not report any menorrhagia.  She does   experience dysmenorrhea.  She experiences dyspareunia.  She has experienced   left sided pelvic pain recently.  MUSCULOSKELETAL:  The patient says that recently she has not been experiencing   any arthralgias or myalgias.  NEUROLOGICAL:  The patient does not report any headaches or syncope or   seizures.     PHYSICAL EXAMINATION:  VITAL SIGNS:  The patient's vital signs are stable and she was afebrile and   her recent blood pressure was 128/72.  GENERAL:  The patient appears well developed and well nourished and relaxed   and alert and comfortable and in no apparent distress.  HEENT:  Normocephalic, atraumatic.  Pupils equal, round, reactive to light and   accommodation.  Extraocular muscles intact.  Pharynx clear.  There is no   thyromegaly.  There are no cervical lymphadenopathy.  CHEST:  Heart, regular rate and rhythm, no murmur.  LUNGS:  Clear to auscultation bilaterally.  ABDOMEN:  Examination of the patient's abdomen reveals that the abdomen is   obese and  the abdomen is soft and nontender, nondistended.  There is no   hepatomegaly and no splenomegaly.  No abdominal masses.  EXTREMITIES:  No clubbing, cyanosis or edema.  NEUROLOGIC:  Nonfocal.     ASSESSMENT:  1.  Recent left sided pelvic pain, which seems to be resolving.  2.  Recent left sided adnexal mass, which has since resolved.  3.  Dyspareunia.  4.  Dysmenorrhea.  5.  The patient desires tubal sterilization.  6.  Obesity.     PLAN:  We will proceed with laparoscopy, both diagnostic and if necessary   depending on findings at the time of laparoscopy operative along with   laparoscopic bilateral tubal sterilization procedure, namely laparoscopic   bilateral salpingectomy.  I have discussed with the patient and explained to   the patient in detail and at length what diagnostic and operative laparoscopy   with laparoscopic bilateral salpingectomy is and what diagnostic and operative   laparoscopy with laparoscopic bilateral salpingectomy involves and I have   discussed with the patient and explained to the patient in detail and at   length the risks and benefits and alternatives of diagnostic and operative   laparoscopy with laparoscopic bilateral salpingectomy.  After our discussions   and after answering her questions, she told me that she very much wishes for   us to proceed with laparoscopy, both diagnostic and if necessary depending on   findings at the time of laparoscopy operative along with laparoscopic   bilateral salpingectomy.        ______________________________  MD RAÚL Ramos/ANDREW/JOSEPH    DD:  03/24/2023 13:41  DT:  03/24/2023 16:19    Job#:  [id number]

## 2023-03-25 ENCOUNTER — ANESTHESIA EVENT (OUTPATIENT)
Dept: SURGERY | Facility: MEDICAL CENTER | Age: 43
End: 2023-03-25
Payer: MEDICAID

## 2023-03-27 ENCOUNTER — ANESTHESIA (OUTPATIENT)
Dept: SURGERY | Facility: MEDICAL CENTER | Age: 43
End: 2023-03-27
Payer: MEDICAID

## 2023-03-27 ENCOUNTER — HOSPITAL ENCOUNTER (OUTPATIENT)
Facility: MEDICAL CENTER | Age: 43
End: 2023-03-27
Attending: SPECIALIST | Admitting: SPECIALIST
Payer: MEDICAID

## 2023-03-27 VITALS
OXYGEN SATURATION: 93 % | TEMPERATURE: 97.4 F | SYSTOLIC BLOOD PRESSURE: 119 MMHG | BODY MASS INDEX: 40.26 KG/M2 | HEART RATE: 68 BPM | WEIGHT: 241.62 LBS | RESPIRATION RATE: 18 BRPM | DIASTOLIC BLOOD PRESSURE: 57 MMHG | HEIGHT: 65 IN

## 2023-03-27 DIAGNOSIS — G89.18 POSTOPERATIVE PAIN: ICD-10-CM

## 2023-03-27 PROBLEM — Z30.2 ENCOUNTER FOR STERILIZATION: Status: ACTIVE | Noted: 2022-08-01

## 2023-03-27 PROBLEM — N94.6 DYSMENORRHEA: Status: ACTIVE | Noted: 2023-03-27

## 2023-03-27 PROBLEM — R10.2 PELVIC PAIN: Status: ACTIVE | Noted: 2023-03-27

## 2023-03-27 PROBLEM — N94.10 DYSPAREUNIA, FEMALE: Status: ACTIVE | Noted: 2023-03-27

## 2023-03-27 LAB
HCG UR QL: NEGATIVE
PATHOLOGY CONSULT NOTE: NORMAL

## 2023-03-27 PROCEDURE — 700111 HCHG RX REV CODE 636 W/ 250 OVERRIDE (IP): Performed by: ANESTHESIOLOGY

## 2023-03-27 PROCEDURE — 160047 HCHG PACU  - EA ADDL 30 MINS PHASE II: Performed by: SPECIALIST

## 2023-03-27 PROCEDURE — 700101 HCHG RX REV CODE 250: Performed by: ANESTHESIOLOGY

## 2023-03-27 PROCEDURE — 160039 HCHG SURGERY MINUTES - EA ADDL 1 MIN LEVEL 3: Performed by: SPECIALIST

## 2023-03-27 PROCEDURE — 88302 TISSUE EXAM BY PATHOLOGIST: CPT

## 2023-03-27 PROCEDURE — 700105 HCHG RX REV CODE 258: Performed by: SPECIALIST

## 2023-03-27 PROCEDURE — 81025 URINE PREGNANCY TEST: CPT

## 2023-03-27 PROCEDURE — A9270 NON-COVERED ITEM OR SERVICE: HCPCS | Performed by: ANESTHESIOLOGY

## 2023-03-27 PROCEDURE — 700102 HCHG RX REV CODE 250 W/ 637 OVERRIDE(OP): Performed by: ANESTHESIOLOGY

## 2023-03-27 PROCEDURE — 160035 HCHG PACU - 1ST 60 MINS PHASE I: Performed by: SPECIALIST

## 2023-03-27 PROCEDURE — 160009 HCHG ANES TIME/MIN: Performed by: SPECIALIST

## 2023-03-27 PROCEDURE — 160025 RECOVERY II MINUTES (STATS): Performed by: SPECIALIST

## 2023-03-27 PROCEDURE — 160048 HCHG OR STATISTICAL LEVEL 1-5: Performed by: SPECIALIST

## 2023-03-27 PROCEDURE — 160046 HCHG PACU - 1ST 60 MINS PHASE II: Performed by: SPECIALIST

## 2023-03-27 PROCEDURE — 00840 ANES IPER PX LOWER ABD NOS: CPT | Performed by: ANESTHESIOLOGY

## 2023-03-27 PROCEDURE — 160028 HCHG SURGERY MINUTES - 1ST 30 MINS LEVEL 3: Performed by: SPECIALIST

## 2023-03-27 PROCEDURE — 160002 HCHG RECOVERY MINUTES (STAT): Performed by: SPECIALIST

## 2023-03-27 RX ORDER — MAGNESIUM SULFATE HEPTAHYDRATE 40 MG/ML
INJECTION, SOLUTION INTRAVENOUS PRN
Status: DISCONTINUED | OUTPATIENT
Start: 2023-03-27 | End: 2023-03-27 | Stop reason: SURG

## 2023-03-27 RX ORDER — HYDROMORPHONE HYDROCHLORIDE 1 MG/ML
0.4 INJECTION, SOLUTION INTRAMUSCULAR; INTRAVENOUS; SUBCUTANEOUS
Status: DISCONTINUED | OUTPATIENT
Start: 2023-03-27 | End: 2023-03-27 | Stop reason: HOSPADM

## 2023-03-27 RX ORDER — KETOROLAC TROMETHAMINE 30 MG/ML
INJECTION, SOLUTION INTRAMUSCULAR; INTRAVENOUS PRN
Status: DISCONTINUED | OUTPATIENT
Start: 2023-03-27 | End: 2023-03-27 | Stop reason: SURG

## 2023-03-27 RX ORDER — SODIUM CHLORIDE, SODIUM LACTATE, POTASSIUM CHLORIDE, CALCIUM CHLORIDE 600; 310; 30; 20 MG/100ML; MG/100ML; MG/100ML; MG/100ML
INJECTION, SOLUTION INTRAVENOUS CONTINUOUS
Status: ACTIVE | OUTPATIENT
Start: 2023-03-27 | End: 2023-03-27

## 2023-03-27 RX ORDER — IBUPROFEN 800 MG/1
800 TABLET ORAL EVERY 8 HOURS PRN
Qty: 30 TABLET | Refills: 0 | Status: SHIPPED | OUTPATIENT
Start: 2023-03-27 | End: 2023-06-08

## 2023-03-27 RX ORDER — OXYCODONE HCL 5 MG/5 ML
5 SOLUTION, ORAL ORAL
Status: COMPLETED | OUTPATIENT
Start: 2023-03-27 | End: 2023-03-27

## 2023-03-27 RX ORDER — HYDRALAZINE HYDROCHLORIDE 20 MG/ML
5 INJECTION INTRAMUSCULAR; INTRAVENOUS
Status: DISCONTINUED | OUTPATIENT
Start: 2023-03-27 | End: 2023-03-27 | Stop reason: HOSPADM

## 2023-03-27 RX ORDER — OXYCODONE HYDROCHLORIDE AND ACETAMINOPHEN 5; 325 MG/1; MG/1
1 TABLET ORAL EVERY 6 HOURS PRN
Qty: 28 TABLET | Refills: 0 | Status: SHIPPED | OUTPATIENT
Start: 2023-03-27 | End: 2023-04-03

## 2023-03-27 RX ORDER — HALOPERIDOL 5 MG/ML
1 INJECTION INTRAMUSCULAR
Status: DISCONTINUED | OUTPATIENT
Start: 2023-03-27 | End: 2023-03-27 | Stop reason: HOSPADM

## 2023-03-27 RX ORDER — BUPIVACAINE HYDROCHLORIDE 2.5 MG/ML
INJECTION, SOLUTION EPIDURAL; INFILTRATION; INTRACAUDAL
Status: DISCONTINUED
Start: 2023-03-27 | End: 2023-03-27 | Stop reason: HOSPADM

## 2023-03-27 RX ORDER — LIDOCAINE HYDROCHLORIDE 20 MG/ML
INJECTION, SOLUTION EPIDURAL; INFILTRATION; INTRACAUDAL; PERINEURAL PRN
Status: DISCONTINUED | OUTPATIENT
Start: 2023-03-27 | End: 2023-03-27 | Stop reason: SURG

## 2023-03-27 RX ORDER — ONDANSETRON 2 MG/ML
4 INJECTION INTRAMUSCULAR; INTRAVENOUS
Status: COMPLETED | OUTPATIENT
Start: 2023-03-27 | End: 2023-03-27

## 2023-03-27 RX ORDER — MEPERIDINE HYDROCHLORIDE 25 MG/ML
6.25 INJECTION INTRAMUSCULAR; INTRAVENOUS; SUBCUTANEOUS
Status: DISCONTINUED | OUTPATIENT
Start: 2023-03-27 | End: 2023-03-27 | Stop reason: HOSPADM

## 2023-03-27 RX ORDER — HYDROMORPHONE HYDROCHLORIDE 1 MG/ML
0.1 INJECTION, SOLUTION INTRAMUSCULAR; INTRAVENOUS; SUBCUTANEOUS
Status: DISCONTINUED | OUTPATIENT
Start: 2023-03-27 | End: 2023-03-27 | Stop reason: HOSPADM

## 2023-03-27 RX ORDER — ONDANSETRON 2 MG/ML
INJECTION INTRAMUSCULAR; INTRAVENOUS PRN
Status: DISCONTINUED | OUTPATIENT
Start: 2023-03-27 | End: 2023-03-27 | Stop reason: SURG

## 2023-03-27 RX ORDER — HYDROMORPHONE HYDROCHLORIDE 1 MG/ML
0.2 INJECTION, SOLUTION INTRAMUSCULAR; INTRAVENOUS; SUBCUTANEOUS
Status: DISCONTINUED | OUTPATIENT
Start: 2023-03-27 | End: 2023-03-27 | Stop reason: HOSPADM

## 2023-03-27 RX ORDER — ROCURONIUM BROMIDE 10 MG/ML
INJECTION, SOLUTION INTRAVENOUS PRN
Status: DISCONTINUED | OUTPATIENT
Start: 2023-03-27 | End: 2023-03-27 | Stop reason: SURG

## 2023-03-27 RX ORDER — ACETAMINOPHEN 500 MG
1000 TABLET ORAL ONCE
Status: COMPLETED | OUTPATIENT
Start: 2023-03-27 | End: 2023-03-27

## 2023-03-27 RX ORDER — OXYCODONE HCL 5 MG/5 ML
10 SOLUTION, ORAL ORAL
Status: COMPLETED | OUTPATIENT
Start: 2023-03-27 | End: 2023-03-27

## 2023-03-27 RX ORDER — DIPHENHYDRAMINE HYDROCHLORIDE 50 MG/ML
12.5 INJECTION INTRAMUSCULAR; INTRAVENOUS
Status: DISCONTINUED | OUTPATIENT
Start: 2023-03-27 | End: 2023-03-27 | Stop reason: HOSPADM

## 2023-03-27 RX ORDER — EPHEDRINE SULFATE 50 MG/ML
5 INJECTION, SOLUTION INTRAVENOUS
Status: DISCONTINUED | OUTPATIENT
Start: 2023-03-27 | End: 2023-03-27 | Stop reason: HOSPADM

## 2023-03-27 RX ORDER — DEXAMETHASONE SODIUM PHOSPHATE 4 MG/ML
INJECTION, SOLUTION INTRA-ARTICULAR; INTRALESIONAL; INTRAMUSCULAR; INTRAVENOUS; SOFT TISSUE PRN
Status: DISCONTINUED | OUTPATIENT
Start: 2023-03-27 | End: 2023-03-27 | Stop reason: SURG

## 2023-03-27 RX ADMIN — MAGNESIUM SULFATE HEPTAHYDRATE 4 G: 40 INJECTION, SOLUTION INTRAVENOUS at 13:44

## 2023-03-27 RX ADMIN — SODIUM CHLORIDE, POTASSIUM CHLORIDE, SODIUM LACTATE AND CALCIUM CHLORIDE: 600; 310; 30; 20 INJECTION, SOLUTION INTRAVENOUS at 11:49

## 2023-03-27 RX ADMIN — PROPOFOL 50 MG: 10 INJECTION, EMULSION INTRAVENOUS at 14:13

## 2023-03-27 RX ADMIN — DEXAMETHASONE SODIUM PHOSPHATE 8 MG: 4 INJECTION, SOLUTION INTRA-ARTICULAR; INTRALESIONAL; INTRAMUSCULAR; INTRAVENOUS; SOFT TISSUE at 13:49

## 2023-03-27 RX ADMIN — PROPOFOL 200 MG: 10 INJECTION, EMULSION INTRAVENOUS at 13:37

## 2023-03-27 RX ADMIN — FENTANYL CITRATE 100 MCG: 50 INJECTION, SOLUTION INTRAMUSCULAR; INTRAVENOUS at 13:30

## 2023-03-27 RX ADMIN — FENTANYL CITRATE 50 MCG: 50 INJECTION, SOLUTION INTRAMUSCULAR; INTRAVENOUS at 14:05

## 2023-03-27 RX ADMIN — ROCURONIUM BROMIDE 20 MG: 10 INJECTION, SOLUTION INTRAVENOUS at 14:06

## 2023-03-27 RX ADMIN — ACETAMINOPHEN 1000 MG: 500 TABLET, FILM COATED ORAL at 11:31

## 2023-03-27 RX ADMIN — HYDROMORPHONE HYDROCHLORIDE 0.4 MG: 1 INJECTION, SOLUTION INTRAMUSCULAR; INTRAVENOUS; SUBCUTANEOUS at 15:20

## 2023-03-27 RX ADMIN — KETOROLAC TROMETHAMINE 30 MG: 30 INJECTION, SOLUTION INTRAMUSCULAR at 14:35

## 2023-03-27 RX ADMIN — ROCURONIUM BROMIDE 50 MG: 10 INJECTION, SOLUTION INTRAVENOUS at 13:37

## 2023-03-27 RX ADMIN — HYDROMORPHONE HYDROCHLORIDE 0.2 MG: 1 INJECTION, SOLUTION INTRAMUSCULAR; INTRAVENOUS; SUBCUTANEOUS at 16:17

## 2023-03-27 RX ADMIN — FENTANYL CITRATE 50 MCG: 50 INJECTION, SOLUTION INTRAMUSCULAR; INTRAVENOUS at 15:01

## 2023-03-27 RX ADMIN — FENTANYL CITRATE 50 MCG: 50 INJECTION, SOLUTION INTRAMUSCULAR; INTRAVENOUS at 14:51

## 2023-03-27 RX ADMIN — FENTANYL CITRATE 50 MCG: 50 INJECTION, SOLUTION INTRAMUSCULAR; INTRAVENOUS at 14:16

## 2023-03-27 RX ADMIN — OXYCODONE HYDROCHLORIDE 10 MG: 5 SOLUTION ORAL at 14:51

## 2023-03-27 RX ADMIN — LIDOCAINE HYDROCHLORIDE 60 MG: 20 INJECTION, SOLUTION EPIDURAL; INFILTRATION; INTRACAUDAL at 13:37

## 2023-03-27 RX ADMIN — ONDANSETRON HYDROCHLORIDE 4 MG: 2 SOLUTION INTRAMUSCULAR; INTRAVENOUS at 16:38

## 2023-03-27 RX ADMIN — PROPOFOL 50 MG: 10 INJECTION, EMULSION INTRAVENOUS at 14:27

## 2023-03-27 RX ADMIN — SUGAMMADEX 200 MG: 100 INJECTION, SOLUTION INTRAVENOUS at 14:35

## 2023-03-27 RX ADMIN — ONDANSETRON 4 MG: 2 INJECTION INTRAMUSCULAR; INTRAVENOUS at 14:26

## 2023-03-27 ASSESSMENT — PAIN DESCRIPTION - PAIN TYPE
TYPE: ACUTE PAIN

## 2023-03-27 ASSESSMENT — FIBROSIS 4 INDEX: FIB4 SCORE: 0.58

## 2023-03-27 NOTE — ANESTHESIA PREPROCEDURE EVALUATION
Case: [id number] Date/Time: 03/27/23 1245    Procedure: LAPAROSCOPY, DIAGNOSTIC AND OPERATIVE WITH LAPAROSCOPIC LEFT OOPHORECTOMY AND LAPAROSCOPIC BILATERAL SALPINGECTOMY    Pre-op diagnosis: PELVIC PAIN, DEEP DYSPAREUNIA, LEFT OVARIAN CYST    Location: [facility] ROOM 23 / SURGERY SAME DAY [facility]    Surgeons: Nolan Rodriguez M.D.          Relevant Problems   Other   (positive) GEOVANY positive   (positive) CREST syndrome (HCC)   (positive) Degenerative joint disease involving multiple joints   (positive) Dyslipidemia   (positive) Enlargement - tonsil/adenoid   (positive) Long term (current) use of antithrombotics/antiplatelets   (positive) Obesity (BMI 30-39.9)     FRANK  BMI40  Physical Exam    Airway   Mallampati: II  TM distance: >3 FB  Neck ROM: full       Cardiovascular - normal exam  Rhythm: regular  Rate: normal  (-) murmur     Dental - normal exam             Pulmonary - normal exam  Breath sounds clear to auscultation     Abdominal    Neurological - normal exam               Anesthesia Plan    ASA 3   ASA physical status 3 criteria: morbid obesity - BMI greater than or equal to 40    Plan - general       Airway plan will be ETT          Induction: intravenous      Pertinent diagnostic labs and testing reviewed    Informed Consent:    Anesthetic plan and risks discussed with patient.

## 2023-03-27 NOTE — OR SURGEON
Immediate Post OP Note    PreOp Diagnosis:   1.  Recent left sided pelvic pain, which seems to be resolving.  2.  Recent left sided adnexal mass, which has since resolved.  3.  Dyspareunia.  4.  Dysmenorrhea.  5.  The patient desires tubal sterilization.  6.  Obesity.    PostOp Diagnosis:   1.  Recent left sided pelvic pain, which seems to be resolving.  2.  Recent left sided adnexal mass, which has since resolved.  During today's laparoscopy no ovarian/adnexal mass or cyst seen either on the right or the left.  At laparoscopy both ovaries are well visualized and both ovaries appear normal.  3.  Dyspareunia.  4.  Dysmenorrhea.  5.  The patient desires tubal sterilization.  6.  Obesity.    Procedure(s):  LAPAROSCOPY, DIAGNOSTIC AND OPERATIVE AND LAPAROSCOPIC BILATERAL SALPINGECTOMY - Wound Class: Clean    Surgeon(s):  Nolan Rodriguez M.D.    Anesthesiologist/Type of Anesthesia:  Anesthesiologist: Keisha Lund M.D./General    Surgical Staff:  Circulator: Huyen Pulido R.N.  Relief Scrub: Pauline Ervin C.N.A.  Scrub Person: Ella Abreu    Specimens removed if any:  ID Type Source Tests Collected by Time Destination   A : RIGHT FALLOPIAN TUBE Tissue Fallopian Tube PATHOLOGY SPECIMEN Nolan Rodriguez M.D. 3/27/2023  2:21 PM    B : LEFT FALLOPIAN TUBE Tissue Fallopian Tube PATHOLOGY SPECIMEN Nolan Rodriguez M.D. 3/27/2023  2:24 PM        Estimated Blood Loss:   Less than 30 cc.    Findings:   Speculum exam under anesthesia reveals no vulvar or vaginal or cervical lesions.  The cervix appears multiparous.  At laparoscopy views of the pelvis are obtained.  During laparoscopy the uterus is found to be normal and both fallopian tubes are found to be normal and both ovaries were found to be normal.  During laparoscopy no intraperitoneal pelvic adhesions are seen and no lesions are seen and at laparoscopy both ovaries are found to be normal and at laparoscopy no ovarian mass or cyst is seen either on the right or the  left.    Complications:   None.        3/27/2023 2:49 PM Nolan Rodriguez M.D.

## 2023-03-27 NOTE — ANESTHESIA TIME REPORT
Anesthesia Start and Stop Event Times     Date Time Event    3/27/2023 1318 Ready for Procedure     1328 Anesthesia Start     1448 Anesthesia Stop        Responsible Staff  03/27/23    Name Role Begin End    Keisha Lund M.D. Anesth 1328 1448        Overtime Reason:  per deneen, locums, etc.    Comments:

## 2023-03-27 NOTE — DISCHARGE INSTRUCTIONS
If any questions arise, call your provider Dr. Rodriguez [phone].  If your provider is not available, please feel free to call the Surgical Center at [phone].    MEDICATIONS: Resume taking daily medication.  Take prescribed pain medication with food.  If no medication is prescribed, you may take non-aspirin pain medication if needed.  PAIN MEDICATION CAN BE VERY CONSTIPATING.  Take a stool softener or laxative such as senokot, pericolace, or milk of magnesia if needed.    Last pain medication given at :  Tylenol at 11:30 AM next dose can be given 5:30 PM,   Toradol (like ibuprofen) at 2:35 PM next dose can be given at 8:35 PM    What to Expect Post Anesthesia    Rest and take it easy for the first 24 hours.  A responsible adult is recommended to remain with you during that time.  It is normal to feel sleepy.  We encourage you to not do anything that requires balance, judgment or coordination.    FOR 24 HOURS DO NOT:  Drive, operate machinery or run household appliances.  Drink beer or alcoholic beverages.  Make important decisions or sign legal documents.    To avoid nausea, slowly advance diet as tolerated, avoiding spicy or greasy foods for the first day.  Add more substantial food to your diet according to your provider's instructions.  Babies can be fed formula or breast milk as soon as they are hungry.  INCREASE FLUIDS AND FIBER TO AVOID CONSTIPATION.    MILD FLU-LIKE SYMPTOMS ARE NORMAL.  YOU MAY EXPERIENCE GENERALIZED MUSCLE ACHES, THROAT IRRITATION, HEADACHE AND/OR SOME NAUSEA.

## 2023-03-27 NOTE — OP REPORT
DATE OF SERVICE:  03/27/2023     PREOPERATIVE DIAGNOSES:  1.  Recent left sided pelvic pain, which seems to be resolving.  2.  Recent left sided adnexal mass, which has since resolved.  3.  Dyspareunia.  4.  Dysmenorrhea.  5.  The patient desires permanent tubal sterilization.  6.  Obesity.     POSTOPERATIVE DIAGNOSES:  1.  Recent left sided pelvic pain, which seems to be resolving.  2.  Recent left sided adnexal mass, which has since resolved.  During today's   laparoscopy no ovarian/adnexal mass or cyst is seen either on the right or the   left.  At laparoscopy both ovaries are well visualized and both ovaries are   found during today's laparoscopy to be normal.  3.  Dyspareunia.  4.  Dysmenorrhea.  5.  The patient desires permanent tubal sterilization.  6.  Obesity.     PROCEDURES:  Laparoscopy, diagnostic and operative, with laparoscopic   bilateral salpingectomy.     SURGEON:  Nolan Rodriguez MD     ANESTHESIA:  General endotracheal tube anesthesia.     ANESTHESIOLOGIST:  Keisha Lund MD     FINDINGS:  Speculum exam under anesthesia reveals no vulvar or vaginal or   cervical lesions.  During speculum exam under anesthesia, the cervix was   visualized and appears to be multiparous.     At laparoscopy, views of the pelvis were obtained.  During laparoscopy, the   uterus is found to be normal and both fallopian tubes were found to be normal   and both ovaries are found to be normal.  During laparoscopy, no   intraperitoneal pelvic adhesions are seen and during laparoscopy no lesions   are seen.  During laparoscopy, both ovaries are found to be normal.  At   laparoscopy, no ovarian mass or cyst was seen either on the right or the left.     SPECIMENS:  Bilateral fallopian tubes.     COMPLICATIONS:  None.     ESTIMATED BLOOD LOSS:  Less than 30 mL.     DESCRIPTION OF PROCEDURE:  After appropriate consents have been obtained, the   patient was taken to the operating room and given general anesthesia.  She was   prepped  and draped in the dorsal lithotomy position.  A Whalen catheter was   placed and found to be properly in place within the bladder.  A speculum exam   was performed and reveals no vulvar or vaginal or cervical lesions.  The   cervix was visualized and appears multiparous and appears normal.  A sponge   stick was placed in the vaginal vault and left in place as the speculum was   removed.  The 's gloves were changed.  Attention was then directed to   the abdomen where a small (approximately 1 cm) horizontal infraumbilical   incision was made with a scalpel.  A Veress needle was advanced through this   incision into the peritoneal cavity and proper placement in the peritoneal   cavity was verified with a Abarca hanging drop technique.  The peritoneal   cavity was then insufflated with approximately 2-3 liters of carbon dioxide   gas.  The Veress needle was removed and a 10-12 mm port was introduced through   the infraumbilical incision into the peritoneal cavity utilizing the   VersaStep trocar system.  The central portion of this port was removed and 10   mm operative laparoscope was inserted through the remaining sleeve and proper   entry in the peritoneal cavity was verified visually with laparoscope.  The   patient was placed in Trendelenburg position.  A 5 mm port was placed   suprapubically under direct laparoscopic visualization utilizing the VersaStep   trocar system.  The Prestige instrument was placed through the suprapubic   port and the long (44 cm) 5 mm LigaSure bipolar cutting forceps instrument was   placed through the operative channel of the operative laparoscope.  The   patient was placed in further Trendelenburg position.  The uterus was   anteverted with the Prestige instrument and findings are as noted above and   pictures were taken.  The right fallopian tube was identified.  The right   fallopian tube was grasped alternatingly with the Prestige instrument and the   LigaSure instrument and  followed to its distal fimbriated end.  The distal   fimbriated end of the right fallopian tube was then clearly identified.  A   picture of the distal fimbriated end, the right fallopian tube was taken.    After the distal fimbriated end to the right fallopian tube was clearly   identified, the right fallopian tube was resected (right salpingectomy was   performed) in the usual fashion by serially thoroughly cauterizing, sealing,   and cutting the right mesosalpinx from distal to proximal with the LigaSure   instrument and then thoroughly cauterizing, sealing, and cutting the proximal   right fallopian tube with the LigaSure instrument.  Excellent hemostasis was   observed.  The amputated right fallopian tube was grasped with the LigaSure   instrument and delivered through the infraumbilical port along with   laparoscope and the right fallopian tube was then submitted as a specimen.    The laparoscope was replaced through the infraumbilical port.  The uterus was   anteverted with the Prestige instrument.  The right adnexa was examined.    Hemostasis was noted to be excellent.  The right ovary is well visualized and   found to be normal.  Attention was then directed to the left fallopian tube.    The left fallopian tube was clearly identified and grasped alternatingly   between the Prestige instrument and the LigaSure instrument and the left   fallopian tube was then followed to its distal fimbriated end.  The distal   fimbriated end of the left fallopian tube was then clearly identified.  A   picture of the distal fimbriated end, the left fallopian tube was taken.    After the distal fimbriated end, the left fallopian tube was clearly   identified, the left fallopian tube was resected (left salpingectomy was   performed) in the usual fashion by serially thoroughly cauterizing, sealing,   and cutting the left mesosalpinx from distal to proximal with the LigaSure   instrument and then thoroughly cauterizing, sealing,  and cutting the left   proximal fallopian tube.  The amputated left fallopian tube was grasped with a   Prestige instrument and delivered through the infraumbilical port along with   laparoscope.  Then, the left fallopian tube was submitted as a specimen.  The   laparoscope was replaced through the infraumbilical port and the uterus was   anteverted with the Prestige instrument placed through the suprapubic port and   the left adnexa was examined.  Hemostasis was noted to be excellent.  The   left ovary is well visualized and examined and found to be normal.  It should   be noted that no ovarian mass or cyst was seen either on the right or the left   and both ovaries were well visualized during today's laparoscopy.  More   pictures were taken to demonstrate that hemostasis was excellent.  Then, the   patient was taken out of Trendelenburg position.  The Prestige instrument was   removed and the laparoscope was removed and air was allowed to evacuate the   peritoneal cavity and then both ports were removed.  The fascia underneath the   infraumbilical skin incision was identified with use of S retractors,   reapproximated with placement of a single interrupted buried suture of 4-0   Monocryl placed in the dermis.  The suprapubic skin incisions were   reapproximated with placement of interrupted buried suture of 4-0 Monocryl   placed in the dermis.  The vaginal sponge stick was removed.  The procedure   was terminated with final lap and needle counts reported to be correct at the   end of the procedure.  The patient tolerated the procedure well and sent to   postanesthesia recovery in stable condition.        ______________________________  Nolan Rodriguez MD    MED/ASS/JOSEPH    DD:  03/27/2023 15:10  DT:  03/27/2023 16:14    Job#:  [id number]    CC:Keisha Lund MD

## 2023-03-27 NOTE — OR NURSING
1445 from OR to PACU 3. Connected to monitor. Report received from anesthesia & RN. VSS. 02 6L via mask. Breaths calm, even, unlabored. Lap sites x2 one with gauze and tegaderm other with bandaid both CD&I. Rowley in place. Holly pad in place, CD&I. Patient tearful in pain. Prn pain medication provided.     1515 per dr hayde ibarra to remove rowley, no voiding trial for d/c.       1533 rowley dc'd 200 in rowley output   Patient tolerating po liquid     1558 Discharge instructions reviewed with family member. All questions answered, verbalizes understanding.     1625 nausea medication provided    1640 IV and ID bands removed, assisted to change into own clothing. All personal belongings & discharge instructions with patient.    1657 Escorted to car via wheelchair, accompanied by RN.

## 2023-03-27 NOTE — ANESTHESIA PROCEDURE NOTES
Airway    Date/Time: 3/27/2023 1:39 PM  Performed by: Keisha Lund M.D.  Authorized by: Keisha Lund M.D.     Location:  OR  Urgency:  Elective  Indications for Airway Management:  Anesthesia      Spontaneous Ventilation: absent    Sedation Level:  Deep  Preoxygenated: Yes    Patient Position:  Sniffing  Final Airway Type:  Endotracheal airway  Final Endotracheal Airway:  ETT  Cuffed: Yes    Technique Used for Successful ETT Placement:  Direct laryngoscopy    Insertion Site:  Oral  Blade Type:  Marely  Laryngoscope Blade/Videolaryngoscope Blade Size:  3  ETT Size (mm):  7.0  Measured from:  Teeth  ETT to Teeth (cm):  20  Placement Verified by: auscultation and capnometry    Cormack-Lehane Classification:  Grade IIa - partial view of glottis  Number of Attempts at Approach:  1

## 2023-03-28 ASSESSMENT — PAIN SCALES - GENERAL: PAIN_LEVEL: 5

## 2023-03-28 NOTE — ANESTHESIA POSTPROCEDURE EVALUATION
Patient: Christine Miguel    Procedure Summary     Date: 03/27/23 Room / Location: [facility] ROOM 23 / SURGERY SAME DAY [facility]    Anesthesia Start: 1328 Anesthesia Stop: 1448    Procedure: LAPAROSCOPY, DIAGNOSTIC AND OPERATIVE AND LAPAROSCOPIC BILATERAL SALPINGECTOMY (Abdomen) Diagnosis: (DESIRSE STERILIZATION, PELVIC PAIN, DEEP DYSPAREUNIA,)    Surgeons: Nolan Rodriguez M.D. Responsible Provider: Keisha Lund M.D.    Anesthesia Type: general ASA Status: 3          Final Anesthesia Type: general  Last vitals  BP   Blood Pressure: 119/57    Temp   36.3 °C (97.4 °F)    Pulse   68   Resp   18    SpO2   93 %      Anesthesia Post Evaluation    Patient location during evaluation: PACU  Patient participation: complete - patient participated  Level of consciousness: awake and alert  Pain score: 5    Airway patency: patent  Anesthetic complications: no  Cardiovascular status: hemodynamically stable  Respiratory status: acceptable  Hydration status: acceptable    PONV: controlled          No notable events documented.     Nurse Pain Score: 5 (NPRS)

## 2023-04-03 DIAGNOSIS — R73.03 PREDIABETES: ICD-10-CM

## 2023-04-03 DIAGNOSIS — E78.5 DYSLIPIDEMIA: ICD-10-CM

## 2023-04-03 DIAGNOSIS — E66.01 MORBID OBESITY (HCC): ICD-10-CM

## 2023-04-17 ENCOUNTER — TELEPHONE (OUTPATIENT)
Dept: INTERNAL MEDICINE | Facility: OTHER | Age: 43
End: 2023-04-17

## 2023-04-17 NOTE — TELEPHONE ENCOUNTER
DOCUMENTATION OF PAR STATUS:    1. Name of Medication & Dose: Wegovy     2. Name of Prescription Coverage Company & phone #: cover my meds key [id number]    3. Date Prior Auth Submitted: 4/17/23    4. What information was given to obtain insurance decision? [id number], insurance info, medication history    5. Prior Auth Status? Pending    6. Patient Notified:

## 2023-04-27 DIAGNOSIS — M34.1 CREST (CALCINOSIS, RAYNAUD'S PHENOMENON, ESOPHAGEAL DYSFUNCTION, SCLERODACTYLY, TELANGIECTASIA) (HCC): ICD-10-CM

## 2023-04-28 RX ORDER — OMEPRAZOLE 40 MG/1
CAPSULE, DELAYED RELEASE ORAL
Qty: 90 CAPSULE | Refills: 0 | OUTPATIENT
Start: 2023-04-28

## 2023-05-01 ENCOUNTER — OFFICE VISIT (OUTPATIENT)
Dept: INTERNAL MEDICINE | Facility: OTHER | Age: 43
End: 2023-05-01
Payer: MEDICAID

## 2023-05-01 VITALS
HEART RATE: 87 BPM | DIASTOLIC BLOOD PRESSURE: 59 MMHG | SYSTOLIC BLOOD PRESSURE: 119 MMHG | TEMPERATURE: 99 F | WEIGHT: 239.6 LBS | HEIGHT: 65 IN | BODY MASS INDEX: 39.92 KG/M2 | OXYGEN SATURATION: 97 %

## 2023-05-01 DIAGNOSIS — E78.5 DYSLIPIDEMIA: ICD-10-CM

## 2023-05-01 DIAGNOSIS — E66.01 CLASS 3 SEVERE OBESITY DUE TO EXCESS CALORIES WITH SERIOUS COMORBIDITY AND BODY MASS INDEX (BMI) OF 40.0 TO 44.9 IN ADULT (HCC): ICD-10-CM

## 2023-05-01 DIAGNOSIS — G47.33 OSA (OBSTRUCTIVE SLEEP APNEA): ICD-10-CM

## 2023-05-01 DIAGNOSIS — G47.30 SLEEP APNEA, UNSPECIFIED TYPE: ICD-10-CM

## 2023-05-01 DIAGNOSIS — M34.1 CREST (CALCINOSIS, RAYNAUD'S PHENOMENON, ESOPHAGEAL DYSFUNCTION, SCLERODACTYLY, TELANGIECTASIA) (HCC): ICD-10-CM

## 2023-05-01 PROBLEM — Z30.2 ENCOUNTER FOR STERILIZATION: Status: RESOLVED | Noted: 2022-08-01 | Resolved: 2023-05-01

## 2023-05-01 PROBLEM — N30.90 CYSTITIS: Status: RESOLVED | Noted: 2023-02-07 | Resolved: 2023-05-01

## 2023-05-01 PROCEDURE — 99214 OFFICE O/P EST MOD 30 MIN: CPT | Mod: GC | Performed by: INTERNAL MEDICINE

## 2023-05-01 RX ORDER — DILTIAZEM HYDROCHLORIDE 180 MG/1
180 CAPSULE, EXTENDED RELEASE ORAL DAILY
COMMUNITY
Start: 2023-04-04 | End: 2023-05-05

## 2023-05-01 ASSESSMENT — ENCOUNTER SYMPTOMS
VOMITING: 0
HEMOPTYSIS: 0
ORTHOPNEA: 0
MYALGIAS: 0
PHOTOPHOBIA: 0
DOUBLE VISION: 0
COUGH: 0
NERVOUS/ANXIOUS: 0
PALPITATIONS: 0
WEIGHT LOSS: 0
BLURRED VISION: 0
FEVER: 0
HEARTBURN: 0
NAUSEA: 0
CHILLS: 0
HEADACHES: 0

## 2023-05-01 ASSESSMENT — FIBROSIS 4 INDEX: FIB4 SCORE: 0.58

## 2023-05-01 NOTE — PROGRESS NOTES
CC: 5 week follow up visit     HPI:   Christine presents today with PMHx of CREST syndrome (following up with rheumatology and vascular), GERD (established with GI, recently started on bid PPI- now changing to pantoprazole with GI), atypical chest pain (established with cards), here for a follow up of obesity.    Wegovy was prescribed at the last visit, unfortunately which was not covered by her insurance. She is also currently established with Dr Evans's clinic for discussions regarding bariatric surgery. She is closely following up with Dr. Haji, describes he would like her to cont a nutritonal plan for 6 months and loose around 11 lbs before they could consider surgery.   She had blood work done on Friday, results A1c pending.     She saw cardiology. Stress test showing tachycardia, her amlodipine was stopped and she was shifted to diltiazem. This was early last month, since stopping amlodipine she has had new ulcers form on her fingers again.     Endorses calf pain left leg, ongoing for last 2-3 weeks. Denies right leg pain, denies any discoloration or swelling         Health Maintenance: Completed    ROS:  Review of Systems   Constitutional:  Negative for chills, fever and weight loss.   HENT:  Negative for ear pain, hearing loss and tinnitus.    Eyes:  Negative for blurred vision, double vision and photophobia.   Respiratory:  Negative for cough and hemoptysis.    Cardiovascular:  Negative for chest pain, palpitations and orthopnea.   Gastrointestinal:  Negative for heartburn, nausea and vomiting.   Genitourinary:  Negative for dysuria and urgency.   Musculoskeletal:  Negative for myalgias.   Skin:  Negative for itching and rash.   Neurological:  Negative for headaches.   Psychiatric/Behavioral:  The patient is not nervous/anxious.      Objective:     Exam:  There were no vitals taken for this visit. There is no height or weight on file to calculate BMI.    Physical Exam  Constitutional:       General: She  is not in acute distress.     Appearance: She is not ill-appearing.   HENT:      Head: Normocephalic and atraumatic.      Right Ear: External ear normal.      Left Ear: External ear normal.      Nose: Nose normal. No congestion.      Mouth/Throat:      Mouth: Mucous membranes are moist.   Eyes:      Extraocular Movements: Extraocular movements intact.      Pupils: Pupils are equal, round, and reactive to light.   Cardiovascular:      Rate and Rhythm: Normal rate and regular rhythm.      Pulses: Normal pulses.   Pulmonary:      Effort: Pulmonary effort is normal.   Musculoskeletal:         General: Normal range of motion.      Comments: Trace edema bilateral lower extremities   No skin changes evident b/l lower ext   Left lower ext with pain on palpation of the calf   No varicose veins appreciated      Skin:     General: Skin is warm.      Capillary Refill: Capillary refill takes less than 2 seconds.   Neurological:      Mental Status: She is alert and oriented to person, place, and time.   Psychiatric:         Mood and Affect: Mood normal.         Labs: Pending     Assessment & Plan:     42 y.o. female with the following -       # Severe obesity   # Dyslipidemia   - I advise reducing sugars/carbohydrates/saturated fats/alcohol, and eating more vegetables and lean meats such as fish/chicken/turkey. I also recommend 30 minutes of cardiovascular exercise most days of the week.  - Cont atorvastatin   - A1c pending   - Cont f/u with Dr Evans, she will discuss the GLP-1 meds with her bariatric surgeon as well  - Cont working on regular physical activity in conjunction with a healthy diet   - RTC 5 weeks     # Tachycardia   # CREST Syndrome   - Recently changed to diltiazem from amlodipine for tachycardia by her cardiologist. Notices return of ulcers on the fingers since this change was made. We discussed stopping diltiazem, going back on amlodipine for raynaud's and initiating beta blocker for HR control. She has an  upcoming appointment with her cardiologist on Thursday and then with vascular on coming Friday- she will discuss this with her specialists as well     # Left leg pain   Endorses calf pain left leg, ongoing for last 2-3 weeks. Denies right leg pain, denies any discoloration or swelling. Exam with symmetric b/l legs, no ulcers/discoloration/varicose veins. Endorses that she has been staying active and walking + working out daily. Does not remember any trauma   - Encourage hydration   - 1 banana daily for electrolyte supplementation   - RTC 5 weeks   - Discussed to update us if symptoms do not get better or get worse  - Warning signs and symptoms prompting calling 911 or going to nearest UC or ED discussed    # FRANK  Hasn't received a CPAP yet, describes they are on back order. We discussed how weight loss can help with FRANK as well. She is very motivated, has lost 2 lbs since last visit and will cont to work on healthy lifestyle/diet and exercise. She will also get intouch with another company regarding her CPAP           I spent a total of 30 minutes with record review, exam, communication with the patient, communication with other providers, and documentation of this encounter.      No follow-ups on file.    Please note that this dictation was created using voice recognition software. I have made every reasonable attempt to correct obvious errors, but I expect that there are errors of grammar and possibly content that I did not discover before finalizing the note.

## 2023-05-01 NOTE — PATIENT INSTRUCTIONS
Thank you for visiting today!  Please follow-up in 5 weeks   Please drink 8-10 glasses of water daily   Please eat 1 banana daily for electrolyte supplementation   Please try and eat healthy, get at least 30 minutes of cardiovascular exercise a day to help keep your health as best as it can be.  If you have any questions or concerns please feel free to contact us at [phone].  If you feel like you are experiencing a medical emergency please seek immediate medical attention at an urgent care or in the emergency department.

## 2023-05-05 ENCOUNTER — OFFICE VISIT (OUTPATIENT)
Dept: VASCULAR LAB | Facility: MEDICAL CENTER | Age: 43
End: 2023-05-05
Attending: FAMILY MEDICINE
Payer: MEDICAID

## 2023-05-05 VITALS
HEIGHT: 65 IN | WEIGHT: 239.9 LBS | SYSTOLIC BLOOD PRESSURE: 113 MMHG | BODY MASS INDEX: 39.97 KG/M2 | DIASTOLIC BLOOD PRESSURE: 76 MMHG | HEART RATE: 83 BPM

## 2023-05-05 DIAGNOSIS — E78.41 ELEVATED LIPOPROTEIN(A): ICD-10-CM

## 2023-05-05 DIAGNOSIS — Z79.02 LONG TERM (CURRENT) USE OF ANTITHROMBOTICS/ANTIPLATELETS: ICD-10-CM

## 2023-05-05 DIAGNOSIS — E78.5 DYSLIPIDEMIA: ICD-10-CM

## 2023-05-05 DIAGNOSIS — S61.209D FINGER WOUND, SIMPLE, OPEN, SUBSEQUENT ENCOUNTER: ICD-10-CM

## 2023-05-05 DIAGNOSIS — R73.03 PREDIABETES: ICD-10-CM

## 2023-05-05 DIAGNOSIS — M34.1 CREST SYNDROME (HCC): ICD-10-CM

## 2023-05-05 DIAGNOSIS — I73.00 RAYNAUD'S PHENOMENON (SECONDARY): ICD-10-CM

## 2023-05-05 PROCEDURE — 99214 OFFICE O/P EST MOD 30 MIN: CPT | Performed by: FAMILY MEDICINE

## 2023-05-05 PROCEDURE — 99212 OFFICE O/P EST SF 10 MIN: CPT

## 2023-05-05 RX ORDER — SUCRALFATE 1 G/1
TABLET ORAL
COMMUNITY
Start: 2023-04-19

## 2023-05-05 RX ORDER — AMLODIPINE BESYLATE 10 MG/1
10 TABLET ORAL DAILY
COMMUNITY
Start: 2023-05-04 | End: 2023-06-03

## 2023-05-05 RX ORDER — DILTIAZEM HYDROCHLORIDE 180 MG/1
180 CAPSULE, EXTENDED RELEASE ORAL DAILY
COMMUNITY
Start: 2023-04-04 | End: 2023-05-05

## 2023-05-05 ASSESSMENT — ENCOUNTER SYMPTOMS
CLAUDICATION: 0
ORTHOPNEA: 0
HEMOPTYSIS: 0
FEVER: 0
SHORTNESS OF BREATH: 0
CHILLS: 0
SPUTUM PRODUCTION: 0
PALPITATIONS: 0
COUGH: 0
WHEEZING: 0
BRUISES/BLEEDS EASILY: 0
PND: 0

## 2023-05-05 ASSESSMENT — FIBROSIS 4 INDEX: FIB4 SCORE: 0.58

## 2023-05-05 NOTE — PROGRESS NOTES
FOLLOW-UP VASCULAR MEDICINE VISIT  05/05/23     Christine Ita Miguel is a. female  who presents for  Chief Complaint   Patient presents with    Follow-Up   initially referred by Lilly Abreu A.P.RN for eval for vasc etiology of RUE non-healing wound, est 4/24/22      Subjective         Interval hx/concerns:  after stopping amlodipine and switched to dilt XR developed new finger tip sores.  Has returned to amlodipine.  Had been on dilt due to tachycardia.  Currently no sx.  Had labs     RUE non-healing wound:   pertinent hx  Started 2 years ago (age 39) with episodes, can be fingers and toes and has been progressive with ulcerations forms.   Seen by  4/2022 for R index finger non-healing wound.  Had subung lac 3mo prior.  Reports prior episodes of non-healing finger wounds in the past.  Hx of recurrent cold/painful fingers with intermittent cyanosis.  Has WBI normal in past 2021 and has apparently been seen by vasc specialist w/o formal dx or tx in the past.   (No prior visits of [facility] vasc med or available  Has seen by Dr. Pierre in latter half of 2021 and informed no interventions available but started nifedipine, DAPT, atorva.  Currently on ASA only, stopped nifedipine due to HA     HTN:  Stable on meds   No checking home BP - stable, no dizz with positional changes     Hyperlipidemia:  Stable, no current concerns  Current treatment: lifestyle changes  and High intensity  atorva   Baseline lipid    Latest Reference Range & Units 01/03/12 12:10   Cholesterol,Tot 100 - 199 mg/dL 218 (H)   Triglycerides 0 - 149 mg/dL 86   HDL >=40 mg/dL 37 !   LDL <100 mg/dL 164     Antiplatelet/anticoagulation: Yes, Details: ASA , no bleeding noted        Current Outpatient Medications:     sucralfate, CRUSH AND MIX 1 TABLET WITH 30 ML OF WATER AND TAKE BY MOUTH 4 TIMES DAILY AS NEEDED, PRN    amLODIPine, 10 mg, Oral, DAILY, Taking    diltiazem, 180 mg, Oral, DAILY, Taking    ibuprofen, 800 mg, Oral, Q8HRS PRN,  "Taking    albuterol, 2 Puff, Inhalation, Q4HRS PRN, PRN    aspirin, 81 mg, Oral, DAILY, PRN    hydroxychloroquine, 200 mg, Oral, DAILY, Taking    omeprazole, 40 mg, Oral, DAILY, Taking    losartan, 25 mg, Oral, DAILY, Taking    atorvastatin, 40 mg, Oral, Nightly, Taking     Family History   Problem Relation Age of Onset    Rheumatologic Disease Mother         RA    Heart Attack Father          MI (age 45)    Kidney Disease Father         ESRD on HD    Diabetes Father     Heart Disease Sister       Social History     Tobacco Use    Smoking status: Never     Passive exposure: Never    Smokeless tobacco: Never   Vaping Use    Vaping Use: Never used   Substance Use Topics    Alcohol use: No    Drug use: No     Review of Systems   Constitutional:  Negative for chills, fever and malaise/fatigue.   Respiratory:  Negative for cough, hemoptysis, sputum production, shortness of breath and wheezing.    Cardiovascular:  Negative for chest pain, palpitations, orthopnea, claudication, leg swelling and PND.   Skin:  Negative for itching and rash.   Endo/Heme/Allergies:  Does not bruise/bleed easily.         Objective   Vitals:    23 1253 23 1256   BP: 138/83 113/76   BP Location: Left arm Left arm   Patient Position: Sitting Sitting   BP Cuff Size: Large adult Large adult   Pulse: (!) 105 83   Weight: 109 kg (239 lb 14.4 oz)    Height: 1.651 m (5' 5\")         BP Readings from Last 5 Encounters:   23 113/76   23 119/59   23 119/57   23 126/67   03/10/23 107/71      Body mass index is 39.92 kg/m².  Physical Exam  Constitutional:       Appearance: Normal appearance.   HENT:      Head: Normocephalic.   Eyes:      Extraocular Movements: Extraocular movements intact.      Conjunctiva/sclera: Conjunctivae normal.   Pulmonary:      Effort: Pulmonary effort is normal.   Musculoskeletal:      Cervical back: Normal range of motion.   Skin:     General: Skin is dry.      Coloration: Skin is not pale.    "   Findings: No rash.   Neurological:      General: No focal deficit present.      Mental Status: She is alert and oriented to person, place, and time.   Psychiatric:         Mood and Affect: Mood normal.         Behavior: Behavior normal.     Labs:    QUest 6/27/22   Lp(a) 181      HDL 42  LDL 62   CMP wnl   a1c wnl  Cbc wnl   ESR, CRP, ANCA (MPO ab, PR3 ab) - wnl   GEOVANY - positive >1:1280 (centromere pattern)  CCP ab 71 (elevated)   HCV ab neg     [facility] 7/20/22   CMP - wnl   SCL70 ab - neg   AT III activity wnl   FVL neg   Prot C wnl   PGM neg   B2GP1 ab wnl  ACL ab wnl   Lupus AC neg   D-dimer 0.32       [facility] 8/2022   C3, C4, RA factor - wnl                   No results found for: LIPOPROTA   No results found for: APOB           Lab Results   Component Value Date    SODIUM 138 03/23/2023    POTASSIUM 4.3 03/23/2023    CHLORIDE 104 03/23/2023    CO2 22 03/23/2023    GLUCOSE 99 03/23/2023    BUN 17 03/23/2023    CREATININE 0.70 03/23/2023    IFAFRICA >60 08/29/2021    IFNOTAFR >60 08/29/2021        Lab Results   Component Value Date    WBC 4.8 03/23/2023    RBC 5.25 03/23/2023    HEMOGLOBIN 15.2 03/23/2023    HEMATOCRIT 46.4 03/23/2023    MCV 88.4 03/23/2023    MCH 29.0 03/23/2023    MCHC 32.8 (L) 03/23/2023    MPV 9.5 03/23/2023         No results found for: MICROALBCALC, MALBCRT, MALBEXCR, OIKUES88, MICROALBUR, MICRALB, UMICROALBUM, MICROALBTIM      Latest Reference Range & Units 09/16/14 04:15 10/28/15 12:04 01/30/16 11:08 04/15/16 14:17 12/26/17 11:05   Hepatitis B Surface Antigen Negative   Negative      HIV 1/0/2 Non Reactive   see below      Rubella IgG Antibody IU/mL  135.40      Strep Gp B DNA PCR Negative     Negative    Syphilis, Treponemal Qual Non Reactive   Non Reactive Non Reactive     Stat C-Reactive Protein 0.00 - 0.75 mg/dL 0.32    0.35       VASCULAR IMAGING:     WBI 8/2021  Normal WBI.   Absent 3, 4, 5th digit flow could be artifactual or secondary to embolic    disease.  Recommend  clinical correlation.   Right.    Arterial doppler waveforms are of high amplitude and triphasic.   Wrist brachial index is within normal limits. WBI=1.05   PPG of the digits demonstrates absent flow in the right 3rd, 4th, and 5th    digits. The 1st and 2nd digits appear to have slightly diminished flow in    comparsion to the contralateral digits.    CT chest 8/2021   1.  No thoracic aortic aneurysm or dissection.  2.  Subclavian arteries are normal in caliber and patent.  LEFT subclavian artery is partially secured.  3.  Probable medial RIGHT lower lobe atelectasis.  Thoracic aorta is normal in caliber.  No dissection demonstrated.  Great vessel origins are intact.  RIGHT subclavian artery is normal in caliber and patent.  LEFT subclavian artery is partially obscured by streak artifact however also appears normal in caliber and patent.    Echo 9/2021   No prior study is available for comparison.   Normal left ventricular systolic function.  The left ventricular ejection fraction is visually estimated to be 65%.  No significant valve abnormalities.     WBI/art duplex 8/2022    Wrist-brachial indices are normal.   FBI:   Right-  1.07   Left-     0.98   Right 3rd and 4th finger waveform are diminished, most likely due to    microvascular disease.   Left 2nd and 3rd finger waveforms are diminished, most likely due to    microvascular disease.    Echo 12/2022  Normal left ventricular size, thickness, systolic function, and   diastolic function.  The left ventricular ejection fraction is visually estimated to be 60-  65%.  The right ventricle is borderline dilated in size with preserved   systolic function.  Estimated right ventricular systolic pressure is mildly elevated at 40   mmHg.  Normal left atrial size.  No significant valvular abnormalities.   Normal inferior vena cava size and inspiratory collapse.    CT chest 12/2022  1.  No CT evidence of interstitial lung disease.   2.  No significant incidental findings  are identified.         Medical Decision Making:  Today's Assessment / Status / Plan:     1. Raynaud's phenomenon (secondary)          Patient Type: Primary Prevention    Etiology of Established CVD if Present:     1) secondary Raynaud's related to CREST syndrome   - recurrent non-healing finger wounds, most recently right index 4/2022 - improving  - prior normal WBI with PPGs showing absent flow , repeat duplex 8/2022 shows nl WBI/FBI with reduced waveforms to R 3r/4th and L 2nd/3rd fingers   - serology testing indicative of CREST syndrome, possible RA overlay per CCP ab elevations as well   - strong centromere ab pattern and GEOVANY 1:1280 with high CCP ab  - high prob secondary raynaud's due to later adulthood (around 41yo) onset with intense pain and ischemic ulcerations with pain and classic discolorations affecting fingers/toes.    - thrombophilia w/u neg,  ANCA neg   - fhx of RA but not SLE, she has no nephritic findings or recurrent malar rash or arthralgia  - no COVID prior to initial episode    - pending eval with rheum in [place]     - CTA chest wnl w/o proximal plaque or stenosis, no indications of thoracic Ao diss, plaque  - prior echo wnl, though not FIDENCIO   - literature supports CCB as 1st line with other agents including prostaglandin based tx, ARB, SSRI, PDE5i moving forward and dependent upon severity and recurrences of episodes   Plan:  - defer further CREST-specific tx to rheum MD for definitive dx and tx   Meds:  - continue ASA 81mg, atorva  - continue amlodipine to 10mg daily - monitor for hypotension   - continue losartan 25mg daily to increase vasodilation   - start fluoxetine 10mg daily, then increase to 20mg   - consider cilostazol as next agent   -  oral iloprost if worsening over time     BLOOD PRESSURE MANAGEMENT:  ACC/AHA (2017) goal <130/80  Home BP at goal: yes  Office BP at goal:  yes   - hx of preg-induced HTN on nifedipine   Plan:   - continue healthy diet, activity, weight mgmt   Monitoring:    - routine clinic-based BP measurements at least once annually   Medications:   - continue amlodipine 10mg daily for Raynaud's   - continue losartan 25mg daily for vasodilt    LIPID MANAGEMENT:   Qualifies for Statin Therapy Based on 2018 ACC/AHA Guidelines: yes, Primary Prevention - 40-74yo, LDLc >70, <190 w/o DM  The ASCVD Risk score (Cong KUHN, et al., 2019) failed to calculate.  Major ASCVD events: None  High-risk condition: N/A  Risk-enhancers: Metabolic syndrome  and Lp(a) >50   - reviewed with patient this is an independent risk factor for ASCVD but is currently controversial if lowering has impact on outcomes in primary prevention, so targeting LDLc lowering is primary objective for now and consider lp(a) lowering if worsening ascvd risk or if ascvd event occurs - definitely prothrombotic and needs ASA   Currently on Statin: Yes  Tx goals: LDL-C <100 (consider non-HDL-C <130, apoB <90)  At goal? Yes, 7/2022   Plan:   - reinforced ongoing TLC measures as noted   - monitor labs   Meds:   - continue atorva 40mg daily     ANTITHROMBOTIC/ANTIPLATELET THERAPY: continue ASA 81mg daily for potential antithromb benefit due to non-healing wounds and high lp(a)     GLYCEMIC STATUS: Prediabetic      Plan:  - update a1c   - continue healthy diet, weight reduction, daily physical activity   - consider metformin up to 850mg BID to slow or offset progression to T2D as per DPP trial   - monitor labs Q6-12mo    LIFESTYLE INTERVENTIONS:    SMOKING:   reports that she has never smoked. She has never been exposed to tobacco smoke. She has never used smokeless tobacco.   - continued complete avoidance of all tobacco products     PHYSICAL ACTIVITY: continue healthy activity to improve CV fitness.  In general, targeting >150min/week of moderate-level activity.     WEIGHT MANAGEMENT AND NUTRITION:  Mediterranean style dietary approach , Low carbohydrate (10-20% CHO content) dietary approach  and Weight reduction approach - reduce  caloric intake by 300kcal/day to achieve 1-2lb weight loss per week     OTHER:     # CREST syndrome - stable, on meds   - ongoing care with rheum MD - requested notes     # GERD - stable, continue PPI     Instructed to follow-up with PCP for remainder of adult medical needs: yes  We will partner with other providers in the management of established vascular disease and cardiometabolic risk factors.    Studies to Be Obtained: none   Labs to Be Obtained: as noted above     Follow up in:  6mo      Scott Acuña M.D.  [facility]   [facility]   [phone]

## 2023-05-08 ENCOUNTER — HOSPITAL ENCOUNTER (OUTPATIENT)
Dept: RADIOLOGY | Facility: MEDICAL CENTER | Age: 43
End: 2023-05-08
Attending: SURGERY
Payer: MEDICAID

## 2023-05-08 ENCOUNTER — APPOINTMENT (OUTPATIENT)
Dept: LAB | Facility: MEDICAL CENTER | Age: 43
End: 2023-05-08
Attending: SURGERY
Payer: MEDICAID

## 2023-05-08 DIAGNOSIS — E78.00 PURE HYPERCHOLESTEROLEMIA: ICD-10-CM

## 2023-05-08 DIAGNOSIS — Z01.818 PREOP EXAMINATION: ICD-10-CM

## 2023-05-08 DIAGNOSIS — G47.00 INSOMNIA WITH SLEEP APNEA: ICD-10-CM

## 2023-05-08 DIAGNOSIS — I10 ESSENTIAL HYPERTENSION, MALIGNANT: ICD-10-CM

## 2023-05-08 DIAGNOSIS — G47.30 INSOMNIA WITH SLEEP APNEA: ICD-10-CM

## 2023-05-08 DIAGNOSIS — E66.9 OBESITY, UNSPECIFIED CLASSIFICATION, UNSPECIFIED OBESITY TYPE, UNSPECIFIED WHETHER SERIOUS COMORBIDITY PRESENT: ICD-10-CM

## 2023-05-08 PROCEDURE — 71046 X-RAY EXAM CHEST 2 VIEWS: CPT

## 2023-05-15 DIAGNOSIS — G47.33 OSA (OBSTRUCTIVE SLEEP APNEA): ICD-10-CM

## 2023-05-18 ENCOUNTER — HOSPITAL ENCOUNTER (OUTPATIENT)
Dept: RADIOLOGY | Facility: MEDICAL CENTER | Age: 43
End: 2023-05-18
Attending: STUDENT IN AN ORGANIZED HEALTH CARE EDUCATION/TRAINING PROGRAM
Payer: MEDICAID

## 2023-05-31 ENCOUNTER — HOSPITAL ENCOUNTER (OUTPATIENT)
Dept: RADIOLOGY | Facility: MEDICAL CENTER | Age: 43
End: 2023-05-31
Attending: SURGERY
Payer: MEDICAID

## 2023-05-31 DIAGNOSIS — G47.30 SLEEP APNEA WITH USE OF CONTINUOUS POSITIVE AIRWAY PRESSURE (CPAP): ICD-10-CM

## 2023-05-31 DIAGNOSIS — Z01.818 PREOP EXAMINATION: ICD-10-CM

## 2023-05-31 DIAGNOSIS — E78.00 HYPERCHOLESTEROLEMIA: ICD-10-CM

## 2023-05-31 DIAGNOSIS — I10 BENIGN HYPERTENSION: ICD-10-CM

## 2023-05-31 DIAGNOSIS — E66.9 OBESITY, UNSPECIFIED CLASSIFICATION, UNSPECIFIED OBESITY TYPE, UNSPECIFIED WHETHER SERIOUS COMORBIDITY PRESENT: ICD-10-CM

## 2023-05-31 PROCEDURE — 74240 X-RAY XM UPR GI TRC 1CNTRST: CPT

## 2023-06-08 ENCOUNTER — OFFICE VISIT (OUTPATIENT)
Dept: INTERNAL MEDICINE | Facility: OTHER | Age: 43
End: 2023-06-08
Payer: MEDICAID

## 2023-06-08 VITALS
SYSTOLIC BLOOD PRESSURE: 111 MMHG | TEMPERATURE: 98 F | WEIGHT: 236.6 LBS | BODY MASS INDEX: 38.02 KG/M2 | HEIGHT: 66 IN | OXYGEN SATURATION: 98 % | DIASTOLIC BLOOD PRESSURE: 77 MMHG | HEART RATE: 76 BPM

## 2023-06-08 DIAGNOSIS — M34.1 CREST (CALCINOSIS, RAYNAUD'S PHENOMENON, ESOPHAGEAL DYSFUNCTION, SCLERODACTYLY, TELANGIECTASIA) (HCC): ICD-10-CM

## 2023-06-08 DIAGNOSIS — E66.01 CLASS 3 SEVERE OBESITY DUE TO EXCESS CALORIES WITH SERIOUS COMORBIDITY AND BODY MASS INDEX (BMI) OF 40.0 TO 44.9 IN ADULT (HCC): ICD-10-CM

## 2023-06-08 DIAGNOSIS — G47.33 OSA (OBSTRUCTIVE SLEEP APNEA): ICD-10-CM

## 2023-06-08 PROCEDURE — 3078F DIAST BP <80 MM HG: CPT | Mod: GC | Performed by: INTERNAL MEDICINE

## 2023-06-08 PROCEDURE — 3074F SYST BP LT 130 MM HG: CPT | Mod: GC | Performed by: INTERNAL MEDICINE

## 2023-06-08 PROCEDURE — 99214 OFFICE O/P EST MOD 30 MIN: CPT | Mod: GC | Performed by: INTERNAL MEDICINE

## 2023-06-08 ASSESSMENT — ENCOUNTER SYMPTOMS
BLURRED VISION: 0
VOMITING: 0
MYALGIAS: 0
CHILLS: 0
FEVER: 0
DOUBLE VISION: 0
HEARTBURN: 0
NERVOUS/ANXIOUS: 0
NAUSEA: 0
COUGH: 0
HEADACHES: 0
PALPITATIONS: 0
HEMOPTYSIS: 0

## 2023-06-08 ASSESSMENT — FIBROSIS 4 INDEX: FIB4 SCORE: 0.58

## 2023-06-08 NOTE — PROGRESS NOTES
"CC: Follow up visit     HPI:   Christine presents today with PMHx of CREST syndrome, raynauds phenomenon, morbid obesity here today for a follow up visit.    Describes she has been doing very well. She is back on amlodipine and that has been helping her a lot. Hasn't had any new ulcers. Endorses compliance to all her meds. Has an upcoming appt with her Rheumatologist in [place] in about a month. Is also established with Dr. Evans for weight loss, is following his nutrition plan in anticipation of bariatric surgery soon.     unaccompanied today    No problems updated.    Health Maintenance: Completed    ROS:  Review of Systems   Constitutional:  Negative for chills and fever.   HENT:  Negative for hearing loss and tinnitus.    Eyes:  Negative for blurred vision and double vision.   Respiratory:  Negative for cough and hemoptysis.    Cardiovascular:  Negative for chest pain and palpitations.   Gastrointestinal:  Negative for heartburn, nausea and vomiting.   Genitourinary:  Negative for dysuria and urgency.   Musculoskeletal:  Negative for myalgias.   Skin:  Negative for itching and rash.   Neurological:  Negative for headaches.   Psychiatric/Behavioral:  The patient is not nervous/anxious.        Objective:     Exam:  /77 (BP Location: Left arm, Patient Position: Sitting, BP Cuff Size: Large adult)   Pulse 76   Temp 36.7 °C (98 °F) (Temporal)   Ht 1.676 m (5' 6\")   Wt 107 kg (236 lb 9.6 oz)   SpO2 98%   BMI 38.19 kg/m²  Body mass index is 38.19 kg/m².    Physical Exam  Constitutional:       General: She is not in acute distress.     Appearance: She is not ill-appearing.   HENT:      Head: Normocephalic and atraumatic.      Right Ear: External ear normal.      Left Ear: External ear normal.      Nose: Nose normal.      Mouth/Throat:      Mouth: Mucous membranes are moist.   Eyes:      Extraocular Movements: Extraocular movements intact.      Pupils: Pupils are equal, round, and reactive to light. "   Cardiovascular:      Rate and Rhythm: Normal rate.      Pulses: Normal pulses.   Pulmonary:      Effort: Pulmonary effort is normal.   Musculoskeletal:         General: Normal range of motion.   Skin:     General: Skin is warm.      Capillary Refill: Capillary refill takes less than 2 seconds.   Neurological:      Mental Status: She is alert and oriented to person, place, and time.   Psychiatric:         Mood and Affect: Mood normal.         A chaperone was offered to the patient during today's exam.: Chaperone Dr. Martinez was present.        Assessment & Plan:     42 y.o. female with the following -       # Severe obesity   # Dyslipidemia   - I advise reducing sugars/carbohydrates/saturated fats/alcohol, and eating more vegetables and lean meats such as fish/chicken/turkey. I also recommend 30 minutes of cardiovascular exercise most days of the week.  - Cont atorvastatin   - Cont f/u with Dr. Haji for weight loss    # Osteoarthritis  - Cont working on weight loss. Describes her orthopedic surgeon would not operate on her until she looses weight. We discussed other options including steroid shot but she would like to defer it at this time. We also discussed trial of duloxetine or other med again but she would like to defer it at this time. Is working hard and will continue to work on loosing weight. Positively reinforced Ms King    # Obstructive sleep apnea  - CPAP has been ordered, describes she has still not received it but she will be calling the company to check. Describes that the company she wanted to go through has been short     # CREST syndrome  # Raynaud's phenomenon   # GERD   # Tachycardia   - Established with GI, cardiology, vascular and rheumatology. Appreciate support           I spent a total of 30 minutes with record review, exam, communication with the patient, communication with other providers, and documentation of this encounter.      Return in about 6 months (around 12/8/2023) for  Annual wellness visit .    Please note that this dictation was created using voice recognition software. I have made every reasonable attempt to correct obvious errors, but I expect that there are errors of grammar and possibly content that I did not discover before finalizing the note.

## 2023-06-08 NOTE — PATIENT INSTRUCTIONS
Thank you for visiting today!    Please follow-up in 6 months     Please try and eat healthy, get at least 30 minutes of cardiovascular exercise a day to help keep your health as best as it can be.    If you have any questions or concerns please feel free to contact us at [phone].    If you feel like you are experiencing a medical emergency please seek immediate medical attention at an urgent care or in the emergency department.

## 2023-06-16 ENCOUNTER — HOSPITAL ENCOUNTER (OUTPATIENT)
Dept: CARDIOLOGY | Facility: MEDICAL CENTER | Age: 43
End: 2023-06-16
Attending: SURGERY
Payer: MEDICAID

## 2023-06-16 DIAGNOSIS — Z01.810 PRE-OPERATIVE CARDIOVASCULAR EXAMINATION: Primary | ICD-10-CM

## 2023-06-16 LAB — EKG IMPRESSION: NORMAL

## 2023-06-16 PROCEDURE — 93010 ELECTROCARDIOGRAM REPORT: CPT | Performed by: INTERNAL MEDICINE

## 2023-06-16 PROCEDURE — 93005 ELECTROCARDIOGRAM TRACING: CPT

## 2023-06-27 ENCOUNTER — TELEPHONE (OUTPATIENT)
Dept: INTERNAL MEDICINE | Facility: OTHER | Age: 43
End: 2023-06-27

## 2023-06-27 DIAGNOSIS — M34.1 CREST (CALCINOSIS, RAYNAUD'S PHENOMENON, ESOPHAGEAL DYSFUNCTION, SCLERODACTYLY, TELANGIECTASIA) (HCC): ICD-10-CM

## 2023-06-27 NOTE — TELEPHONE ENCOUNTER
Hi Dr. Logan, can you please review the Referral needed for [facility] ? Looks like patient needs a referral to be seen there based on her insurance. Can you please sent one to [phone], thank you!

## 2023-06-28 ENCOUNTER — APPOINTMENT (OUTPATIENT)
Dept: ADMISSIONS | Facility: MEDICAL CENTER | Age: 43
DRG: 621 | End: 2023-06-28
Attending: SURGERY
Payer: MEDICAID

## 2023-07-06 ENCOUNTER — PRE-ADMISSION TESTING (OUTPATIENT)
Dept: ADMISSIONS | Facility: MEDICAL CENTER | Age: 43
DRG: 621 | End: 2023-07-06
Attending: SURGERY
Payer: MEDICAID

## 2023-07-06 RX ORDER — AMLODIPINE BESYLATE 10 MG/1
TABLET ORAL
COMMUNITY
Start: 2023-06-30

## 2023-07-06 NOTE — OR NURSING
RN preadmit tele appointment complete.  Strongly recommended clearance per [facility] Pre-Procedural Clearance Protocol.  Dr. Oliveros notified via fax on 7/6/23 at 07:24.

## 2023-07-07 ENCOUNTER — PRE-ADMISSION TESTING (OUTPATIENT)
Dept: ADMISSIONS | Facility: MEDICAL CENTER | Age: 43
DRG: 621 | End: 2023-07-07
Attending: SURGERY
Payer: MEDICAID

## 2023-07-07 DIAGNOSIS — Z01.812 PRE-OPERATIVE LABORATORY EXAMINATION: ICD-10-CM

## 2023-07-07 LAB
ALBUMIN SERPL BCP-MCNC: 4.2 G/DL (ref 3.2–4.9)
ALBUMIN/GLOB SERPL: 1.8 G/DL
ALP SERPL-CCNC: 69 U/L (ref 30–99)
ALT SERPL-CCNC: 24 U/L (ref 2–50)
ANION GAP SERPL CALC-SCNC: 11 MMOL/L (ref 7–16)
AST SERPL-CCNC: 18 U/L (ref 12–45)
BASOPHILS # BLD AUTO: 0.9 % (ref 0–1.8)
BASOPHILS # BLD: 0.04 K/UL (ref 0–0.12)
BILIRUB SERPL-MCNC: 0.5 MG/DL (ref 0.1–1.5)
BUN SERPL-MCNC: 9 MG/DL (ref 8–22)
CALCIUM ALBUM COR SERPL-MCNC: 8.9 MG/DL (ref 8.5–10.5)
CALCIUM SERPL-MCNC: 9.1 MG/DL (ref 8.5–10.5)
CHLORIDE SERPL-SCNC: 104 MMOL/L (ref 96–112)
CO2 SERPL-SCNC: 24 MMOL/L (ref 20–33)
CREAT SERPL-MCNC: 0.79 MG/DL (ref 0.5–1.4)
EOSINOPHIL # BLD AUTO: 0.16 K/UL (ref 0–0.51)
EOSINOPHIL NFR BLD: 3.5 % (ref 0–6.9)
ERYTHROCYTE [DISTWIDTH] IN BLOOD BY AUTOMATED COUNT: 41.9 FL (ref 35.9–50)
GFR SERPLBLD CREATININE-BSD FMLA CKD-EPI: 95 ML/MIN/1.73 M 2
GLOBULIN SER CALC-MCNC: 2.4 G/DL (ref 1.9–3.5)
GLUCOSE SERPL-MCNC: 98 MG/DL (ref 65–99)
HCT VFR BLD AUTO: 43.5 % (ref 37–47)
HGB BLD-MCNC: 14.4 G/DL (ref 12–16)
IMM GRANULOCYTES # BLD AUTO: 0.01 K/UL (ref 0–0.11)
IMM GRANULOCYTES NFR BLD AUTO: 0.2 % (ref 0–0.9)
LYMPHOCYTES # BLD AUTO: 1.36 K/UL (ref 1–4.8)
LYMPHOCYTES NFR BLD: 29.6 % (ref 22–41)
MCH RBC QN AUTO: 28.9 PG (ref 27–33)
MCHC RBC AUTO-ENTMCNC: 33.1 G/DL (ref 32.2–35.5)
MCV RBC AUTO: 87.3 FL (ref 81.4–97.8)
MONOCYTES # BLD AUTO: 0.46 K/UL (ref 0–0.85)
MONOCYTES NFR BLD AUTO: 10 % (ref 0–13.4)
NEUTROPHILS # BLD AUTO: 2.57 K/UL (ref 1.82–7.42)
NEUTROPHILS NFR BLD: 55.8 % (ref 44–72)
NRBC # BLD AUTO: 0 K/UL
NRBC BLD-RTO: 0 /100 WBC (ref 0–0.2)
PLATELET # BLD AUTO: 198 K/UL (ref 164–446)
PMV BLD AUTO: 10.2 FL (ref 9–12.9)
POTASSIUM SERPL-SCNC: 4.1 MMOL/L (ref 3.6–5.5)
PROT SERPL-MCNC: 6.6 G/DL (ref 6–8.2)
RBC # BLD AUTO: 4.98 M/UL (ref 4.2–5.4)
SODIUM SERPL-SCNC: 139 MMOL/L (ref 135–145)
WBC # BLD AUTO: 4.6 K/UL (ref 4.8–10.8)

## 2023-07-07 PROCEDURE — 36415 COLL VENOUS BLD VENIPUNCTURE: CPT

## 2023-07-07 PROCEDURE — 80053 COMPREHEN METABOLIC PANEL: CPT

## 2023-07-07 PROCEDURE — 85025 COMPLETE CBC W/AUTO DIFF WBC: CPT

## 2023-07-13 ENCOUNTER — ANESTHESIA EVENT (OUTPATIENT)
Dept: SURGERY | Facility: MEDICAL CENTER | Age: 43
DRG: 621 | End: 2023-07-13
Payer: MEDICAID

## 2023-07-14 ENCOUNTER — HOSPITAL ENCOUNTER (INPATIENT)
Facility: MEDICAL CENTER | Age: 43
LOS: 2 days | DRG: 621 | End: 2023-07-16
Attending: SURGERY | Admitting: SURGERY
Payer: MEDICAID

## 2023-07-14 ENCOUNTER — ANESTHESIA (OUTPATIENT)
Dept: SURGERY | Facility: MEDICAL CENTER | Age: 43
DRG: 621 | End: 2023-07-14
Payer: MEDICAID

## 2023-07-14 LAB — HCG UR QL: NEGATIVE

## 2023-07-14 PROCEDURE — 8E0W4CZ ROBOTIC ASSISTED PROCEDURE OF TRUNK REGION, PERCUTANEOUS ENDOSCOPIC APPROACH: ICD-10-PCS | Performed by: SURGERY

## 2023-07-14 PROCEDURE — C9113 INJ PANTOPRAZOLE SODIUM, VIA: HCPCS | Performed by: SURGERY

## 2023-07-14 PROCEDURE — 700111 HCHG RX REV CODE 636 W/ 250 OVERRIDE (IP): Performed by: SURGERY

## 2023-07-14 PROCEDURE — 00797 ANES IPER UPR ABD GSTR PX MO: CPT | Performed by: ANESTHESIOLOGY

## 2023-07-14 PROCEDURE — 700111 HCHG RX REV CODE 636 W/ 250 OVERRIDE (IP): Mod: JZ | Performed by: ANESTHESIOLOGY

## 2023-07-14 PROCEDURE — C1729 CATH, DRAINAGE: HCPCS | Performed by: SURGERY

## 2023-07-14 PROCEDURE — 700105 HCHG RX REV CODE 258: Mod: JZ | Performed by: SURGERY

## 2023-07-14 PROCEDURE — 700102 HCHG RX REV CODE 250 W/ 637 OVERRIDE(OP): Performed by: ANESTHESIOLOGY

## 2023-07-14 PROCEDURE — 160009 HCHG ANES TIME/MIN: Performed by: SURGERY

## 2023-07-14 PROCEDURE — A9270 NON-COVERED ITEM OR SERVICE: HCPCS | Performed by: SURGERY

## 2023-07-14 PROCEDURE — A9270 NON-COVERED ITEM OR SERVICE: HCPCS | Performed by: ANESTHESIOLOGY

## 2023-07-14 PROCEDURE — 700101 HCHG RX REV CODE 250: Performed by: SURGERY

## 2023-07-14 PROCEDURE — 81025 URINE PREGNANCY TEST: CPT

## 2023-07-14 PROCEDURE — 700111 HCHG RX REV CODE 636 W/ 250 OVERRIDE (IP): Performed by: ANESTHESIOLOGY

## 2023-07-14 PROCEDURE — 160042 HCHG SURGERY MINUTES - EA ADDL 1 MIN LEVEL 5: Performed by: SURGERY

## 2023-07-14 PROCEDURE — 160048 HCHG OR STATISTICAL LEVEL 1-5: Performed by: SURGERY

## 2023-07-14 PROCEDURE — 88307 TISSUE EXAM BY PATHOLOGIST: CPT

## 2023-07-14 PROCEDURE — 160035 HCHG PACU - 1ST 60 MINS PHASE I: Performed by: SURGERY

## 2023-07-14 PROCEDURE — 160036 HCHG PACU - EA ADDL 30 MINS PHASE I: Performed by: SURGERY

## 2023-07-14 PROCEDURE — 770001 HCHG ROOM/CARE - MED/SURG/GYN PRIV*

## 2023-07-14 PROCEDURE — 700101 HCHG RX REV CODE 250: Performed by: ANESTHESIOLOGY

## 2023-07-14 PROCEDURE — 0BQT4ZZ REPAIR DIAPHRAGM, PERCUTANEOUS ENDOSCOPIC APPROACH: ICD-10-PCS | Performed by: SURGERY

## 2023-07-14 PROCEDURE — 160031 HCHG SURGERY MINUTES - 1ST 30 MINS LEVEL 5: Performed by: SURGERY

## 2023-07-14 PROCEDURE — 700102 HCHG RX REV CODE 250 W/ 637 OVERRIDE(OP): Performed by: SURGERY

## 2023-07-14 PROCEDURE — 0DB64Z3 EXCISION OF STOMACH, PERCUTANEOUS ENDOSCOPIC APPROACH, VERTICAL: ICD-10-PCS | Performed by: SURGERY

## 2023-07-14 PROCEDURE — 502714 HCHG ROBOTIC SURGERY SERVICES: Performed by: SURGERY

## 2023-07-14 PROCEDURE — 160002 HCHG RECOVERY MINUTES (STAT): Performed by: SURGERY

## 2023-07-14 RX ORDER — ALBUTEROL SULFATE 90 UG/1
2 AEROSOL, METERED RESPIRATORY (INHALATION) EVERY 4 HOURS PRN
Status: DISCONTINUED | OUTPATIENT
Start: 2023-07-14 | End: 2023-07-16 | Stop reason: HOSPADM

## 2023-07-14 RX ORDER — HEPARIN SODIUM 5000 [USP'U]/ML
5000 INJECTION, SOLUTION INTRAVENOUS; SUBCUTANEOUS EVERY 8 HOURS
Status: DISCONTINUED | OUTPATIENT
Start: 2023-07-14 | End: 2023-07-16 | Stop reason: HOSPADM

## 2023-07-14 RX ORDER — BACLOFEN 10 MG/1
10 TABLET ORAL 3 TIMES DAILY
Status: DISCONTINUED | OUTPATIENT
Start: 2023-07-14 | End: 2023-07-16 | Stop reason: HOSPADM

## 2023-07-14 RX ORDER — ACETAMINOPHEN 500 MG
1000 TABLET ORAL EVERY 6 HOURS PRN
Status: DISCONTINUED | OUTPATIENT
Start: 2023-07-20 | End: 2023-07-16 | Stop reason: HOSPADM

## 2023-07-14 RX ORDER — HYDROXYCHLOROQUINE SULFATE 200 MG/1
200 TABLET, FILM COATED ORAL DAILY
Status: DISCONTINUED | OUTPATIENT
Start: 2023-07-15 | End: 2023-07-16 | Stop reason: HOSPADM

## 2023-07-14 RX ORDER — PANTOPRAZOLE SODIUM 40 MG/10ML
40 INJECTION, POWDER, LYOPHILIZED, FOR SOLUTION INTRAVENOUS 2 TIMES DAILY
Status: DISCONTINUED | OUTPATIENT
Start: 2023-07-14 | End: 2023-07-16 | Stop reason: HOSPADM

## 2023-07-14 RX ORDER — CEFAZOLIN SODIUM 1 G/3ML
INJECTION, POWDER, FOR SOLUTION INTRAMUSCULAR; INTRAVENOUS PRN
Status: DISCONTINUED | OUTPATIENT
Start: 2023-07-14 | End: 2023-07-14 | Stop reason: SURG

## 2023-07-14 RX ORDER — HYDROMORPHONE HYDROCHLORIDE 1 MG/ML
0.5 INJECTION, SOLUTION INTRAMUSCULAR; INTRAVENOUS; SUBCUTANEOUS
Status: DISCONTINUED | OUTPATIENT
Start: 2023-07-14 | End: 2023-07-16 | Stop reason: HOSPADM

## 2023-07-14 RX ORDER — ONDANSETRON 2 MG/ML
4 INJECTION INTRAMUSCULAR; INTRAVENOUS
Status: DISCONTINUED | OUTPATIENT
Start: 2023-07-14 | End: 2023-07-14 | Stop reason: HOSPADM

## 2023-07-14 RX ORDER — OXYCODONE HCL 5 MG/5 ML
5 SOLUTION, ORAL ORAL
Status: COMPLETED | OUTPATIENT
Start: 2023-07-14 | End: 2023-07-14

## 2023-07-14 RX ORDER — LABETALOL HYDROCHLORIDE 5 MG/ML
INJECTION, SOLUTION INTRAVENOUS PRN
Status: DISCONTINUED | OUTPATIENT
Start: 2023-07-14 | End: 2023-07-14 | Stop reason: SURG

## 2023-07-14 RX ORDER — SIMETHICONE 125 MG
125 TABLET,CHEWABLE ORAL 2 TIMES DAILY
Status: DISCONTINUED | OUTPATIENT
Start: 2023-07-14 | End: 2023-07-16 | Stop reason: HOSPADM

## 2023-07-14 RX ORDER — SODIUM CHLORIDE, SODIUM LACTATE, POTASSIUM CHLORIDE, CALCIUM CHLORIDE 600; 310; 30; 20 MG/100ML; MG/100ML; MG/100ML; MG/100ML
INJECTION, SOLUTION INTRAVENOUS CONTINUOUS
Status: DISCONTINUED | OUTPATIENT
Start: 2023-07-14 | End: 2023-07-14 | Stop reason: HOSPADM

## 2023-07-14 RX ORDER — GABAPENTIN 250 MG/5ML
300 SOLUTION ORAL 3 TIMES DAILY
Status: DISCONTINUED | OUTPATIENT
Start: 2023-07-15 | End: 2023-07-16 | Stop reason: HOSPADM

## 2023-07-14 RX ORDER — HYDROMORPHONE HYDROCHLORIDE 1 MG/ML
0.1 INJECTION, SOLUTION INTRAMUSCULAR; INTRAVENOUS; SUBCUTANEOUS
Status: DISCONTINUED | OUTPATIENT
Start: 2023-07-14 | End: 2023-07-14 | Stop reason: HOSPADM

## 2023-07-14 RX ORDER — HEPARIN SODIUM 5000 [USP'U]/ML
5000 INJECTION, SOLUTION INTRAVENOUS; SUBCUTANEOUS ONCE
Status: COMPLETED | OUTPATIENT
Start: 2023-07-14 | End: 2023-07-14

## 2023-07-14 RX ORDER — DIPHENHYDRAMINE HYDROCHLORIDE 50 MG/ML
12.5 INJECTION INTRAMUSCULAR; INTRAVENOUS
Status: DISCONTINUED | OUTPATIENT
Start: 2023-07-14 | End: 2023-07-14 | Stop reason: HOSPADM

## 2023-07-14 RX ORDER — ONDANSETRON 2 MG/ML
INJECTION INTRAMUSCULAR; INTRAVENOUS PRN
Status: DISCONTINUED | OUTPATIENT
Start: 2023-07-14 | End: 2023-07-14 | Stop reason: SURG

## 2023-07-14 RX ORDER — AMLODIPINE BESYLATE 10 MG/1
10 TABLET ORAL
Status: DISCONTINUED | OUTPATIENT
Start: 2023-07-14 | End: 2023-07-16 | Stop reason: HOSPADM

## 2023-07-14 RX ORDER — ACETAMINOPHEN 500 MG
1000 TABLET ORAL EVERY 6 HOURS
Status: DISCONTINUED | OUTPATIENT
Start: 2023-07-14 | End: 2023-07-16 | Stop reason: HOSPADM

## 2023-07-14 RX ORDER — HYDROMORPHONE HYDROCHLORIDE 1 MG/ML
0.4 INJECTION, SOLUTION INTRAMUSCULAR; INTRAVENOUS; SUBCUTANEOUS
Status: DISCONTINUED | OUTPATIENT
Start: 2023-07-14 | End: 2023-07-14 | Stop reason: HOSPADM

## 2023-07-14 RX ORDER — DEXAMETHASONE SODIUM PHOSPHATE 4 MG/ML
4 INJECTION, SOLUTION INTRA-ARTICULAR; INTRALESIONAL; INTRAMUSCULAR; INTRAVENOUS; SOFT TISSUE EVERY 6 HOURS
Status: DISCONTINUED | OUTPATIENT
Start: 2023-07-15 | End: 2023-07-16 | Stop reason: HOSPADM

## 2023-07-14 RX ORDER — DEXAMETHASONE SODIUM PHOSPHATE 4 MG/ML
INJECTION, SOLUTION INTRA-ARTICULAR; INTRALESIONAL; INTRAMUSCULAR; INTRAVENOUS; SOFT TISSUE PRN
Status: DISCONTINUED | OUTPATIENT
Start: 2023-07-14 | End: 2023-07-14 | Stop reason: SURG

## 2023-07-14 RX ORDER — ROCURONIUM BROMIDE 10 MG/ML
INJECTION, SOLUTION INTRAVENOUS PRN
Status: DISCONTINUED | OUTPATIENT
Start: 2023-07-14 | End: 2023-07-14 | Stop reason: SURG

## 2023-07-14 RX ORDER — LOSARTAN POTASSIUM 25 MG/1
25 TABLET ORAL DAILY
Status: DISCONTINUED | OUTPATIENT
Start: 2023-07-15 | End: 2023-07-16 | Stop reason: HOSPADM

## 2023-07-14 RX ORDER — PROMETHAZINE HYDROCHLORIDE 25 MG/1
25 SUPPOSITORY RECTAL EVERY 4 HOURS PRN
Status: DISCONTINUED | OUTPATIENT
Start: 2023-07-14 | End: 2023-07-16 | Stop reason: HOSPADM

## 2023-07-14 RX ORDER — ATORVASTATIN CALCIUM 40 MG/1
40 TABLET, FILM COATED ORAL NIGHTLY
Status: DISCONTINUED | OUTPATIENT
Start: 2023-07-14 | End: 2023-07-16 | Stop reason: HOSPADM

## 2023-07-14 RX ORDER — HYDROMORPHONE HYDROCHLORIDE 1 MG/ML
0.2 INJECTION, SOLUTION INTRAMUSCULAR; INTRAVENOUS; SUBCUTANEOUS
Status: DISCONTINUED | OUTPATIENT
Start: 2023-07-14 | End: 2023-07-14 | Stop reason: HOSPADM

## 2023-07-14 RX ORDER — ESMOLOL HYDROCHLORIDE 10 MG/ML
INJECTION INTRAVENOUS PRN
Status: DISCONTINUED | OUTPATIENT
Start: 2023-07-14 | End: 2023-07-14 | Stop reason: SURG

## 2023-07-14 RX ORDER — KETOROLAC TROMETHAMINE 30 MG/ML
30 INJECTION, SOLUTION INTRAMUSCULAR; INTRAVENOUS EVERY 6 HOURS
Status: DISCONTINUED | OUTPATIENT
Start: 2023-07-15 | End: 2023-07-16 | Stop reason: HOSPADM

## 2023-07-14 RX ORDER — LABETALOL HYDROCHLORIDE 5 MG/ML
10 INJECTION, SOLUTION INTRAVENOUS EVERY 4 HOURS PRN
Status: DISCONTINUED | OUTPATIENT
Start: 2023-07-14 | End: 2023-07-16 | Stop reason: HOSPADM

## 2023-07-14 RX ORDER — OXYCODONE HCL 5 MG/5 ML
5 SOLUTION, ORAL ORAL
Status: DISCONTINUED | OUTPATIENT
Start: 2023-07-14 | End: 2023-07-16 | Stop reason: HOSPADM

## 2023-07-14 RX ORDER — ONDANSETRON 2 MG/ML
4 INJECTION INTRAMUSCULAR; INTRAVENOUS EVERY 6 HOURS
Status: DISCONTINUED | OUTPATIENT
Start: 2023-07-15 | End: 2023-07-16 | Stop reason: HOSPADM

## 2023-07-14 RX ORDER — OXYCODONE HCL 5 MG/5 ML
10 SOLUTION, ORAL ORAL
Status: DISCONTINUED | OUTPATIENT
Start: 2023-07-14 | End: 2023-07-16 | Stop reason: HOSPADM

## 2023-07-14 RX ORDER — BUPIVACAINE HYDROCHLORIDE AND EPINEPHRINE 5; 5 MG/ML; UG/ML
INJECTION, SOLUTION EPIDURAL; INTRACAUDAL; PERINEURAL
Status: DISCONTINUED | OUTPATIENT
Start: 2023-07-14 | End: 2023-07-14 | Stop reason: HOSPADM

## 2023-07-14 RX ORDER — SODIUM CHLORIDE, SODIUM LACTATE, POTASSIUM CHLORIDE, CALCIUM CHLORIDE 600; 310; 30; 20 MG/100ML; MG/100ML; MG/100ML; MG/100ML
INJECTION, SOLUTION INTRAVENOUS CONTINUOUS
Status: DISCONTINUED | OUTPATIENT
Start: 2023-07-14 | End: 2023-07-16 | Stop reason: HOSPADM

## 2023-07-14 RX ORDER — SCOLOPAMINE TRANSDERMAL SYSTEM 1 MG/1
1 PATCH, EXTENDED RELEASE TRANSDERMAL
Status: DISCONTINUED | OUTPATIENT
Start: 2023-07-14 | End: 2023-07-16 | Stop reason: HOSPADM

## 2023-07-14 RX ORDER — SODIUM CHLORIDE, SODIUM LACTATE, POTASSIUM CHLORIDE, CALCIUM CHLORIDE 600; 310; 30; 20 MG/100ML; MG/100ML; MG/100ML; MG/100ML
INJECTION, SOLUTION INTRAVENOUS CONTINUOUS
Status: ACTIVE | OUTPATIENT
Start: 2023-07-14 | End: 2023-07-14

## 2023-07-14 RX ORDER — OXYCODONE HCL 5 MG/5 ML
10 SOLUTION, ORAL ORAL
Status: COMPLETED | OUTPATIENT
Start: 2023-07-14 | End: 2023-07-14

## 2023-07-14 RX ADMIN — HEPARIN SODIUM 5000 UNITS: 5000 INJECTION, SOLUTION INTRAVENOUS; SUBCUTANEOUS at 21:58

## 2023-07-14 RX ADMIN — HYDROMORPHONE HYDROCHLORIDE 0.2 MG: 1 INJECTION, SOLUTION INTRAMUSCULAR; INTRAVENOUS; SUBCUTANEOUS at 19:24

## 2023-07-14 RX ADMIN — FENTANYL CITRATE 50 MCG: 50 INJECTION, SOLUTION INTRAMUSCULAR; INTRAVENOUS at 18:40

## 2023-07-14 RX ADMIN — ROCURONIUM BROMIDE 50 MG: 50 INJECTION, SOLUTION INTRAVENOUS at 17:10

## 2023-07-14 RX ADMIN — FENTANYL CITRATE 100 MCG: 50 INJECTION, SOLUTION INTRAMUSCULAR; INTRAVENOUS at 18:07

## 2023-07-14 RX ADMIN — CEFAZOLIN 2 G: 1 INJECTION, POWDER, FOR SOLUTION INTRAMUSCULAR; INTRAVENOUS at 17:14

## 2023-07-14 RX ADMIN — ROCURONIUM BROMIDE 10 MG: 50 INJECTION, SOLUTION INTRAVENOUS at 17:51

## 2023-07-14 RX ADMIN — FENTANYL CITRATE 50 MCG: 50 INJECTION, SOLUTION INTRAMUSCULAR; INTRAVENOUS at 18:50

## 2023-07-14 RX ADMIN — HYDROMORPHONE HYDROCHLORIDE 0.4 MG: 1 INJECTION, SOLUTION INTRAMUSCULAR; INTRAVENOUS; SUBCUTANEOUS at 19:34

## 2023-07-14 RX ADMIN — HYDROMORPHONE HYDROCHLORIDE 0.4 MG: 1 INJECTION, SOLUTION INTRAMUSCULAR; INTRAVENOUS; SUBCUTANEOUS at 19:42

## 2023-07-14 RX ADMIN — ONDANSETRON 8 MG: 2 INJECTION INTRAMUSCULAR; INTRAVENOUS at 18:07

## 2023-07-14 RX ADMIN — ESMOLOL HYDROCHLORIDE 50 MG: 100 INJECTION, SOLUTION INTRAVENOUS at 17:09

## 2023-07-14 RX ADMIN — HYDROMORPHONE HYDROCHLORIDE 0.4 MG: 1 INJECTION, SOLUTION INTRAMUSCULAR; INTRAVENOUS; SUBCUTANEOUS at 19:12

## 2023-07-14 RX ADMIN — PANTOPRAZOLE SODIUM 40 MG: 40 INJECTION, POWDER, FOR SOLUTION INTRAVENOUS at 21:57

## 2023-07-14 RX ADMIN — LABETALOL HYDROCHLORIDE 10 MG: 5 INJECTION, SOLUTION INTRAVENOUS at 17:26

## 2023-07-14 RX ADMIN — OXYCODONE HYDROCHLORIDE 10 MG: 5 SOLUTION ORAL at 21:55

## 2023-07-14 RX ADMIN — SUGAMMADEX 200 MG: 100 INJECTION, SOLUTION INTRAVENOUS at 18:21

## 2023-07-14 RX ADMIN — HYDROMORPHONE HYDROCHLORIDE 0.4 MG: 1 INJECTION, SOLUTION INTRAMUSCULAR; INTRAVENOUS; SUBCUTANEOUS at 19:05

## 2023-07-14 RX ADMIN — SODIUM CHLORIDE, POTASSIUM CHLORIDE, SODIUM LACTATE AND CALCIUM CHLORIDE: 600; 310; 30; 20 INJECTION, SOLUTION INTRAVENOUS at 17:00

## 2023-07-14 RX ADMIN — ESMOLOL HYDROCHLORIDE 20 MG: 100 INJECTION, SOLUTION INTRAVENOUS at 17:23

## 2023-07-14 RX ADMIN — FENTANYL CITRATE 50 MCG: 50 INJECTION, SOLUTION INTRAMUSCULAR; INTRAVENOUS at 17:24

## 2023-07-14 RX ADMIN — OXYCODONE HYDROCHLORIDE 10 MG: 5 SOLUTION ORAL at 19:28

## 2023-07-14 RX ADMIN — DEXAMETHASONE SODIUM PHOSPHATE 8 MG: 4 INJECTION INTRA-ARTICULAR; INTRALESIONAL; INTRAMUSCULAR; INTRAVENOUS; SOFT TISSUE at 17:15

## 2023-07-14 RX ADMIN — FENTANYL CITRATE 100 MCG: 50 INJECTION, SOLUTION INTRAMUSCULAR; INTRAVENOUS at 18:21

## 2023-07-14 RX ADMIN — SIMETHICONE 125 MG: 125 TABLET, CHEWABLE ORAL at 21:56

## 2023-07-14 RX ADMIN — PROPOFOL 200 MG: 10 INJECTION, EMULSION INTRAVENOUS at 17:09

## 2023-07-14 RX ADMIN — SCOPOLAMINE 1 PATCH: 1.5 PATCH, EXTENDED RELEASE TRANSDERMAL at 13:11

## 2023-07-14 RX ADMIN — SODIUM CHLORIDE, POTASSIUM CHLORIDE, SODIUM LACTATE AND CALCIUM CHLORIDE: 600; 310; 30; 20 INJECTION, SOLUTION INTRAVENOUS at 22:03

## 2023-07-14 RX ADMIN — HEPARIN SODIUM 5000 UNITS: 5000 INJECTION, SOLUTION INTRAVENOUS; SUBCUTANEOUS at 13:11

## 2023-07-14 RX ADMIN — HYDROMORPHONE HYDROCHLORIDE 0.2 MG: 1 INJECTION, SOLUTION INTRAMUSCULAR; INTRAVENOUS; SUBCUTANEOUS at 20:02

## 2023-07-14 RX ADMIN — FENTANYL CITRATE 100 MCG: 50 INJECTION, SOLUTION INTRAMUSCULAR; INTRAVENOUS at 17:00

## 2023-07-14 RX ADMIN — ROCURONIUM BROMIDE 20 MG: 50 INJECTION, SOLUTION INTRAVENOUS at 17:35

## 2023-07-14 ASSESSMENT — PAIN DESCRIPTION - PAIN TYPE
TYPE: SURGICAL PAIN

## 2023-07-14 ASSESSMENT — PAIN SCALES - GENERAL: PAIN_LEVEL: 1

## 2023-07-14 ASSESSMENT — FIBROSIS 4 INDEX: FIB4 SCORE: 0.78

## 2023-07-14 NOTE — ANESTHESIA PREPROCEDURE EVALUATION
Case: [id number] Date/Time: 07/14/23 1415    Procedure: ROBOTIC SLEEVE GASTRECTOMY, HIATAL HERNIA REPAIR    Pre-op diagnosis: OBESITY, HIATAL HERNIA, SLEEP APNEA    Location: [place] [address] / SURGERY [facility]    Surgeons: Olivier Oliveros M.D.          Relevant Problems   Other   (positive) Enlargement - tonsil/adenoid       Physical Exam    Airway   Mallampati: III  TM distance: >3 FB  Neck ROM: full       Cardiovascular - normal exam  Rhythm: regular  Rate: normal  (-) murmur     Dental - normal exam           Pulmonary - normal exam  Breath sounds clear to auscultation     Abdominal    Neurological - normal exam                 Anesthesia Plan    ASA 3   ASA physical status 3 criteria: a thrombophilic disease requiring anticoagulation    Plan - general       Airway plan will be ETT          Induction: intravenous    Postoperative Plan: Postoperative administration of opioids is intended.    Pertinent diagnostic labs and testing reviewed    Informed Consent:    Anesthetic plan and risks discussed with patient.    Use of blood products discussed with: patient whom consented to blood products.

## 2023-07-14 NOTE — OP REPORT
[facility]    OPERATIVE REPORT         DATE OF OPERATION: 7/14/2023    SURGEON: Olivier Oliveros MD MPH FACS [facility]    ASSISTANT(S): None    ANESTHESIOLOGIST(S): Reagan Conway MD    ANESTHESIA: General with local anesthetic    PREOPERATIVE DIAGNOSES:   Obesity, class II (BMI of 37.8 kg/m2)  HTN, FRANK on CPAP, hypercholesterolemia, and GERD    POSTOPERATIVE DIAGNOSES:   Obesity, class II (BMI of 37.8 kg/m2)  HTN, FRANK on CPAP, hypercholesterolemia, and GERD    OPERATION(S) PERFORMED: Robotic sleeve gastrectomy and posterior hiatal cruroplasty    ESTIMATED BLOOD LOSS: 10 ml    SPECIMEN(S): Sleeve gastrectomy    COMPLICATIONS: None    BACKGROUND: The patient is a 42 y.o. female with a diagnosis of obesity (class II), whose current Body mass index is 37.75 kg/m². Her comorbidities include: HTN, FRANK on CPAP, hypercholesterolemia, and GERD. The patient has failed multiple attempts at non-surgical weight loss. Thus, she presents to undergo a robotic vs laparoscopic sleeve gastrectomy, and possible hiatal cruroplasty, for treatment of morbid obesity.     We discussed the risks of surgery including infection, bleeding, need for transfusion, blood clot formation, pulmonary embolus, pneumonia, leak or perforation, recurrence of symptoms, and death. In addition, the risks of general anesthetic were discussed, including MI, CVA, reaction to anesthetic medications, or sudden death. The patient understands all of the above risks and all questions were answered to her satisfaction.    OPERATIVE FINDINGS: During this procedure, a small, sliding hiatal hernia (type I) was identified. Otherwise, gastric anatomy was normal.    OPERATIVE PROCEDURE: Following obtaining informed consent, the patient was brought to the operating room and placed in the supine position. A preoperative dose of antibiotics was administered, as well as, subcutaneous heparin in the preoperative holding area. General anesthesia was  "smoothly induced. The abdomen was prepped and draped in the usual sterile fashion. Following a formal time-out and site verification, the procedure was undertaken.    A small skin incision was created in the left upper quadrant, at the \"Abarca's point\", and a Veress needle was inserted without incident. The saline drip test was negative. Pneumoperitoneum was created with CO2 to 15 mmHg pressure without any physiologic derangements. A 5 mm blunt tipped working port was placed approximately 14 cm below the xyphoid and to the left of umbilicus. It was placed without incident and a 0 degree 5 mm laparoscope was introduced into the abdomen with its camera attached. No injuries were noted from initial or subsequent trocar placement. Four robotic trocars - three 8 mm and one 12 mm - were placed in the bilateral upper quadrants, all under direct vision. An incision was made below the xiphoid process, the Kishore liver retractor was inserted and the left lobe of liver was elevated. The patient was placed in a reverse Trendelenburg position. The Evocalize Xi robotic system was then successfully docked above the patient's epigastrium and the remainder of this operation was performed with assistance of the robot.    Beginning 5 cm proximal to the pylorus, and staying along the greater curvature, the Vessel sealer was used to transect the greater omentum and short gastric vessels up to the gastroesophageal junction and angle of His. All retroperitoneal attachments to the posterior wall of stomach were also transected by using the Vessel sealer. Special care was undertaken to avoid the patient's left gastric artery and splenic vessels. Dissection of the gastroesophageal junction was then undertaken. The right and left crura were identified. An approximately 3-cm hiatal hernia was found. This was repaired with two interrupted #0 Ethibond suture(s) posterior to the esophagus to reapproximate the right and left crura.    Robotic " SureForm [place] 60-mm stapling device was then inserted after a 40-Tajik bougie was passed into the stomach. The sleeve gastrectomy was initiated with a blue cartridge load, starting ~5 cm proximal to the pylorus and proceeding along the greater curvature. The stomach was sequentially stapled with additional white loads, progressing around the 40-Tajik bougie tube, until the sleeve was completely free from the remnant stomach. The staple line was inspected and found to be intact. The previously transected omentum was then buttressed to several sites along the staple line by using a 2-0 Vicryl suture. A leak test was performed by instilling saline around the staple line and insufflating the stomach with ~ 200 ml of air via the orogastric tube. The leak test was negative. The EndoCatch bag was then inserted around the remnant stomach, which was placed into the bag and removed from the abdominal cavity. The fascia at the 12-mm trocar was then reapproximated by using a laparoscopic suture passer and 0-Vicryl suture. The skin at all trocar sites was reapproximated using 4-0 Monocryl. Local anesthetic was injected into all trocar sites. The abdomen was cleaned and dried. Dermabond sterile skin glue was applied to all incisions. The patient was then awakened from anesthesia and transferred to the Post Anesthesia Care Unit in a stable condition. At the conclusion of the case, the sponge, needle, and instrument counts were correct x 2.    Dr. Oliveros was present and scrubbed throughout the entirety of this case.

## 2023-07-15 LAB
ANION GAP SERPL CALC-SCNC: 14 MMOL/L (ref 7–16)
BUN SERPL-MCNC: 5 MG/DL (ref 8–22)
CALCIUM SERPL-MCNC: 8.7 MG/DL (ref 8.5–10.5)
CHLORIDE SERPL-SCNC: 103 MMOL/L (ref 96–112)
CO2 SERPL-SCNC: 20 MMOL/L (ref 20–33)
CREAT SERPL-MCNC: 0.62 MG/DL (ref 0.5–1.4)
ERYTHROCYTE [DISTWIDTH] IN BLOOD BY AUTOMATED COUNT: 42.9 FL (ref 35.9–50)
GFR SERPLBLD CREATININE-BSD FMLA CKD-EPI: 113 ML/MIN/1.73 M 2
GLUCOSE SERPL-MCNC: 135 MG/DL (ref 65–99)
HCT VFR BLD AUTO: 41 % (ref 37–47)
HGB BLD-MCNC: 13.6 G/DL (ref 12–16)
MCH RBC QN AUTO: 29.2 PG (ref 27–33)
MCHC RBC AUTO-ENTMCNC: 33.2 G/DL (ref 32.2–35.5)
MCV RBC AUTO: 88.2 FL (ref 81.4–97.8)
PATHOLOGY CONSULT NOTE: NORMAL
PLATELET # BLD AUTO: 180 K/UL (ref 164–446)
PMV BLD AUTO: 10 FL (ref 9–12.9)
POTASSIUM SERPL-SCNC: 4.2 MMOL/L (ref 3.6–5.5)
RBC # BLD AUTO: 4.65 M/UL (ref 4.2–5.4)
SODIUM SERPL-SCNC: 137 MMOL/L (ref 135–145)
WBC # BLD AUTO: 8.8 K/UL (ref 4.8–10.8)

## 2023-07-15 PROCEDURE — 700111 HCHG RX REV CODE 636 W/ 250 OVERRIDE (IP): Performed by: SURGERY

## 2023-07-15 PROCEDURE — 36415 COLL VENOUS BLD VENIPUNCTURE: CPT

## 2023-07-15 PROCEDURE — 770001 HCHG ROOM/CARE - MED/SURG/GYN PRIV*

## 2023-07-15 PROCEDURE — 700102 HCHG RX REV CODE 250 W/ 637 OVERRIDE(OP): Performed by: SURGERY

## 2023-07-15 PROCEDURE — A9270 NON-COVERED ITEM OR SERVICE: HCPCS | Performed by: SURGERY

## 2023-07-15 PROCEDURE — C9113 INJ PANTOPRAZOLE SODIUM, VIA: HCPCS | Performed by: SURGERY

## 2023-07-15 PROCEDURE — 80048 BASIC METABOLIC PNL TOTAL CA: CPT

## 2023-07-15 PROCEDURE — 85027 COMPLETE CBC AUTOMATED: CPT

## 2023-07-15 RX ADMIN — BACLOFEN 10 MG: 10 TABLET ORAL at 20:11

## 2023-07-15 RX ADMIN — ACETAMINOPHEN 1000 MG: 500 TABLET, FILM COATED ORAL at 13:52

## 2023-07-15 RX ADMIN — HEPARIN SODIUM 5000 UNITS: 5000 INJECTION, SOLUTION INTRAVENOUS; SUBCUTANEOUS at 05:49

## 2023-07-15 RX ADMIN — AMLODIPINE BESYLATE 10 MG: 10 TABLET ORAL at 20:10

## 2023-07-15 RX ADMIN — KETOROLAC TROMETHAMINE 30 MG: 30 INJECTION, SOLUTION INTRAMUSCULAR; INTRAVENOUS at 00:59

## 2023-07-15 RX ADMIN — DEXAMETHASONE SODIUM PHOSPHATE 4 MG: 4 INJECTION, SOLUTION INTRAMUSCULAR; INTRAVENOUS at 13:54

## 2023-07-15 RX ADMIN — KETOROLAC TROMETHAMINE 30 MG: 30 INJECTION, SOLUTION INTRAMUSCULAR; INTRAVENOUS at 18:43

## 2023-07-15 RX ADMIN — HEPARIN SODIUM 5000 UNITS: 5000 INJECTION, SOLUTION INTRAVENOUS; SUBCUTANEOUS at 20:08

## 2023-07-15 RX ADMIN — KETOROLAC TROMETHAMINE 30 MG: 30 INJECTION, SOLUTION INTRAMUSCULAR; INTRAVENOUS at 12:15

## 2023-07-15 RX ADMIN — OXYCODONE HYDROCHLORIDE 10 MG: 5 SOLUTION ORAL at 12:17

## 2023-07-15 RX ADMIN — GABAPENTIN 300 MG: 250 SOLUTION ORAL at 06:01

## 2023-07-15 RX ADMIN — ONDANSETRON 4 MG: 2 INJECTION INTRAMUSCULAR; INTRAVENOUS at 12:13

## 2023-07-15 RX ADMIN — GABAPENTIN 300 MG: 250 SOLUTION ORAL at 20:13

## 2023-07-15 RX ADMIN — ONDANSETRON 4 MG: 2 INJECTION INTRAMUSCULAR; INTRAVENOUS at 00:59

## 2023-07-15 RX ADMIN — BACLOFEN 10 MG: 10 TABLET ORAL at 05:36

## 2023-07-15 RX ADMIN — HYDROXYCHLOROQUINE SULFATE 200 MG: 200 TABLET ORAL at 07:00

## 2023-07-15 RX ADMIN — HEPARIN SODIUM 5000 UNITS: 5000 INJECTION, SOLUTION INTRAVENOUS; SUBCUTANEOUS at 13:57

## 2023-07-15 RX ADMIN — DEXAMETHASONE SODIUM PHOSPHATE 4 MG: 4 INJECTION, SOLUTION INTRAMUSCULAR; INTRAVENOUS at 06:08

## 2023-07-15 RX ADMIN — ATORVASTATIN CALCIUM 40 MG: 40 TABLET, FILM COATED ORAL at 20:10

## 2023-07-15 RX ADMIN — DEXAMETHASONE SODIUM PHOSPHATE 4 MG: 4 INJECTION, SOLUTION INTRAMUSCULAR; INTRAVENOUS at 00:59

## 2023-07-15 RX ADMIN — PANTOPRAZOLE SODIUM 40 MG: 40 INJECTION, POWDER, FOR SOLUTION INTRAVENOUS at 05:55

## 2023-07-15 RX ADMIN — OXYCODONE HYDROCHLORIDE 10 MG: 5 SOLUTION ORAL at 05:46

## 2023-07-15 RX ADMIN — ONDANSETRON 4 MG: 2 INJECTION INTRAMUSCULAR; INTRAVENOUS at 18:41

## 2023-07-15 RX ADMIN — ACETAMINOPHEN 1000 MG: 500 TABLET, FILM COATED ORAL at 05:37

## 2023-07-15 RX ADMIN — BACLOFEN 10 MG: 10 TABLET ORAL at 13:53

## 2023-07-15 RX ADMIN — KETOROLAC TROMETHAMINE 30 MG: 30 INJECTION, SOLUTION INTRAMUSCULAR; INTRAVENOUS at 06:00

## 2023-07-15 RX ADMIN — OXYCODONE HYDROCHLORIDE 10 MG: 5 SOLUTION ORAL at 23:24

## 2023-07-15 RX ADMIN — GABAPENTIN 300 MG: 250 SOLUTION ORAL at 13:56

## 2023-07-15 RX ADMIN — ACETAMINOPHEN 1000 MG: 500 TABLET, FILM COATED ORAL at 20:11

## 2023-07-15 RX ADMIN — DEXAMETHASONE SODIUM PHOSPHATE 4 MG: 4 INJECTION, SOLUTION INTRAMUSCULAR; INTRAVENOUS at 23:21

## 2023-07-15 RX ADMIN — SIMETHICONE 125 MG: 125 TABLET, CHEWABLE ORAL at 05:37

## 2023-07-15 RX ADMIN — KETOROLAC TROMETHAMINE 30 MG: 30 INJECTION, SOLUTION INTRAMUSCULAR; INTRAVENOUS at 23:19

## 2023-07-15 RX ADMIN — ACETAMINOPHEN 1000 MG: 500 TABLET, FILM COATED ORAL at 23:18

## 2023-07-15 RX ADMIN — SIMETHICONE 125 MG: 125 TABLET, CHEWABLE ORAL at 20:10

## 2023-07-15 RX ADMIN — PANTOPRAZOLE SODIUM 40 MG: 40 INJECTION, POWDER, FOR SOLUTION INTRAVENOUS at 18:45

## 2023-07-15 RX ADMIN — LOSARTAN POTASSIUM 25 MG: 25 TABLET, FILM COATED ORAL at 05:38

## 2023-07-15 RX ADMIN — ONDANSETRON 4 MG: 2 INJECTION INTRAMUSCULAR; INTRAVENOUS at 23:20

## 2023-07-15 RX ADMIN — ONDANSETRON 4 MG: 2 INJECTION INTRAMUSCULAR; INTRAVENOUS at 05:51

## 2023-07-15 ASSESSMENT — PAIN DESCRIPTION - PAIN TYPE
TYPE: SURGICAL PAIN
TYPE: ACUTE PAIN;SURGICAL PAIN
TYPE: SURGICAL PAIN
TYPE: ACUTE PAIN;SURGICAL PAIN
TYPE: SURGICAL PAIN

## 2023-07-15 NOTE — OR NURSING
1835 Pt arrived from OR in a gurney. ID band verified. Report received from OR nurse and anesthesia. Pt is on telemonitor.     2000 Son given an update.    2030 Report given to floor RN. Pt will be transferred to room on 2L NC by transport.

## 2023-07-15 NOTE — DISCHARGE SUMMARY
Discharge Summary    CHIEF COMPLAINT ON ADMISSION  Obesity, class II      Reason for Admission  Obesity, class II (BMI of 37.8 kg/m2)  HTN, FRANK on CPAP, hypercholesterolemia, and GERD    Admission Date  7/14/2023    CODE STATUS  Full Code    HPI & HOSPITAL COURSE  This is a 42 y.o. female here with morbid obesity and HTN, FRANK on CPAP, hypercholesterolemia, and GERD, who underwent a robotic sleeve gastrectomy and posterior hiatal cruroplasty on 7/14/2023. Her postoperative recovery was unremarkable - by POD #2, symptoms of pain and nausea were well controlled, patient was tolerating sips of clear liquids and water, ambulating in the hallways w/o difficulties, and voiding spontaneously. Therefore, she is discharged in good and stable condition to home with close outpatient follow-up.    The patient recovered much more quickly than anticipated on admission.    Discharge Date  7/16/2023    FOLLOW UP ITEMS POST DISCHARGE  None    DISCHARGE DIAGNOSES   Obesity, class II (BMI of 37.8 kg/m2)  HTN, FRANK on CPAP, hypercholesterolemia, and GERD    FOLLOW UP  Future Appointments   Date Time Provider Department Center   11/10/2023  1:20 PM Scott Acuña M.D. [facility] None   11/27/2023  4:00 PM JONATHON Adler     No follow-up provider specified.    MEDICATIONS ON DISCHARGE     Medication List        CONTINUE taking these medications        Instructions   albuterol 108 (90 Base) MCG/ACT Aers inhalation aerosol   Doctor's comments: Give albuterol that is patient or insurance preference please  Inhale 2 Puffs every four hours as needed for Shortness of Breath.  Dose: 2 Puff     amLODIPine 10 MG Tabs  Commonly known as: Norvasc   TAKE 1 TABLET BY MOUTH EVERY DAY AT BEDTIME  DAYS     atorvastatin 40 MG Tabs  Commonly known as: Lipitor   Take 1 Tablet by mouth every evening.  Dose: 40 mg     hydroxychloroquine 200 MG Tabs  Commonly known as: Plaquenil   Take 200 mg by mouth every day.  Dose: 200 mg      losartan 25 MG Tabs  Commonly known as: Cozaar   Take 1 Tablet by mouth every day for 360 days.  Dose: 25 mg     omeprazole 40 MG delayed-release capsule  Commonly known as: PriLOSEC   Take 1 Capsule by mouth every day.  Dose: 40 mg     sucralfate 1 GM Tabs  Commonly known as: Carafate   CRUSH AND MIX 1 TABLET WITH 30 ML OF WATER AND TAKE BY MOUTH 4 TIMES DAILY AS NEEDED            STOP taking these medications      aspirin 81 MG EC tablet              Allergies  Allergies   Allergen Reactions    Latex Rash and Itching    Duloxetine      HA, dizz     Nifedipine Vomiting     Headache, vomiting    Sildenafil      HA, nausea       DIET  Orders Placed This Encounter   Procedures    Diet Order Diet: Clear Liquid; Miscellaneous modifications: (optional): Bariatric; Tray Modifications (optional): No Straws     Standing Status:   Standing     Number of Occurrences:   1     Order Specific Question:   Diet:     Answer:   Clear Liquid [10]     Order Specific Question:   Miscellaneous modifications: (optional)     Answer:   Bariatric [3]     Order Specific Question:   Tray Modifications (optional)     Answer:   No Straws       ACTIVITY  Light duty.  Weight bearing as tolerated    CONSULTATIONS  None    PROCEDURES  Robotic sleeve gastrectomy and posterior hiatal cruroplasty on 7/14/2023.    LABORATORY  Lab Results   Component Value Date    SODIUM 139 07/07/2023    POTASSIUM 4.1 07/07/2023    CHLORIDE 104 07/07/2023    CO2 24 07/07/2023    GLUCOSE 98 07/07/2023    BUN 9 07/07/2023    CREATININE 0.79 07/07/2023    CREATININE 0.9 01/04/2008        Lab Results   Component Value Date    WBC 4.6 (L) 07/07/2023    HEMOGLOBIN 14.4 07/07/2023    HEMATOCRIT 43.5 07/07/2023    PLATELETCT 198 07/07/2023        Total time of the discharge process exceeds 35 minutes.

## 2023-07-15 NOTE — PROGRESS NOTES
"[facility]  Progress Note      POD #1, s/p robotic sleeve gastrectomy and posterior hiatal cruroplasty.    Subjective:     Reports feeling reasonably well, overall. Notes a moderate amount of incisional/abdominal pain (most notably in RUQ), some nausea, early satiety and dysphagia with liquids. Otherwise, no recent history of fevers/chills, vomiting, hematemesis, odynophagia, CP/SOB, bloating/belching, worsening abdominal pain, dysuria/hematuria, BRBPR/melena, or constipation/diarrhea. No other symptoms or complaints at this time. Tolerating sips of clear liquids and water, ambulating in the hallways w/o difficulties and voiding spontaneously.    Objective:    BP (!) 140/74   Pulse 91   Temp 36.4 °C (97.5 °F) (Temporal)   Resp 17   Ht 1.651 m (5' 5\")   Wt 103 kg (226 lb 13.7 oz)   SpO2 94%     I/O last 3 completed shifts:  In: -   Out: 1900 [Urine:1900]    Current Facility-Administered Medications   Medication Dose    scopolamine (Transderm-Scop) patch 1 Patch  1 Patch    albuterol inhaler 2 Puff  2 Puff    amLODIPine (Norvasc) tablet 10 mg  10 mg    atorvastatin (Lipitor) tablet 40 mg  40 mg    hydroxychloroquine (Plaquenil) tablet 200 mg  200 mg    losartan (Cozaar) tablet 25 mg  25 mg    lactated ringers infusion      heparin injection 5,000 Units  5,000 Units    Pharmacy Consult Request ...Pain Management Review 1 Each  1 Each    acetaminophen (Tylenol) tablet 1,000 mg  1,000 mg    Followed by    [START ON 7/20/2023] acetaminophen (Tylenol) tablet 1,000 mg  1,000 mg    ketorolac (Toradol) injection 30 mg  30 mg    oxyCODONE (Roxicodone) oral solution 5 mg  5 mg    Or    oxyCODONE (Roxicodone) oral solution 10 mg  10 mg    Or    HYDROmorphone (Dilaudid) injection 0.5 mg  0.5 mg    ondansetron (Zofran) syringe/vial injection 4 mg  4 mg    promethazine (Phenergan) suppository 25 mg  25 mg    simethicone (Mylicon) chewable tablet 125 mg  125 mg    benzocaine-menthol (Cepacol) lozenge 1 Lozenge  " 1 Lozenge    pantoprazole (Protonix) injection 40 mg  40 mg    baclofen (Lioresal) tablet 10 mg  10 mg    gabapentin (Neurontin) 250 MG/5ML 300 mg  300 mg    dexamethasone (Decadron) injection 4 mg  4 mg    labetalol (Normodyne/Trandate) injection 10 mg  10 mg     Physical Exam:     Gen - comfortable, NAD, A&O x 3  HEENT - anicteric, PERRLA  CV - regular rate and rhythm  Abd - soft, + min TTP @ epigastrium and RUQ, no rebound/guarding, no distention, no palp masses or bulges  Inc - all lap incisions are C/D/I - no evidence of infection or bulges; + min bruising at all incisions  Ext - no clubbing, cyanosis or edema bilaterally  Skin - no rashes/lesions, no open wounds, no jaundice    Labs:        No results for input(s): SODIUM, POTASSIUM, CHLORIDE, CO2, GLUCOSE, BUN, CPKTOTAL in the last 72 hours.  Imaging:    No results found.    Assessment/Plan:    Obesity, class II (BMI of 37.8 kg/m2)  HTN, FRANK on CPAP, hypercholesterolemia, and GERD    Now, POD #1, s/p robotic sleeve gastrectomy and posterior hiatal cruroplasty.    Postoperative recovery is progressing as expected. Pain and nausea are reasonably well controlled. Tolerating sips of clear liquids and water, ambulating in the hallways w/o difficulties, and voiding spontaneously.    Will encourage additional PO intake this morning, to ensure that patient is able to stay hydrated on her own. Encourage ambulation and usage of ICS frequently throughout the day. If patient continues to recover well, she'll be stable for discharge home either later today or tomorrow AM, with close follow up in the Surgery clinic (appointment has been scheduled already).    Thank you for giving us this opportunity to care for this patient.    Olivier Oliveros MD MPH FACS [facility]  Bariatric and Gastroesophageal Surgery  [facility]  Clinical Assistant Professor of Surgery  [facility]

## 2023-07-15 NOTE — ANESTHESIA PROCEDURE NOTES
Airway    Date/Time: 7/14/2023 5:12 PM    Performed by: Reagan Conway M.D.  Authorized by: Reagan Conway M.D.    Location:  OR  Urgency:  Elective  Indications for Airway Management:  Anesthesia      Spontaneous Ventilation: absent    Sedation Level:  Deep  Preoxygenated: Yes    Patient Position:  Sniffing  Final Airway Type:  Endotracheal airway  Final Endotracheal Airway:  ETT  Cuffed: Yes    Technique Used for Successful ETT Placement:  Direct laryngoscopy    Insertion Site:  Oral  Blade Type:  Marely  Laryngoscope Blade/Videolaryngoscope Blade Size:  3  ETT Size (mm):  7.0  Measured from:  Teeth  ETT to Teeth (cm):  21  Placement Verified by: auscultation and capnometry    Cormack-Lehane Classification:  Grade I - full view of glottis  Number of Attempts at Approach:  1   Soft bite block

## 2023-07-15 NOTE — DISCHARGE INSTRUCTIONS
[facility]  Post Weight-Loss Surgery Discharge Instructions      1. DIET: Follow the diet progression detailed in your post-op booklet. Progressing as instructed will make for a smooth transition after surgery and prevent any discomfort, associated with eating foods before your stomach is ready, or eating too much. Drinking water for hydration and protein shakes are much more important than food intake in the first week or two after surgery. Drink enough water to keep your urine pale yellow. Increase water intake if your urine is a darker yellow or if have burning with urination. Occasional nausea and vomiting in the immediate post-operative period are normal. Please take your anti-nausea medication (e.g. “Zofran” or ondansetron) as instructed and continue to stay hydrated.    2. SUPPLEMENTS: Start your supplements 1-2 weeks after surgery. You may need to cut some supplements in half, or crush them, initially. Follow the guidelines from your supplement handout given to you during your pre-op class.     3. ACTIVITIES: After discharge from the hospital, you may resume full routine activities. However, there should be no heavy lifting (greater than 15 pounds) and no strenuous activities until after your follow-up visit. Otherwise, routine activities of daily living are acceptable. More movement and walking after surgery will speed up your recovery process.    4. DRIVING: You may drive whenever you are off narcotic pain medications and are able to perform the activities needed to drive, i.e. turning, bending, twisting, etc, without significant pain.    5. BATHING AND WOUND CARE: You may get your incisions wet 24 hours after surgery. You may shower, but do not submerge in a bath, hot tub, or swimming pool for at least two weeks. If present, dressings may come off after 24 hours. Your incisions are covered with special skin glue - it should peel off on its own within the next few weeks. If you have  steri-strips covering your incisions, they will fall off over the next few weeks. You may notice some clear or slightly bloody drainage from your wounds and there may be some mild redness or bruising around your incision sites. This is normal.    6. BOWEL FUNCTION: Constipation is common after this operation, especially with narcotic pain medications. The combination of pain medication, dehydration, and decreased activity levels can cause constipation in otherwise normal patients. If you feel this is occurring, take a stool softener (Colace, Senna, etc.) or a laxative (Milk of Magnesia, Miralax, etc.) until the problem has resolved. Diarrhea, to a lesser degree, in the first few days after surgery can also be normal. You may notice that it is dark in color or see blood, which is also normal, and should subside in the first week post-op.    7. HOME MEDICATION/PAIN MEDICATION: You may resume all home medications as prescribed, except for your diabetic medications. Please reach out to your Primary Care Physician or Endocrinologist to discuss readjusting your diabetic medications. You have been given a prescription for pain medication preoperatively. Please take these as directed. It is important to remember not to take medications on an empty stomach as this may cause nausea. For minimal discomfort you may use liquid Tylenol 650 mg every 4 hours. DO NOT exceed 4,000 mg of Tylenol in 24 hours. DO NOT use non-steroidal anti-inflammatory medication such as: Aspirin, Ibuprofen, Advil, Motrin, Aleve; or steroid medications. These medications can cause ulcers after weight loss surgery.    8. CALL OUR OFFICE - [phone] - IF YOU HAVE:     1. Fevers to more than 101.5 F   2. Leg pain or swelling  3. Drainage or fluid from incision that may be foul smelling, increased tenderness or soreness at the wound, or the wound edges are no longer together, redness or  swelling at the incision site.   4. Night sweats   5. Shaking or  chills   6. Persistent nausea or vomiting for over 24 hours. Use your anti-nausea medication as prescribed.   7. Call 911 if sudden onset of chest pain or shortness of breath that does not improve with 5-10 minutes of rest.    9. FOLLOW UP APPOINTMENT: Contact our office at [phone] for a follow-up appointment within 2 weeks following your procedure. Our office hours are Monday-Thursday, 7.30 am-4.30 pm and from 8.00 am-3.00 pm on Friday. Please try to call during these hours when we are better able to assist you.    If you have any additional questions, please do not hesitate to call our office and speak to the staff or provider on call.    Office Address:  [address]  8th [address]  [address]  [place] [address]    Thank you for this opportunity to provide excellent surgical care for you today.    Post-operative Nutrition Guidelines for Jayna-en-Y Gastric Bypass, Vertical Sleeve Gastrectomy and Duodenal Switch        Hospital Guidelines    Immediately After surgery    Sleeve Gastrectomy    Day of surgery  Sips of water evening after surgery.    Day 1 1 oz (30 ml) of Optisource every hour and 1 oz (30 ml) of water every 15 minutes as needed    Discharge   Begin diet progression as recommended in your packet     (Clear and Full liquids)      Gastric Bypass & Duodenal Switch    Day of Surgery Sips of water evening after surgery.    Post Op Day 1 1 oz (30 ml) water every hour progress to Optisource in the evening, 1 oz every hour and 1 oz of water every 15 minutes as needed    Post Op Day 2 1 oz (30 ml) of Optisource every hour 1 oz (30 ml) of water every 15 minutes as needed        Discharge Begin diet progression as recommended in your packet    (Clear and Full liquids)    Note: 1 oz = 2 Tablespoons          Gastric Bypass / Sleeve / Duodenal Switch Surgery Nutrition Plan: Post-Op Week 1 and 2    Clear and Full Liquid Diet   Water  Protein Shakes  Beef or chicken broth  “Bone Broth” is  higher in protein  Broth based soups (strained)  Low fat cream soups (strained)  Sugar free popsicles, Jell-O, pudding  Light yogurt (no chunks)  Light Greek yogurt (no chunks)    Soupy hot cereals (no sugar or butter)  Oatmeal, cream of wheat, grits  Skim or 1 % milk  Fairlife Milk, fat free  Unsweetened soymilk or almond milk  Black decaf coffee or decaf/green tea   Gatorade G2, PowerAde Zero  Sugar free drinks   Crystal light, Propel    No carbonated beverages    Points to Remember:    Your primary goal is to stay hydrated and increasing your protein intake to meet your needs.  Goal: 64 ounces of fluids daily (most foods are included at this stage)  Drink 1 oz. of liquid every 10 minutes  Taking in fluids frequently will help you stay well hydrated. Be sure to drink slowly. Take tiny sips, Do Not Gulp. You will not feel thirsty. You may need to set a timer or keep a log.  Avoid concentrated sources of sugar (regular Jell-O, pudding, popsicle, etc.) to prevent Dumping Syndrome. Symptoms of Dumping Syndrome include abdominal pain, gas, cramping, diarrhea, nausea, and sweating.  Sugar substitutes such as Splenda, Sweet & Low, Equal, Stevia, and Monk Fruit are acceptable.  Meet your protein needs daily:  You will need a protein supplement while on the clear and full liquid diet stage.  Protein is important for healing, to fight infection, to preserve muscle mass, and to promote healthy weight loss.   Start your supplements  Must be in a chewable or liquid form for 2 months after surgery.  Start your Multivitamin, Calcium Citrate, and Vitamin B12.    Sample Liquid Meal Plan    Time Food   8 to 9 a.m. 6 ounces of protein drink   9 to 10 a.m. 2 ounces of PowerAde Zero  4 ounces water   10 to 11 a.m.                                      4 ounces of protein drink  2 ounces sugar free Jell-O   11 a.m. to 12 noon 6 ounces Gatorade G2   12 noon to 1 p.m. 4 ounces of protein drink  2 ounces sugar free pudding   1 to 2 p.m. 3  ounces of sugar free popsicle   3 ounces of beef broth   2 to 3 p.m. 4 ounces of protein drink  2 ounces water   3 to 4 p.m.  2 ounces of yogurt  4 ounces sugar free drink   4 to 5 p.m. 4 ounces of PowerAde Zero  2 ounces of chicken broth   5 to 6 p.m. 3 ounces of strained soup  3 ounces water   6 to 7 p.m. 4 ounces of protein drink   *Remember to eat and drink everything very slowly, taking tiny sips and small bites of food. Not eating or drinking slowly may result in vomiting or even lead to perforation, leaks or other complications        Gastric Bypass / Sleeve / Duodenal Switch Surgery Nutrition Plan: Week 3, 4, and 5    Pureed Diet  Diet Overview:    You will need to blenderize most of your food.  Allowable foods include:  All foods from post-op liquid diet  Blenderized canned tuna or chicken packed in water  Pureed baked chicken, turkey, or fish (be careful of bones)  Boiled eggs or scrambled eggs, mashed or pureed  Blended canned beans or low fat refried beans  Low fat cottage cheese (small curd)  Pureed cooked or canned vegetables and fruit   Baby fruits and vegetables (stage 1 or 2)    Points to Remember:    Continue to stay hydrated and meet your protein needs.  Eat 6 small meals per day.   Listen to your body; it is ok if you are only tolerating 2-3 meals the first few days.  Limit meals to 4-5 tablespoons of food; otherwise, you may have nausea, diarrhea, vomiting, gas, and stomach pain.  Allow 30 minutes between eating and drinking. It is okay to take tiny sips with your meals to help keep the food moist and easy to swallow.  Eat slowly, taking at least 20-30 minutes to eat a meal or snack. Use a small bowl or plate, baby/children's spoon, or seafood fork to remind you to eat slowly.   Each bite of food should be pea sized and should be chewed 20-30 times each bite.  Set your fork or spoon down between bites can help you eat slower.  Eat protein foods first to get the protein your body needs prior to  feeling full  Include protein rich foods (meat, milk, cheese, yogurt, egg) with each meal and snack.      Sample Pureed Menu    Time Food   7 to 7:30 a.m.   Breakfast 1 scrambled egg   2 Tbsp. oatmeal   8 to 9 a.m. 8 ounces water   10 to 10:30 a.m.     Snack                                   8 ounces protein shake   11 a.m. to 12 Noon 8 ounces Crystal Light   12:30 to 1 PM        Lunch 3 Tbsp. blended Tuna salad  2 Tbsp. pureed fruit   1:30 to 2:30 p.m. 12 ounces Gatorade G2   3:30 to 4 p.m.        Snack ¼ cup low fat cottage cheese   4 to 5 p.m.  8 ounces water   5 to 5:30 p.m. 4 ounces Crystal Light   6 to 6:30 p.m.        Dinner 3 Tbsp. puree chicken  2 Tbsp. puree vegetables   7 to 8:30 8 ounces Gatorade G2   9 to 9:30                 Snack ¼ cup light Greek yogurt   10 to 11 8 ounces water       Gastric Bypass / Sleeve / Duodenal Switch Surgery Nutrition Plan: Week 6    Soft Diet  During this stage of the nutrition plan, you will be consuming solid foods that are well cooked. You may now begin to slowly introduce solid foods into your diet. It is important to avoid foods that are hard to digest because they may cause discomfort or blockage in the stomach pouch. It may help to introduce one new food at a time to help you identify foods you may not tolerate.    Foods to include:  Baked fish, chicken, and turkey (DO NOT eat any poultry skin)  Lean ground beef or turkey (needs to be soft)  Dried beans, peas, and lentils (well cooked)  Creamy peanut butter  Vegetables (canned or well cooked)  Canned fruit (unsweetened, in juice, or 'lite' version- do not drink the juice)    Allowed foods in small amounts:  Cereal, crackers, baked potato (no skin)  Toasted bread, saltine crackers, or [place] toast    Points to Remember:  Eat 6 small meals per day.  No more than 6-8 Tbsp. or 3-4 ounces of food at one time.  Liquids in between meals. You may continue to take small sips with your meals.  Protein supplements may still be  used as a meal or snack when necessary.  Meet your protein and fluid needs to help prevent infection and dehydration.  Remember to continue eating slowly and chewing well.      Sample Meal Plan: Soft Diet    Meal Day 1 Day 2 Day 3   Breakfast +  Multivitamin  Vit B12 1 scrambled Egg  2 oz. Grits   1 egg omelet  1 oz. low fat cheese  ½ slice of toast High Protein oatmeal  (2 Tbsp. powder peanut butter, ½ cup skim milk, 2 Tbsp. oatmeal)    Snack +  Calcium Protein Shake   Protein Bar 6-8 ounces skim milk or Fairlife skim milk   Lunch    2 oz. Cottage cheese  1 oz. light canned peaches   2 oz. Tuna Salad  3-4 cooked baby carrots 2 oz. Egg salad  3 Crackers   Snack +  Calcium 2 oz. turkey cheese roll  ½ slice of toast 1 oz. Low fat cheese  ¼ cup applesauce 2-3 oz. low fat turkey chili   Dinner +  Multivitamin   2 oz. Baked Fish  1-2 oz. Mashed potato   2 oz. Lean ground beef  1 oz. Black beans  1 oz. Cantaloupe 2 oz. Baked Chicken  1-2 oz. mash sweet potato   Snack 2 oz. Light Greek yogurt  ? small banana 2 Tbsp. Peanut butter   2-3 saltine crackers Protein Shake     Meal Day 4 Day 5 Day 6   Breakfast +  Multivitamin  Vit B12 1 scrambled Egg  2 oz. Oatmeal 2 Tbsp. Peanut butter  ½ English muffin toasted   1 scrambled Egg  ¼ cup cereal + ¼ cup milk      Snack +  Calcium 2 oz. Three Bean Salad Protein shake 2 oz. Light Greek yogurt  ? small banana   Lunch    2 oz. Tuna Salad  1 oz. watermelon  3 Crackers 2 oz. Cottage Cheese  1 oz. canned peaches   ¼ cup Chicken Salad  1-2 oz. mash sweet potato   Snack +  Calcium Protein Shake   2 oz. Sugar free pudding + 2 Tbsp. protein powder Protein shake    Dinner +  Multivitamin 2 oz. ground Turkey  2 Tbsp. mixed vegetables   2 oz. Baked Fish  2 Tbsp. green beans 2 ounces baked Chicken  3-4 cooked baby carrots   Snack 1 oz. Low Fat cheese   3 oz. Light Greek yogurt 1 oz. Low fat cheese  2 Tbsp. applesauce     *Drink water and low calorie liquids between meals to prevent  dehydration  *Drink water and low calorie liquids between meals to prevent dehydration      Gastric Bypass / Sleeve / Duodenal Switch Surgery Nutrition Plan: Week 7    Regular Diet  Slowly increase the toughness and texture of foods as your body allows.  Vegetables will need to be cooked for the first few months, but not as soft.    Points to Remember:  Continue eating 6 meals daily with no more than 3-4 ounces of food per meal.  Gradually add new foods, to find which foods you tolerate best.  Allow 30 minutes between meals and liquids. Aim for 64 oz. of liquid daily.  Continue meeting your protein needs daily.  Continue taking Multivitamin, Calcium Citrate, and Vitamin B12 as recommended.  The following foods are common intolerances:  Nuts and seeds  Skins of potato, cucumber, eggplant, and apples  Membrane between sections of oranges and grapefruits  Celery, asparagus stems, string beans  Untoasted bread, soft or under cooked pasta and  rice  Steak and pork  Avoid foods high in calories, sugar, and fat such as:  Fried foods  Foods with gravy or sauce  Desserts  Sugar containing beverages (juice, soda, sweet tea)  Chips  Long Term Essentials:  Ask yourself “Should” rather than “Could” you eat something  Do not allow comfort foods in the home or where you will see them  Find comfort outside of food  Be careful what you read online  Stay active, aim for 150 minutes of physical activity per week   5 days of 30 minutes of walking, exercise, swimming  Avoid straws  No Carbonation  Monitor portion sizes       Protein sources  Food One Serving   Lean beef 1 ounce   Pork tenderloin 1 ounce   Chicken breast 1 ounce   Turkey breast 1 ounce   Luncheon meat (turkey breast, roast beef, lean ham) 1 ounce (be sure to read the label)   Creamy peanut butter 2 tablespoons   Egg 1 large   Egg beaters ¼ cup   Egg whites 2-3   Fish  1 ounce   Shrimp 5-6   Canned tuna or salmon 1 ounce or ¼ cup   Low-fat cheese 1 ounce (1” cube)    Low-fat or fat free cottage cheese ½ cup   Tofu ¼ cup   Beans   Canned or dry  (does not include green beans) ½ cup   Milk (skim or 1%) 1 cup   Greek yogurt ½ cup   Low sugar yogurt 1 cup   Fairlife milk (skim or 1%) ½ cup   Soymilk 1 cup   One serving provides 7 grams of protein   *Read the Nutrition Facts label on selected food packages to exact amounts of protein is provided by a product.     Measuring portion sizes of foods can be very beneficial to determining your total protein intake.  A deck of cards or the palm of your hand is approximately 3 oz. of protein rich food.  Use a measuring cup to measure serving sizes in this group are listed in cups. Note most of our protein comes from meats, poultry, fish, eggs, cheese, beans, milk, and dairy products.      Potential Problems and Suggested Dietary Modifications    Potential Problem Dietary Modifications   Constipation Meet fluid needs daily, aiming for 64+ ounces per day.  Increase fiber intake slowly from fruits, vegetables, beans, and whole grains pending on diet stage.  Consider adding a chewable or powdered fiber supplements such as Benefiber or Citrucel. Limit psyllium (Metamucil) based supplements.  Exercise daily.   Nausea and Vomiting Eat slowly, taking 20-30 minutes per meal.  Chew food well, 20-30 times per bite.  Avoid overeating. Stay within recommended food volume guidelines.  Separate eating and drinking by 30-60 min.  Follow diet progression as instructed. Do not advance to solid food until instructed.  Meet fluid needs daily, aiming for 64+ ounces per day.    Diarrhea Limit sugar intake to no more than 15 grams per meal.  Avoid sugar alcohols (i.e. sorbitol, mannitol, xylitol).  Avoid high fat and greasy foods.  Try eliminating dairy as lactose intolerance sometimes occurs after bariatric surgery.   Difficulty Swallowing Eat slowly, taking 20-30 minutes per meal.  Chew food well, 20-30 times per bite or food is liquefied in mouth.  Avoid  overeating. Stay within recommended food volume guidelines.  Avoid tough or rubbery meats, crunchy vegetables, and sticky foods like untoasted bread.   Burping Eat slowly, taking 20-30 minutes per meal.  Chew foods longer to avoid swallowing air.  Avoid carbonated beverages, chewing gum, and sipping through straws or sport bottles tops.         Vitamin and Mineral Needs    Vitamin deficiencies are common among patients that do not take their vitamins. The only way to tell if you are absorbing your vitamins properly is getting your lab work completed regularly. (See [facility] packet for recommended labs)    Multivitamin  Bariatric vitamins are preferred.  The reasons include:  Formulated based on research to meet your specific needs after surgery.  Higher quality and more likely to contain what is on the label compared to an over the counter vitamin.  Easy to read directions (you take 1 serving as directed)  Better absorbed if taken in 2-3 doses spread throughout the day.    If you choose an over the counter multivitamin, remember:  Each serving must contain 18 mg iron, 400 mcg folic acid, thiamine, and zinc  Additional thiamine and zinc will be needed  Take 2 SERVINGS per day (if 1 serving = 2 pills, you will need 4 pills total)  Look for regular adult. No 50+, silver, men, or gummy vitamins.  Calcium Citrate  5835-0580 mg daily of Calcium Citrate.  Split calcium into 2-3 doses (400-600 mg) throughout the day.  Read Serving Size carefully; If 1 serving = 2 pills, you may need 6 pills per day.  Take at least 2 hours before or after other vitamins with iron  May also be called “Tricalcium Citrate”  Beware: Calcium Carbonate is the most common form found in stores, but is poorly absorbed after surgery!  Vitamin B12  Daily sublingual tablet or liquid 500-1000 mcg/day, dissolves under your tongue  Monthly intramuscular injections are adequate substitutes.  Vitamin D3:    Total daily needs are 3000 IU.    Add  the amount provided by your multivitamin and calcium supplements.  An additional Vitamin D3 may be required based on your labs and the vitamins you choose.    Combination bariatric supplements: multivitamin and calcium  Take recommended dose spread throughout the day  If 4 tablets are recommended daily, take 1 at a time to increase absorption  May need additional supplements; such as calcium citrate, iron, and thiamine    Multivitamin and Calcium Supplement Suggestions*   Multivitamins Dose Location   Bariatric Advantage Multi EA Chewable  2 per day [email or web address]    Bariatric Advantage Ultra Multi Capsules  (with or without iron) 3 per day [email or web address]    Bariatric Advantage Chewy Bites  2 per day  Separate iron needed [email or web address]    Celebrate Multi-Complete 2 per day www.My [email or web address]    Celebrate Multivitamins Chewable 2 per day  Separate iron needed www.My eStore App.Actus Interactive Software    Opurity 1 per day [email or web address]    Calcium Citrate Dose Location   Bariatric Advantage Calcium Citrate Chewable 3 per day [email or web address]    Bariatric Advantage Chewy Bites   (250 mg or 500 mg) 500 mg- 3 per day  250 mg-5 per day [email or web address]    Bluebonnet Liquid CALCIUM Magnesium Citrate Plus Vitamin D3 2 Tbsp. per day Health food stores   Building Blocks Calcium Citrate 2 per day  (1200 mg) [email or web address]    Calcet Citrate Creamy Bites 3 per day  (1500 mg) Drugstores   Citracal Petites Tablets 6 tablets per day  (1200 mg) Drugstores   Celebrate Calcium Plus Chewable  (250 mg or 500 mg) 500 mg- 3 per day  250 mg-5 per day www.My eStore App.Actus [employer] Software    Celebrate Calcium Soft Chews 3 per day  (1500 mg) www.[email or web address].[email or web address] Interactive Software    Opurity Calcium Citrate Plus 4 per day  (1200 mg) www.Tasted Menu    [place] Light Calcium Citrate Chew 4 per day  (1300 mg) [employer]inking [employer] food Zyme [employer]     All-in-One Multivitamin and Calcium Dose Location   [facility]  Bariatric Vitamin &  Mineral 4 per day; Additional 500 mg Calcium Citrate needed Cimetrix   Bariatric Fusion Vitamin & Mineral 4 per day [email or web address]    Bariatric Choice All-in-one 4 per day [email or web address]    *Please note: we do not require the use of any particular vendor or product. This is not meant to be an exhaustive list since other products may meet your need. Please consult with your medical team if you have any questions.       Protein Supplement Suggestions     Drinks Kcal Protein Sugar Fat Where To Buy   Atkins Lift Protein Drink 90 20 0 0 Grocery Stores   Bariatric Advantage Meal Replacements, 1 packet 160 27 0.5 2 [email or web address]  [phone]   HEALTH -discount code   Body Fortress Whey Isolate Protein, 1 scoop 140 30 3 4 [employer]   Boost Glucose Control, 8 oz. 190 16 4 7 Grocery Stores   Quest Protein Powder, Vanilla 1 scoop 100 22 <1 0 Grocery Stores  Note: Some flavors contain sugar alcohols   Celebrate Meal Replacement, 2 scoops 150 27 4 0.5 [email or web address]  [phone]     OhYeah! Shake, 14 oz. 220 32 3 9 Grocery Stores   EAS AdvantEDGE Carb Control, 11 oz. 100 17 0 3 Grocery Stores   Nature's Best Isopure Zero Carb, 20 oz.  160 40 0 0 [employer]NC, The Vitamin Shoppe   Muscle Milk Genuine, Vanilla Creme, 14 ounce 160 25 0 5 Grocery Stores   Optisource, 8 oz. 200 24 0 6 www.Applied BioCode or  [phone] ([place])   Premier Protein 160 30 1 3 Grocery Stores     Slim Fast Advanced Nutrition, 11 oz. 180 20 1 9 Grocery Stores   Syntrax Gutierrez, 1 scoop 100 23 0 0 wwwInterface Security Systems  [phone]   Unjury Powder, 1 scoop or 1 packet 80 20 0 0 www.Good Men MediajuryKobalt [employer] Group   [phone]   Beneprotein powder (unflavored), 1 scoop 25 6 0 0 www.Applied BioCode [phone] ([place])   * If you are adding a protein powder to Milk, remember to add the sugar and protein content to what is provided by the shake.   Skim Milk, 8 oz. 90 8 12 0 Regular of lactose free   [facility], Skim Milk, 8  80 13 6 0     Soymilk, 8 oz. 80 7 1 4 Unsweetened   [place] Milk, 8 oz. 30-40 1 0 2.5 Unsweetened   Please note: we do not require the use of any particular vendor or product. This is not meant to be an exhaustive list since other products may meet your need. Please consult with your medical team if you have any questions.       Multivitamin  -High potency formula containing at -least 100-200% of most vitamins and minerals  -Take chewable/liquid for 2 months  -Iron: total of 36 mg/d (divide dose)  -Folic Acid: 400-1000 mcg/d  -Selenium  -Zinc, Copper    Formulas must contain:    Calcium Supplement  -8156-0564 mg calcium citrate   (Divide in doses of 500-600 mg)  -400-800 IU vitamin D  -Take chewable/liquid for 2 months  -Separate from iron by 2 hours    Protein Shake Ideas  PB&J  4 oz. strawberry light Greek yogurt  4 oz. milk  1 scoop sophy. protein powder  1 Teaspoon powdered peanut butter    Vanilla Latte (or Mocha)  8 ounces milk  1 Teaspoon Decaf Instant Coffee  1 pack stevia or Splenda  1 scoop Vanilla protein powder  (Or chocolate protein for Mocha)    *Banana  4 ounces low fat yogurt  4 ounces milk  1 small banana  1 scoop vanilla protein powder  Blend all ingredients until smooth   *Add 1-2 Teaspoon powder peanut butter to make peanut butter banana shake    Darinel Spice   8 ounces water  1 scoop vanilla protein powder  1 Darinel Tea bag (herbal tea)  1 packet Splenda   Heat water, then steep Darinel tea for 5 minutes, allow to cool and add protein powder    Mint Chocolate  Recipes adapted from [employer].  Unless specifically stated, milk is per taste preference.  Examples include Skim Milk, Unsweetnened Soy Milk, Fairlife Milk, [place] Milk, and Skim Lactaid Milk.   Protein content depends on protein powder and choice of milk.  No canned coconut milk.  Mix ingredients in shaker cup or  unless otherwise stated.  May substitute ready-made protein drinks in place of milk and protein powder for most recipes.  8 ounces milk  1  scoop chocolate protein powder  1-2 drops of peppermint extract    Hot Chocolate  8 ounces milk  1 scoop chocolate protein powder  Heat milk until luke-warm (do not overheat) and then mix protein powder    Strawberry Lemonade  1 scoop strawberry protein powder  ½ Lemonade Crystal Light single serve packet  8 ounces water    Pumpkin Spice  8 ounces of milk  1 scoop vanilla protein powder  1 Tablespoon Canned Pumpkin  ¼ teaspoon pumpkin spice (or to taste)  2 packets Splenda    Peach   4 ounces light Strawberry Greek yogurt  4 ounces milk  1 scoop vanilla protein powder  ¼-cup canned/frozen peaches (packed in juice-strained)  Blend all ingredients until smooth    Creamsicle  8 ounces water  ½ packet orange crystal light  1 scoop Vanilla protein powder

## 2023-07-15 NOTE — PROGRESS NOTES
4 Eyes Skin Assessment Completed by SAMINA Rodney and SAMINA Bernard.    Head Scab  Ears Redness and Blanching  Nose WDL  Mouth WDL  Neck WDL  Breast/Chest WDL  Shoulder Blades WDL  Spine WDL  (R) Arm/Elbow/Hand WDL  (L) Arm/Elbow/Hand WDL  Abdomen Incision x 5 lap sites  Groin WDL  Scrotum/Coccyx/Buttocks WDL  (R) Leg: Distal edema  (L) Leg: Distal edema  (R) Heel/Foot/Toe WDL, edema  (L) Heel/Foot/Toe Scab on top of foot, from bee sting, edema          Devices In Places Blood Pressure Cuff and Pulse Ox      Interventions In Place Gray Ear Foams    Possible Skin Injury No    Pictures Uploaded Into Epic N/A  Wound Consult Placed N/A  RN Wound Prevention Protocol Ordered Yes

## 2023-07-15 NOTE — CARE PLAN
The patient is Stable - Low risk of patient condition declining or worsening    Shift Goals  Clinical Goals: pain control, mobility  Patient Goals: comfort  Family Goals: comfort    Progress made toward(s) clinical / shift goals:    Problem: Pain - Standard  Goal: Alleviation of pain or a reduction in pain to the patient’s comfort goal  Outcome: Progressing     Problem: Knowledge Deficit - Standard  Goal: Patient and family/care givers will demonstrate understanding of plan of care, disease process/condition, diagnostic tests and medications  Outcome: Progressing       Patient is not progressing towards the following goals:

## 2023-07-15 NOTE — ANESTHESIA TIME REPORT
Anesthesia Start and Stop Event Times     Date Time Event    7/14/2023 1700 Anesthesia Start     1840 Anesthesia Stop        Responsible Staff  07/14/23    Name Role Begin End    Reagan Conway M.D. Anesth 1700 1840        Overtime Reason:  overtime    Comments:

## 2023-07-16 VITALS
RESPIRATION RATE: 16 BRPM | OXYGEN SATURATION: 93 % | SYSTOLIC BLOOD PRESSURE: 113 MMHG | HEIGHT: 65 IN | BODY MASS INDEX: 37.8 KG/M2 | TEMPERATURE: 97.3 F | DIASTOLIC BLOOD PRESSURE: 57 MMHG | WEIGHT: 226.85 LBS | HEART RATE: 65 BPM

## 2023-07-16 LAB
ERYTHROCYTE [DISTWIDTH] IN BLOOD BY AUTOMATED COUNT: 44.9 FL (ref 35.9–50)
HCT VFR BLD AUTO: 40.1 % (ref 37–47)
HGB BLD-MCNC: 12.9 G/DL (ref 12–16)
MCH RBC QN AUTO: 29.1 PG (ref 27–33)
MCHC RBC AUTO-ENTMCNC: 32.2 G/DL (ref 32.2–35.5)
MCV RBC AUTO: 90.3 FL (ref 81.4–97.8)
PLATELET # BLD AUTO: 151 K/UL (ref 164–446)
PMV BLD AUTO: 10.1 FL (ref 9–12.9)
RBC # BLD AUTO: 4.44 M/UL (ref 4.2–5.4)
WBC # BLD AUTO: 6.9 K/UL (ref 4.8–10.8)

## 2023-07-16 PROCEDURE — 36415 COLL VENOUS BLD VENIPUNCTURE: CPT

## 2023-07-16 PROCEDURE — 700111 HCHG RX REV CODE 636 W/ 250 OVERRIDE (IP): Performed by: SURGERY

## 2023-07-16 PROCEDURE — 700102 HCHG RX REV CODE 250 W/ 637 OVERRIDE(OP): Performed by: SURGERY

## 2023-07-16 PROCEDURE — C9113 INJ PANTOPRAZOLE SODIUM, VIA: HCPCS | Performed by: SURGERY

## 2023-07-16 PROCEDURE — A9270 NON-COVERED ITEM OR SERVICE: HCPCS | Performed by: SURGERY

## 2023-07-16 PROCEDURE — 85027 COMPLETE CBC AUTOMATED: CPT

## 2023-07-16 RX ADMIN — DEXAMETHASONE SODIUM PHOSPHATE 4 MG: 4 INJECTION, SOLUTION INTRAMUSCULAR; INTRAVENOUS at 05:26

## 2023-07-16 RX ADMIN — HEPARIN SODIUM 5000 UNITS: 5000 INJECTION, SOLUTION INTRAVENOUS; SUBCUTANEOUS at 05:20

## 2023-07-16 RX ADMIN — PANTOPRAZOLE SODIUM 40 MG: 40 INJECTION, POWDER, FOR SOLUTION INTRAVENOUS at 05:22

## 2023-07-16 RX ADMIN — GABAPENTIN 300 MG: 250 SOLUTION ORAL at 05:19

## 2023-07-16 RX ADMIN — SIMETHICONE 125 MG: 125 TABLET, CHEWABLE ORAL at 05:19

## 2023-07-16 RX ADMIN — ONDANSETRON 4 MG: 2 INJECTION INTRAMUSCULAR; INTRAVENOUS at 05:25

## 2023-07-16 RX ADMIN — KETOROLAC TROMETHAMINE 30 MG: 30 INJECTION, SOLUTION INTRAMUSCULAR; INTRAVENOUS at 05:19

## 2023-07-16 ASSESSMENT — PAIN DESCRIPTION - PAIN TYPE: TYPE: ACUTE PAIN;SURGICAL PAIN

## 2023-07-16 NOTE — CARE PLAN
The patient is Stable - Low risk of patient condition declining or worsening    Shift Goals  Clinical Goals: Pain control, mobilty  Patient Goals: Pain control, mobility  Family Goals: comfort    Progress made toward(s) clinical / shift goals:    Problem: Pain - Standard  Goal: Alleviation of pain or a reduction in pain to the patient’s comfort goal  Outcome: Progressing     Problem: Knowledge Deficit - Standard  Goal: Patient and family/care givers will demonstrate understanding of plan of care, disease process/condition, diagnostic tests and medications  Outcome: Progressing       Patient is not progressing towards the following goals:

## 2023-07-16 NOTE — CARE PLAN
The patient is Stable - Low risk of patient condition declining or worsening    Shift Goals  Clinical Goals: Pain management, mobility, discharge  Patient Goals: pain management, shower, discharge  Family Goals: comfort    Progress made toward(s) clinical / shift goals:    Problem: Pain - Standard  Goal: Alleviation of pain or a reduction in pain to the patient’s comfort goal  Outcome: Met     Problem: Knowledge Deficit - Standard  Goal: Patient and family/care givers will demonstrate understanding of plan of care, disease process/condition, diagnostic tests and medications  Outcome: Met       Patient is not progressing towards the following goals:

## 2023-07-16 NOTE — PROGRESS NOTES
Patient received discharge instructions all questions answered at the bedside. Patient received prescription meds pre-op. Patient left unit via wheelchair  with two bags of belongings.

## 2023-07-16 NOTE — PROGRESS NOTES
Surgical Progress Note    Author: Sofi Dixon P.A.-C. Date & Time created: 2023   8:14 AM     POD #2, s/p robotic sleeve gastrectomy and posterior hiatal cruroplasty.     Subjective:      Reports feeling reasonably well, overall.  Pain much improved from yesterday, has been ambulating the hallways.   Notes mild amount of incisional/abdominal pain (most notably in RUQ).  Nausea controlled with medications, tolerating CLD at this time.  Oxygen level was 86-87% when sleeping requiring 0.5LNC.  Currently 93%RA.   Otherwise, no recent history of fevers/chills, vomiting, hematemesis, odynophagia, CP/SOB, bloating/belching, worsening abdominal pain, dysuria/hematuria, BRBPR/melena, or constipation/diarrhea. No other symptoms or complaints at this time. She ambulating in the hallways w/o difficulties and voiding spontaneously.      Hemodynamics:  Temp (24hrs), Av.4 °C (97.6 °F), Min:36.3 °C (97.3 °F), Max:36.5 °C (97.7 °F)  Temperature: 36.3 °C (97.3 °F), Monitored Temp: 36.5 °C (97.7 °F)  Pulse  Av.9  Min: 56  Max: 91   Blood Pressure: 113/57     Respiratory:    Respiration: 16, Pulse Oximetry: 93 %           Neuro:  GCS       Fluids:    Intake/Output Summary (Last 24 hours) at 2023 0814  Last data filed at 2023 0600  Gross per 24 hour   Intake --   Output 1350 ml   Net -1350 ml        Current Diet Order   Procedures    Diet Order Diet: Clear Liquid; Miscellaneous modifications: (optional): Bariatric; Tray Modifications (optional): No Straws     Physical Exam  Constitutional:       Appearance: Normal appearance. She is obese.   HENT:      Head: Normocephalic and atraumatic.      Nose: Nose normal.      Mouth/Throat:      Mouth: Mucous membranes are moist.   Eyes:      Conjunctiva/sclera: Conjunctivae normal.   Cardiovascular:      Rate and Rhythm: Normal rate and regular rhythm.   Pulmonary:      Effort: Pulmonary effort is normal. No respiratory distress.   Abdominal:      General: There is  distension.      Palpations: Abdomen is soft.      Tenderness: There is abdominal tenderness. There is no guarding or rebound.   Musculoskeletal:         General: Normal range of motion.      Cervical back: Normal range of motion.   Skin:     General: Skin is warm and dry.      Coloration: Skin is not jaundiced.      Findings: No erythema or rash.   Neurological:      Mental Status: She is alert and oriented to person, place, and time.   Psychiatric:         Mood and Affect: Mood normal.       Labs:  Recent Results (from the past 24 hour(s))   CBC WITHOUT DIFFERENTIAL    Collection Time: 07/15/23 11:14 AM   Result Value Ref Range    WBC 8.8 4.8 - 10.8 K/uL    RBC 4.65 4.20 - 5.40 M/uL    Hemoglobin 13.6 12.0 - 16.0 g/dL    Hematocrit 41.0 37.0 - 47.0 %    MCV 88.2 81.4 - 97.8 fL    MCH 29.2 27.0 - 33.0 pg    MCHC 33.2 32.2 - 35.5 g/dL    RDW 42.9 35.9 - 50.0 fL    Platelet Count 180 164 - 446 K/uL    MPV 10.0 9.0 - 12.9 fL     Medical Decision Making, by Problem:  There are no active hospital problems to display for this patient.    Plan:  POD #2, s/p robotic sleeve gastrectomy and posterior hiatal cruroplasty.     Postoperative recovery is progressing as expected. Pain and nausea are reasonably well controlled. Tolerating sips of clear liquids and water, ambulating in the hallways w/o difficulties, and voiding spontaneously.     Will encourage additional PO intake this morning, to ensure that patient is able to stay hydrated on her own. Encourage ambulation and usage of ICS frequently throughout the day. Patient is  ready for discharge this morning  with close follow up in the Surgery clinic (appointment has been scheduled already).     Thank you for giving us this opportunity to care for this patient.

## 2023-07-18 ENCOUNTER — TELEPHONE (OUTPATIENT)
Dept: HEALTH INFORMATION MANAGEMENT | Facility: OTHER | Age: 43
End: 2023-07-18

## 2023-08-06 ENCOUNTER — APPOINTMENT (OUTPATIENT)
Dept: RADIOLOGY | Facility: MEDICAL CENTER | Age: 43
End: 2023-08-06
Attending: EMERGENCY MEDICINE
Payer: MEDICAID

## 2023-08-06 ENCOUNTER — OFFICE VISIT (OUTPATIENT)
Dept: URGENT CARE | Facility: CLINIC | Age: 43
End: 2023-08-06
Payer: MEDICAID

## 2023-08-06 ENCOUNTER — HOSPITAL ENCOUNTER (EMERGENCY)
Facility: MEDICAL CENTER | Age: 43
End: 2023-08-06
Attending: EMERGENCY MEDICINE
Payer: MEDICAID

## 2023-08-06 VITALS
DIASTOLIC BLOOD PRESSURE: 67 MMHG | TEMPERATURE: 97.8 F | RESPIRATION RATE: 20 BRPM | BODY MASS INDEX: 35.34 KG/M2 | SYSTOLIC BLOOD PRESSURE: 124 MMHG | HEART RATE: 70 BPM | HEIGHT: 64 IN | WEIGHT: 207.01 LBS | OXYGEN SATURATION: 97 %

## 2023-08-06 VITALS
DIASTOLIC BLOOD PRESSURE: 72 MMHG | HEART RATE: 97 BPM | OXYGEN SATURATION: 98 % | RESPIRATION RATE: 22 BRPM | TEMPERATURE: 98.5 F | HEIGHT: 64 IN | BODY MASS INDEX: 35.15 KG/M2 | SYSTOLIC BLOOD PRESSURE: 108 MMHG | WEIGHT: 205.9 LBS

## 2023-08-06 DIAGNOSIS — N12 PYELONEPHRITIS: ICD-10-CM

## 2023-08-06 DIAGNOSIS — R06.02 SHORTNESS OF BREATH: ICD-10-CM

## 2023-08-06 DIAGNOSIS — R10.9 FLANK PAIN: ICD-10-CM

## 2023-08-06 DIAGNOSIS — R00.0 TACHYCARDIA: ICD-10-CM

## 2023-08-06 LAB
ALBUMIN SERPL BCP-MCNC: 4.5 G/DL (ref 3.2–4.9)
ALBUMIN/GLOB SERPL: 1.7 G/DL
ALP SERPL-CCNC: 70 U/L (ref 30–99)
ALT SERPL-CCNC: 15 U/L (ref 2–50)
ANION GAP SERPL CALC-SCNC: 17 MMOL/L (ref 7–16)
APPEARANCE UR: ABNORMAL
APPEARANCE UR: NORMAL
APTT PPP: 32 SEC (ref 24.7–36)
AST SERPL-CCNC: 20 U/L (ref 12–45)
BACTERIA #/AREA URNS HPF: ABNORMAL /HPF
BASOPHILS # BLD AUTO: 0.5 % (ref 0–1.8)
BASOPHILS # BLD: 0.03 K/UL (ref 0–0.12)
BILIRUB SERPL-MCNC: 0.5 MG/DL (ref 0.1–1.5)
BILIRUB UR QL STRIP.AUTO: ABNORMAL
BILIRUB UR STRIP-MCNC: NORMAL MG/DL
BUN SERPL-MCNC: 14 MG/DL (ref 8–22)
CALCIUM ALBUM COR SERPL-MCNC: 9.8 MG/DL (ref 8.5–10.5)
CALCIUM SERPL-MCNC: 10.2 MG/DL (ref 8.5–10.5)
CAOX CRY #/AREA URNS HPF: ABNORMAL /HPF
CHLORIDE SERPL-SCNC: 102 MMOL/L (ref 96–112)
CO2 SERPL-SCNC: 18 MMOL/L (ref 20–33)
COLOR UR AUTO: NORMAL
COLOR UR: ABNORMAL
CREAT SERPL-MCNC: 0.72 MG/DL (ref 0.5–1.4)
EOSINOPHIL # BLD AUTO: 0.27 K/UL (ref 0–0.51)
EOSINOPHIL NFR BLD: 4.3 % (ref 0–6.9)
EPI CELLS #/AREA URNS HPF: ABNORMAL /HPF
ERYTHROCYTE [DISTWIDTH] IN BLOOD BY AUTOMATED COUNT: 44.6 FL (ref 35.9–50)
GFR SERPLBLD CREATININE-BSD FMLA CKD-EPI: 107 ML/MIN/1.73 M 2
GLOBULIN SER CALC-MCNC: 2.7 G/DL (ref 1.9–3.5)
GLUCOSE SERPL-MCNC: 90 MG/DL (ref 65–99)
GLUCOSE UR STRIP.AUTO-MCNC: NEGATIVE MG/DL
GLUCOSE UR STRIP.AUTO-MCNC: NEGATIVE MG/DL
HCT VFR BLD AUTO: 46.1 % (ref 37–47)
HGB BLD-MCNC: 15.1 G/DL (ref 12–16)
HYALINE CASTS #/AREA URNS LPF: ABNORMAL /LPF
IMM GRANULOCYTES # BLD AUTO: 0.01 K/UL (ref 0–0.11)
IMM GRANULOCYTES NFR BLD AUTO: 0.2 % (ref 0–0.9)
INR PPP: 1.11 (ref 0.87–1.13)
KETONES UR STRIP.AUTO-MCNC: >=160 MG/DL
KETONES UR STRIP.AUTO-MCNC: >=160 MG/DL
LACTATE SERPL-SCNC: 1 MMOL/L (ref 0.5–2)
LEUKOCYTE ESTERASE UR QL STRIP.AUTO: ABNORMAL
LEUKOCYTE ESTERASE UR QL STRIP.AUTO: NEGATIVE
LIPASE SERPL-CCNC: 43 U/L (ref 11–82)
LYMPHOCYTES # BLD AUTO: 2.18 K/UL (ref 1–4.8)
LYMPHOCYTES NFR BLD: 34.4 % (ref 22–41)
MCH RBC QN AUTO: 28.8 PG (ref 27–33)
MCHC RBC AUTO-ENTMCNC: 32.8 G/DL (ref 32.2–35.5)
MCV RBC AUTO: 87.8 FL (ref 81.4–97.8)
MICRO URNS: ABNORMAL
MONOCYTES # BLD AUTO: 0.68 K/UL (ref 0–0.85)
MONOCYTES NFR BLD AUTO: 10.7 % (ref 0–13.4)
NEUTROPHILS # BLD AUTO: 3.17 K/UL (ref 1.82–7.42)
NEUTROPHILS NFR BLD: 49.9 % (ref 44–72)
NITRITE UR QL STRIP.AUTO: NEGATIVE
NITRITE UR QL STRIP.AUTO: NEGATIVE
NRBC # BLD AUTO: 0 K/UL
NRBC BLD-RTO: 0 /100 WBC (ref 0–0.2)
NT-PROBNP SERPL IA-MCNC: 61 PG/ML (ref 0–125)
PH UR STRIP.AUTO: 5.5 [PH] (ref 5–8)
PH UR STRIP.AUTO: 5.5 [PH] (ref 5–8)
PLATELET # BLD AUTO: 194 K/UL (ref 164–446)
PMV BLD AUTO: 10.8 FL (ref 9–12.9)
POTASSIUM SERPL-SCNC: 3.9 MMOL/L (ref 3.6–5.5)
PROT SERPL-MCNC: 7.2 G/DL (ref 6–8.2)
PROT UR QL STRIP: 100 MG/DL
PROT UR QL STRIP: 30 MG/DL
PROTHROMBIN TIME: 14.2 SEC (ref 12–14.6)
RBC # BLD AUTO: 5.25 M/UL (ref 4.2–5.4)
RBC # URNS HPF: ABNORMAL /HPF
RBC UR QL AUTO: ABNORMAL
RBC UR QL AUTO: NORMAL
SODIUM SERPL-SCNC: 137 MMOL/L (ref 135–145)
SP GR UR STRIP.AUTO: 1.04
SP GR UR STRIP.AUTO: >=1.03
TROPONIN T SERPL-MCNC: <6 NG/L (ref 6–19)
UROBILINOGEN UR STRIP-MCNC: 1 MG/DL
UROBILINOGEN UR STRIP.AUTO-MCNC: 1 MG/DL
WBC # BLD AUTO: 6.3 K/UL (ref 4.8–10.8)
WBC #/AREA URNS HPF: ABNORMAL /HPF

## 2023-08-06 PROCEDURE — 71275 CT ANGIOGRAPHY CHEST: CPT

## 2023-08-06 PROCEDURE — 99285 EMERGENCY DEPT VISIT HI MDM: CPT

## 2023-08-06 PROCEDURE — 85610 PROTHROMBIN TIME: CPT

## 2023-08-06 PROCEDURE — 700111 HCHG RX REV CODE 636 W/ 250 OVERRIDE (IP): Mod: JZ,UD | Performed by: STUDENT IN AN ORGANIZED HEALTH CARE EDUCATION/TRAINING PROGRAM

## 2023-08-06 PROCEDURE — 81002 URINALYSIS NONAUTO W/O SCOPE: CPT | Performed by: PHYSICIAN ASSISTANT

## 2023-08-06 PROCEDURE — 83605 ASSAY OF LACTIC ACID: CPT

## 2023-08-06 PROCEDURE — 3078F DIAST BP <80 MM HG: CPT | Performed by: PHYSICIAN ASSISTANT

## 2023-08-06 PROCEDURE — 74176 CT ABD & PELVIS W/O CONTRAST: CPT

## 2023-08-06 PROCEDURE — 36415 COLL VENOUS BLD VENIPUNCTURE: CPT

## 2023-08-06 PROCEDURE — 71045 X-RAY EXAM CHEST 1 VIEW: CPT

## 2023-08-06 PROCEDURE — 93005 ELECTROCARDIOGRAM TRACING: CPT

## 2023-08-06 PROCEDURE — 80053 COMPREHEN METABOLIC PANEL: CPT

## 2023-08-06 PROCEDURE — 84484 ASSAY OF TROPONIN QUANT: CPT

## 2023-08-06 PROCEDURE — 700105 HCHG RX REV CODE 258: Mod: UD | Performed by: EMERGENCY MEDICINE

## 2023-08-06 PROCEDURE — 700117 HCHG RX CONTRAST REV CODE 255: Mod: UD | Performed by: EMERGENCY MEDICINE

## 2023-08-06 PROCEDURE — 3074F SYST BP LT 130 MM HG: CPT | Performed by: PHYSICIAN ASSISTANT

## 2023-08-06 PROCEDURE — 83690 ASSAY OF LIPASE: CPT

## 2023-08-06 PROCEDURE — 99215 OFFICE O/P EST HI 40 MIN: CPT | Performed by: PHYSICIAN ASSISTANT

## 2023-08-06 PROCEDURE — 96374 THER/PROPH/DIAG INJ IV PUSH: CPT

## 2023-08-06 PROCEDURE — 83880 ASSAY OF NATRIURETIC PEPTIDE: CPT

## 2023-08-06 PROCEDURE — 85730 THROMBOPLASTIN TIME PARTIAL: CPT

## 2023-08-06 PROCEDURE — 85025 COMPLETE CBC W/AUTO DIFF WBC: CPT

## 2023-08-06 PROCEDURE — 81001 URINALYSIS AUTO W/SCOPE: CPT

## 2023-08-06 PROCEDURE — 93005 ELECTROCARDIOGRAM TRACING: CPT | Performed by: EMERGENCY MEDICINE

## 2023-08-06 RX ORDER — CEFTRIAXONE 2 G/1
2000 INJECTION, POWDER, FOR SOLUTION INTRAMUSCULAR; INTRAVENOUS ONCE
Status: COMPLETED | OUTPATIENT
Start: 2023-08-06 | End: 2023-08-06

## 2023-08-06 RX ORDER — SULFAMETHOXAZOLE AND TRIMETHOPRIM 800; 160 MG/1; MG/1
1 TABLET ORAL 2 TIMES DAILY
Qty: 14 TABLET | Refills: 0 | Status: ACTIVE | OUTPATIENT
Start: 2023-08-06 | End: 2023-08-13

## 2023-08-06 RX ORDER — SODIUM CHLORIDE 9 MG/ML
1000 INJECTION, SOLUTION INTRAVENOUS ONCE
Status: COMPLETED | OUTPATIENT
Start: 2023-08-06 | End: 2023-08-06

## 2023-08-06 RX ORDER — ONDANSETRON 4 MG/1
TABLET, FILM COATED ORAL
Status: ON HOLD | COMMUNITY
Start: 2023-07-05 | End: 2023-12-26

## 2023-08-06 RX ADMIN — SODIUM CHLORIDE 1000 ML: 9 INJECTION, SOLUTION INTRAVENOUS at 22:01

## 2023-08-06 RX ADMIN — CEFTRIAXONE SODIUM 2000 MG: 2 INJECTION, POWDER, FOR SOLUTION INTRAMUSCULAR; INTRAVENOUS at 23:29

## 2023-08-06 RX ADMIN — IOHEXOL 50 ML: 350 INJECTION, SOLUTION INTRAVENOUS at 22:33

## 2023-08-06 ASSESSMENT — ENCOUNTER SYMPTOMS
VOMITING: 0
SHORTNESS OF BREATH: 1
FEVER: 0
NAUSEA: 0
COUGH: 0
BACK PAIN: 1
FLANK PAIN: 1
BRUISES/BLEEDS EASILY: 0
CHILLS: 0

## 2023-08-06 ASSESSMENT — FIBROSIS 4 INDEX
FIB4 SCORE: 1.02
FIB4 SCORE: 1.02

## 2023-08-06 ASSESSMENT — PAIN DESCRIPTION - PAIN TYPE: TYPE: ACUTE PAIN

## 2023-08-07 NOTE — DISCHARGE SUMMARY
"  ED Observation Discharge Summary    Patient:Christine Miguel  Patient : 1980  Patient MRN: [id number]  Patient PCP: Sandra Logan M.D.    Admit Date: 2023  Discharge Date and Time: 23 10:28 PM  Discharge Diagnosis: Pyelonephritis  Discharge Attending: Toshia Manning M.D.  Discharge Service: ED Observation    ED Course  Christine is a 42 y.o. female who was evaluated at [facility] for flank pain, +SOB tachycardia. Gastric sleeve 3 weeks ago with Dr. Minor. Concern for PE. CTA pulm and CT renal pending.      11:18 PM  CT with no PE, no evidence of kidney stone or other acute intra-abdominal abnormality on CT.  UA does show evidence of pyelonephritis, I will give the patient a dose of ceftriaxone here, touch base with her surgeon/on call.     11:28 PM  Spoke with Dr. Evans on call for Dr. Minor, no further recommendations.  Will discharge home.    11:35 PM  Updated patient on results, plan.    Discharge Exam:  /72   Pulse 83   Temp 36.4 °C (97.6 °F) (Temporal)   Resp 20   Ht 1.626 m (5' 4\")   Wt 93.9 kg (207 lb 0.2 oz)   LMP 2023 (Approximate)   SpO2 98%   BMI 35.53 kg/m² .    Constitutional: Awake and alert. Nontoxic  HENT:  Grossly normal  Eyes: Grossly normal  Neck: Normal range of motion  Cardiovascular: Normal heart rate   Thorax & Lungs: No respiratory distress  Skin:  No pathologic rash.   Extremities: Well perfused  Psychiatric: Affect normal    Labs  Results for orders placed or performed during the hospital encounter of 23   CBC with Differential   Result Value Ref Range    WBC 6.3 4.8 - 10.8 K/uL    RBC 5.25 4.20 - 5.40 M/uL    Hemoglobin 15.1 12.0 - 16.0 g/dL    Hematocrit 46.1 37.0 - 47.0 %    MCV 87.8 81.4 - 97.8 fL    MCH 28.8 27.0 - 33.0 pg    MCHC 32.8 32.2 - 35.5 g/dL    RDW 44.6 35.9 - 50.0 fL    Platelet Count 194 164 - 446 K/uL    MPV 10.8 9.0 - 12.9 fL    Neutrophils-Polys 49.90 44.00 - 72.00 %    Lymphocytes 34.40 22.00 - 41.00 %    " Monocytes 10.70 0.00 - 13.40 %    Eosinophils 4.30 0.00 - 6.90 %    Basophils 0.50 0.00 - 1.80 %    Immature Granulocytes 0.20 0.00 - 0.90 %    Nucleated RBC 0.00 0.00 - 0.20 /100 WBC    Neutrophils (Absolute) 3.17 1.82 - 7.42 K/uL    Lymphs (Absolute) 2.18 1.00 - 4.80 K/uL    Monos (Absolute) 0.68 0.00 - 0.85 K/uL    Eos (Absolute) 0.27 0.00 - 0.51 K/uL    Baso (Absolute) 0.03 0.00 - 0.12 K/uL    Immature Granulocytes (abs) 0.01 0.00 - 0.11 K/uL    NRBC (Absolute) 0.00 K/uL   Comp Metabolic Panel   Result Value Ref Range    Sodium 137 135 - 145 mmol/L    Potassium 3.9 3.6 - 5.5 mmol/L    Chloride 102 96 - 112 mmol/L    Co2 18 (L) 20 - 33 mmol/L    Anion Gap 17.0 (H) 7.0 - 16.0    Glucose 90 65 - 99 mg/dL    Bun 14 8 - 22 mg/dL    Creatinine 0.72 0.50 - 1.40 mg/dL    Calcium 10.2 8.5 - 10.5 mg/dL    Correct Calcium 9.8 8.5 - 10.5 mg/dL    AST(SGOT) 20 12 - 45 U/L    ALT(SGPT) 15 2 - 50 U/L    Alkaline Phosphatase 70 30 - 99 U/L    Total Bilirubin 0.5 0.1 - 1.5 mg/dL    Albumin 4.5 3.2 - 4.9 g/dL    Total Protein 7.2 6.0 - 8.2 g/dL    Globulin 2.7 1.9 - 3.5 g/dL    A-G Ratio 1.7 g/dL   LACTIC ACID   Result Value Ref Range    Lactic Acid 1.0 0.5 - 2.0 mmol/L   PROTHROMBIN TIME (INR)   Result Value Ref Range    PT 14.2 12.0 - 14.6 sec    INR 1.11 0.87 - 1.13   APTT   Result Value Ref Range    APTT 32.0 24.7 - 36.0 sec   LIPASE   Result Value Ref Range    Lipase 43 11 - 82 U/L   proBrain Natriuretic Peptide, NT   Result Value Ref Range    NT-proBNP 61 0 - 125 pg/mL   TROPONIN   Result Value Ref Range    Troponin T <6 6 - 19 ng/L   ESTIMATED GFR   Result Value Ref Range    GFR (CKD-EPI) 107 >60 mL/min/1.73 m 2   URINALYSIS,CULTURE IF INDICATED    Specimen: Urine, Clean Catch   Result Value Ref Range    Color DK Yellow     Character Cloudy (A)     Specific Gravity 1.040 <1.035    Ph 5.5 5.0 - 8.0    Glucose Negative Negative mg/dL    Ketones >=160 Negative mg/dL    Protein 30 (A) Negative mg/dL    Bilirubin Moderate (A)  Negative    Urobilinogen, Urine 1.0 Negative    Nitrite Negative Negative    Leukocyte Esterase Trace (A) Negative    Occult Blood Trace (A) Negative    Micro Urine Req Microscopic    URINE MICROSCOPIC (W/UA)   Result Value Ref Range    WBC 20-50 (A) /hpf    RBC 2-5 (A) /hpf    Bacteria Moderate (A) None /hpf    Epithelial Cells Many (A) /hpf    Ca Oxalate Crystal Moderate /hpf    Hyaline Cast 11-20 (A) /lpf   EKG (NOW)   Result Value Ref Range    Report       [facility] Emergency Dept.    Test Date:  2023  Pt Name:    ADELINA KENNY       Department: ER  MRN:        [id number]                      Room:  Gender:     Female                       Technician: [id number]  :        1980                   Requested By:ER TRIAGE PROTOCOL  Order #:    [id number]                    Reading MD:    Measurements  Intervals                                Axis  Rate:       83                           P:          21  KS:         141                          QRS:        20  QRSD:       86                           T:          27  QT:         378  QTc:        445    Interpretive Statements  Sinus rhythm  Abnormal R-wave progression, early transition  Compared to ECG 2023 11:52:28  No significant changes         Radiology  CT-CTA CHEST PULMONARY ARTERY W/ RECONS   Final Result      No evidence of pulmonary embolism.            CT-RENAL COLIC EVALUATION(A/P W/O)   Final Result      No evidence of nephroureterolithiasis or obstructive uropathy.      DX-CHEST-PORTABLE (1 VIEW)   Final Result      No acute cardiopulmonary disease evident.          Medications:   New Prescriptions    SULFAMETHOXAZOLE-TRIMETHOPRIM (BACTRIM DS) 800-160 MG TABLET    Take 1 Tablet by mouth 2 times a day for 7 days.       My final assessment includes Pyelonephritis    Upon Reevaluation, the patient's condition has: Improved; and will be discharged.    Patient discharged from ED Observation status at 11:34 PM (Time)  08/06/23 (Date).     Total time spent on this ED Observation discharge encounter is > 30 Minutes    Electronically signed by: Toshia Manning M.D., 8/6/2023 10:28 PM

## 2023-08-07 NOTE — ED TRIAGE NOTES
"Chief Complaint   Patient presents with    Flank Pain     Pt presents from  for right sided flank pain that started 7/31, pain transitioned to left flank pain on 8/4. Pt also endorses SOB while ambulating, but denies SOB with rest.        Pt ambulatory to triage. Pt A&Ox4, for above complaint.     Pt to lobby . Pt educated on alerting staff in changes to condition. Pt verbalized understanding. Protocol SOB.     /71   Pulse (!) 113   Temp 36.3 °C (97.3 °F) (Temporal)   Resp 18   Ht 1.626 m (5' 4\")   Wt 93.9 kg (207 lb 0.2 oz)   LMP 07/11/2023 (Approximate)   SpO2 95%   BMI 35.53 kg/m²   "

## 2023-08-07 NOTE — ED PROVIDER NOTES
ED Provider Note    CHIEF COMPLAINT  Chief Complaint   Patient presents with    Flank Pain     Pt presents from  for right sided flank pain that started 7/31, pain transitioned to left flank pain on 8/4. Pt also endorses SOB while ambulating, but denies SOB with rest.        EXTERNAL RECORDS REVIEWED  Inpatient Notes DC note 7/15/23    HPI/ROS  LIMITATION TO HISTORY   Select: : None  OUTSIDE HISTORIAN(S):  None    Christine Ita Miguel is a 42 y.o. female who presents emergency department for evaluation of shortness of breath and flank pain.  The patient states that she started having flank pain for 5 days ago.  She states that he was initially on the right side and is now moved to the left.  She states that it is made worse with trying to take a deep breath.  She also states that her heart rate has been going quite high.  She states that today in the shower was 140.  She denies chest pain.  She has not had any hemoptysis.  She denies previous DVT or PE.  She is not on exogenous hormones.  She did have a gastric sleeve performed 3 weeks ago.  She denies calf tenderness or swelling.  She fevers.  She has had some vomiting over the last 2 days.  She denies dysuria or hematuria.    PAST MEDICAL HISTORY   has a past medical history of Acid reflux, Anesthesia, Arthritis (03/20/2023), Asthma, Breath shortness (03/20/2023), CREST syndrome (HCC), Degenerative joint disease involving multiple joints (02/07/2023), Dyslipidemia (08/01/2022), Fatty liver, Heart burn, Hiatus hernia syndrome (07/06/2023), High cholesterol (03/20/2023), Hypertension (03/20/2023), Indigestion, Oligohydramnios antepartum-IOL 4/24 9 AM (04/15/2016), Pain (08/22/2014), Pain (03/20/2023), Right ventricular enlargement, Sleep apnea (03/20/2023), Snoring, and Unspecified urinary incontinence (03/20/2023).    SURGICAL HISTORY   has a past surgical history that includes tonsillectomy (5/14/2013); nasal septal reconstruction (5/14/2013);  "turbinoplasty (2013); inj,epidural/lumb/sac single (2014); lumbar laminectomy diskectomy (2014); irrigation & debridement neuro (2014); irrigation & debridement ortho (2014); lap,diagnostic abdomen (N/A, 3/27/2023); lap, ubaldo restrict proc, longitudinal gas* (2023); and robotic assisted lap hiatal hernia rep (2023).    FAMILY HISTORY  Family History   Problem Relation Age of Onset    Rheumatologic Disease Mother         RA    Heart Attack Father          MI (age 45)    Kidney Disease Father         ESRD on HD    Diabetes Father     Heart Disease Sister        SOCIAL HISTORY  Social History     Tobacco Use    Smoking status: Never     Passive exposure: Never    Smokeless tobacco: Never   Vaping Use    Vaping Use: Never used   Substance and Sexual Activity    Alcohol use: No    Drug use: No    Sexual activity: Not on file       CURRENT MEDICATIONS  Home Medications       Reviewed by Mauricio Coyle RLoraineNLoraine (Registered Nurse) on 23 at   Med List Status: Partial     Medication Last Dose Status   albuterol 108 (90 Base) MCG/ACT Aero Soln inhalation aerosol  Active   amLODIPine (NORVASC) 10 MG Tab  Active   atorvastatin (LIPITOR) 40 MG Tab  Active   hydroxychloroquine (PLAQUENIL) 200 MG Tab  Active   losartan (COZAAR) 25 MG Tab  Active   omeprazole (PRILOSEC) 40 MG delayed-release capsule  Active   ondansetron (ZOFRAN) 4 MG Tab tablet  Active   sucralfate (CARAFATE) 1 GM Tab  Active                    ALLERGIES  Allergies   Allergen Reactions    Latex Rash and Itching    Duloxetine      HA, dizz     Nifedipine Vomiting     Headache, vomiting    Sildenafil      HA, nausea     PHYSICAL EXAM  VITAL SIGNS: /63   Pulse 84   Temp 36.4 °C (97.6 °F) (Temporal)   Resp 14   Ht 1.626 m (5' 4\")   Wt 93.9 kg (207 lb 0.2 oz)   LMP 2023 (Approximate)   SpO2 97%   BMI 35.53 kg/m²   Constitutional: Alert and in no apparent distress.  HENT: Normocephalic atraumatic. " Bilateral external ears normal. Nose normal. Mucous membranes are moist.  Eyes: Pupils are equal and reactive. Conjunctiva normal. Non-icteric sclera.   Neck: Normal range of motion without tenderness. Supple. No meningeal signs.  Cardiovascular: Tachycardic rate and regular rhythm. No murmurs, gallops or rubs.  Thorax & Lungs: No increased work of breathing. Breath sounds are clear to auscultation bilaterally. No wheezing, rhonchi or rales.  Abdomen: Soft, nontender and nondistended. No hepatosplenomegaly.  Surgical visions noted on the abdomen.  No surrounding erythema, discharge, or warmth noted.  Skin: Warm and dry. No rashes are noted.  Back: No bony tenderness, No CVA tenderness.   Extremities: 2+ peripheral pulses. Cap refill is less than 2 seconds. No edema, cyanosis, or clubbing.  Musculoskeletal: Good range of motion in all major joints. No tenderness to palpation or major deformities noted.   Neurologic: Alert and appropriate for age. The patient moves all 4 extremities without obvious deficits.    DIAGNOSTIC STUDIES / PROCEDURES  EKG  I have independently interpreted this EKG  Results for orders placed or performed during the hospital encounter of 23   EKG (NOW)   Result Value Ref Range    Report       [facility] Emergency Dept.    Test Date:  2023  Pt Name:    ADELINA KENNY       Department: ER  MRN:        [id number]                      Room:  Gender:     Female                       Technician: [id number]  :        1980                   Requested By:ER TRIAGE PROTOCOL  Order #:    [id number]                    Reading MD:    Measurements  Intervals                                Axis  Rate:       83                           P:          21  SD:         141                          QRS:        20  QRSD:       86                           T:          27  QT:         378  QTc:        445    Interpretive Statements  Sinus rhythm  Abnormal R-wave progression,  early transition  Compared to ECG 06/16/2023 11:52:28  No significant changes       LABS  Results for orders placed or performed during the hospital encounter of 08/06/23   CBC with Differential   Result Value Ref Range    WBC 6.3 4.8 - 10.8 K/uL    RBC 5.25 4.20 - 5.40 M/uL    Hemoglobin 15.1 12.0 - 16.0 g/dL    Hematocrit 46.1 37.0 - 47.0 %    MCV 87.8 81.4 - 97.8 fL    MCH 28.8 27.0 - 33.0 pg    MCHC 32.8 32.2 - 35.5 g/dL    RDW 44.6 35.9 - 50.0 fL    Platelet Count 194 164 - 446 K/uL    MPV 10.8 9.0 - 12.9 fL    Neutrophils-Polys 49.90 44.00 - 72.00 %    Lymphocytes 34.40 22.00 - 41.00 %    Monocytes 10.70 0.00 - 13.40 %    Eosinophils 4.30 0.00 - 6.90 %    Basophils 0.50 0.00 - 1.80 %    Immature Granulocytes 0.20 0.00 - 0.90 %    Nucleated RBC 0.00 0.00 - 0.20 /100 WBC    Neutrophils (Absolute) 3.17 1.82 - 7.42 K/uL    Lymphs (Absolute) 2.18 1.00 - 4.80 K/uL    Monos (Absolute) 0.68 0.00 - 0.85 K/uL    Eos (Absolute) 0.27 0.00 - 0.51 K/uL    Baso (Absolute) 0.03 0.00 - 0.12 K/uL    Immature Granulocytes (abs) 0.01 0.00 - 0.11 K/uL    NRBC (Absolute) 0.00 K/uL   Comp Metabolic Panel   Result Value Ref Range    Sodium 137 135 - 145 mmol/L    Potassium 3.9 3.6 - 5.5 mmol/L    Chloride 102 96 - 112 mmol/L    Co2 18 (L) 20 - 33 mmol/L    Anion Gap 17.0 (H) 7.0 - 16.0    Glucose 90 65 - 99 mg/dL    Bun 14 8 - 22 mg/dL    Creatinine 0.72 0.50 - 1.40 mg/dL    Calcium 10.2 8.5 - 10.5 mg/dL    Correct Calcium 9.8 8.5 - 10.5 mg/dL    AST(SGOT) 20 12 - 45 U/L    ALT(SGPT) 15 2 - 50 U/L    Alkaline Phosphatase 70 30 - 99 U/L    Total Bilirubin 0.5 0.1 - 1.5 mg/dL    Albumin 4.5 3.2 - 4.9 g/dL    Total Protein 7.2 6.0 - 8.2 g/dL    Globulin 2.7 1.9 - 3.5 g/dL    A-G Ratio 1.7 g/dL   LACTIC ACID   Result Value Ref Range    Lactic Acid 1.0 0.5 - 2.0 mmol/L   PROTHROMBIN TIME (INR)   Result Value Ref Range    PT 14.2 12.0 - 14.6 sec    INR 1.11 0.87 - 1.13   APTT   Result Value Ref Range    APTT 32.0 24.7 - 36.0 sec    ESTIMATED GFR   Result Value Ref Range    GFR (CKD-EPI) 107 >60 mL/min/1.73 m 2   EKG (NOW)   Result Value Ref Range    Report       [facility] Emergency Dept.    Test Date:  2023  Pt Name:    ADELINA KENNY       Department: ER  MRN:        [id number]                      Room:  Gender:     Female                       Technician: [id number]  :        1980                   Requested By:ER TRIAGE PROTOCOL  Order #:    [id number]                    Reading MD:    Measurements  Intervals                                Axis  Rate:       83                           P:          21  AR:         141                          QRS:        20  QRSD:       86                           T:          27  QT:         378  QTc:        445    Interpretive Statements  Sinus rhythm  Abnormal R-wave progression, early transition  Compared to ECG 2023 11:52:28  No significant changes       RADIOLOGY  I have independently interpreted the diagnostic imaging associated with this visit and am waiting the final reading from the radiologist.   My preliminary interpretation is as follows: Clear lung fields bilaterally  Radiologist interpretation:   DX-CHEST-PORTABLE (1 VIEW)   Final Result      No acute cardiopulmonary disease evident.      CT-CTA CHEST PULMONARY ARTERY W/ RECONS    (Results Pending)   CT-RENAL COLIC EVALUATION(A/P W/O)    (Results Pending)     COURSE & MEDICAL DECISION MAKING    ED Observation Status? Yes; I am placing the patient in to an observation status due to a diagnostic uncertainty as well as therapeutic intensity. Patient placed in observation status at 9:21 PM, 2023.     Observation plan is as follows: Labs, imaging and reassessment    INITIAL ASSESSMENT, COURSE AND PLAN  Care Narrative: This is a 42-year-old female presenting to the ED for evaluation of flank pain and shortness of breath.  On initial evaluation, the patient did not appear to be in any acute distress.   Her vital signs were notable for tachycardia.  Her lung sounds were clear and her abdomen was soft and nontender.  Heart sounds were normal as well.    However, given that she is recently postop, tachycardic, and having shortness of breath, I am concerned for PE.  CTA of the chest was ordered.    Initial screening labs were overall reassuring but some are still pending including troponin.    Urinalysis was also pending.    The patient was signed out to Dr. Manning, pending results of the labs, CTs and final disposition.    HYDRATION: Based on the patient's presentation of Tachycardia the patient was given IV fluids. IV Hydration was used because oral hydration was not adequate alone. Upon recheck following hydration, the patient was stable.      ADDITIONAL PROBLEM LIST    DISPOSITION AND DISCUSSIONS  I have discussed management of the patient with the following physicians and JOON's:  None    Discussion of management with other QHP or appropriate source(s): None     Escalation of care considered, and ultimately not performed:IV fluids, blood analysis, and diagnostic imaging    Barriers to care at this time, including but not limited to:  None .     Decision tools and prescription drugs considered including, but not limited to:  None .    FINAL IMPRESSION  1. Flank pain    2. Shortness of breath      Electronically signed by: Gina Briceño D.O., 8/6/2023 9:15 PM

## 2023-08-07 NOTE — PROGRESS NOTES
Subjective     Christine Miguel is a 42 y.o. female who presents with Low Back Pain (X 1 week especially right side. Left is getting there. Had gastric sleeve surgery 3 weeks ago)      HPI:  Christine Miguel is a 42 y.o. female who presents today for evaluation back pain and shortness of breath.  Patient reports that for the past week or so she has been having fairly persistent pain in her low back and the pain seems to be worse in her flank area bilaterally.  She says the right side has been worse but she feels on both.  She also notes that when she takes a deep breath she feels worsening pain and anytime she starts walking she starts to get short of breath and her heart rate jumps into the 140s.  She has not had any chest pain.  No fever.  No burning with urination, urgency or frequency of urination.  No blood in the urine.  She does note that she had gastric sleeve surgery 3 weeks ago.        Review of Systems   Constitutional:  Positive for malaise/fatigue. Negative for chills and fever.   Respiratory:  Positive for shortness of breath. Negative for cough.    Cardiovascular:  Negative for chest pain.        Increased HR   Gastrointestinal:  Negative for nausea and vomiting.   Genitourinary:  Positive for flank pain. Negative for dysuria, frequency, hematuria and urgency.   Musculoskeletal:  Positive for back pain.   Endo/Heme/Allergies:  Does not bruise/bleed easily.           PMH:  has a past medical history of Acid reflux, Anesthesia, Arthritis (03/20/2023), Asthma, Breath shortness (03/20/2023), CREST syndrome (HCC), Degenerative joint disease involving multiple joints (02/07/2023), Dyslipidemia (08/01/2022), Fatty liver, Heart burn, Hiatus hernia syndrome (07/06/2023), High cholesterol (03/20/2023), Hypertension (03/20/2023), Indigestion, Oligohydramnios antepartum-IOL 4/24 9 AM (04/15/2016), Pain (08/22/2014), Pain (03/20/2023), Right ventricular enlargement, Sleep apnea  (03/20/2023), Snoring, and Unspecified urinary incontinence (03/20/2023).    She has no past medical history of Addisons disease ([facility]), Allergy, Anemia, Anxiety, Arrhythmia, Blood transfusion without reported diagnosis, Cancer ([facility]), CHF (congestive heart failure) ([facility]), Clotting disorder ([facility]), Cold, COPD (chronic obstructive pulmonary disease) ([facility]), Cushings syndrome ([facility]), Dental disorder, Depression, Diabetes ([facility]), Diabetic neuropathy ([facility]), Glaucoma, Goiter, Heart attack ([facility]), Heart murmur, HIV (human immunodeficiency virus infection) ([facility]), IBD (inflammatory bowel disease), Kidney disease, Meningitis, Migraine, or Pulmonary emphysema ([facility]).  MEDS:   Current Outpatient Medications:     ondansetron (ZOFRAN) 4 MG Tab tablet, TAKE 1 TABLET BY MOUTH EVERY 6 HOURS AS NEEDED FOR NAUSEA AND VOMITING FOR 5 DAYS, Disp: , Rfl:     amLODIPine (NORVASC) 10 MG Tab, TAKE 1 TABLET BY MOUTH EVERY DAY AT BEDTIME  DAYS, Disp: , Rfl:     sucralfate (CARAFATE) 1 GM Tab, CRUSH AND MIX 1 TABLET WITH 30 ML OF WATER AND TAKE BY MOUTH 4 TIMES DAILY AS NEEDED, Disp: , Rfl:     albuterol 108 (90 Base) MCG/ACT Aero Soln inhalation aerosol, Inhale 2 Puffs every four hours as needed for Shortness of Breath., Disp: 1 Each, Rfl: 0    hydroxychloroquine (PLAQUENIL) 200 MG Tab, Take 200 mg by mouth every day., Disp: , Rfl:     omeprazole (PRILOSEC) 40 MG delayed-release capsule, Take 1 Capsule by mouth every day., Disp: 90 Capsule, Rfl: 0    losartan (COZAAR) 25 MG Tab, Take 1 Tablet by mouth every day for 360 days., Disp: 90 Tablet, Rfl: 3    atorvastatin (LIPITOR) 40 MG Tab, Take 1 Tablet by mouth every evening., Disp: 90 Tablet, Rfl: 3  ALLERGIES:   Allergies   Allergen Reactions    Latex Rash and Itching    Duloxetine      HA, dizz     Nifedipine Vomiting     Headache, vomiting    Sildenafil      HA, nausea     SURGHX:   Past Surgical History:   Procedure Laterality Date    [place] LAP, NILS RESTRICT PROC, LONGITUDINAL GAS*  7/14/2023  "   Procedure: ROBOTIC PARTIAL SLEEVE GASTRECTOMY;  Surgeon: Olivier Oliveros M.D.;  Location: SURGERY [facility];  Service: Gen Robotic    ROBOTIC ASSISTED LAP HIATAL HERNIA REP  7/14/2023    Procedure: REPAIR, HERNIA, HIATAL, ROBOT-ASSISTED, LAPAROSCOPIC;  Surgeon: Olivier Oliveros M.D.;  Location: SURGERY [facility];  Service: Gen Robotic    OK LAP,DIAGNOSTIC ABDOMEN N/A 3/27/2023    Procedure: LAPAROSCOPY, DIAGNOSTIC AND OPERATIVE AND LAPAROSCOPIC BILATERAL SALPINGECTOMY;  Surgeon: Nolan Rodriguez M.D.;  Location: SURGERY SAME DAY [facility];  Service: Gynecology    IRRIGATION & DEBRIDEMENT ORTHO  9/7/2014    Performed by El Gomez M.D. at [facility] ORS    IRRIGATION & DEBRIDEMENT NEURO  9/5/2014    Performed by El Gomez M.D. at SURGERY [facility] ORS    LUMBAR LAMINECTOMY DISKECTOMY  8/27/2014    Performed by El Gomez M.D. at [facility] ORS    INJ,EPIDURAL/LUMB/SAC SINGLE  6/5/2014    Performed by Alexis Cornell M.D. at [facility] ORS    TONSILLECTOMY  5/14/2013    Performed by Aaron Hutton M.D. at SURGERY SAME DAY [facility] ORS    NASAL SEPTAL RECONSTRUCTION  5/14/2013    Performed by Aaron Hutton M.D. at SURGERY SAME DAY [facility] ORS    TURBINOPLASTY  5/14/2013    Performed by Aaron Hutton M.D. at SURGERY SAME DAY [facility] ORS     SOCHX:  reports that she has never smoked. She has never been exposed to tobacco smoke. She has never used smokeless tobacco. She reports that she does not drink alcohol and does not use drugs.  FH: Family history was reviewed, no pertinent findings to report        Objective     /72   Pulse 97   Temp 36.9 °C (98.5 °F) (Temporal)   Resp (!) 22   Ht 1.619 m (5' 3.74\")   Wt 93.4 kg (205 lb 14.4 oz)   LMP 07/11/2023   SpO2 98%   BMI 35.63 kg/m²      Physical Exam  Constitutional:       Appearance: She is well-developed.   HENT:      Head: Normocephalic and atraumatic.      Right Ear: " External ear normal.      Left Ear: External ear normal.   Eyes:      Conjunctiva/sclera: Conjunctivae normal.      Pupils: Pupils are equal, round, and reactive to light.   Cardiovascular:      Rate and Rhythm: Regular rhythm.      Heart sounds: Normal heart sounds. No murmur heard.     Comments: Pulse was 97 at rest but within a few seconds of walking patient's heart rate jumped into the high 130s and she was feeling short of breath.  Pulmonary:      Effort: Pulmonary effort is normal.      Breath sounds: Normal breath sounds. No decreased breath sounds, wheezing, rhonchi or rales.   Abdominal:      Tenderness: There is no right CVA tenderness or left CVA tenderness.   Musculoskeletal:      Cervical back: Normal range of motion.   Lymphadenopathy:      Cervical: No cervical adenopathy.   Skin:     General: Skin is warm and dry.      Capillary Refill: Capillary refill takes less than 2 seconds.   Neurological:      Mental Status: She is alert and oriented to person, place, and time.   Psychiatric:         Behavior: Behavior normal.         Judgment: Judgment normal.         POCT Urinalysis  Lab Results   Component Value Date/Time    POCCOLOR dark yellow 08/06/2023 08:18 PM    POCAPPEAR cloudy 08/06/2023 08:18 PM    POCLEUKEST negative 08/06/2023 08:18 PM    POCNITRITE negative 08/06/2023 08:18 PM    POCUROBILIGE 1.0 08/06/2023 08:18 PM    POCPROTEIN 100 08/06/2023 08:18 PM    POCURPH 5.5 08/06/2023 08:18 PM    POCBLOOD small 08/06/2023 08:18 PM    POCSPGRV >=1.030 08/06/2023 08:18 PM    POCKETONES >=160 08/06/2023 08:18 PM    POCBILIRUBIN large 08/06/2023 08:18 PM    POCGLUCUA negative 08/06/2023 08:18 PM            Assessment & Plan       1. Flank pain  - POCT Urinalysis    2. Shortness of breath    3. Tachycardia    Patient presents complaining of flank pain that has been worsening.  She also notes that she has been having shortness of breath with heart rate going up into the 140s with even short bouts of  ambulation.  This was reproduced in the urgent care.  She is a few weeks status post gastric sleeve surgery.  Her shortness of breath and heart rate elevations are concerning for possibility of PE.  Patient referred to the emergency department for higher level of care.  She is going via private vehicle to [facility].  Transfer center notified.

## 2023-08-07 NOTE — ED NOTES
Ambulatory to room Yellow 57 with same report as triage. Alert and oriented not in any form of acute distress noted. Charted up for ERP.

## 2023-08-28 ENCOUNTER — OFFICE VISIT (OUTPATIENT)
Dept: INTERNAL MEDICINE | Facility: OTHER | Age: 43
End: 2023-08-28
Payer: MEDICAID

## 2023-08-28 VITALS
OXYGEN SATURATION: 96 % | BODY MASS INDEX: 34.38 KG/M2 | TEMPERATURE: 98.5 F | WEIGHT: 201.4 LBS | DIASTOLIC BLOOD PRESSURE: 60 MMHG | SYSTOLIC BLOOD PRESSURE: 112 MMHG | HEIGHT: 64 IN | HEART RATE: 92 BPM

## 2023-08-28 DIAGNOSIS — N30.90 CYSTITIS: ICD-10-CM

## 2023-08-28 LAB
APPEARANCE UR: CLEAR
BILIRUB UR STRIP-MCNC: ABNORMAL MG/DL
COLOR UR AUTO: ABNORMAL
GLUCOSE UR STRIP.AUTO-MCNC: NEGATIVE MG/DL
KETONES UR STRIP.AUTO-MCNC: 15 MG/DL
LEUKOCYTE ESTERASE UR QL STRIP.AUTO: NEGATIVE
NITRITE UR QL STRIP.AUTO: NEGATIVE
PH UR STRIP.AUTO: 5 [PH] (ref 5–8)
PROT UR QL STRIP: 100 MG/DL
RBC UR QL AUTO: ABNORMAL
SP GR UR STRIP.AUTO: 1.03
UROBILINOGEN UR STRIP-MCNC: 0.2 MG/DL

## 2023-08-28 PROCEDURE — 3074F SYST BP LT 130 MM HG: CPT | Performed by: INTERNAL MEDICINE

## 2023-08-28 PROCEDURE — 81002 URINALYSIS NONAUTO W/O SCOPE: CPT | Performed by: INTERNAL MEDICINE

## 2023-08-28 PROCEDURE — 3078F DIAST BP <80 MM HG: CPT | Performed by: INTERNAL MEDICINE

## 2023-08-28 PROCEDURE — 99214 OFFICE O/P EST MOD 30 MIN: CPT | Mod: GC | Performed by: INTERNAL MEDICINE

## 2023-08-28 RX ORDER — NITROFURANTOIN 25; 75 MG/1; MG/1
100 CAPSULE ORAL 2 TIMES DAILY
Qty: 14 CAPSULE | Refills: 0 | Status: SHIPPED | OUTPATIENT
Start: 2023-08-28 | End: 2023-11-22

## 2023-08-28 ASSESSMENT — ENCOUNTER SYMPTOMS
PALPITATIONS: 0
MYALGIAS: 0
WEIGHT LOSS: 0
ORTHOPNEA: 0
DOUBLE VISION: 0
BLURRED VISION: 0
NAUSEA: 1
NECK PAIN: 0
DIZZINESS: 0
FEVER: 0
NERVOUS/ANXIOUS: 0
HEARTBURN: 1
CHILLS: 0
COUGH: 0
VOMITING: 1

## 2023-08-28 ASSESSMENT — FIBROSIS 4 INDEX: FIB4 SCORE: 1.12

## 2023-08-28 NOTE — PROGRESS NOTES
CC: Follow up visit, UTI     HPI:   Christine presents today with past medical history of crest syndrome with Raynaud's phenomena, here today for an ED follow-up visit for chief complaints of a UTI    She was recently seen in the ED with abdominal pain and tachycardia found to have a UTI s/p antibiotic therapy with Augmentin with minimal response.  CT PE was done at the time that was negative for any acute PE.  CT renal colic was also negative.  She describes she completed antibiotic course of gabapentin, however she continues to have dysuria, concentrated, low-volume urine.  She had her gastric sleeve performed about 6 weeks ago and since then she has not been able to eat or drink that much.  Endorses nausea and vomiting since her gastric sleeve that her bariatric surgeon is very closely following her up for.    Also endorses occasional blood when she wipes after passing stool.  Has a history of external hemorrhoids.  Describes she started noticing it about 3 weeks ago.  Again describes that she has been mildly constipated and has not been going very frequently since her surgery.    accompanied by her 2 kids (sons)    No problems updated.    Health Maintenance: Completed    ROS:  Review of Systems   Constitutional:  Negative for chills, fever and weight loss.   HENT:  Negative for hearing loss and tinnitus.    Eyes:  Negative for blurred vision and double vision.   Respiratory:  Negative for cough.    Cardiovascular:  Negative for chest pain, palpitations and orthopnea.   Gastrointestinal:  Positive for heartburn, nausea and vomiting.   Genitourinary:  Positive for dysuria. Negative for frequency, hematuria and urgency.   Musculoskeletal:  Negative for myalgias and neck pain.   Skin:  Negative for itching and rash.   Neurological:  Negative for dizziness.   Psychiatric/Behavioral:  The patient is not nervous/anxious.        Objective:     Exam:  /60 (BP Location: Right arm, Patient Position: Sitting, BP Cuff  "Size: Large adult)   Pulse 92   Temp 36.9 °C (98.5 °F) (Temporal)   Ht 1.626 m (5' 4\")   Wt 91.4 kg (201 lb 6.4 oz)   LMP 07/11/2023 (Approximate)   SpO2 96%   BMI 34.57 kg/m²  Body mass index is 34.57 kg/m².    Physical Exam  Constitutional:       General: She is not in acute distress.     Appearance: She is not ill-appearing.   HENT:      Head: Normocephalic and atraumatic.      Right Ear: External ear normal.      Left Ear: External ear normal.      Nose: Nose normal. No congestion.      Mouth/Throat:      Mouth: Mucous membranes are moist.   Eyes:      Extraocular Movements: Extraocular movements intact.      Pupils: Pupils are equal, round, and reactive to light.   Cardiovascular:      Rate and Rhythm: Normal rate and regular rhythm.      Pulses: Normal pulses.   Pulmonary:      Effort: Pulmonary effort is normal.   Abdominal:      General: Abdomen is flat. There is no distension.      Palpations: Abdomen is soft. There is no mass.   Musculoskeletal:         General: Normal range of motion.   Skin:     General: Skin is warm.      Capillary Refill: Capillary refill takes less than 2 seconds.   Neurological:      Mental Status: She is alert and oriented to person, place, and time.   Psychiatric:         Mood and Affect: Mood normal.             Labs: Reviewed     Assessment & Plan:     42 y.o. female with the following -       Dehydration   Cystitis  Endorses low volume, dark urine.  Describes not drinking enough water since her surgery.  Point-of-care UA with moderate OC, 15 ketones, specific gravity greater than 1.030, large amount of blood, 100 protein.  Nitrites and leukocyte esterase negative.  Considering patient has had ongoing dysuria and burning, we will go ahead and give her another course of antibiotic therapy.  - Nitrofurantoin 100 mg twice daily for 5-7 days  - We discussed adequate hydration in detail today.  Counseled her to drink at least 8-10 glasses of water daily to help with " symptoms  - Follow-up in 1 week with repeat UA and metabolic panel (to assess hydration status and electrolytes)      External hemorrhoids  She has been occasionally noticing blood on wiping her stool since the last 2-3 weeks.  Describes she has been constipated as she has been able to drink and each much since surgery.  Endorses low volume stool.  Denies any blood in her stool, endorses fresh blood on wiping on the toilet paper.  - We again discussed increasing hydration and increasing fiber in diet to help with the symptoms.  Encouraged her to discuss her diet concerns with her bariatric surgeon as well so appropriate help and interventions will be provided to increase healthy diet  - She will f/u with her GI doctor as well      Concerns of electrolyte abnormalities  He will also repeat a metabolic panel considering she has had nausea, vomiting since her surgery.  UA with protein ketones concerning for metabolic acidosis  - Follow-up in 1 week with repeat metabolic panel      Healthcare maintenance   Describes she has not been able to take other medications since her surgery including amlodipine, losartan, hydroxychloroquine, atorvastatin.  She has appointment with her rheumatologist next week and will discuss it with them.  We also discussed that she needs to check in with her bariatric surgeon on the optimal way to take her medications.  Discussed medication compliance.  I also encouraged her to discuss with her rheumatologist and vascular doctor on possibly decreasing the dose of losartan/amlodipine considering her blood pressure is on the lower limit of normal with weight loss while being off of medications      I spent a total of 30 minutes with record review, exam, communication with the patient, communication with other providers, and documentation of this encounter.      Return in about 1 week (around 9/4/2023).    Please note that this dictation was created using voice recognition software. I have made  every reasonable attempt to correct obvious errors, but I expect that there are errors of grammar and possibly content that I did not discover before finalizing the note.

## 2023-09-08 ENCOUNTER — HOSPITAL ENCOUNTER (EMERGENCY)
Facility: MEDICAL CENTER | Age: 43
End: 2023-09-08
Attending: EMERGENCY MEDICINE
Payer: MEDICAID

## 2023-09-08 ENCOUNTER — OFFICE VISIT (OUTPATIENT)
Dept: INTERNAL MEDICINE | Facility: OTHER | Age: 43
End: 2023-09-08
Payer: MEDICAID

## 2023-09-08 VITALS
TEMPERATURE: 99.8 F | WEIGHT: 199.74 LBS | HEIGHT: 64 IN | RESPIRATION RATE: 17 BRPM | BODY MASS INDEX: 34.1 KG/M2 | OXYGEN SATURATION: 100 % | SYSTOLIC BLOOD PRESSURE: 123 MMHG | DIASTOLIC BLOOD PRESSURE: 81 MMHG | HEART RATE: 91 BPM

## 2023-09-08 VITALS
SYSTOLIC BLOOD PRESSURE: 116 MMHG | HEART RATE: 88 BPM | TEMPERATURE: 97.9 F | OXYGEN SATURATION: 97 % | DIASTOLIC BLOOD PRESSURE: 58 MMHG | WEIGHT: 199.2 LBS | HEIGHT: 64 IN | BODY MASS INDEX: 34.01 KG/M2

## 2023-09-08 DIAGNOSIS — E87.6 HYPOKALEMIA: ICD-10-CM

## 2023-09-08 DIAGNOSIS — M34.1 CREST SYNDROME (HCC): ICD-10-CM

## 2023-09-08 DIAGNOSIS — E87.6 HYPOKALEMIA DUE TO EXCESSIVE GASTROINTESTINAL LOSS OF POTASSIUM: ICD-10-CM

## 2023-09-08 DIAGNOSIS — E86.0 DEHYDRATION: ICD-10-CM

## 2023-09-08 DIAGNOSIS — R11.2 NAUSEA AND VOMITING IN ADULT: ICD-10-CM

## 2023-09-08 DIAGNOSIS — Z90.3 H/O GASTRIC SLEEVE: ICD-10-CM

## 2023-09-08 DIAGNOSIS — Z98.84 HX OF GASTRIC BYPASS: ICD-10-CM

## 2023-09-08 PROBLEM — E66.09 OBESITY DUE TO EXCESS CALORIES WITH SERIOUS COMORBIDITY: Status: RESOLVED | Noted: 2023-02-07 | Resolved: 2023-09-08

## 2023-09-08 PROBLEM — M54.9 INTRACTABLE BACK PAIN: Status: RESOLVED | Noted: 2017-12-26 | Resolved: 2023-09-08

## 2023-09-08 PROBLEM — N94.10 DYSPAREUNIA, FEMALE: Status: RESOLVED | Noted: 2023-03-27 | Resolved: 2023-09-08

## 2023-09-08 PROBLEM — R76.8 CYCLIC CITRULLINATED PEPTIDE (CCP) ANTIBODY POSITIVE: Status: RESOLVED | Noted: 2022-08-01 | Resolved: 2023-09-08

## 2023-09-08 PROBLEM — M25.562 PATELLOFEMORAL INSTABILITY OF LEFT KNEE WITH PAIN: Status: RESOLVED | Noted: 2023-02-07 | Resolved: 2023-09-08

## 2023-09-08 PROBLEM — M25.562 LEFT KNEE PAIN: Status: RESOLVED | Noted: 2023-02-07 | Resolved: 2023-09-08

## 2023-09-08 PROBLEM — R76.8 ANA POSITIVE: Status: RESOLVED | Noted: 2022-08-01 | Resolved: 2023-09-08

## 2023-09-08 PROBLEM — M54.31 SCIATICA OF RIGHT SIDE: Status: RESOLVED | Noted: 2017-12-26 | Resolved: 2023-09-08

## 2023-09-08 PROBLEM — R10.2 PELVIC PAIN: Status: RESOLVED | Noted: 2023-03-27 | Resolved: 2023-09-08

## 2023-09-08 PROBLEM — M25.362 PATELLOFEMORAL INSTABILITY OF LEFT KNEE WITH PAIN: Status: RESOLVED | Noted: 2023-02-07 | Resolved: 2023-09-08

## 2023-09-08 LAB
ANION GAP SERPL CALC-SCNC: 13 MMOL/L (ref 7–16)
BUN SERPL-MCNC: 11 MG/DL (ref 8–22)
CALCIUM SERPL-MCNC: 9.1 MG/DL (ref 8.5–10.5)
CHLORIDE SERPL-SCNC: 106 MMOL/L (ref 96–112)
CO2 SERPL-SCNC: 22 MMOL/L (ref 20–33)
CREAT SERPL-MCNC: 0.68 MG/DL (ref 0.5–1.4)
EKG IMPRESSION: NORMAL
EKG IMPRESSION: NORMAL
GFR SERPLBLD CREATININE-BSD FMLA CKD-EPI: 111 ML/MIN/1.73 M 2
GLUCOSE SERPL-MCNC: 85 MG/DL (ref 65–99)
LIPASE SERPL-CCNC: 22 U/L (ref 11–82)
POTASSIUM SERPL-SCNC: 3.7 MMOL/L (ref 3.6–5.5)
SODIUM SERPL-SCNC: 141 MMOL/L (ref 135–145)

## 2023-09-08 PROCEDURE — 83690 ASSAY OF LIPASE: CPT

## 2023-09-08 PROCEDURE — 700105 HCHG RX REV CODE 258: Mod: UD | Performed by: EMERGENCY MEDICINE

## 2023-09-08 PROCEDURE — 3078F DIAST BP <80 MM HG: CPT | Mod: GC | Performed by: INTERNAL MEDICINE

## 2023-09-08 PROCEDURE — 80048 BASIC METABOLIC PNL TOTAL CA: CPT

## 2023-09-08 PROCEDURE — 99213 OFFICE O/P EST LOW 20 MIN: CPT | Mod: GE | Performed by: INTERNAL MEDICINE

## 2023-09-08 PROCEDURE — 93005 ELECTROCARDIOGRAM TRACING: CPT | Performed by: EMERGENCY MEDICINE

## 2023-09-08 PROCEDURE — 99284 EMERGENCY DEPT VISIT MOD MDM: CPT

## 2023-09-08 PROCEDURE — 36415 COLL VENOUS BLD VENIPUNCTURE: CPT

## 2023-09-08 PROCEDURE — 3074F SYST BP LT 130 MM HG: CPT | Mod: GC | Performed by: INTERNAL MEDICINE

## 2023-09-08 PROCEDURE — A9270 NON-COVERED ITEM OR SERVICE: HCPCS | Mod: JZ,UD | Performed by: EMERGENCY MEDICINE

## 2023-09-08 PROCEDURE — 700111 HCHG RX REV CODE 636 W/ 250 OVERRIDE (IP): Mod: UD | Performed by: EMERGENCY MEDICINE

## 2023-09-08 PROCEDURE — 96365 THER/PROPH/DIAG IV INF INIT: CPT

## 2023-09-08 PROCEDURE — 700102 HCHG RX REV CODE 250 W/ 637 OVERRIDE(OP): Mod: JZ,UD | Performed by: EMERGENCY MEDICINE

## 2023-09-08 RX ORDER — POTASSIUM CHLORIDE 7.45 MG/ML
10 INJECTION INTRAVENOUS ONCE
Status: COMPLETED | OUTPATIENT
Start: 2023-09-08 | End: 2023-09-08

## 2023-09-08 RX ORDER — POTASSIUM CHLORIDE 750 MG/1
10 TABLET, FILM COATED, EXTENDED RELEASE ORAL DAILY
Qty: 20 TABLET | Refills: 0 | Status: SHIPPED | OUTPATIENT
Start: 2023-09-08 | End: 2023-09-08

## 2023-09-08 RX ORDER — POTASSIUM CHLORIDE 750 MG/1
10 TABLET, FILM COATED, EXTENDED RELEASE ORAL DAILY
Qty: 14 TABLET | Refills: 0 | Status: SHIPPED | OUTPATIENT
Start: 2023-09-08 | End: 2023-11-22

## 2023-09-08 RX ORDER — POTASSIUM CHLORIDE 20 MEQ/1
20 TABLET, EXTENDED RELEASE ORAL ONCE
Status: COMPLETED | OUTPATIENT
Start: 2023-09-08 | End: 2023-09-08

## 2023-09-08 RX ORDER — SODIUM CHLORIDE 9 MG/ML
1000 INJECTION, SOLUTION INTRAVENOUS ONCE
Status: COMPLETED | OUTPATIENT
Start: 2023-09-08 | End: 2023-09-08

## 2023-09-08 RX ADMIN — SODIUM CHLORIDE 1000 ML: 9 INJECTION, SOLUTION INTRAVENOUS at 18:08

## 2023-09-08 RX ADMIN — POTASSIUM CHLORIDE 20 MEQ: 1500 TABLET, EXTENDED RELEASE ORAL at 18:17

## 2023-09-08 RX ADMIN — POTASSIUM CHLORIDE 10 MEQ: 7.46 INJECTION, SOLUTION INTRAVENOUS at 18:16

## 2023-09-08 ASSESSMENT — LIFESTYLE VARIABLES: DO YOU DRINK ALCOHOL: NO

## 2023-09-08 ASSESSMENT — ENCOUNTER SYMPTOMS
ORTHOPNEA: 0
MYALGIAS: 0
WEIGHT LOSS: 1
PALPITATIONS: 0
PHOTOPHOBIA: 0
FEVER: 0
DIARRHEA: 0
VOMITING: 1
HEADACHES: 0
SPUTUM PRODUCTION: 0
NERVOUS/ANXIOUS: 0
CONSTIPATION: 0
FLANK PAIN: 0
BLOOD IN STOOL: 0
DIZZINESS: 0
DOUBLE VISION: 0
COUGH: 0
ABDOMINAL PAIN: 0
CHILLS: 0
HEMOPTYSIS: 0
BLURRED VISION: 0
NAUSEA: 1

## 2023-09-08 ASSESSMENT — FIBROSIS 4 INDEX
FIB4 SCORE: 1.12
FIB4 SCORE: 1.12

## 2023-09-08 NOTE — PROGRESS NOTES
CC: UTI medication not working     HPI:   Christine presents today with past medical history of crest syndrome, Raynaud's phenomena, here today for a follow-up visit for UTI.    She was initially seen in the ED 8/23 with abdominal pain, tachycardia found to have UTI s/p antibiotic therapy with Augmentin with minimal response.  She was seen at the clinic last week and prescribed nitrofurantoin 100 mg twice daily for concerns of recurrent cystitis.     Labs were ordered  Metabolic panel with glucose 85, BUN 8, creatinine 0.62,   Sodium 140, potassium 3.3, protein 5.8 low, chloride 107, bicarb 25  AST 15, ALT 20, T. bili 0.6  Calcium 9.1    Urinalysis with dark yellow turbid urine.  Specific gravity 1.026, pH 5.5.  1+ bilirubin (presumptive), 1+ ketones, negative blood, trace protein, trace leukocyte esterase, 20-40 squamous epithelial cells, few bacteria, moderate calcium oxalate crystals and 0-5 hyaline casts    Urine culture showing mixed genital lyssa.    Describes her symptoms are ongoing, she has been taking the antibiotics, however with minimal relief.  Endorses ongoing dysuria and burning with urination.  Describes the urine is low-volume and dark-colored.  She has not noticed any blood in her urine.  She saw her GI doctor a few days ago who recommended that she continue taking the omeprazole and also started her on sucralfate.  They have been swallow ordered for her which is pending.  She has not been able to get in touch with her surgeon yet, describes he is on vacation will be returning on the 20th of this month which is when she is scheduled her appointment.  She has been trying to drink water, electrolyte water, protein shakes, however describes that she has been vomiting a lot as well.  She takes a few steps of her drink and after a few minutes she vomits.  She tried eating bananas yesterday that also resulted in vomiting.  She has been able to keep any solid food down since her surgery about a  month ago.  She has nausea medications including Zofran and they have not been helping either      No problems updated.    Health Maintenance: Completed    ROS:  Review of Systems   Constitutional:  Positive for malaise/fatigue and weight loss. Negative for chills and fever.   HENT:  Negative for hearing loss and tinnitus.    Eyes:  Negative for blurred vision, double vision and photophobia.   Respiratory:  Negative for cough, hemoptysis and sputum production.    Cardiovascular:  Negative for chest pain, palpitations and orthopnea.   Gastrointestinal:  Positive for nausea and vomiting. Negative for abdominal pain, blood in stool, constipation and diarrhea.   Genitourinary:  Positive for dysuria and frequency. Negative for flank pain, hematuria and urgency.   Musculoskeletal:  Negative for myalgias.   Skin:  Negative for itching and rash.   Neurological:  Negative for dizziness and headaches.   Psychiatric/Behavioral:  The patient is not nervous/anxious.        Objective:     Exam:  There were no vitals taken for this visit. There is no height or weight on file to calculate BMI.    Physical Exam  Constitutional:       General: She is not in acute distress.     Appearance: Normal appearance. She is not ill-appearing.   HENT:      Head: Normocephalic and atraumatic.      Right Ear: External ear normal.      Left Ear: External ear normal.      Nose: Nose normal. No congestion.      Mouth/Throat:      Mouth: Mucous membranes are moist.   Eyes:      Extraocular Movements: Extraocular movements intact.      Pupils: Pupils are equal, round, and reactive to light.   Cardiovascular:      Rate and Rhythm: Normal rate and regular rhythm.      Pulses: Normal pulses.   Pulmonary:      Effort: Pulmonary effort is normal.   Abdominal:      General: Bowel sounds are normal. There is no distension.      Palpations: Abdomen is soft. There is no mass.      Tenderness: There is no right CVA tenderness, left CVA tenderness, guarding or  rebound.      Hernia: No hernia is present.      Comments: Epigastric tenderness on deep palpation    Musculoskeletal:         General: Normal range of motion.      Right lower leg: No edema.      Left lower leg: No edema.   Skin:     General: Skin is warm.      Capillary Refill: Capillary refill takes less than 2 seconds.   Neurological:      Mental Status: She is alert and oriented to person, place, and time.   Psychiatric:         Mood and Affect: Mood normal.             Labs:     Assessment & Plan:     42 y.o. female with the following -       Dehydration  S/p gastric sleeve  Epigastric tenderness  Ongoing dysuria    She was initially seen in the ED 8/23 with abdominal pain, tachycardia found to have UTI s/p antibiotic therapy with Augmentin with minimal response.  Of note CT PE at the time was negative for pulmonary embolism, CT renal colic was also negative for renal stones.  She was seen at the clinic last week and prescribed nitrofurantoin 100 mg twice daily for concerns of recurrent cystitis.     Describes her symptoms are ongoing, she has been taking the antibiotics, however with minimal relief.  Endorses ongoing dysuria and burning with urination.  Describes the urine is low-volume and dark-colored.  She has not noticed any blood in her urine.  She saw her GI doctor a few days ago who recommended that she continue taking the omeprazole and also started her on sucralfate.  They have been swallow ordered for her which is pending.  She has not been able to get in touch with her surgeon yet, describes he is on vacation will be returning on the 20th of this month which is when she is scheduled her appointment.  She has been trying to drink water, electrolyte water, protein shakes, however describes that she has been vomiting a lot as well.  She takes a few steps of her drink and after a few minutes she vomits.  She tried eating bananas yesterday that also resulted in vomiting.  She has been able to keep any  solid food down since her surgery about a month ago.  She has nausea medications including Zofran and they have not been helping either    Labs were ordered at the last visit last week,  Metabolic panel with glucose 85, BUN 8, creatinine 0.62,   Sodium 140, potassium 3.3, protein 5.8 low, chloride 107, bicarb 25  AST 15, ALT 20, T. bili 0.6  Calcium 9.1    Urinalysis with dark yellow turbid urine.  Specific gravity 1.026, pH 5.5.  1+ bilirubin (presumptive), 1+ ketones, negative blood, trace protein, trace leukocyte esterase, 20-40 squamous epithelial cells, few bacteria, moderate calcium oxalate crystals and 0-5 hyaline casts    Urine culture showing mixed genital lyssa.    Plan:    - Encourage hydration.  Advised her to drink fresh water, coconut water, electrolyte water, Gatorade, protein shakes.  Advised her to continue drinking throughout the day, small sips throughout the day to prevent dehydration. Cont PRN anti-nausea meds as well.   - Start potassium supplementation considering the potassium was low 3.3. Qtc 445 last EKG 8/23   - We discussed that she would benefit from IV hydration at this time, discussed going to the ED if she is not able to keep sips of fluids and the potassium pills down.  I addressed alarm symptoms and concerns of low potassium levels.  - We discussed continuing omeprazole and sucralfate  - Follow-up at her surgeon's office as soon as possible, discussed requesting the office to see another surgeon if Dr. Evans is still out   - Follow up at the clinic in 2 weeks with repeat BMP   - Consider repeating UA in the future as well to confirm resolution       Hypokalemia  - K supplementation, as above       Off note, we addressed alarm signs and symptoms prompting calling 911 and/or going to the nearest UC/ED in detail today.  I addressed in detail that she will need to go to the ED for IV potassium supplementation and IV fluids if she is not able to keep the potassium pills down.  She  agrees and describes she will get help in the ED if she is not able to keep fluids/potassium pills down         I spent a total of 30 minutes with record review, exam, communication with the patient, communication with other providers, and documentation of this encounter.      No follow-ups on file.    Please note that this dictation was created using voice recognition software. I have made every reasonable attempt to correct obvious errors, but I expect that there are errors of grammar and possibly content that I did not discover before finalizing the note.

## 2023-09-08 NOTE — ED TRIAGE NOTES
Chief Complaint   Patient presents with    Sent by MD     PT was referred by PCP for multiple concerns. PT had a gastric sleeve procedure 2 months ago and has been unable to tolerate much PO. Recent lab work showed PT to be dehydrated with potassium level of 3.3. PCP will be out of office for 2 weeks and wanted PT to be treated in ER before issues become exacerbated.      PT ambulatory to triage for above complaint. GCS 15.     Pt is alert and oriented, speaking in full sentences, follows commands and responds appropriately to questions. NAD. Resp are even and unlabored.      Pt placed in lobby. Pt educated on triage process. Pt encouraged to alert staff for any changes.     Patient and staff wearing appropriate PPE

## 2023-09-08 NOTE — PATIENT INSTRUCTIONS
Thank you for visiting today!    Please follow-up in 2 weeks     Please get lab work done     If you have any questions or concerns please feel free to contact us at [phone].    If you feel like you are experiencing a medical emergency please seek immediate medical attention at an urgent care or in the emergency department.

## 2023-09-09 NOTE — ED PROVIDER NOTES
ED Provider Note    CHIEF COMPLAINT  Chief Complaint   Patient presents with    Sent by MD     PT was referred by PCP for multiple concerns. PT had a gastric sleeve procedure 2 months ago and has been unable to tolerate much PO. Recent lab work showed PT to be dehydrated with potassium level of 3.3. PCP will be out of office for 2 weeks and wanted PT to be treated in ER before issues become exacerbated.        EXTERNAL RECORDS REVIEWED  Outpatient Notes reviewed office visit note dated today at 10 AM by Dr. Logan.  Seen for vomiting and poor oral intake, unable to keep down solid food since her gastric sleeve surgery    HPI/ROS  LIMITATION TO HISTORY   Select: : None  OUTSIDE HISTORIAN(S):  None available    Christine Ita Miguel is a 42 y.o. female who presents for evaluation of poor oral intake.  Patient notes gastric sleeve procedure about 2 months ago and notes very little oral intake since.  She was seen in Dr. Ann's office.  She is concern for dehydration given persistent nausea despite Zofran.  She was seen by primary care today and found her potassium to be mildly low at 3.3.  Patient recommended to go to the emergency department for IV fluids and so she presents for this reason.  Also need for potassium repletion per primary care.  Currently has UTI on antibiotics, no fever.  Has persistent abdominal pain since her surgery, will be seeing her surgeon in 12 days.    PAST MEDICAL HISTORY   has a past medical history of Acid reflux, GEOVANY positive (8/1/2022), Anesthesia, Arthritis (03/20/2023), Asthma, Breath shortness (03/20/2023), CREST syndrome (HCC), Cyclic citrullinated peptide (CCP) antibody positive (8/1/2022), Degenerative joint disease involving multiple joints (02/07/2023), Dyslipidemia (08/01/2022), Dyspareunia, female (3/27/2023), Enlargement - tonsil/adenoid (5/14/2013), Fatty liver, Heart burn, Hiatus hernia syndrome (07/06/2023), High cholesterol (03/20/2023), Hypertension (03/20/2023),  Indigestion, Intractable back pain (2017), Left knee pain (2023), Obesity due to excess calories with serious comorbidity (2023), Oligohydramnios antepartum-IOL  9 AM (04/15/2016), Pain (2014), Pain (2023), 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    SURGICAL HISTORY   has a past surgical history that includes tonsillectomy (2013); nasal septal reconstruction (2013); turbinoplasty (2013); inj,epidural/lumb/sac single (2014); lumbar laminectomy diskectomy (2014); irrigation & debridement neuro (2014); irrigation & debridement ortho (2014); lap,diagnostic abdomen (N/A, 3/27/2023); lap, ubaldo restrict proc, longitudinal gas* (2023); and robotic assisted lap hiatal hernia rep (2023).    FAMILY HISTORY  Family History   Problem Relation Age of Onset    Rheumatologic Disease Mother         RA    Heart Attack Father          MI (age 45)    Kidney Disease Father         ESRD on HD    Diabetes Father     Heart Disease Sister        SOCIAL HISTORY  Social History     Tobacco Use    Smoking status: Never     Passive exposure: Never    Smokeless tobacco: Never   Vaping Use    Vaping Use: Never used   Substance and Sexual Activity    Alcohol use: No    Drug use: No    Sexual activity: Not on file       CURRENT MEDICATIONS  Home Medications       Reviewed by Margie Giles R.N. (Registered Nurse) on 23 at 1644  Med List Status: Partial     Medication Last Dose Status   albuterol 108 (90 Base) MCG/ACT Aero Soln inhalation aerosol  Active   amLODIPine (NORVASC) 10 MG Tab  Active   atorvastatin (LIPITOR) 40 MG Tab  Active   hydroxychloroquine (PLAQUENIL) 200 MG Tab  Active   losartan (COZAAR) 25 MG Tab  Active   nitrofurantoin (MACROBID) 100 MG Cap   "Active   omeprazole (PRILOSEC) 40 MG delayed-release capsule  Active   ondansetron (ZOFRAN) 4 MG Tab tablet  Active   potassium chloride ER (KLOR-CON) 10 MEQ tablet  Active   Prenatal Vit-Fe Fumarate-FA (PRENATAL 1+1 PO)  Active   sucralfate (CARAFATE) 1 GM Tab  Active                    ALLERGIES  Allergies   Allergen Reactions    Latex Rash and Itching    Duloxetine      HA, dizz     Nifedipine Vomiting     Headache, vomiting    Sildenafil      HA, nausea       PHYSICAL EXAM  VITAL SIGNS: /81   Pulse 91   Temp 37.7 °C (99.8 °F) (Temporal)   Resp 17   Ht 1.626 m (5' 4\")   Wt 90.6 kg (199 lb 11.8 oz)   SpO2 100%   BMI 34.28 kg/m²    General: Alert, no acute distress  Skin: Warm, dry, pale normal for ethnicity  Head: Normocephalic, atraumatic  Neck: Trachea midline  Eye: PERRL, normal conjunctiva  ENMT: Oral mucosa dry  Cardiovascular: Regular rate and rhythm, No murmur, Normal peripheral perfusion  Respiratory: Lungs CTA, respirations are non-labored, breath sounds are equal  Gastrointestinal: Soft, nontender, non distended.  Bowel sounds hypoactive, no guarding, rebound, no rigidity.  Musculoskeletal: No swelling, no deformity  Neurological: Alert and oriented to person, place, time, and situation  Lymphatics: No lymphadenopathy  Psychiatric: Cooperative, appropriate mood & affect     DIAGNOSTIC STUDIES / PROCEDURES  EKG Interpretation    Interpreted by emergency department physician    Rhythm: normal sinus   Rate: 71  Axis: normal  Ectopy: none  Conduction: normal  ST Segments: no acute change  T Waves: no acute change  Q Waves: none    Clinical Impression: no acute changes     LABS  Results for orders placed or performed during the hospital encounter of 09/08/23   LIPASE   Result Value Ref Range    Lipase 22 11 - 82 U/L   BMP   Result Value Ref Range    Sodium 141 135 - 145 mmol/L    Potassium 3.7 3.6 - 5.5 mmol/L    Chloride 106 96 - 112 mmol/L    Co2 22 20 - 33 mmol/L    Glucose 85 65 - 99 mg/dL    " "Bun 11 8 - 22 mg/dL    Creatinine 0.68 0.50 - 1.40 mg/dL    Calcium 9.1 8.5 - 10.5 mg/dL    Anion Gap 13.0 7.0 - 16.0   ESTIMATED GFR   Result Value Ref Range    GFR (CKD-EPI) 111 >60 mL/min/1.73 m 2   EKG   Result Value Ref Range    Report       [facility] Emergency Dept.    Test Date:  2023  Pt Name:    ADELINA KENNY       Department: ER  MRN:        [id number]                      Room:       [facility]  Gender:     Female                       Technician: [id number]  :        1980                   Requested By:MARILYN MCKEE  Order #:    [id number]                    Reading MD:    Measurements  Intervals                                Axis  Rate:       71                           P:          20  DE:         143                          QRS:        23  QRSD:       89                           T:          31  QT:         413  QTc:        449    Interpretive Statements  Sinus rhythm  Compared to ECG 2023 20:43:15  No significant changes     Potassium improving from previous level of 3.3      COURSE & MEDICAL DECISION MAKING    ED Observation Status? Yes; I am placing the patient in to an observation status due to a diagnostic uncertainty as well as therapeutic intensity. Patient placed in observation status at 5:57 PM, 2023.     Observation plan is as follows: Patient medicated with LR 1 L, potassium 10 mill equivalents IV and 20 mill equivalents p.o.  Electrolyte panel and EKG will be obtained.    : Patient reassessed, doing well, she is in no acute distress and has tolerated her fluid infusion well.  Electrolytes repleted, thankfully studies here are unremarkable and I have updated the patient with these reassuring studies.    Patient Vitals for the past 24 hrs:   BP Temp Temp src Pulse Resp SpO2 Height Weight   23 1640 -- -- -- -- -- -- 1.626 m (5' 4\") 90.6 kg (199 lb 11.8 oz)   23 1619 123/81 37.7 °C (99.8 °F) Temporal 91 17 100 % -- --    "     Upon Reevaluation, the patient's condition has: Improved; and will be discharged.    Patient discharged from ED Observation status at 2006 (Time) 9/8/23 (Date).     INITIAL ASSESSMENT, COURSE AND PLAN  Care Narrative: This patient is a very pleasant 42-year-old female with history of recent gastric sleeve who presents for evaluation of poor oral intake with nausea and vomiting since her procedure about 2 months ago.  She will be seeing her surgeon later this month.  Reassuringly on her exam she demonstrates no peritoneal signs, she is not febrile nor tachycardic and otherwise is well in appearance.  Per primary care provider was concern for poor oral intake and electrolyte abnormalities.  Patient found a potassium of 3.3.  This is thankfully not very low and actually potassium here is within normal limits.  She has had no palpitations and EKG is unremarkable no evidence of T wave flattening.  As such no indication for inpatient management with regard to electrolyte abnormalities.  She is obviously volume depleted but does well with IV fluids and has no evidence of significant hypovolemia; no acute kidney injury, no hypotension, no tachycardia.  As such no indication for inpatient management nor emergent surgical intervention.  Patient is comfortable following up with her established surgeon as planned.  She already has antiemetics for home.  HYDRATION: Based on the patient's presentation of Dehydration the patient was given IV fluids. IV Hydration was used because oral hydration was not as rapid as required. Upon recheck following hydration, the patient was doing better, skin tone is improving with IV fluids.      ADDITIONAL PROBLEM LIST  Dehydration, nausea vomiting, postop pain  DISPOSITION AND DISCUSSIONS  I have discussed management of the patient with the following physicians and JOON's:  NA    Discussion of management with other QHP or appropriate source(s): None     Escalation of care considered, and  ultimately not performed:NA    Barriers to care at this time, including but not limited to:  NA .     Decision tools and prescription drugs considered including, but not limited to:  NA .    The patient will return for new or worsening symptoms and is stable at the time of discharge.    Patient has had high blood pressure while in the emergency department, felt likely secondary to medical condition. Counseled patient to monitor blood pressure at home and follow up with primary care physician.      DISPOSITION:  Patient will be discharged home in stable condition.    FOLLOW UP:  Sandra Logan M.D.  [address] [facility] [address]  [phone]    Schedule an appointment as soon as possible for a visit       [address]  [facility] [id number] [phone]  Schedule an appointment as soon as possible for a visit         OUTPATIENT MEDICATIONS:  Discharge Medication List as of 9/8/2023  8:06 PM             FINAL DIAGNOSIS  1. Hypokalemia    2. Dehydration    3. Nausea and vomiting in adult           Electronically signed by: Brice Short M.D., 9/8/2023 5:49 PM

## 2023-10-20 ENCOUNTER — APPOINTMENT (OUTPATIENT)
Dept: INTERNAL MEDICINE | Facility: OTHER | Age: 43
End: 2023-10-20
Payer: MEDICAID

## 2023-11-08 ENCOUNTER — PATIENT MESSAGE (OUTPATIENT)
Dept: INTERNAL MEDICINE | Facility: OTHER | Age: 43
End: 2023-11-08
Payer: MEDICAID

## 2023-11-09 DIAGNOSIS — M34.1 CREST SYNDROME (HCC): ICD-10-CM

## 2023-11-09 DIAGNOSIS — K21.9 GASTROESOPHAGEAL REFLUX DISEASE, UNSPECIFIED WHETHER ESOPHAGITIS PRESENT: ICD-10-CM

## 2023-11-09 DIAGNOSIS — Z98.84 HX OF GASTRIC BYPASS: ICD-10-CM

## 2023-11-10 ENCOUNTER — OFFICE VISIT (OUTPATIENT)
Dept: VASCULAR LAB | Facility: MEDICAL CENTER | Age: 43
End: 2023-11-10
Attending: FAMILY MEDICINE
Payer: MEDICAID

## 2023-11-10 VITALS
HEIGHT: 64 IN | DIASTOLIC BLOOD PRESSURE: 66 MMHG | SYSTOLIC BLOOD PRESSURE: 98 MMHG | BODY MASS INDEX: 31.24 KG/M2 | HEART RATE: 76 BPM | WEIGHT: 183 LBS

## 2023-11-10 DIAGNOSIS — M34.1 CREST SYNDROME (HCC): ICD-10-CM

## 2023-11-10 DIAGNOSIS — Z79.02 LONG TERM (CURRENT) USE OF ANTITHROMBOTICS/ANTIPLATELETS: ICD-10-CM

## 2023-11-10 DIAGNOSIS — E66.9 OBESITY (BMI 30-39.9): ICD-10-CM

## 2023-11-10 DIAGNOSIS — R73.03 PREDIABETES: ICD-10-CM

## 2023-11-10 DIAGNOSIS — E78.5 DYSLIPIDEMIA: ICD-10-CM

## 2023-11-10 DIAGNOSIS — E78.41 ELEVATED LIPOPROTEIN(A): ICD-10-CM

## 2023-11-10 DIAGNOSIS — Z82.49 FAMILY HISTORY OF PREMATURE CORONARY HEART DISEASE: ICD-10-CM

## 2023-11-10 DIAGNOSIS — I73.00 RAYNAUD'S PHENOMENON (SECONDARY): ICD-10-CM

## 2023-11-10 PROCEDURE — 3078F DIAST BP <80 MM HG: CPT | Performed by: FAMILY MEDICINE

## 2023-11-10 PROCEDURE — 99214 OFFICE O/P EST MOD 30 MIN: CPT | Performed by: FAMILY MEDICINE

## 2023-11-10 PROCEDURE — 3074F SYST BP LT 130 MM HG: CPT | Performed by: FAMILY MEDICINE

## 2023-11-10 PROCEDURE — 99212 OFFICE O/P EST SF 10 MIN: CPT

## 2023-11-10 RX ORDER — FAMOTIDINE 40 MG/1
40 TABLET, FILM COATED ORAL DAILY
COMMUNITY
End: 2023-11-22

## 2023-11-10 RX ORDER — BACLOFEN 10 MG/1
10 TABLET ORAL 3 TIMES DAILY
COMMUNITY

## 2023-11-10 RX ORDER — URSODIOL 250 MG/1
250 TABLET, FILM COATED ORAL 3 TIMES DAILY
Status: ON HOLD | COMMUNITY
End: 2023-12-26

## 2023-11-10 RX ORDER — LOSARTAN POTASSIUM 25 MG/1
25 TABLET ORAL DAILY
COMMUNITY
End: 2024-02-13

## 2023-11-10 RX ORDER — METOCLOPRAMIDE 10 MG/1
10 TABLET ORAL 3 TIMES DAILY
COMMUNITY
End: 2024-03-27

## 2023-11-10 ASSESSMENT — ENCOUNTER SYMPTOMS
PND: 0
FEVER: 0
SHORTNESS OF BREATH: 0
HEMOPTYSIS: 0
CLAUDICATION: 0
ORTHOPNEA: 0
SPUTUM PRODUCTION: 0
COUGH: 0
BRUISES/BLEEDS EASILY: 0
CHILLS: 0
PALPITATIONS: 0
WHEEZING: 0

## 2023-11-10 ASSESSMENT — FIBROSIS 4 INDEX: FIB4 SCORE: 1.12

## 2023-11-10 NOTE — PROGRESS NOTES
FOLLOW-UP VASCULAR MEDICINE VISIT  11/10/23     Christine Ita Miguel is a. female  who presents for  initially referred by Lilly Abreu A.P.RN for eval for vasc etiology of RUE non-healing wound, est 4/24/22      Subjective         Interval hx/concerns:  last seen 5/2023, reports ongoing intermittent R finger redness w/o ulcerations but tips of fingers can be painful chaparrita when typing at work.  No new ulcerations.    Noting mild pain at plantar aspect of L great toe.  No ulceration or other sx.    Has ongoing care with rheum MD in .  No med changes.      RUE non-healing wound:  Initial visit hx:  Started age 39 with episodes, can be fingers and toes and has been progressive with ulcerations forms.   Seen by  4/2022 for R index finger non-healing wound.  Had subung lac 3mo prior.  Reports prior episodes of non-healing finger wounds in the past.  Hx of recurrent cold/painful fingers with intermittent cyanosis.  Has WBI normal in past 2021 and has apparently been seen by vasc specialist w/o formal dx or tx in the past.   (No prior visits of [facility] vasc med or available  Has seen by Dr. Pierre in latter half of 2021 and informed no interventions available but started nifedipine, DAPT, atorva.  Currently on ASA only, stopped nifedipine due to HA     HTN:  Stable on meds   No checking home BP - stable, no dizz with positional changes     Hyperlipidemia:  Stable, no current concerns  Current treatment: lifestyle changes  and High intensity  atorva   Baseline lipid    Latest Reference Range & Units 01/03/12 12:10   Cholesterol,Tot 100 - 199 mg/dL 218 (H)   Triglycerides 0 - 149 mg/dL 86   HDL >=40 mg/dL 37 !   LDL <100 mg/dL 164     Antiplatelet/anticoagulation: Yes, Details: ASA , no bleeding noted        Current Outpatient Medications:     metoclopramide, 10 mg, Oral, TID, Taking    losartan, 25 mg, Oral, DAILY, Taking    baclofen, 10 mg, Oral, TID, Taking    famotidine, 40 mg, Oral, DAILY, Taking     "ursodiol, 250 mg, Oral, TID, Taking    atorvastatin, 40 mg, Oral, Q EVENING, Taking    Prenatal Vit-Fe Fumarate-FA (PRENATAL 1+1 PO), , Taking    potassium chloride ER, 10 mEq, Oral, DAILY, Taking    nitrofurantoin, 100 mg, Oral, BID, Taking    ondansetron, TAKE 1 TABLET BY MOUTH EVERY 6 HOURS AS NEEDED FOR NAUSEA AND VOMITING FOR 5 DAYS, Taking    amLODIPine, TAKE 1 TABLET BY MOUTH EVERY DAY AT BEDTIME  DAYS, Taking    sucralfate, CRUSH AND MIX 1 TABLET WITH 30 ML OF WATER AND TAKE BY MOUTH 4 TIMES DAILY AS NEEDED, Taking    albuterol, 2 Puff, Inhalation, Q4HRS PRN, PRN    hydroxychloroquine, 200 mg, Oral, DAILY, Taking    omeprazole, 40 mg, Oral, DAILY, Taking     Family History   Problem Relation Age of Onset    Rheumatologic Disease Mother         RA    Heart Attack Father          MI (age 45)    Kidney Disease Father         ESRD on HD    Diabetes Father     Heart Disease Sister       Social History     Tobacco Use    Smoking status: Never     Passive exposure: Never    Smokeless tobacco: Never   Vaping Use    Vaping Use: Never used   Substance Use Topics    Alcohol use: No    Drug use: No     Review of Systems   Constitutional:  Negative for chills, fever and malaise/fatigue.   Respiratory:  Negative for cough, hemoptysis, sputum production, shortness of breath and wheezing.    Cardiovascular:  Negative for chest pain, palpitations, orthopnea, claudication, leg swelling and PND.   Skin:  Negative for itching and rash.   Endo/Heme/Allergies:  Does not bruise/bleed easily.           Objective   Vitals:    11/10/23 1330   BP: 98/66   BP Location: Left arm   Patient Position: Sitting   BP Cuff Size: Large adult   Pulse: 76   Weight: 83 kg (183 lb)   Height: 1.626 m (5' 4\")        BP Readings from Last 5 Encounters:   11/10/23 98/66   23 123/81   23 116/58   23 112/60   23 124/67      Body mass index is 31.41 kg/m².  Physical Exam  Constitutional:       Appearance: Normal " "appearance.   HENT:      Head: Normocephalic.   Eyes:      Extraocular Movements: Extraocular movements intact.      Conjunctiva/sclera: Conjunctivae normal.   Pulmonary:      Effort: Pulmonary effort is normal.   Musculoskeletal:      Cervical back: Normal range of motion.        Feet:    Skin:     General: Skin is dry.      Coloration: Skin is not pale.      Findings: No rash.   Neurological:      General: No focal deficit present.      Mental Status: She is alert and oriented to person, place, and time.   Psychiatric:         Mood and Affect: Mood normal.         Behavior: Behavior normal.       Labs:    QUest 6/27/22   Lp(a) 181      HDL 42  LDL 62   CMP wnl   a1c wnl  Cbc wnl   ESR, CRP, ANCA (MPO ab, PR3 ab) - wnl   GEOVANY - positive >1:1280 (centromere pattern)  CCP ab 71 (elevated)   HCV ab neg     [facility] 7/20/22   CMP - wnl   SCL70 ab - neg   AT III activity wnl   FVL neg   Prot C wnl   PGM neg   B2GP1 ab wnl  ACL ab wnl   Lupus AC neg   D-dimer 0.32       [facility] 8/2022   C3, C4, RA factor - wnl                   No results found for: \"LIPOPROTA\"   No results found for: \"APOB\"    Lab Results   Component Value Date    PROTHROMBTM 14.2 08/06/2023    INR 1.11 08/06/2023         Lab Results   Component Value Date    SODIUM 141 09/08/2023    POTASSIUM 3.7 09/08/2023    CHLORIDE 106 09/08/2023    CO2 22 09/08/2023    GLUCOSE 85 09/08/2023    BUN 11 09/08/2023    CREATININE 0.68 09/08/2023        Lab Results   Component Value Date    WBC 6.3 08/06/2023    RBC 5.25 08/06/2023    HEMOGLOBIN 15.1 08/06/2023    HEMATOCRIT 46.1 08/06/2023    MCV 87.8 08/06/2023    MCH 28.8 08/06/2023    MCHC 32.8 08/06/2023    MPV 10.8 08/06/2023         No results found for: \"MICROALBCALC\", \"MALBCRT\", \"MALBEXCR\", \"ATKINJ69\", \"MICROALBUR\", \"MICRALB\", \"UMICROALBUM\", \"MICROALBTIM\"      Latest Reference Range & Units 09/16/14 04:15 10/28/15 12:04 01/30/16 11:08 04/15/16 14:17 12/26/17 11:05   Hepatitis B Surface Antigen Negative   " Negative      HIV 1/0/2 Non Reactive   see below      Rubella IgG Antibody IU/mL  135.40      Strep Gp B DNA PCR Negative     Negative    Syphilis, Treponemal Qual Non Reactive   Non Reactive Non Reactive     Stat C-Reactive Protein 0.00 - 0.75 mg/dL 0.32    0.35       VASCULAR IMAGING:     WBI 8/2021  Normal WBI.   Absent 3, 4, 5th digit flow could be artifactual or secondary to embolic    disease.  Recommend clinical correlation.   Right.    Arterial doppler waveforms are of high amplitude and triphasic.   Wrist brachial index is within normal limits. WBI=1.05   PPG of the digits demonstrates absent flow in the right 3rd, 4th, and 5th    digits. The 1st and 2nd digits appear to have slightly diminished flow in    comparsion to the contralateral digits.    CT chest 8/2021   1.  No thoracic aortic aneurysm or dissection.  2.  Subclavian arteries are normal in caliber and patent.  LEFT subclavian artery is partially secured.  3.  Probable medial RIGHT lower lobe atelectasis.  Thoracic aorta is normal in caliber.  No dissection demonstrated.  Great vessel origins are intact.  RIGHT subclavian artery is normal in caliber and patent.  LEFT subclavian artery is partially obscured by streak artifact however also appears normal in caliber and patent.    Echo 9/2021   No prior study is available for comparison.   Normal left ventricular systolic function.  The left ventricular ejection fraction is visually estimated to be 65%.  No significant valve abnormalities.     WBI/art duplex 8/2022    Wrist-brachial indices are normal.   FBI:   Right-  1.07   Left-     0.98   Right 3rd and 4th finger waveform are diminished, most likely due to    microvascular disease.   Left 2nd and 3rd finger waveforms are diminished, most likely due to    microvascular disease.    Echo 12/2022  Normal left ventricular size, thickness, systolic function, and   diastolic function.  The left ventricular ejection fraction is visually estimated to be  60-  65%.  The right ventricle is borderline dilated in size with preserved   systolic function.  Estimated right ventricular systolic pressure is mildly elevated at 40   mmHg.  Normal left atrial size.  No significant valvular abnormalities.   Normal inferior vena cava size and inspiratory collapse.    CT chest 12/2022  1.  No CT evidence of interstitial lung disease.   2.  No significant incidental findings are identified.         Medical Decision Making:  Today's Assessment / Status / Plan:     1. CREST syndrome (HCC)        2. Raynaud's phenomenon (secondary)        3. Dyslipidemia        4. Elevated lipoprotein(a)        5. Prediabetes        6. Long term (current) use of antithrombotics/antiplatelets        7. Obesity (BMI 30-39.9)        8. Family history of premature coronary heart disease          Patient Type: Primary Prevention    Etiology of Established CVD if Present:     1) secondary Raynaud's related to CREST syndrome   - recurrent non-healing finger wounds, most recently right index 4/2022 - improving  - prior normal WBI with PPGs showing absent flow , repeat duplex 8/2022 shows nl WBI/FBI with reduced waveforms to R 3r/4th and L 2nd/3rd fingers   - serology testing indicative of CREST syndrome, possible RA overlay per CCP ab elevations as well   - strong centromere ab pattern and GEOVANY 1:1280 with high CCP ab  - high prob secondary raynaud's due to later adulthood (around 39yo) onset with intense pain and ischemic ulcerations with pain and classic discolorations affecting fingers/toes.    - thrombophilia w/u neg,  ANCA neg   - fhx of RA but not SLE, she has no nephritic findings or recurrent malar rash or arthralgia  - no COVID prior to initial episode    - pending eval with rheum in [place]     - CTA chest wnl w/o proximal plaque or stenosis, no indications of thoracic Ao diss, plaque  - prior echo wnl, though not FIDENCIO   - literature supports CCB as 1st line with other agents including prostaglandin based  tx, ARB, SSRI, PDE5i moving forward and dependent upon severity and recurrences of episodes   Plan:  - defer further CREST-specific tx to rheum MD for definitive dx and tx  - continue warming practices    Meds:  - continue ASA 81mg, atorva  - failed sildenafil   - continue amlodipine to 10mg daily - monitor for hypotension (failed nifedipine)  - continue losartan 25mg daily to increase vasodilation   - start topical nitro-Bid ointment Q6h to affected fingertips   - consider fluoxetine 10mg daily, then increase to 20mg (failed duloxetine)  - consider cilostazol as next agent   -  oral iloprost vs bosentan if worsening over time     BLOOD PRESSURE MANAGEMENT:  ACC/AHA (2017) goal <130/80  Home BP at goal: yes  Office BP at goal:  yes   - hx of preg-induced HTN on nifedipine   Plan:   - continue healthy diet, activity, weight mgmt   Monitoring:   - routine clinic-based BP measurements at least once annually   Medications:   - continue amlodipine 10mg daily for Raynaud's   - continue losartan 25mg daily for vasodilt    LIPID MANAGEMENT:   Qualifies for Statin Therapy Based on 2018 ACC/AHA Guidelines: yes, Primary Prevention - 40-76yo, LDLc >70, <190 w/o DM  The ASCVD Risk score (Cong KUHN, et al., 2019) failed to calculate.  Major ASCVD events: None  High-risk condition: N/A  Risk-enhancers: Metabolic syndrome  and Lp(a) >50   - reviewed with patient this is an independent risk factor for ASCVD but is currently controversial if lowering has impact on outcomes in primary prevention, so targeting LDLc lowering is primary objective for now and consider lp(a) lowering if worsening ascvd risk or if ascvd event occurs - definitely prothrombotic and needs ASA   Currently on Statin: Yes  Tx goals: LDL-C <100 (consider non-HDL-C <130, apoB <90)  At goal? Yes, 7/2022   Plan:   - reinforced ongoing TLC measures as noted   - monitor labs   Meds:   - continue atorva 40mg daily     ANTITHROMBOTIC/ANTIPLATELET THERAPY: continue ASA  81mg daily for potential antithromb benefit due to non-healing wounds and high lp(a)     GLYCEMIC STATUS: Prediabetic      Plan:  - update a1c   - continue healthy diet, weight reduction, daily physical activity   - consider metformin up to 850mg BID to slow or offset progression to T2D as per DPP trial   - monitor labs Q6-12mo    LIFESTYLE INTERVENTIONS:    SMOKING:   reports that she has never smoked. She has never been exposed to tobacco smoke. She has never used smokeless tobacco.   - continued complete avoidance of all tobacco products     PHYSICAL ACTIVITY: continue healthy activity to improve CV fitness.  In general, targeting >150min/week of moderate-level activity.     WEIGHT MANAGEMENT AND NUTRITION:  Mediterranean style dietary approach , Low carbohydrate (10-20% CHO content) dietary approach  and Weight reduction approach - reduce caloric intake by 300kcal/day to achieve 1-2lb weight loss per week     OTHER:     # CREST syndrome - stable, on meds   - ongoing care with rheum MD - requested notes     # GERD - stable, continue PPI     # L great toe lesion - appear to be early plantar wart, does not exhibit classic ulcerative look but we will monitor closely.  Trial OTC compound W  - would avoid cryotherapy due to risk for non-healing wound     Instructed to follow-up with PCP for remainder of adult medical needs: yes  We will partner with other providers in the management of established vascular disease and cardiometabolic risk factors.    Studies to Be Obtained: none   Labs to Be Obtained: as noted above     Follow up in:  6mo, sooner pauline Acuña M.D.  [facility]   [facility]   [phone]

## 2023-11-22 ENCOUNTER — OFFICE VISIT (OUTPATIENT)
Dept: INTERNAL MEDICINE | Facility: OTHER | Age: 43
End: 2023-11-22
Payer: MEDICAID

## 2023-11-22 VITALS
SYSTOLIC BLOOD PRESSURE: 109 MMHG | WEIGHT: 184.5 LBS | OXYGEN SATURATION: 94 % | BODY MASS INDEX: 31.5 KG/M2 | TEMPERATURE: 98 F | RESPIRATION RATE: 16 BRPM | HEART RATE: 76 BPM | DIASTOLIC BLOOD PRESSURE: 77 MMHG | HEIGHT: 64 IN

## 2023-11-22 DIAGNOSIS — Z23 NEED FOR VACCINATION: ICD-10-CM

## 2023-11-22 DIAGNOSIS — M34.1 CREST (CALCINOSIS, RAYNAUD'S PHENOMENON, ESOPHAGEAL DYSFUNCTION, SCLERODACTYLY, TELANGIECTASIA) (HCC): ICD-10-CM

## 2023-11-22 DIAGNOSIS — L03.032 PARONYCHIA OF TOE OF LEFT FOOT: ICD-10-CM

## 2023-11-22 PROBLEM — J45.909 ASTHMA: Status: ACTIVE | Noted: 2023-11-22

## 2023-11-22 PROBLEM — G47.30 SLEEP APNEA: Status: ACTIVE | Noted: 2023-11-22

## 2023-11-22 PROBLEM — K21.9 GERD (GASTROESOPHAGEAL REFLUX DISEASE): Status: ACTIVE | Noted: 2023-11-22

## 2023-11-22 PROBLEM — I10 HTN (HYPERTENSION): Status: ACTIVE | Noted: 2023-03-10

## 2023-11-22 PROCEDURE — 3074F SYST BP LT 130 MM HG: CPT | Mod: GC | Performed by: INTERNAL MEDICINE

## 2023-11-22 PROCEDURE — 99214 OFFICE O/P EST MOD 30 MIN: CPT | Mod: 25,GC | Performed by: INTERNAL MEDICINE

## 2023-11-22 PROCEDURE — 90686 IIV4 VACC NO PRSV 0.5 ML IM: CPT | Performed by: INTERNAL MEDICINE

## 2023-11-22 PROCEDURE — 3078F DIAST BP <80 MM HG: CPT | Mod: GC | Performed by: INTERNAL MEDICINE

## 2023-11-22 PROCEDURE — 90471 IMMUNIZATION ADMIN: CPT | Performed by: INTERNAL MEDICINE

## 2023-11-22 RX ORDER — OMEPRAZOLE 40 MG/1
40 CAPSULE, DELAYED RELEASE ORAL 2 TIMES DAILY
Qty: 90 CAPSULE | Refills: 0 | Status: SHIPPED
Start: 2023-11-22

## 2023-11-22 RX ORDER — FAMOTIDINE 40 MG/1
40 TABLET, FILM COATED ORAL DAILY
Qty: 60 TABLET | Status: SHIPPED
Start: 2023-11-22

## 2023-11-22 ASSESSMENT — ENCOUNTER SYMPTOMS
BLURRED VISION: 0
WEIGHT LOSS: 1
HEADACHES: 0
NAUSEA: 1
NECK PAIN: 0
DIARRHEA: 0
DOUBLE VISION: 0
HEMOPTYSIS: 0
CONSTIPATION: 0
CHILLS: 0
ABDOMINAL PAIN: 1
BLOOD IN STOOL: 0
COUGH: 0
DIZZINESS: 0
MYALGIAS: 0
NERVOUS/ANXIOUS: 0
PALPITATIONS: 0
HEARTBURN: 1
FEVER: 0
VOMITING: 1

## 2023-11-22 ASSESSMENT — FIBROSIS 4 INDEX: FIB4 SCORE: 1.14

## 2023-11-22 NOTE — PATIENT INSTRUCTIONS
Thank you for visiting today!    Please follow-up in 3 months     Please follow-up on referrals and schedule an appointment with podiatry     If you have any questions or concerns please feel free to contact us at [phone].    If you feel like you are experiencing a medical emergency please seek immediate medical attention at an urgent care or in the emergency department.

## 2023-11-22 NOTE — PROGRESS NOTES
CC:   Chief Complaint   Patient presents with    Follow-Up    Lab Results     11/13/23 [facility]. Patient has records on phone         HPI:   Christine presents today with past medical history of crest syndrome, Raynaud's phenomena, GERD, obesity s/p gastric sleeve here today for follow-up visit.    She is establushed with vascular medicine and rheumatology for her CREST syndrome with raynauds phenomenon     # Gastric sleeve   C/b severe regurg. Describes she is barely able to keep any food down.  Has severe regurgitation and abdominal discomfort.  Is closely following up with her surgeon as well as GI physician.  Currently on omeprazole 40 mg twice daily along with Pepcid 40 mg daily.  She is also prescribed sucralfate, ursodiol, metoclopramide and as needed Zofran that she uses.  Describes her surgeon is now planning for gastric bypass soon.      # Essential hypertension  # Crest syndrome  Currently on amlodipine 10 mg along with losartan 25 mg.  Saw her vascular medicine doctor few days ago who recommended continuing the same doses.  Blood pressure borderline low today.  Discussed symptoms, however she denies any dizziness, headaches, postural hypotension etc.      Problem   Asthma   Sleep Apnea   Gerd (Gastroesophageal Reflux Disease)   Htn (Hypertension)    GEOVANY and anti CCP positive         Health Maintenance: Completed    ROS:  Review of Systems   Constitutional:  Positive for weight loss. Negative for chills, fever and malaise/fatigue.   HENT:  Negative for hearing loss and tinnitus.    Eyes:  Negative for blurred vision and double vision.   Respiratory:  Negative for cough and hemoptysis.    Cardiovascular:  Negative for chest pain and palpitations.   Gastrointestinal:  Positive for abdominal pain, heartburn, nausea and vomiting. Negative for blood in stool, constipation, diarrhea and melena.   Genitourinary:  Negative for dysuria, frequency and urgency.   Musculoskeletal:  Negative for myalgias and neck pain.  "  Skin:  Negative for itching and rash.   Neurological:  Negative for dizziness and headaches.   Psychiatric/Behavioral:  The patient is not nervous/anxious.        Objective:     Exam:  /77   Pulse 76   Temp 36.7 °C (98 °F) (Temporal)   Resp 16   Ht 1.626 m (5' 4\")   Wt 83.7 kg (184 lb 8 oz)   SpO2 94%   BMI 31.67 kg/m²  Body mass index is 31.67 kg/m².    Physical Exam  Constitutional:       General: She is not in acute distress.     Appearance: She is not ill-appearing.   HENT:      Head: Normocephalic and atraumatic.      Right Ear: External ear normal.      Nose: Nose normal. No congestion.      Mouth/Throat:      Mouth: Mucous membranes are moist.   Eyes:      Extraocular Movements: Extraocular movements intact.      Pupils: Pupils are equal, round, and reactive to light.   Cardiovascular:      Rate and Rhythm: Normal rate.      Pulses: Normal pulses.   Pulmonary:      Effort: Pulmonary effort is normal.   Musculoskeletal:         General: Normal range of motion.   Skin:     General: Skin is warm.      Capillary Refill: Capillary refill takes less than 2 seconds.   Neurological:      Mental Status: She is alert and oriented to person, place, and time.   Psychiatric:         Mood and Affect: Mood normal.             Labs: Reviewed     Assessment & Plan:     43 y.o. female with the following -     # Gastric sleeve   C/b severe regurg. Describes she is barely able to keep any food down.  Has severe regurgitation and abdominal discomfort.  Is closely following up with her surgeon as well as GI physician.  Currently on omeprazole 40 mg twice daily along with Pepcid 40 mg daily.  She is also prescribed sucralfate, ursodiol, metoclopramide and as needed Zofran that she uses.  Describes her surgeon is now planning for gastric bypass soon.  Plan:  - Established with GI and her gastric surgeon, appreciate support    # Essential hypertension  # Crest syndrome  Currently on amlodipine 10 mg along with losartan " 25 mg.  Saw her vascular medicine doctor few days ago who recommended continuing the same doses.  Blood pressure borderline low today.  Discussed symptoms, however she denies any dizziness, headaches, postural hypotension etc.  Plan:  - Alarm signs and symptoms addressed in detail today.  Encouraged her to discuss with the vascular medicine doctor regarding decreasing the dose of medications if any symptoms of hypotension arise.  Encouraged to cut back down on losartan first if she gets hypotensive at home.  Continue monitoring blood pressure at home.      # Need for vaccination   - Flu shot administered in clinic today     I spent a total of 30 minutes with record review, exam, communication with the patient, communication with other providers, and documentation of this encounter.      Return in about 3 months (around 2/22/2024).    Please note that this dictation was created using voice recognition software. I have made every reasonable attempt to correct obvious errors, but I expect that there are errors of grammar and possibly content that I did not discover before finalizing the note.

## 2023-11-27 ENCOUNTER — APPOINTMENT (OUTPATIENT)
Dept: INTERNAL MEDICINE | Facility: OTHER | Age: 43
End: 2023-11-27
Payer: MEDICAID

## 2023-12-18 ENCOUNTER — APPOINTMENT (OUTPATIENT)
Dept: ADMISSIONS | Facility: MEDICAL CENTER | Age: 43
DRG: 328 | End: 2023-12-18
Attending: SURGERY
Payer: MEDICAID

## 2023-12-20 ENCOUNTER — PRE-ADMISSION TESTING (OUTPATIENT)
Dept: ADMISSIONS | Facility: MEDICAL CENTER | Age: 43
DRG: 328 | End: 2023-12-20
Attending: SURGERY
Payer: MEDICAID

## 2023-12-22 ENCOUNTER — PRE-ADMISSION TESTING (OUTPATIENT)
Dept: ADMISSIONS | Facility: MEDICAL CENTER | Age: 43
DRG: 328 | End: 2023-12-22
Attending: SURGERY
Payer: MEDICAID

## 2023-12-22 DIAGNOSIS — Z01.812 PRE-OPERATIVE LABORATORY EXAMINATION: ICD-10-CM

## 2023-12-22 LAB
ALBUMIN SERPL BCP-MCNC: 4.9 G/DL (ref 3.2–4.9)
ALBUMIN/GLOB SERPL: 2 G/DL
ALP SERPL-CCNC: 65 U/L (ref 30–99)
ALT SERPL-CCNC: 13 U/L (ref 2–50)
ANION GAP SERPL CALC-SCNC: 13 MMOL/L (ref 7–16)
AST SERPL-CCNC: 10 U/L (ref 12–45)
BASOPHILS # BLD AUTO: 0.1 % (ref 0–1.8)
BASOPHILS # BLD: 0.01 K/UL (ref 0–0.12)
BILIRUB SERPL-MCNC: 0.4 MG/DL (ref 0.1–1.5)
BUN SERPL-MCNC: 15 MG/DL (ref 8–22)
CALCIUM ALBUM COR SERPL-MCNC: 9.3 MG/DL (ref 8.5–10.5)
CALCIUM SERPL-MCNC: 10 MG/DL (ref 8.5–10.5)
CHLORIDE SERPL-SCNC: 101 MMOL/L (ref 96–112)
CO2 SERPL-SCNC: 22 MMOL/L (ref 20–33)
CREAT SERPL-MCNC: 0.77 MG/DL (ref 0.5–1.4)
EOSINOPHIL # BLD AUTO: 0 K/UL (ref 0–0.51)
EOSINOPHIL NFR BLD: 0 % (ref 0–6.9)
ERYTHROCYTE [DISTWIDTH] IN BLOOD BY AUTOMATED COUNT: 41.6 FL (ref 35.9–50)
GFR SERPLBLD CREATININE-BSD FMLA CKD-EPI: 98 ML/MIN/1.73 M 2
GLOBULIN SER CALC-MCNC: 2.5 G/DL (ref 1.9–3.5)
GLUCOSE SERPL-MCNC: 140 MG/DL (ref 65–99)
HCG SERPL QL: NEGATIVE
HCT VFR BLD AUTO: 40.5 % (ref 37–47)
HGB BLD-MCNC: 13.9 G/DL (ref 12–16)
IMM GRANULOCYTES # BLD AUTO: 0.03 K/UL (ref 0–0.11)
IMM GRANULOCYTES NFR BLD AUTO: 0.3 % (ref 0–0.9)
INR PPP: 1.1 (ref 0.87–1.13)
LYMPHOCYTES # BLD AUTO: 0.59 K/UL (ref 1–4.8)
LYMPHOCYTES NFR BLD: 6.2 % (ref 22–41)
MCH RBC QN AUTO: 30.1 PG (ref 27–33)
MCHC RBC AUTO-ENTMCNC: 34.3 G/DL (ref 32.2–35.5)
MCV RBC AUTO: 87.7 FL (ref 81.4–97.8)
MONOCYTES # BLD AUTO: 0.22 K/UL (ref 0–0.85)
MONOCYTES NFR BLD AUTO: 2.3 % (ref 0–13.4)
NEUTROPHILS # BLD AUTO: 8.69 K/UL (ref 1.82–7.42)
NEUTROPHILS NFR BLD: 91.1 % (ref 44–72)
NRBC # BLD AUTO: 0 K/UL
NRBC BLD-RTO: 0 /100 WBC (ref 0–0.2)
PLATELET # BLD AUTO: 237 K/UL (ref 164–446)
PMV BLD AUTO: 10.2 FL (ref 9–12.9)
POTASSIUM SERPL-SCNC: 3.7 MMOL/L (ref 3.6–5.5)
PROT SERPL-MCNC: 7.4 G/DL (ref 6–8.2)
PROTHROMBIN TIME: 14.3 SEC (ref 12–14.6)
RBC # BLD AUTO: 4.62 M/UL (ref 4.2–5.4)
SODIUM SERPL-SCNC: 136 MMOL/L (ref 135–145)
WBC # BLD AUTO: 9.5 K/UL (ref 4.8–10.8)

## 2023-12-22 PROCEDURE — 80053 COMPREHEN METABOLIC PANEL: CPT

## 2023-12-22 PROCEDURE — 85025 COMPLETE CBC W/AUTO DIFF WBC: CPT

## 2023-12-22 PROCEDURE — 84703 CHORIONIC GONADOTROPIN ASSAY: CPT

## 2023-12-22 PROCEDURE — 36415 COLL VENOUS BLD VENIPUNCTURE: CPT

## 2023-12-22 PROCEDURE — 85610 PROTHROMBIN TIME: CPT

## 2023-12-26 ENCOUNTER — ANESTHESIA (OUTPATIENT)
Dept: SURGERY | Facility: MEDICAL CENTER | Age: 43
DRG: 328 | End: 2023-12-26
Payer: MEDICAID

## 2023-12-26 ENCOUNTER — HOSPITAL ENCOUNTER (INPATIENT)
Facility: MEDICAL CENTER | Age: 43
LOS: 2 days | DRG: 328 | End: 2023-12-28
Attending: SURGERY | Admitting: SURGERY
Payer: MEDICAID

## 2023-12-26 ENCOUNTER — ANESTHESIA EVENT (OUTPATIENT)
Dept: SURGERY | Facility: MEDICAL CENTER | Age: 43
DRG: 328 | End: 2023-12-26
Payer: MEDICAID

## 2023-12-26 DIAGNOSIS — G89.18 POSTOPERATIVE PAIN: ICD-10-CM

## 2023-12-26 DIAGNOSIS — Z90.3 H/O GASTRIC SLEEVE: ICD-10-CM

## 2023-12-26 LAB — HCG UR QL: NEGATIVE

## 2023-12-26 PROCEDURE — 700105 HCHG RX REV CODE 258: Mod: UD | Performed by: SURGERY

## 2023-12-26 PROCEDURE — 700102 HCHG RX REV CODE 250 W/ 637 OVERRIDE(OP): Performed by: ANESTHESIOLOGY

## 2023-12-26 PROCEDURE — 160042 HCHG SURGERY MINUTES - EA ADDL 1 MIN LEVEL 5: Performed by: SURGERY

## 2023-12-26 PROCEDURE — 700105 HCHG RX REV CODE 258: Performed by: SURGERY

## 2023-12-26 PROCEDURE — 0DNW4ZZ RELEASE PERITONEUM, PERCUTANEOUS ENDOSCOPIC APPROACH: ICD-10-PCS | Performed by: SURGERY

## 2023-12-26 PROCEDURE — 160002 HCHG RECOVERY MINUTES (STAT): Performed by: SURGERY

## 2023-12-26 PROCEDURE — 8E0W8CZ ROBOTIC ASSISTED PROCEDURE OF TRUNK REGION, VIA NATURAL OR ARTIFICIAL OPENING ENDOSCOPIC: ICD-10-PCS | Performed by: SURGERY

## 2023-12-26 PROCEDURE — C9113 INJ PANTOPRAZOLE SODIUM, VIA: HCPCS | Performed by: SURGERY

## 2023-12-26 PROCEDURE — 502714 HCHG ROBOTIC SURGERY SERVICES: Performed by: SURGERY

## 2023-12-26 PROCEDURE — A9270 NON-COVERED ITEM OR SERVICE: HCPCS | Performed by: ANESTHESIOLOGY

## 2023-12-26 PROCEDURE — 81025 URINE PREGNANCY TEST: CPT

## 2023-12-26 PROCEDURE — 700111 HCHG RX REV CODE 636 W/ 250 OVERRIDE (IP): Mod: JZ | Performed by: SURGERY

## 2023-12-26 PROCEDURE — 700111 HCHG RX REV CODE 636 W/ 250 OVERRIDE (IP): Performed by: ANESTHESIOLOGY

## 2023-12-26 PROCEDURE — 700101 HCHG RX REV CODE 250: Performed by: ANESTHESIOLOGY

## 2023-12-26 PROCEDURE — A9270 NON-COVERED ITEM OR SERVICE: HCPCS | Performed by: SURGERY

## 2023-12-26 PROCEDURE — 700102 HCHG RX REV CODE 250 W/ 637 OVERRIDE(OP): Performed by: SURGERY

## 2023-12-26 PROCEDURE — 160048 HCHG OR STATISTICAL LEVEL 1-5: Performed by: SURGERY

## 2023-12-26 PROCEDURE — 0D164ZA BYPASS STOMACH TO JEJUNUM, PERCUTANEOUS ENDOSCOPIC APPROACH: ICD-10-PCS | Performed by: SURGERY

## 2023-12-26 PROCEDURE — 160009 HCHG ANES TIME/MIN: Performed by: SURGERY

## 2023-12-26 PROCEDURE — 160035 HCHG PACU - 1ST 60 MINS PHASE I: Performed by: SURGERY

## 2023-12-26 PROCEDURE — 160031 HCHG SURGERY MINUTES - 1ST 30 MINS LEVEL 5: Performed by: SURGERY

## 2023-12-26 PROCEDURE — 770001 HCHG ROOM/CARE - MED/SURG/GYN PRIV*

## 2023-12-26 PROCEDURE — 700101 HCHG RX REV CODE 250: Performed by: SURGERY

## 2023-12-26 PROCEDURE — 0BQT4ZZ REPAIR DIAPHRAGM, PERCUTANEOUS ENDOSCOPIC APPROACH: ICD-10-PCS | Performed by: SURGERY

## 2023-12-26 RX ORDER — HYDROMORPHONE HYDROCHLORIDE 1 MG/ML
0.1 INJECTION, SOLUTION INTRAMUSCULAR; INTRAVENOUS; SUBCUTANEOUS
Status: DISCONTINUED | OUTPATIENT
Start: 2023-12-26 | End: 2023-12-26 | Stop reason: HOSPADM

## 2023-12-26 RX ORDER — LOSARTAN POTASSIUM 25 MG/1
25 TABLET ORAL DAILY
Status: DISCONTINUED | OUTPATIENT
Start: 2023-12-27 | End: 2023-12-28 | Stop reason: HOSPADM

## 2023-12-26 RX ORDER — SODIUM CHLORIDE, SODIUM LACTATE, POTASSIUM CHLORIDE, CALCIUM CHLORIDE 600; 310; 30; 20 MG/100ML; MG/100ML; MG/100ML; MG/100ML
INJECTION, SOLUTION INTRAVENOUS CONTINUOUS
Status: ACTIVE | OUTPATIENT
Start: 2023-12-26 | End: 2023-12-26

## 2023-12-26 RX ORDER — HEPARIN SODIUM 5000 [USP'U]/ML
5000 INJECTION, SOLUTION INTRAVENOUS; SUBCUTANEOUS
Status: COMPLETED | OUTPATIENT
Start: 2023-12-26 | End: 2023-12-26

## 2023-12-26 RX ORDER — PROMETHAZINE HYDROCHLORIDE 25 MG/1
25 SUPPOSITORY RECTAL EVERY 4 HOURS PRN
Status: DISCONTINUED | OUTPATIENT
Start: 2023-12-26 | End: 2023-12-28 | Stop reason: HOSPADM

## 2023-12-26 RX ORDER — HALOPERIDOL 5 MG/ML
1 INJECTION INTRAMUSCULAR
Status: DISCONTINUED | OUTPATIENT
Start: 2023-12-26 | End: 2023-12-26 | Stop reason: HOSPADM

## 2023-12-26 RX ORDER — HYDROMORPHONE HYDROCHLORIDE 1 MG/ML
0.2 INJECTION, SOLUTION INTRAMUSCULAR; INTRAVENOUS; SUBCUTANEOUS
Status: DISCONTINUED | OUTPATIENT
Start: 2023-12-26 | End: 2023-12-26 | Stop reason: HOSPADM

## 2023-12-26 RX ORDER — OXYCODONE HCL 5 MG/5 ML
5 SOLUTION, ORAL ORAL
Status: COMPLETED | OUTPATIENT
Start: 2023-12-26 | End: 2023-12-26

## 2023-12-26 RX ORDER — SCOLOPAMINE TRANSDERMAL SYSTEM 1 MG/1
1 PATCH, EXTENDED RELEASE TRANSDERMAL
Status: DISCONTINUED | OUTPATIENT
Start: 2023-12-26 | End: 2023-12-28 | Stop reason: HOSPADM

## 2023-12-26 RX ORDER — ONDANSETRON 2 MG/ML
INJECTION INTRAMUSCULAR; INTRAVENOUS PRN
Status: DISCONTINUED | OUTPATIENT
Start: 2023-12-26 | End: 2023-12-26 | Stop reason: SURG

## 2023-12-26 RX ORDER — OXYCODONE HCL 5 MG/5 ML
10 SOLUTION, ORAL ORAL
Status: DISCONTINUED | OUTPATIENT
Start: 2023-12-26 | End: 2023-12-28 | Stop reason: HOSPADM

## 2023-12-26 RX ORDER — OXYCODONE HCL 5 MG/5 ML
10 SOLUTION, ORAL ORAL
Status: COMPLETED | OUTPATIENT
Start: 2023-12-26 | End: 2023-12-26

## 2023-12-26 RX ORDER — SODIUM CHLORIDE, SODIUM LACTATE, POTASSIUM CHLORIDE, CALCIUM CHLORIDE 600; 310; 30; 20 MG/100ML; MG/100ML; MG/100ML; MG/100ML
INJECTION, SOLUTION INTRAVENOUS CONTINUOUS
Status: DISCONTINUED | OUTPATIENT
Start: 2023-12-26 | End: 2023-12-28 | Stop reason: HOSPADM

## 2023-12-26 RX ORDER — HYDROMORPHONE HYDROCHLORIDE 1 MG/ML
0.4 INJECTION, SOLUTION INTRAMUSCULAR; INTRAVENOUS; SUBCUTANEOUS
Status: DISCONTINUED | OUTPATIENT
Start: 2023-12-26 | End: 2023-12-26 | Stop reason: HOSPADM

## 2023-12-26 RX ORDER — HYDROXYCHLOROQUINE SULFATE 200 MG/1
200 TABLET, FILM COATED ORAL DAILY
Status: DISCONTINUED | OUTPATIENT
Start: 2023-12-26 | End: 2023-12-26

## 2023-12-26 RX ORDER — ONDANSETRON 2 MG/ML
4 INJECTION INTRAMUSCULAR; INTRAVENOUS
Status: DISCONTINUED | OUTPATIENT
Start: 2023-12-26 | End: 2023-12-26 | Stop reason: HOSPADM

## 2023-12-26 RX ORDER — OXYCODONE HCL 5 MG/5 ML
5 SOLUTION, ORAL ORAL
Status: DISCONTINUED | OUTPATIENT
Start: 2023-12-26 | End: 2023-12-28 | Stop reason: HOSPADM

## 2023-12-26 RX ORDER — MIDAZOLAM HYDROCHLORIDE 1 MG/ML
INJECTION INTRAMUSCULAR; INTRAVENOUS PRN
Status: DISCONTINUED | OUTPATIENT
Start: 2023-12-26 | End: 2023-12-26 | Stop reason: SURG

## 2023-12-26 RX ORDER — AMLODIPINE BESYLATE 10 MG/1
10 TABLET ORAL
Status: DISCONTINUED | OUTPATIENT
Start: 2023-12-26 | End: 2023-12-28 | Stop reason: HOSPADM

## 2023-12-26 RX ORDER — HEPARIN SODIUM 5000 [USP'U]/ML
5000 INJECTION, SOLUTION INTRAVENOUS; SUBCUTANEOUS EVERY 8 HOURS
Status: DISCONTINUED | OUTPATIENT
Start: 2023-12-27 | End: 2023-12-28 | Stop reason: HOSPADM

## 2023-12-26 RX ORDER — ALBUTEROL SULFATE 90 UG/1
2 AEROSOL, METERED RESPIRATORY (INHALATION) EVERY 4 HOURS PRN
Status: DISCONTINUED | OUTPATIENT
Start: 2023-12-26 | End: 2023-12-28 | Stop reason: HOSPADM

## 2023-12-26 RX ORDER — METHOCARBAMOL 500 MG/1
1000 TABLET, FILM COATED ORAL 3 TIMES DAILY
Status: DISCONTINUED | OUTPATIENT
Start: 2023-12-26 | End: 2023-12-28 | Stop reason: HOSPADM

## 2023-12-26 RX ORDER — ATORVASTATIN CALCIUM 40 MG/1
40 TABLET, FILM COATED ORAL EVERY EVENING
Status: DISCONTINUED | OUTPATIENT
Start: 2023-12-26 | End: 2023-12-28 | Stop reason: HOSPADM

## 2023-12-26 RX ORDER — LABETALOL HYDROCHLORIDE 5 MG/ML
10 INJECTION, SOLUTION INTRAVENOUS
Status: DISCONTINUED | OUTPATIENT
Start: 2023-12-26 | End: 2023-12-28 | Stop reason: HOSPADM

## 2023-12-26 RX ORDER — PANTOPRAZOLE SODIUM 40 MG/10ML
40 INJECTION, POWDER, LYOPHILIZED, FOR SOLUTION INTRAVENOUS 2 TIMES DAILY
Status: DISCONTINUED | OUTPATIENT
Start: 2023-12-26 | End: 2023-12-28 | Stop reason: HOSPADM

## 2023-12-26 RX ORDER — SIMETHICONE 125 MG
125 TABLET,CHEWABLE ORAL 2 TIMES DAILY
Status: DISCONTINUED | OUTPATIENT
Start: 2023-12-26 | End: 2023-12-28 | Stop reason: HOSPADM

## 2023-12-26 RX ORDER — KETOROLAC TROMETHAMINE 30 MG/ML
15 INJECTION, SOLUTION INTRAMUSCULAR; INTRAVENOUS EVERY 6 HOURS
Status: DISCONTINUED | OUTPATIENT
Start: 2023-12-26 | End: 2023-12-28 | Stop reason: HOSPADM

## 2023-12-26 RX ORDER — ACETAMINOPHEN 500 MG
1000 TABLET ORAL EVERY 6 HOURS
Status: DISCONTINUED | OUTPATIENT
Start: 2023-12-26 | End: 2023-12-28 | Stop reason: HOSPADM

## 2023-12-26 RX ORDER — LIDOCAINE HYDROCHLORIDE 20 MG/ML
INJECTION, SOLUTION EPIDURAL; INFILTRATION; INTRACAUDAL; PERINEURAL PRN
Status: DISCONTINUED | OUTPATIENT
Start: 2023-12-26 | End: 2023-12-26 | Stop reason: SURG

## 2023-12-26 RX ORDER — SUCRALFATE ORAL 1 G/10ML
1 SUSPENSION ORAL EVERY 6 HOURS
Status: DISCONTINUED | OUTPATIENT
Start: 2023-12-26 | End: 2023-12-28 | Stop reason: HOSPADM

## 2023-12-26 RX ORDER — DIPHENHYDRAMINE HYDROCHLORIDE 50 MG/ML
12.5 INJECTION INTRAMUSCULAR; INTRAVENOUS
Status: DISCONTINUED | OUTPATIENT
Start: 2023-12-26 | End: 2023-12-26 | Stop reason: HOSPADM

## 2023-12-26 RX ORDER — HYDROMORPHONE HYDROCHLORIDE 1 MG/ML
0.5 INJECTION, SOLUTION INTRAMUSCULAR; INTRAVENOUS; SUBCUTANEOUS
Status: DISCONTINUED | OUTPATIENT
Start: 2023-12-26 | End: 2023-12-28 | Stop reason: HOSPADM

## 2023-12-26 RX ORDER — BUPIVACAINE HYDROCHLORIDE AND EPINEPHRINE 5; 5 MG/ML; UG/ML
INJECTION, SOLUTION EPIDURAL; INTRACAUDAL; PERINEURAL
Status: DISCONTINUED | OUTPATIENT
Start: 2023-12-26 | End: 2023-12-26 | Stop reason: HOSPADM

## 2023-12-26 RX ORDER — GABAPENTIN 250 MG/5ML
300 SOLUTION ORAL 3 TIMES DAILY
Status: DISCONTINUED | OUTPATIENT
Start: 2023-12-26 | End: 2023-12-28 | Stop reason: HOSPADM

## 2023-12-26 RX ORDER — ONDANSETRON 2 MG/ML
4 INJECTION INTRAMUSCULAR; INTRAVENOUS EVERY 6 HOURS
Status: DISCONTINUED | OUTPATIENT
Start: 2023-12-26 | End: 2023-12-28 | Stop reason: HOSPADM

## 2023-12-26 RX ORDER — DEXAMETHASONE SODIUM PHOSPHATE 4 MG/ML
INJECTION, SOLUTION INTRA-ARTICULAR; INTRALESIONAL; INTRAMUSCULAR; INTRAVENOUS; SOFT TISSUE PRN
Status: DISCONTINUED | OUTPATIENT
Start: 2023-12-26 | End: 2023-12-26 | Stop reason: SURG

## 2023-12-26 RX ORDER — METOCLOPRAMIDE HYDROCHLORIDE 5 MG/ML
10 INJECTION INTRAMUSCULAR; INTRAVENOUS EVERY 6 HOURS PRN
Status: DISCONTINUED | OUTPATIENT
Start: 2023-12-26 | End: 2023-12-28 | Stop reason: HOSPADM

## 2023-12-26 RX ORDER — CEFAZOLIN SODIUM 1 G/3ML
INJECTION, POWDER, FOR SOLUTION INTRAMUSCULAR; INTRAVENOUS PRN
Status: DISCONTINUED | OUTPATIENT
Start: 2023-12-26 | End: 2023-12-26 | Stop reason: SURG

## 2023-12-26 RX ORDER — DEXAMETHASONE SODIUM PHOSPHATE 4 MG/ML
4 INJECTION, SOLUTION INTRA-ARTICULAR; INTRALESIONAL; INTRAMUSCULAR; INTRAVENOUS; SOFT TISSUE EVERY 6 HOURS
Status: DISCONTINUED | OUTPATIENT
Start: 2023-12-26 | End: 2023-12-28

## 2023-12-26 RX ORDER — ACETAMINOPHEN 500 MG
1000 TABLET ORAL EVERY 6 HOURS PRN
Status: DISCONTINUED | OUTPATIENT
Start: 2023-12-31 | End: 2023-12-28 | Stop reason: HOSPADM

## 2023-12-26 RX ADMIN — HYDROMORPHONE HYDROCHLORIDE 0.4 MG: 1 INJECTION, SOLUTION INTRAMUSCULAR; INTRAVENOUS; SUBCUTANEOUS at 13:20

## 2023-12-26 RX ADMIN — HYDROMORPHONE HYDROCHLORIDE 0.4 MG: 1 INJECTION, SOLUTION INTRAMUSCULAR; INTRAVENOUS; SUBCUTANEOUS at 13:07

## 2023-12-26 RX ADMIN — FENTANYL CITRATE 100 MCG: 50 INJECTION, SOLUTION INTRAMUSCULAR; INTRAVENOUS at 11:14

## 2023-12-26 RX ADMIN — METOCLOPRAMIDE 10 MG: 5 INJECTION, SOLUTION INTRAMUSCULAR; INTRAVENOUS at 21:01

## 2023-12-26 RX ADMIN — SODIUM CHLORIDE, POTASSIUM CHLORIDE, SODIUM LACTATE AND CALCIUM CHLORIDE: 600; 310; 30; 20 INJECTION, SOLUTION INTRAVENOUS at 23:10

## 2023-12-26 RX ADMIN — ROCURONIUM BROMIDE 30 MG: 10 INJECTION, SOLUTION INTRAVENOUS at 11:14

## 2023-12-26 RX ADMIN — SODIUM CHLORIDE, POTASSIUM CHLORIDE, SODIUM LACTATE AND CALCIUM CHLORIDE: 600; 310; 30; 20 INJECTION, SOLUTION INTRAVENOUS at 11:44

## 2023-12-26 RX ADMIN — HYDROMORPHONE HYDROCHLORIDE 0.5 MG: 1 INJECTION, SOLUTION INTRAMUSCULAR; INTRAVENOUS; SUBCUTANEOUS at 23:16

## 2023-12-26 RX ADMIN — ROCURONIUM BROMIDE 50 MG: 10 INJECTION, SOLUTION INTRAVENOUS at 10:23

## 2023-12-26 RX ADMIN — CEFAZOLIN 2 G: 1 INJECTION, POWDER, FOR SOLUTION INTRAMUSCULAR; INTRAVENOUS at 10:18

## 2023-12-26 RX ADMIN — FENTANYL CITRATE 50 MCG: 50 INJECTION, SOLUTION INTRAMUSCULAR; INTRAVENOUS at 12:11

## 2023-12-26 RX ADMIN — PROPOFOL 150 MG: 10 INJECTION, EMULSION INTRAVENOUS at 10:23

## 2023-12-26 RX ADMIN — ROCURONIUM BROMIDE 20 MG: 10 INJECTION, SOLUTION INTRAVENOUS at 12:11

## 2023-12-26 RX ADMIN — SCOPOLAMINE 1 PATCH: 1.5 PATCH, EXTENDED RELEASE TRANSDERMAL at 17:18

## 2023-12-26 RX ADMIN — OXYCODONE HYDROCHLORIDE 10 MG: 5 SOLUTION ORAL at 13:49

## 2023-12-26 RX ADMIN — HYDROMORPHONE HYDROCHLORIDE 0.2 MG: 1 INJECTION, SOLUTION INTRAMUSCULAR; INTRAVENOUS; SUBCUTANEOUS at 13:12

## 2023-12-26 RX ADMIN — HALOPERIDOL LACTATE 1 MG: 5 INJECTION, SOLUTION INTRAMUSCULAR at 13:07

## 2023-12-26 RX ADMIN — DEXAMETHASONE SODIUM PHOSPHATE 4 MG: 4 INJECTION INTRA-ARTICULAR; INTRALESIONAL; INTRAMUSCULAR; INTRAVENOUS; SOFT TISSUE at 23:29

## 2023-12-26 RX ADMIN — HYDROMORPHONE HYDROCHLORIDE 0.2 MG: 1 INJECTION, SOLUTION INTRAMUSCULAR; INTRAVENOUS; SUBCUTANEOUS at 13:30

## 2023-12-26 RX ADMIN — SUGAMMADEX 200 MG: 100 INJECTION, SOLUTION INTRAVENOUS at 12:51

## 2023-12-26 RX ADMIN — PANTOPRAZOLE SODIUM 40 MG: 40 INJECTION, POWDER, FOR SOLUTION INTRAVENOUS at 17:16

## 2023-12-26 RX ADMIN — MIDAZOLAM HYDROCHLORIDE 2 MG: 1 INJECTION, SOLUTION INTRAMUSCULAR; INTRAVENOUS at 10:23

## 2023-12-26 RX ADMIN — METHOCARBAMOL 1000 MG: 500 TABLET ORAL at 20:53

## 2023-12-26 RX ADMIN — DEXAMETHASONE SODIUM PHOSPHATE 4 MG: 4 INJECTION INTRA-ARTICULAR; INTRALESIONAL; INTRAMUSCULAR; INTRAVENOUS; SOFT TISSUE at 16:21

## 2023-12-26 RX ADMIN — KETOROLAC TROMETHAMINE 15 MG: 30 INJECTION, SOLUTION INTRAMUSCULAR; INTRAVENOUS at 16:21

## 2023-12-26 RX ADMIN — SUCRALFATE ORAL 1 G: 1 SUSPENSION ORAL at 23:27

## 2023-12-26 RX ADMIN — ONDANSETRON 4 MG: 2 INJECTION INTRAMUSCULAR; INTRAVENOUS at 10:23

## 2023-12-26 RX ADMIN — SODIUM CHLORIDE, POTASSIUM CHLORIDE, SODIUM LACTATE AND CALCIUM CHLORIDE: 600; 310; 30; 20 INJECTION, SOLUTION INTRAVENOUS at 14:45

## 2023-12-26 RX ADMIN — DEXAMETHASONE SODIUM PHOSPHATE 4 MG: 4 INJECTION INTRA-ARTICULAR; INTRALESIONAL; INTRAMUSCULAR; INTRAVENOUS; SOFT TISSUE at 10:23

## 2023-12-26 RX ADMIN — KETOROLAC TROMETHAMINE 15 MG: 30 INJECTION, SOLUTION INTRAMUSCULAR; INTRAVENOUS at 23:28

## 2023-12-26 RX ADMIN — FENTANYL CITRATE 100 MCG: 50 INJECTION, SOLUTION INTRAMUSCULAR; INTRAVENOUS at 10:23

## 2023-12-26 RX ADMIN — LIDOCAINE HYDROCHLORIDE 100 MG: 20 INJECTION, SOLUTION EPIDURAL; INFILTRATION; INTRACAUDAL at 10:23

## 2023-12-26 RX ADMIN — HYDROMORPHONE HYDROCHLORIDE 0.4 MG: 1 INJECTION, SOLUTION INTRAMUSCULAR; INTRAVENOUS; SUBCUTANEOUS at 13:25

## 2023-12-26 RX ADMIN — HYDROMORPHONE HYDROCHLORIDE 0.4 MG: 1 INJECTION, SOLUTION INTRAMUSCULAR; INTRAVENOUS; SUBCUTANEOUS at 13:00

## 2023-12-26 RX ADMIN — ACETAMINOPHEN 1000 MG: 500 TABLET ORAL at 23:27

## 2023-12-26 RX ADMIN — ONDANSETRON 4 MG: 2 INJECTION INTRAMUSCULAR; INTRAVENOUS at 23:27

## 2023-12-26 RX ADMIN — SODIUM CHLORIDE, POTASSIUM CHLORIDE, SODIUM LACTATE AND CALCIUM CHLORIDE: 600; 310; 30; 20 INJECTION, SOLUTION INTRAVENOUS at 09:04

## 2023-12-26 RX ADMIN — HEPARIN SODIUM 5000 UNITS: 5000 INJECTION, SOLUTION INTRAVENOUS; SUBCUTANEOUS at 09:04

## 2023-12-26 RX ADMIN — OXYCODONE HYDROCHLORIDE 10 MG: 5 SOLUTION ORAL at 20:52

## 2023-12-26 RX ADMIN — ONDANSETRON 4 MG: 2 INJECTION INTRAMUSCULAR; INTRAVENOUS at 16:21

## 2023-12-26 ASSESSMENT — PAIN DESCRIPTION - PAIN TYPE
TYPE: SURGICAL PAIN
TYPE: ACUTE PAIN
TYPE: SURGICAL PAIN
TYPE: ACUTE PAIN
TYPE: SURGICAL PAIN

## 2023-12-26 ASSESSMENT — FIBROSIS 4 INDEX
FIB4 SCORE: 0.5
FIB4 SCORE: 0.5

## 2023-12-26 ASSESSMENT — PAIN SCALES - GENERAL: PAIN_LEVEL: 7

## 2023-12-26 NOTE — ANESTHESIA POSTPROCEDURE EVALUATION
Patient: Christine Miguel    Procedure Summary       Date: 12/26/23 Room / Location: [place] [address] / SURGERY [facility]    Anesthesia Start: 1018 Anesthesia Stop: 1305    Procedures:       ROBOTIC SONNY EN Y GASTROENTEROSTOMY (Abdomen)      REPAIR, HERNIA, HIATAL, ROBOT-ASSISTED, LAPAROSCOPIC (Abdomen) Diagnosis: (GERD, HIATAL HERNIA)    Surgeons: Olivier Oliveros M.D. Responsible Provider: Dann Colindres M.D.    Anesthesia Type: general ASA Status: 3            Final Anesthesia Type: general  Last vitals  BP   Blood Pressure: 120/89    Temp   36.1 °C (96.9 °F)    Pulse   99   Resp   12    SpO2   96 %      Anesthesia Post Evaluation    Patient location during evaluation: PACU  Patient participation: complete - patient participated  Level of consciousness: awake and alert  Pain score: 7  Chronic pain  Airway patency: patent  Anesthetic complications: no  Cardiovascular status: hemodynamically stable  Respiratory status: acceptable  Hydration status: euvolemic    PONV: none          There were no known notable events for this encounter.     Nurse Pain Score: 0 (NPRS)

## 2023-12-26 NOTE — OP REPORT
[facility]    OPERATIVE REPORT         DATE OF OPERATION: 12/26/2023    SURGEON: Olivier Oliveros MD MPH FACS [facility]    ASSISTANT(S): Judy Barrios NP    ANESTHESIOLOGIST(S): Dann Colindres MD     ANESTHESIA: General endotracheal, with local anesthetic    PREOPERATIVE DIAGNOSES:   1. GERD with bile gastritis and esophagitis, associated with heartburn, regurgitation, and occasional dysphagia  2. Small recurrent hiatal hernia  3. S/p robotic sleeve gastrectomy and hiatal hernia repair in 7/2023 for history of morbid obesity and GERD    POSTOPERATIVE DIAGNOSES:   1. GERD with bile gastritis and esophagitis, associated with heartburn, regurgitation, and occasional dysphagia  2. Small recurrent hiatal hernia  3. S/p robotic sleeve gastrectomy and hiatal hernia repair in 7/2023 for history of morbid obesity and GERD  4. Small amount of intraperitoneal adhesions    OPERATION(S) PERFORMED: Robotic repair of recurrent hiatal hernia and Jayna-en-Y gastrojejunostomy    ESTIMATED BLOOD LOSS: 50 ml    URINE OUTPUT: Not recorded    COMPLICATIONS: None    SPECIMEN(S): None    DISPOSITION: The patient tolerated this procedure well and was transferred to the recovery room in stable condition.    BACKGROUND: The patient is a pleasant 43 y.o. female, with a history of morbid obesity, HTN, hypercholesterolemia, FRANK, and GERD, s/p previous robotic sleeve gastrectomy and hiatal hernia repair in 7/2023. After the initial weight loss procedure, patient has achieved a substantial amount of excess weight loss, but developed symptoms consistent with significant GERD, including heartburn, dysphagia, regurgitation, and epigastric pain worsened by PO intake. Diagnostic workup that included a Fluoro UGI w/ Barium and EGD, demonstrated a small, recurrent hiatal hernia, significant bile reflux gastritis and esophagitis, and clear evidence of gastroesophageal reflux on the fluoroscopy study. Medical management was unsuccessful at  controlling most of the symptoms, thus, patient was recommended to undergo a robotic vs laparoscopic, possible open, Jayna-en-Y gastrojejunostomy, possible partial gastrectomy, possible repair of hiatal hernia +/- with biologic mesh reinforcement, and possible EGD.    We discussed the risks of surgery including infection, bleeding, need for transfusion, blood clot formation, pulmonary embolus, pneumonia, leak or perforation, recurrence of symptoms, and death. In addition, the risks of general anesthetic were discussed, including MI, CVA, reaction to anesthetic medications, or sudden death. The patient understands all of the above risks and all questions were answered to complete satisfaction.    OPERATIVE FINDINGS: During the procedure, a normal gastric sleeve anatomy was identified, as well as, a small, recurrent hiatal hernia. There was a small amount of intraperitoneal adhesions and a significant amount of fibrotic adhesions between the left lobe of liver, gastric sleeve, diaphragmatic hiatus, and overlying omentum. No other abnormalities were noted.    OPERATIVE PROCEDURE: The patient was brought to the Operating Room and placed on the operating table in supine position. Venodynes were placed on the patient's legs. Antibiotics (Ancef 2g) were given intravenously for surgical prophylaxis. Five thousand units of heparin had been given subcutaneously for deep venous thrombosis prophylaxis in the preop holding area. The patient was induced under general anesthesia and intubated. The abdomen was then prepped and draped in the usual sterile fashion. A time-out was done where the name of the patient and the procedure were verified.     The procedure was begun by injecting local anesthetic into subcutaneous tissue approximately 15 cm below the xiphoid process and just left of the midline. A skin incision was then made using a scalpel and a 5 mm OptiView trocar was placed under direct vision, with assistance of a 5-mm,  0-degree scope. The abdomen was insufflated successfully to a pressure of 15 mmHg. The scope was switched to a 5-mm, 30-degree and the abdomen was inspected - no injuries were noted from initial trocar placement. Next, four additional bladeless trocars were placed in a similar fashion, a robotic 8 mm and 12 mm in the right upper quadrant and two 8 mm trocars in the left upper quadrant. The patient was then placed in the reverse Trendelenburg position. The da Tashi Xi robotic system was then successfully docked above the patient's epigastrium and the remainder of this operation was performed with assistance of the robot.    There were some adhesions containing small bowel and omentum to the anterior abdominal wall and those were carefully taken down by using the Vessel sealer. There was no evidence of bowel obstruction or twisting of small bowel. The gastric sleeve was identified and significant lysis of adhesions had to be performed in order to separate the sleeve gastrectomy from the left lobe of liver, diaphragmatic hiatus, and overlying omentum. Next, attention was turned to the hiatal hernia. Upon inspection of the gastroesophageal junction, a small, recurrent hiatal hernia was noted. This was dissected free from the esophagus and from the right and left crura, circumferentially. The decision was made to repair this hernia after performing the gastrojejunostomy. Dissection was then done bluntly along the angle of His.    Next, a small window was created at the 6 cm ji along the lesser curvature of gastric sleeve in order to create a small gastric pouch. This window was created posteriorly beyond the stomach and then a robotic ORLANDO stapler with a blue load was placed horizontally across the stomach and fired. A single staple load was necessary to completely transect the sleeved stomach and create an adequately-sized gastric pouch. The omentum was then reflected superiorly in order to lift the transverse colon  and expose the ligament of Treitz. Forty centimeters of small bowel were counted from the ligament of Treitz and that loop of jejunum was brought up into proximity to the gastric pouch in antecolic-antegastric fashion. Next, a 3-cm gastrojejunostomy anastomosis was formed by using a blue load of the robotic ORLANDO stapler to create the posterior wall. The anterior wall of anastomosis was closed in two layers by using 3-0 PDS (Stratafix) sutures. The anastomosis was inspected and found to be intact and hemostatic.     Next, the biliopancreatic limb was divided proximally to the gastrojejunostomy anastomosis by using a white load of the robotic stapler. The Vessel sealer was used to divide the small bowel mesentery, being careful not to compromise the blood supply to the bowel. Then, additional 75 cm of jejunum were counted out and at that position a jejunojejunostomy was created by using another white load of the robotic ORLANDO stapler to create the posterior wall. The common enterotomy was sutured closed with a running 3-0 PDS (Stratafix) suture. The anastomosis was inspected and found to be intact. The mesenteric defect at the jejunojejunostomy was identified and closed by using a running 2-0 Ethibond suture. The attention now turned to the hiatal hernia and the stomach pouch was retracted slightly in a cephalad direction to ensure that the gastroesophageal junction was located below the hiatus of the diaphragm. The right and left crura were approximated posterior to the esophagus by using two 0-Ethibond suture(s), in addition to one 0-Ethibond suture anterior to the esophagus.    The robotic system was undocked and all trocars were removed. The 12 mm trocar site was closed in two layers with 0-Vicryl for the fascia and 4-0 Monocryl for the skin. The remainder of trocar sites were closed with 4-0 Monocryl sutures only. The wounds were cleaned, dried, and covered with Dermabond. The sponge and instrument counts were  correct x 2. The patient was then extubated and transferred to the PACU in stable condition.    Dr. Olivier Oliveros, attending surgeon, was present and scrubbed throughout the entirety of the case.

## 2023-12-26 NOTE — ANESTHESIA PREPROCEDURE EVALUATION
Case: [id number] Date/Time: 12/26/23 1000    Procedure: ROBOTIC SONNY EN Y GASTROENTEROSTOMY, HIATAL HERNIA REPAIR    Pre-op diagnosis: GERD, HIATAL HERNIA    Location: [facility] OR 14 / SURGERY [facility]    Surgeons: Olivier Oliveros M.D.            Relevant Problems   ANESTHESIA   (positive) Sleep apnea      PULMONARY   (positive) Asthma      CARDIAC   (positive) HTN (hypertension)      GI   (positive) GERD (gastroesophageal reflux disease)       Physical Exam    Airway   Mallampati: II  TM distance: >3 FB  Neck ROM: full       Cardiovascular - normal exam  Rhythm: regular  Rate: normal  (-) murmur     Dental - normal exam           Pulmonary - normal exam  Breath sounds clear to auscultation     Abdominal    Neurological - normal exam                   Anesthesia Plan    ASA 3   ASA physical status 3 criteria: morbid obesity - BMI greater than or equal to 40    Plan - general       Airway plan will be ETT          Induction: intravenous    Postoperative Plan: Postoperative administration of opioids is intended.    Pertinent diagnostic labs and testing reviewed    Informed Consent:    Anesthetic plan and risks discussed with patient.    Use of blood products discussed with: patient whom consented to blood products.

## 2023-12-26 NOTE — PROGRESS NOTES
"Admitted to room T 431 via transport in [facility] from PACU at 1500.  BP (!) 155/80   Pulse 82   Temp 36.7 °C (98.1 °F) (Temporal)   Resp 20   Ht 1.626 m (5' 4\")   Wt 78.7 kg (173 lb 8 oz)   LMP 11/15/2023 (Approximate)   SpO2 94%   BMI 29.78 kg/m²     Patient reports pain at 9 on a scale of 0-10. Educated patient regarding pharmacologic and non pharmacologic modalities for pain management. Oriented to room call light and smoking policy.  Reviewed plan of care (equipment, incentive spirometer, sequential compression devices, medications, activity, diet, fall precautions, skin care, and pain) with patient and family. Welcome packet given and reviewed with patient, all questions answered. Education provided on oral hygiene program.    AA&Ox4. Patient Lethargic, Drifts off during conversation.  Denies CP/SOB.  See 2 RN skin note  On Bariactric Clear Liquid Diet Patient Reporting Nausea  + void. Last BM PTA   Pt ambulates x1 assist.  All needs met at this time. Call light within reach. Pt calls appropriately. Bed low and locked, non skid socks in place. Hourly rounding in place.    "

## 2023-12-26 NOTE — ANESTHESIA PROCEDURE NOTES
Airway    Date/Time: 12/26/2023 10:23 AM    Performed by: Dann Colindres M.D.  Authorized by: Dann Colindres M.D.    Location:  OR  Urgency:  Elective  Indications for Airway Management:  Anesthesia      Spontaneous Ventilation: absent    Sedation Level:  Deep  Preoxygenated: Yes    Patient Position:  Sniffing  Final Airway Type:  Endotracheal airway  Final Endotracheal Airway:  ETT  Cuffed: Yes    Technique Used for Successful ETT Placement:  Direct laryngoscopy    Insertion Site:  Oral  Blade Type:  Hayes  Laryngoscope Blade/Videolaryngoscope Blade Size:  2  ETT Size (mm):  7.0  Measured from:  Teeth  ETT to Teeth (cm):  22  Placement Verified by: auscultation and capnometry    Cormack-Lehane Classification:  Grade I - full view of glottis  Number of Attempts at Approach:  1

## 2023-12-26 NOTE — PROGRESS NOTES
Rightmost Lap Site Noted to be Oozing Blood     Per Domo SOSA, Cover with Dry Gauze and Transparent Film

## 2023-12-26 NOTE — ANESTHESIA TIME REPORT
Anesthesia Start and Stop Event Times       Date Time Event    12/26/2023 1000 Ready for Procedure     1018 Anesthesia Start     1305 Anesthesia Stop          Responsible Staff  12/26/23      Name Role Begin End    Dann Colindres M.D. Anesth 1018 1305          Overtime Reason:  no overtime (within assigned shift)    Comments:

## 2023-12-26 NOTE — OR NURSING
Pt A&OX4. VSS. Pt on 2 L of oxygen via nasal cannula. Afebrile. Four lap sites to abd, dermabond closure - scant oozing. Pt initially reported severe abd pain - restless/tense in pacu. IV and PO pain medication administered and pt states pain down to 6/10.   No issues noted with pt's swallow. IV antiemetics administered as well. Pt able to void into bed pan multiple times while in pacu. One episode of urinary incontinence - pt declined to have flat sheet changed at this time.   Report given to SAMINA Muhammad.

## 2023-12-27 LAB
ANION GAP SERPL CALC-SCNC: 11 MMOL/L (ref 7–16)
BUN SERPL-MCNC: 7 MG/DL (ref 8–22)
CALCIUM SERPL-MCNC: 9.1 MG/DL (ref 8.5–10.5)
CHLORIDE SERPL-SCNC: 101 MMOL/L (ref 96–112)
CO2 SERPL-SCNC: 24 MMOL/L (ref 20–33)
CREAT SERPL-MCNC: 0.49 MG/DL (ref 0.5–1.4)
ERYTHROCYTE [DISTWIDTH] IN BLOOD BY AUTOMATED COUNT: 39.8 FL (ref 35.9–50)
GFR SERPLBLD CREATININE-BSD FMLA CKD-EPI: 120 ML/MIN/1.73 M 2
GLUCOSE SERPL-MCNC: 126 MG/DL (ref 65–99)
HCT VFR BLD AUTO: 40.2 % (ref 37–47)
HGB BLD-MCNC: 13.7 G/DL (ref 12–16)
MCH RBC QN AUTO: 29.3 PG (ref 27–33)
MCHC RBC AUTO-ENTMCNC: 34.1 G/DL (ref 32.2–35.5)
MCV RBC AUTO: 86.1 FL (ref 81.4–97.8)
PLATELET # BLD AUTO: 180 K/UL (ref 164–446)
PMV BLD AUTO: 9.8 FL (ref 9–12.9)
POTASSIUM SERPL-SCNC: 4 MMOL/L (ref 3.6–5.5)
RBC # BLD AUTO: 4.67 M/UL (ref 4.2–5.4)
SODIUM SERPL-SCNC: 136 MMOL/L (ref 135–145)
WBC # BLD AUTO: 6 K/UL (ref 4.8–10.8)

## 2023-12-27 PROCEDURE — 700111 HCHG RX REV CODE 636 W/ 250 OVERRIDE (IP): Performed by: SURGERY

## 2023-12-27 PROCEDURE — 80048 BASIC METABOLIC PNL TOTAL CA: CPT

## 2023-12-27 PROCEDURE — 700102 HCHG RX REV CODE 250 W/ 637 OVERRIDE(OP): Performed by: SURGERY

## 2023-12-27 PROCEDURE — 770001 HCHG ROOM/CARE - MED/SURG/GYN PRIV*

## 2023-12-27 PROCEDURE — 700105 HCHG RX REV CODE 258: Performed by: SURGERY

## 2023-12-27 PROCEDURE — C9113 INJ PANTOPRAZOLE SODIUM, VIA: HCPCS | Performed by: SURGERY

## 2023-12-27 PROCEDURE — 85027 COMPLETE CBC AUTOMATED: CPT

## 2023-12-27 PROCEDURE — A9270 NON-COVERED ITEM OR SERVICE: HCPCS | Performed by: SURGERY

## 2023-12-27 RX ADMIN — KETOROLAC TROMETHAMINE 15 MG: 30 INJECTION, SOLUTION INTRAMUSCULAR; INTRAVENOUS at 23:18

## 2023-12-27 RX ADMIN — SIMETHICONE 125 MG: 125 TABLET, CHEWABLE ORAL at 17:38

## 2023-12-27 RX ADMIN — PANTOPRAZOLE SODIUM 40 MG: 40 INJECTION, POWDER, FOR SOLUTION INTRAVENOUS at 17:38

## 2023-12-27 RX ADMIN — DEXAMETHASONE SODIUM PHOSPHATE 4 MG: 4 INJECTION INTRA-ARTICULAR; INTRALESIONAL; INTRAMUSCULAR; INTRAVENOUS; SOFT TISSUE at 11:55

## 2023-12-27 RX ADMIN — SUCRALFATE ORAL 1 G: 1 SUSPENSION ORAL at 05:23

## 2023-12-27 RX ADMIN — SUCRALFATE ORAL 1 G: 1 SUSPENSION ORAL at 17:38

## 2023-12-27 RX ADMIN — HEPARIN SODIUM 5000 UNITS: 5000 INJECTION, SOLUTION INTRAVENOUS; SUBCUTANEOUS at 20:21

## 2023-12-27 RX ADMIN — ATORVASTATIN CALCIUM 40 MG: 40 TABLET, FILM COATED ORAL at 17:38

## 2023-12-27 RX ADMIN — OXYCODONE HYDROCHLORIDE 10 MG: 5 SOLUTION ORAL at 03:19

## 2023-12-27 RX ADMIN — SODIUM CHLORIDE, POTASSIUM CHLORIDE, SODIUM LACTATE AND CALCIUM CHLORIDE: 600; 310; 30; 20 INJECTION, SOLUTION INTRAVENOUS at 23:33

## 2023-12-27 RX ADMIN — KETOROLAC TROMETHAMINE 15 MG: 30 INJECTION, SOLUTION INTRAMUSCULAR; INTRAVENOUS at 17:38

## 2023-12-27 RX ADMIN — METHOCARBAMOL 1000 MG: 500 TABLET ORAL at 15:24

## 2023-12-27 RX ADMIN — GABAPENTIN 300 MG: 250 SOLUTION ORAL at 11:59

## 2023-12-27 RX ADMIN — ACETAMINOPHEN 1000 MG: 500 TABLET ORAL at 05:22

## 2023-12-27 RX ADMIN — AMLODIPINE BESYLATE 10 MG: 10 TABLET ORAL at 05:22

## 2023-12-27 RX ADMIN — METOCLOPRAMIDE 10 MG: 5 INJECTION, SOLUTION INTRAMUSCULAR; INTRAVENOUS at 10:00

## 2023-12-27 RX ADMIN — ACETAMINOPHEN 1000 MG: 500 TABLET ORAL at 11:54

## 2023-12-27 RX ADMIN — HEPARIN SODIUM 5000 UNITS: 5000 INJECTION, SOLUTION INTRAVENOUS; SUBCUTANEOUS at 15:24

## 2023-12-27 RX ADMIN — GABAPENTIN 300 MG: 250 SOLUTION ORAL at 17:38

## 2023-12-27 RX ADMIN — METHOCARBAMOL 1000 MG: 500 TABLET ORAL at 20:21

## 2023-12-27 RX ADMIN — OXYCODONE HYDROCHLORIDE 10 MG: 5 SOLUTION ORAL at 09:59

## 2023-12-27 RX ADMIN — SUCRALFATE ORAL 1 G: 1 SUSPENSION ORAL at 11:55

## 2023-12-27 RX ADMIN — GABAPENTIN 300 MG: 250 SOLUTION ORAL at 05:42

## 2023-12-27 RX ADMIN — ONDANSETRON 4 MG: 2 INJECTION INTRAMUSCULAR; INTRAVENOUS at 23:18

## 2023-12-27 RX ADMIN — ONDANSETRON 4 MG: 2 INJECTION INTRAMUSCULAR; INTRAVENOUS at 11:55

## 2023-12-27 RX ADMIN — OXYCODONE HYDROCHLORIDE 10 MG: 5 SOLUTION ORAL at 23:17

## 2023-12-27 RX ADMIN — KETOROLAC TROMETHAMINE 15 MG: 30 INJECTION, SOLUTION INTRAMUSCULAR; INTRAVENOUS at 05:27

## 2023-12-27 RX ADMIN — ONDANSETRON 4 MG: 2 INJECTION INTRAMUSCULAR; INTRAVENOUS at 05:35

## 2023-12-27 RX ADMIN — ONDANSETRON 4 MG: 2 INJECTION INTRAMUSCULAR; INTRAVENOUS at 17:38

## 2023-12-27 RX ADMIN — DEXAMETHASONE SODIUM PHOSPHATE 4 MG: 4 INJECTION INTRA-ARTICULAR; INTRALESIONAL; INTRAMUSCULAR; INTRAVENOUS; SOFT TISSUE at 05:33

## 2023-12-27 RX ADMIN — LOSARTAN POTASSIUM 25 MG: 25 TABLET, FILM COATED ORAL at 05:22

## 2023-12-27 RX ADMIN — ACETAMINOPHEN 1000 MG: 500 TABLET ORAL at 23:18

## 2023-12-27 RX ADMIN — DEXAMETHASONE SODIUM PHOSPHATE 4 MG: 4 INJECTION INTRA-ARTICULAR; INTRALESIONAL; INTRAMUSCULAR; INTRAVENOUS; SOFT TISSUE at 17:38

## 2023-12-27 RX ADMIN — SUCRALFATE ORAL 1 G: 1 SUSPENSION ORAL at 23:17

## 2023-12-27 RX ADMIN — PANTOPRAZOLE SODIUM 40 MG: 40 INJECTION, POWDER, FOR SOLUTION INTRAVENOUS at 05:35

## 2023-12-27 RX ADMIN — ACETAMINOPHEN 1000 MG: 500 TABLET ORAL at 17:38

## 2023-12-27 RX ADMIN — KETOROLAC TROMETHAMINE 15 MG: 30 INJECTION, SOLUTION INTRAMUSCULAR; INTRAVENOUS at 11:55

## 2023-12-27 RX ADMIN — DEXAMETHASONE SODIUM PHOSPHATE 4 MG: 4 INJECTION INTRA-ARTICULAR; INTRALESIONAL; INTRAMUSCULAR; INTRAVENOUS; SOFT TISSUE at 23:18

## 2023-12-27 RX ADMIN — HEPARIN SODIUM 5000 UNITS: 5000 INJECTION, SOLUTION INTRAVENOUS; SUBCUTANEOUS at 05:26

## 2023-12-27 RX ADMIN — SIMETHICONE 125 MG: 125 TABLET, CHEWABLE ORAL at 05:22

## 2023-12-27 ASSESSMENT — PATIENT HEALTH QUESTIONNAIRE - PHQ9
2. FEELING DOWN, DEPRESSED, IRRITABLE, OR HOPELESS: NOT AT ALL
1. LITTLE INTEREST OR PLEASURE IN DOING THINGS: NOT AT ALL
SUM OF ALL RESPONSES TO PHQ9 QUESTIONS 1 AND 2: 0
1. LITTLE INTEREST OR PLEASURE IN DOING THINGS: NOT AT ALL
SUM OF ALL RESPONSES TO PHQ9 QUESTIONS 1 AND 2: 0
2. FEELING DOWN, DEPRESSED, IRRITABLE, OR HOPELESS: NOT AT ALL

## 2023-12-27 ASSESSMENT — LIFESTYLE VARIABLES
ALCOHOL_USE: NO
TOTAL SCORE: 0
AVERAGE NUMBER OF DAYS PER WEEK YOU HAVE A DRINK CONTAINING ALCOHOL: 0
HOW MANY TIMES IN THE PAST YEAR HAVE YOU HAD 5 OR MORE DRINKS IN A DAY: 0
TOTAL SCORE: 0
DOES PATIENT WANT TO STOP DRINKING: NO
EVER HAD A DRINK FIRST THING IN THE MORNING TO STEADY YOUR NERVES TO GET RID OF A HANGOVER: NO
CONSUMPTION TOTAL: NEGATIVE
EVER FELT BAD OR GUILTY ABOUT YOUR DRINKING: NO
ON A TYPICAL DAY WHEN YOU DRINK ALCOHOL HOW MANY DRINKS DO YOU HAVE: 0
HAVE YOU EVER FELT YOU SHOULD CUT DOWN ON YOUR DRINKING: NO
HAVE PEOPLE ANNOYED YOU BY CRITICIZING YOUR DRINKING: NO
TOTAL SCORE: 0

## 2023-12-27 ASSESSMENT — PAIN DESCRIPTION - PAIN TYPE
TYPE: ACUTE PAIN
TYPE: SURGICAL PAIN
TYPE: ACUTE PAIN
TYPE: SURGICAL PAIN
TYPE: SURGICAL PAIN
TYPE: ACUTE PAIN
TYPE: ACUTE PAIN
TYPE: SURGICAL PAIN

## 2023-12-27 ASSESSMENT — COGNITIVE AND FUNCTIONAL STATUS - GENERAL
HELP NEEDED FOR BATHING: A LITTLE
WALKING IN HOSPITAL ROOM: A LITTLE
DRESSING REGULAR LOWER BODY CLOTHING: A LITTLE
SUGGESTED CMS G CODE MODIFIER MOBILITY: CJ
CLIMB 3 TO 5 STEPS WITH RAILING: A LITTLE
DAILY ACTIVITIY SCORE: 22
MOBILITY SCORE: 22
SUGGESTED CMS G CODE MODIFIER DAILY ACTIVITY: CJ

## 2023-12-27 NOTE — CARE PLAN
Problem: Pain - Standard  Goal: Alleviation of pain or a reduction in pain to the patient’s comfort goal  Outcome: Progressing        The patient is Stable - Low risk of patient condition declining or worsening    Shift Goals  Clinical Goals: Education on Post OP Bariactric Care  Patient Goals: Comfort    Progress made toward(s) clinical / shift goals:  Patient Free from falls     Patient is not progressing towards the following goals:  No learning evident upon education, Reinforcement Required     Problem: Fall Risk  Goal: Patient will remain free from falls  Outcome: Not Progressing    Problem: Knowledge Deficit - Standard  Goal: Patient and family/care givers will demonstrate understanding of plan of care, disease process/condition, diagnostic tests and medications  Outcome: Not Progressing

## 2023-12-27 NOTE — PROGRESS NOTES
This RN offered patient PO scheduled medication    Patient Refused, stated she was nauseous    Patient verbalized understanding of risks of medication refusal

## 2023-12-27 NOTE — PROGRESS NOTES
"[facility]  Progress Note      POD #1, s/p robotic repair of recurrent hiatal hernia and Jayna-en-Y gastrojejunostomy.    Subjective:     Reports feeling reasonably well. Notes mild to moderate amount of incisional/abdominal pain (most notably at the RUQ incision - which has been oozing overnight), some epigastric/substernal chest pain & tightness, mild dysphagia and early satiety. Otherwise, no recent history of fevers/chills, vomiting/hematemesis, odynophagia, CP/SOB, bloating/belching, worsening abdominal pain, dysuria/hematuria, BRBPR/melena, or constipation/diarrhea. No other symptoms or complaints at this time. Tolerating sips of clear liquids and water, ambulating in the hallways w/o difficulties and voiding spontaneously.    Objective:    /53   Pulse 64   Temp 37.2 °C (99 °F) (Temporal)   Resp 20   Ht 1.626 m (5' 4\")   Wt 78.7 kg (173 lb 8 oz)   SpO2 96%     I/O last 3 completed shifts:  In: 1850 [I.V.:1850]  Out: 875 [Urine:800]    Current Facility-Administered Medications   Medication Dose    albuterol inhaler 2 Puff  2 Puff    amLODIPine (Norvasc) tablet 10 mg  10 mg    atorvastatin (Lipitor) tablet 40 mg  40 mg    losartan (Cozaar) tablet 25 mg  25 mg    lactated ringers infusion      Pharmacy Consult Request ...Pain Management Review 1 Each  1 Each    acetaminophen (Tylenol) tablet 1,000 mg  1,000 mg    Followed by    [START ON 12/31/2023] acetaminophen (Tylenol) tablet 1,000 mg  1,000 mg    oxyCODONE (Roxicodone) oral solution 5 mg  5 mg    Or    oxyCODONE (Roxicodone) oral solution 10 mg  10 mg    Or    HYDROmorphone (Dilaudid) injection 0.5 mg  0.5 mg    ondansetron (Zofran) syringe/vial injection 4 mg  4 mg    promethazine (Phenergan) suppository 25 mg  25 mg    simethicone (Mylicon) chewable tablet 125 mg  125 mg    benzocaine-menthol (Cepacol) lozenge 1 Lozenge  1 Lozenge    heparin injection 5,000 Units  5,000 Units    ketorolac (Toradol) injection 15 mg  15 mg    " pantoprazole (Protonix) injection 40 mg  40 mg    sucralfate (Carafate) 1 GM/10ML suspension 1 g  1 g    methocarbamol (Robaxin) tablet 1,000 mg  1,000 mg    gabapentin (Neurontin) 250 MG/5ML 300 mg  300 mg    dexamethasone (Decadron) injection 4 mg  4 mg    metoclopramide (Reglan) injection 10 mg  10 mg    labetalol (Normodyne/Trandate) injection 10 mg  10 mg    scopolamine (Transderm-Scop) patch 1 Patch  1 Patch     Physical Exam:     Gen - comfortable, NAD, A&O x 3  HEENT - anicteric, PERRLA  CV - regular rate and rhythm  Abd - soft, + min TTP @ epigastrium and RUQ, no rebound/guarding, no distention, no palp masses or bulges  Inc - all lap incisions are C/D/I - no evidence of infection or bulges; + min bruising at all incisions  Ext - no clubbing, cyanosis or edema bilaterally  Skin - no rashes/lesions, no open wounds, no jaundice    Labs:    Recent Labs     12/27/23  0139   WBC 6.0   RBC 4.67   HEMOGLOBIN 13.7   HEMATOCRIT 40.2   MCV 86.1   MCH 29.3   MCHC 34.1   RDW 39.8   PLATELETCT 180   MPV 9.8     Recent Labs     12/27/23  0139   SODIUM 136   POTASSIUM 4.0   CHLORIDE 101   CO2 24   GLUCOSE 126*   BUN 7*     Imaging:    No results found.    Assessment/Plan:    1. GERD with bile gastritis and esophagitis, associated with heartburn, regurgitation, and occasional dysphagia  2. Small recurrent hiatal hernia  3. S/p robotic sleeve gastrectomy and hiatal hernia repair in 7/2023 for history of morbid obesity and GERD  4. Small amount of intraperitoneal adhesions    Now, POD #1, s/p robotic repair of recurrent hiatal hernia and Jayna-en-Y gastrojejunostomy.    Postoperative recovery is progressing as expected. Pain and nausea are well controlled. Tolerating sips of clear liquids and water, ambulating in the hallways w/o difficulties and voiding spontaneously.    Will continue to encourage increased PO intake + clear protein shakes throughout the day. In addition, will encourage ambulation in the hallways and use of  ICS while resting in bed. Patient may be stable for discharge home tomorrow AM, with close follow up in the Surgery clinic (appointment has been scheduled already), if continues to recover well.    Thank you for giving us this opportunity to care for this patient.    Olivier Oliveros MD MPH FACS [facility]  Bariatric and Gastroesophageal Surgery  [facility]  Clinical Assistant Professor of Surgery  [facility]

## 2023-12-27 NOTE — PROGRESS NOTES
Bedside report received.  Assessment complete.  A&O x 4. Patient calls appropriately.  Patient ambulates with standby with FWW assist. Bed alarm off.   Patient has 8/10 pain. Pain managed with prescribed medications.  Denies N&V. Tolerating bariatric clears diet.  Surgical x4 lap sites with dermabond; left incision oozing requiring frequent dressing changes.  + void, - flatus, - BM.  Patient denies SOB.  SCD's refused.  Review plan with of care with patient. Call light and personal belongings within reach. Hourly rounding in place. All needs met at this time.

## 2023-12-27 NOTE — PROGRESS NOTES
4 Eyes Skin Assessment Completed by SAMINA Muhammad and Tori RN.    Head WDL  Ears WDL  Nose WDL  Mouth WDL  Neck WDL  Breast/Chest WDL  Shoulder Blades WDL  Spine WDL  (R) Arm/Elbow/Hand WDL  (L) Arm/Elbow/Hand WDL  Abdomen x4 Lap Sites Closed w Dermabond, Oozing Noted to R Lap Sites, Dry Gauze and Transparent Film applied   Groin WDL  Scrotum/Coccyx/Buttocks WDL  (R) Leg WDL  (L) Leg WDL  (R) Heel/Foot/Toe WDL  (L) Heel/Foot/Toe WDL          Devices In Places Blood Pressure Cuff, Pulse Ox, SCD's, and Nasal Cannula, Purewick       Interventions In Place Gray Ear Foams, Pillows, and Low Air Loss Mattress    Possible Skin Injury No    Pictures Uploaded Into Epic N/A  Wound Consult Placed N/A  RN Wound Prevention Protocol Ordered No

## 2023-12-27 NOTE — PROGRESS NOTES
Bedside report received.  Assessment complete.  A&O x 4. Patient calls appropriately.  Patient ambulates with stand by assist. Bed alarm refused.   Patient has 10/10 pain. Pain managed with prescribed medications.  Patient experiencing some nausea, medicated per Mar.  X4 lap sites with dermabond. R lap site with moderate sanguineous drainage, dressing changed.  + void, - flatus, - BM.  Patient denies SOB and chest pain.  SCD's refused.  Review plan with of care with patient. Call light and personal belongings within reach. Hourly rounding in place. All needs met at this time.

## 2023-12-27 NOTE — CARE PLAN
The patient is Stable - Low risk of patient condition declining or worsening    Shift Goals  Clinical Goals: pain control, nausea control, tolerate diet, OOB activity  Patient Goals: pain/nausea control    Progress made toward(s) clinical / shift goals:      Patient's pain will be < 5 for duration of shift with use of PRN and scheduled medication in addition to frequent ambulation throughout the day. Patient will remain free from falls with frequent rounding and frequent activity provided throughout the day.    Problem: Pain - Standard  Goal: Alleviation of pain or a reduction in pain to the patient’s comfort goal  Outcome: Progressing     Problem: Fall Risk  Goal: Patient will remain free from falls  Outcome: Progressing

## 2023-12-27 NOTE — CARE PLAN
Problem: Pain - Standard  Goal: Alleviation of pain or a reduction in pain to the patient’s comfort goal  Outcome: Progressing     Problem: Fall Risk  Goal: Patient will remain free from falls  Outcome: Progressing     Problem: Knowledge Deficit - Standard  Goal: Patient and family/care givers will demonstrate understanding of plan of care, disease process/condition, diagnostic tests and medications  Outcome: Progressing   The patient is Stable - Low risk of patient condition declining or worsening    Shift Goals  Clinical Goals: pain and nausea management  Patient Goals: rest    Progress made toward(s) clinical / shift goals:  Administered pain and nausea medication prn per MD order.    Patient is not progressing towards the following goals:

## 2023-12-28 ENCOUNTER — PHARMACY VISIT (OUTPATIENT)
Dept: PHARMACY | Facility: MEDICAL CENTER | Age: 43
End: 2023-12-28
Payer: COMMERCIAL

## 2023-12-28 VITALS
OXYGEN SATURATION: 93 % | TEMPERATURE: 98.6 F | HEART RATE: 59 BPM | DIASTOLIC BLOOD PRESSURE: 62 MMHG | BODY MASS INDEX: 29.62 KG/M2 | WEIGHT: 173.5 LBS | SYSTOLIC BLOOD PRESSURE: 112 MMHG | RESPIRATION RATE: 17 BRPM | HEIGHT: 64 IN

## 2023-12-28 LAB
ERYTHROCYTE [DISTWIDTH] IN BLOOD BY AUTOMATED COUNT: 41.2 FL (ref 35.9–50)
HCT VFR BLD AUTO: 34.7 % (ref 37–47)
HGB BLD-MCNC: 11.8 G/DL (ref 12–16)
MCH RBC QN AUTO: 29.6 PG (ref 27–33)
MCHC RBC AUTO-ENTMCNC: 34 G/DL (ref 32.2–35.5)
MCV RBC AUTO: 87.2 FL (ref 81.4–97.8)
PLATELET # BLD AUTO: 172 K/UL (ref 164–446)
PMV BLD AUTO: 10 FL (ref 9–12.9)
RBC # BLD AUTO: 3.98 M/UL (ref 4.2–5.4)
WBC # BLD AUTO: 6 K/UL (ref 4.8–10.8)

## 2023-12-28 PROCEDURE — 700102 HCHG RX REV CODE 250 W/ 637 OVERRIDE(OP): Performed by: SURGERY

## 2023-12-28 PROCEDURE — C9113 INJ PANTOPRAZOLE SODIUM, VIA: HCPCS | Performed by: SURGERY

## 2023-12-28 PROCEDURE — A9270 NON-COVERED ITEM OR SERVICE: HCPCS | Performed by: SURGERY

## 2023-12-28 PROCEDURE — 85027 COMPLETE CBC AUTOMATED: CPT

## 2023-12-28 PROCEDURE — RXMED WILLOW AMBULATORY MEDICATION CHARGE

## 2023-12-28 PROCEDURE — 700111 HCHG RX REV CODE 636 W/ 250 OVERRIDE (IP): Performed by: SURGERY

## 2023-12-28 RX ORDER — ENOXAPARIN SODIUM 100 MG/ML
40 INJECTION SUBCUTANEOUS DAILY
Qty: 10 EACH | Refills: 0 | Status: ACTIVE | OUTPATIENT
Start: 2023-12-28 | End: 2024-01-07

## 2023-12-28 RX ORDER — ONDANSETRON 4 MG/1
4 TABLET, ORALLY DISINTEGRATING ORAL EVERY 6 HOURS PRN
Qty: 10 TABLET | Refills: 0 | Status: SHIPPED | OUTPATIENT
Start: 2023-12-28

## 2023-12-28 RX ORDER — ONDANSETRON 4 MG/1
TABLET, FILM COATED ORAL
Qty: 20 TABLET | Refills: 0 | OUTPATIENT
Start: 2023-12-28

## 2023-12-28 RX ORDER — ENOXAPARIN SODIUM 100 MG/ML
INJECTION SUBCUTANEOUS
Qty: 10 EACH | Refills: 0 | OUTPATIENT
Start: 2023-12-28

## 2023-12-28 RX ADMIN — SIMETHICONE 125 MG: 125 TABLET, CHEWABLE ORAL at 04:47

## 2023-12-28 RX ADMIN — ACETAMINOPHEN 1000 MG: 500 TABLET ORAL at 04:48

## 2023-12-28 RX ADMIN — AMLODIPINE BESYLATE 10 MG: 10 TABLET ORAL at 04:48

## 2023-12-28 RX ADMIN — METHOCARBAMOL 1000 MG: 500 TABLET ORAL at 09:24

## 2023-12-28 RX ADMIN — PANTOPRAZOLE SODIUM 40 MG: 40 INJECTION, POWDER, FOR SOLUTION INTRAVENOUS at 04:49

## 2023-12-28 RX ADMIN — ONDANSETRON 4 MG: 2 INJECTION INTRAMUSCULAR; INTRAVENOUS at 04:48

## 2023-12-28 RX ADMIN — HEPARIN SODIUM 5000 UNITS: 5000 INJECTION, SOLUTION INTRAVENOUS; SUBCUTANEOUS at 04:48

## 2023-12-28 RX ADMIN — LOSARTAN POTASSIUM 25 MG: 25 TABLET, FILM COATED ORAL at 04:48

## 2023-12-28 RX ADMIN — KETOROLAC TROMETHAMINE 15 MG: 30 INJECTION, SOLUTION INTRAMUSCULAR; INTRAVENOUS at 04:49

## 2023-12-28 RX ADMIN — GABAPENTIN 300 MG: 250 SOLUTION ORAL at 04:49

## 2023-12-28 RX ADMIN — SUCRALFATE ORAL 1 G: 1 SUSPENSION ORAL at 04:48

## 2023-12-28 ASSESSMENT — PAIN DESCRIPTION - PAIN TYPE
TYPE: ACUTE PAIN
TYPE: ACUTE PAIN

## 2023-12-28 NOTE — CARE PLAN
Problem: Pain - Standard  Goal: Alleviation of pain or a reduction in pain to the patient’s comfort goal  Outcome: Progressing     Problem: Fall Risk  Goal: Patient will remain free from falls  Outcome: Progressing     Problem: Knowledge Deficit - Standard  Goal: Patient and family/care givers will demonstrate understanding of plan of care, disease process/condition, diagnostic tests and medications  Outcome: Progressing   The patient is Stable - Low risk of patient condition declining or worsening    Shift Goals  Clinical Goals: Pain and nausea control, tolerate diet  Patient Goals: rest    Progress made toward(s) clinical / shift goals:  Patients' pain and nausea improved from last night. R lap site with significantly less drainage. Patient resting in bed without complaints at this time.    Patient is not progressing towards the following goals:

## 2023-12-28 NOTE — PROGRESS NOTES
Patient discharged to [facility] via wheelchair via transport. Patient has all belongings and verified correct pharmacy for home meds. All needs met at this time.

## 2023-12-28 NOTE — PROGRESS NOTES
Patient is being discharged home from the discharge lounge. Patient is A&Ox4. IV removed. Discharge instructions provided to patient and reviewed. Patient verbalized understanding and all questions and concerns were addressed. Waiting for meds to beds in [facility].     Vickie Aguilera  (RN)  2020 09:11:22

## 2023-12-28 NOTE — DISCHARGE INSTRUCTIONS
Bariatric Discharge instructions:    1. DIET: Follow the diet progression detailed in your post-op booklet.  Progressing as instructed will make for a smooth transition after surgery and prevent any pain associated with eating foods before your stomach is ready or eating too much.  Water and hydration is much more important than food intake the first week or two after surgery.  Drink enough water to keep your urine pale yellow.  Increase water intake if your urine is a darker yellow or if have burning with urination.  Nausea and vomiting once or twice after you leave the hospital is normal.  Sip fluids continually and stay hydrated.    2.  SUPPLEMENTS: Start your supplements 1 week after surgery.  You may need to cut some supplements in half initially.  Follow the guidelines from your supplement handout from your pre-op class.     3. ACTIVITIES: After discharge from the hospital, you may resume full routine activities. However, there should be no heavy lifting (greater than 15 pounds) and no strenuous activities for the next 6 weeks. Otherwise, routine activities of daily living are acceptable.  More movement and walking after surgery the better.    4. DRIVING: You may drive whenever you are off pain medications and are able to perform the activities needed to drive, i.e. turning, bending, twisting, etc.    5. BATHING AND WOUND CARE: You may get the wound wet 2 days after surgery. You may shower, but do not submerge in a bath for at least a week. Dressings may come off after 48 hours. Please leave the steri-strips in place, they will fall off over 5-7 days. You may notice some clear or slightly bloody drainage from your wounds and there may be some mild redness or bruising around your incision sites.  This is normal.    6. BOWEL FUNCTION: Constipation is common after an operation, especially with pain medications. The combination of pain medication and decreased activity level can cause constipation in otherwise  normal patients. If you feel this is occurring, take a laxative (Milk of Magnesia, Miralax, stool softener, etc.) until the problem has resolved.  Diarrhea the first few days after surgery can also be normal.  You may notice that it is dark in color or see blood, which is also normal and should subside in the first week post-op.    7. HOME MEDICATIONS/PAIN MEDICATION: You may resume home medications as normal. Preoperatively, you have been given prescriptions for pain medication, antinausea medication, Proton pump inhibitor antacid medication, and home Lovenox/Enoxaparin injections. Please take these as directed. It is important to remember not to take medications on an empty stomach as this may cause nausea.  For minimal discomfort you may use liquid Tylenol 650 mg every 4 hours.  DO NOT exceed 4,000 mg of Tylenol in 24 hours.  DO NOT use non-steroidal anti-inflammatory medication such as: Aspirin, Ibuprofen, Advil, Motrin, Aleve, or steroids.  These medication can cause ulcers after weight loss surgery. If you experience itching or rash, you may take Benadryl or other antihistamines (Claritin, Zyrtec, Allegra, etc.). These are all safe.    8.CALL IF YOU HAVE: (1) Fevers to more than 101.5 F, (2) leg pain or swelling (3) Drainage or fluid from incision that may be foul smelling, increased tenderness or soreness at the wound or the wound edges are no longer together, redness or swelling at the incision site. (4) Night Sweats (5) Shaking, Chills (6) Persistent Nausea or Vomiting for over 24 hours.  Use your anti nausea medication as prescribed. (7) Call 911 if sudden onset of chest pain or shortness of breath that does not improve with 5-10 min of rest.    9. APPOINTMENT: You should have a prescheduled follow up appointment in our office in 1-2 weeks. If not, please contact our office at [phone] to schedule a follow-up appointment in 1-2 weeks following your procedure.  Our office hours are Monday-Friday,  8am-4:30pm.  Please try to call during these hours when we are better able to assist you.    If you have any additional questions, please do not hesitate to call the office and speak to either myself or the physician on call.    Office address:  [facility] [address]   [address]  [place] [address]

## 2023-12-28 NOTE — PROGRESS NOTES
Bedside report received.  Assessment complete.  A&O x 4. Patient calls appropriately.  Patient ambulates with standby with FWW assist. Bed alarm off.   Patient has 6/10 pain. Pain managed with prescribed medications.  Denies N&V. Tolerating bariatric clears diet.  Surgical x4 lap sites with dermabond; left incision oozing requiring frequent dressing changes.  + void, + flatus, - BM.  Patient denies SOB.  SCD's refused.  Review plan with of care with patient. Call light and personal belongings within reach. Hourly rounding in place. All needs met at this time.

## 2023-12-28 NOTE — DISCHARGE SUMMARY
Discharge Summary    CHIEF COMPLAINT ON ADMISSION  No chief complaint on file.      Reason for Admission  Obesity, unspecified     Admission Date  12/26/2023    CODE STATUS  Full Code    HPI & HOSPITAL COURSE  This is a 43 y.o. female here  s/p robotic repair of recurrent hiatal hernia and Jayna-en-Y gastrojejunostomy.   No notes on file    Therefore, she is discharged in good and stable condition to home with close outpatient follow-up.    The patient met 2-midnight criteria for an inpatient stay at the time of discharge.    Discharge Date  12/28/2023    FOLLOW UP ITEMS POST DISCHARGE      DISCHARGE DIAGNOSES  Active Problems:    * No active hospital problems. *  Resolved Problems:    * No resolved hospital problems. *      FOLLOW UP  Future Appointments   Date Time Provider Department Center   2/12/2024  3:30 PM JONATHON AdlerRIPROSPER [facility]   5/13/2024  1:20 PM Scott Acuña M.D. [facility] None     Rober Cruz P.A.-C.  [address] [facility] [address]  [facility] [address]  [phone]    Follow up in 1 week(s)        MEDICATIONS ON DISCHARGE     Medication List        START taking these medications        Instructions   enoxaparin 40 MG/0.4ML Sosy inj  Commonly known as: Lovenox   Inject 40 mg under the skin every day for 10 days.  Dose: 40 mg     HYDROcodone-acetaminophen 2.5-108 mg/5mL 7.5-325 MG/15ML solution  Commonly known as: Hycet   Doctor's comments: Post bariatric surgery  Take 15 mL by mouth 3 times a day as needed for Severe Pain for up to 5 days.  Dose: 15 mL     ondansetron 4 MG Tbdp  Commonly known as: Zofran ODT   Take 1 Tablet by mouth every 6 hours as needed for Nausea/Vomiting.  Dose: 4 mg            CONTINUE taking these medications        Instructions   albuterol 108 (90 Base) MCG/ACT Aers inhalation aerosol   Doctor's comments: Give albuterol that is patient or insurance preference please  Inhale 2 Puffs every four hours as needed for Shortness of Breath.  Dose: 2 Puff     amLODIPine 10 MG  Tabs  Commonly known as: Norvasc   TAKE 1 TABLET BY MOUTH EVERY DAY AT BEDTIME  DAYS     atorvastatin 40 MG Tabs  Commonly known as: Lipitor   TAKE 1 TABLET BY MOUTH ONCE DAILY IN THE EVENING  Dose: 40 mg     baclofen 10 MG Tabs  Commonly known as: Lioresal   Take 10 mg by mouth 3 times a day.  Dose: 10 mg     famotidine 40 MG Tabs  Commonly known as: Pepcid   Take 1 Tablet by mouth every day.  Dose: 40 mg     hydroxychloroquine 200 MG Tabs  Commonly known as: Plaquenil   Take 200 mg by mouth every day.  Dose: 200 mg     losartan 25 MG Tabs  Commonly known as: Cozaar   Take 25 mg by mouth every day.  Dose: 25 mg     metoclopramide 10 MG Tabs  Commonly known as: Reglan   Take 10 mg by mouth 3 times a day.  Dose: 10 mg     omeprazole 40 MG delayed-release capsule  Commonly known as: PriLOSEC   Take 1 Capsule by mouth 2 times a day.  Dose: 40 mg     sucralfate 1 GM Tabs  Commonly known as: Carafate   CRUSH AND MIX 1 TABLET WITH 30 ML OF WATER AND TAKE BY MOUTH 4 TIMES DAILY AS NEEDED              Allergies  Allergies   Allergen Reactions    Latex Rash and Itching    Duloxetine      HA, dizz     Nifedipine Vomiting     Headache, vomiting    Sildenafil      HA, nausea       DIET  Orders Placed This Encounter   Procedures    Diet Order Diet: Clear Liquid; Miscellaneous modifications: (optional): Bariatric; Tray Modifications (optional): No Straws     Standing Status:   Standing     Number of Occurrences:   1     Order Specific Question:   Diet:     Answer:   Clear Liquid [10]     Order Specific Question:   Miscellaneous modifications: (optional)     Answer:   Bariatric [3]     Order Specific Question:   Tray Modifications (optional)     Answer:   No Straws       ACTIVITY  As tolerated.  20-lb lifting restriction    CONSULTATIONS      PROCEDURES      LABORATORY  Lab Results   Component Value Date    SODIUM 136 12/27/2023    POTASSIUM 4.0 12/27/2023    CHLORIDE 101 12/27/2023    CO2 24 12/27/2023    GLUCOSE 126 (H)  12/27/2023    BUN 7 (L) 12/27/2023    CREATININE 0.49 (L) 12/27/2023    CREATININE 0.9 01/04/2008        Lab Results   Component Value Date    WBC 6.0 12/28/2023    HEMOGLOBIN 11.8 (L) 12/28/2023    HEMATOCRIT 34.7 (L) 12/28/2023    PLATELETCT 172 12/28/2023        Total time of the discharge process exceeds 30 minutes.

## 2023-12-28 NOTE — CARE PLAN
The patient is Stable - Low risk of patient condition declining or worsening    Shift Goals  Clinical Goals: pain control, tolerate diet, OOB activity  Patient Goals: discharge    Progress made toward(s) clinical / shift goals:      Patient's pain will be < 5 for duration of shift with use of scheduled pain medications and heat packs, PRNs available.    Problem: Pain - Standard  Goal: Alleviation of pain or a reduction in pain to the patient’s comfort goal  Outcome: Progressing

## 2023-12-28 NOTE — PROGRESS NOTES
Bedside report received.  Assessment complete.  A&O x 4. Patient calls appropriately.  Patient ambulates with no assist. Bed alarm off.   Patient has 3/10 pain. Pain managed with prescribed medications.  Denies N&V at this time. Tolerating bariatric clear liquid diet  X4 lap sites, R lap site with gauze and teg C/D/I. X3 RADHA with dermabond C/D/I  + void, + flatus, - BM.  Patient denies SOB and chest pain.  SCD's refused.  Review plan with of care with patient. Call light and personal belongings within reach. Hourly rounding in place. All needs met at this time.

## 2024-02-11 DIAGNOSIS — I10 PRIMARY HYPERTENSION: ICD-10-CM

## 2024-02-12 ENCOUNTER — HOSPITAL ENCOUNTER (OUTPATIENT)
Dept: RADIOLOGY | Facility: MEDICAL CENTER | Age: 44
End: 2024-02-12
Attending: INTERNAL MEDICINE
Payer: MEDICAID

## 2024-02-12 ENCOUNTER — OFFICE VISIT (OUTPATIENT)
Dept: INTERNAL MEDICINE | Facility: OTHER | Age: 44
End: 2024-02-12
Payer: MEDICAID

## 2024-02-12 VITALS
DIASTOLIC BLOOD PRESSURE: 61 MMHG | HEART RATE: 78 BPM | TEMPERATURE: 98.1 F | HEIGHT: 64 IN | SYSTOLIC BLOOD PRESSURE: 102 MMHG | OXYGEN SATURATION: 99 % | BODY MASS INDEX: 27.35 KG/M2 | WEIGHT: 160.2 LBS

## 2024-02-12 DIAGNOSIS — M54.50 BACK PAIN AT L4-L5 LEVEL: ICD-10-CM

## 2024-02-12 PROCEDURE — 99213 OFFICE O/P EST LOW 20 MIN: CPT | Mod: GE | Performed by: INTERNAL MEDICINE

## 2024-02-12 PROCEDURE — 3074F SYST BP LT 130 MM HG: CPT | Performed by: INTERNAL MEDICINE

## 2024-02-12 PROCEDURE — 72100 X-RAY EXAM L-S SPINE 2/3 VWS: CPT

## 2024-02-12 PROCEDURE — 3078F DIAST BP <80 MM HG: CPT | Performed by: INTERNAL MEDICINE

## 2024-02-12 PROCEDURE — 72070 X-RAY EXAM THORAC SPINE 2VWS: CPT

## 2024-02-12 ASSESSMENT — ENCOUNTER SYMPTOMS
PALPITATIONS: 0
DIZZINESS: 0
NERVOUS/ANXIOUS: 0
MYALGIAS: 0
DOUBLE VISION: 0
FEVER: 0
NECK PAIN: 0
BLURRED VISION: 0
CHILLS: 0
HEARTBURN: 0
HEADACHES: 0
BACK PAIN: 1
NAUSEA: 0
COUGH: 0
VOMITING: 0
FALLS: 0
HEMOPTYSIS: 0

## 2024-02-12 ASSESSMENT — PATIENT HEALTH QUESTIONNAIRE - PHQ9: CLINICAL INTERPRETATION OF PHQ2 SCORE: 0

## 2024-02-12 ASSESSMENT — FIBROSIS 4 INDEX: FIB4 SCORE: .6933752452815364025

## 2024-02-12 NOTE — PATIENT INSTRUCTIONS
Thank you for visiting today!    Please follow-up in 5 weeks     Please try diclofenac gel (voltaren gel) at the back for pain     Please get xrays of the back done     If you have any questions or concerns please feel free to contact us at [phone].    If you feel like you are experiencing a medical emergency please seek immediate medical attention at an urgent care or in the emergency department.

## 2024-02-12 NOTE — PROGRESS NOTES
"CC:   Chief Complaint   Patient presents with    Back Pain     Lower back pain is the worst; onset; 1 month- pt can't straighten out, no trauma to the area         HPI:   Christine presents today with chief complains of       # Lower back pain   Ongoing for the last 1 and half months.  Endorses pain at her middle back in the lower back that encircles her back in a bandlike distribution.  She denies hurting her back, denies any falls.  She does have a history of back surgery in 2014:  1.  Status post L5-S1 microdiskectomy 8/28/2014  2.  Cerebrospinal fluid leak.  3.  Redo exploration of lumbar wound with cerebrospinal fluid leak repair 09/05/2014  4.  Lumbar drain insertion and removal.  5.  Postoperative wound infection.  6.  Lumbar spinal stenosis.  She currently denies any anesthesia, numbness, tingling, involuntary loss of bladder or bowels.        No problems updated.    Health Maintenance: Completed    ROS:  Review of Systems   Constitutional:  Negative for chills, fever and malaise/fatigue.   HENT:  Negative for hearing loss and tinnitus.    Eyes:  Negative for blurred vision and double vision.   Respiratory:  Negative for cough and hemoptysis.    Cardiovascular:  Negative for chest pain and palpitations.   Gastrointestinal:  Negative for heartburn, nausea and vomiting.   Genitourinary:  Negative for dysuria, frequency and urgency.   Musculoskeletal:  Positive for back pain. Negative for falls, joint pain, myalgias and neck pain.   Skin:  Negative for itching and rash.   Neurological:  Negative for dizziness and headaches.   Psychiatric/Behavioral:  The patient is not nervous/anxious.        Objective:     Exam:  /61 (BP Location: Left arm, Patient Position: Sitting, BP Cuff Size: Adult)   Pulse 78   Temp 36.7 °C (98.1 °F) (Temporal)   Ht 1.613 m (5' 3.5\")   Wt 72.7 kg (160 lb 3.2 oz)   SpO2 99%   BMI 27.93 kg/m²  Body mass index is 27.93 kg/m².    Physical Exam  Constitutional:       General: She " is not in acute distress.     Appearance: Normal appearance. She is not ill-appearing.   HENT:      Head: Normocephalic and atraumatic.      Right Ear: External ear normal.      Left Ear: External ear normal.      Nose: Nose normal.      Mouth/Throat:      Mouth: Mucous membranes are moist.   Cardiovascular:      Rate and Rhythm: Normal rate.      Pulses: Normal pulses.   Musculoskeletal:         General: No tenderness or signs of injury. Normal range of motion.      Right lower leg: No edema.      Left lower leg: No edema.   Skin:     General: Skin is warm.      Capillary Refill: Capillary refill takes less than 2 seconds.   Neurological:      Mental Status: She is alert and oriented to person, place, and time.   Psychiatric:         Mood and Affect: Mood normal.           Assessment & Plan:     43 y.o. female with the following -           # Lower back pain   Ongoing for the last 1 and half months.  Endorses pain at her middle back in the lower back that encircles her back in a bandlike distribution.  She denies hurting her back, denies any falls.  She does have a history of back surgery in 2014:  1.  Status post L5-S1 microdiskectomy 8/28/2014  2.  Cerebrospinal fluid leak.  3.  Redo exploration of lumbar wound with cerebrospinal fluid leak repair 09/05/2014  4.  Lumbar drain insertion and removal.  5.  Postoperative wound infection.  6.  Lumbar spinal stenosis.  She currently denies any anesthesia, numbness, tingling, involuntary loss of bladder or bowels.    Plan:  - Considering prior history of back surgeries, we will get x-ray lumbar and thoracic spine to rule out acute fractures/dislocations  - Trial of diclofenac gel at the lower back, can apply up to 4 times daily.  Advised to get it over-the-counter  - If x-rays are negative, we discussed trial of physical therapy for 6 weeks to assess improvement in pain.  If x-rays are concerning we will consider referral to specialist  - RTC 5 weeks   - Alarm signs and  symptoms prompting call 911 and/or go to the nearest emergency department addressed in detail today including but not limited to persistent back pain, numbness/tingling, loss of bladder/bowels etc.      # Obstructive sleep apnea  - She has a diagnosis of a positive sleep apnea, however has lost a lot of weight since her bariatric surgery.  We referred her for repeat testing after weight loss.  - Got retested/had sleep study done this weekend, results are pending.      # H/o morbid obesity   S/p bariatric surgery.  Established with Dr. Evans, appreciate support    I spent a total of 30 minutes with record review, exam, communication with the patient, communication with other providers, and documentation of this encounter.      No follow-ups on file.    Please note that this dictation was created using voice recognition software. I have made every reasonable attempt to correct obvious errors, but I expect that there are errors of grammar and possibly content that I did not discover before finalizing the note.

## 2024-02-13 DIAGNOSIS — M54.50 BACK PAIN AT L4-L5 LEVEL: ICD-10-CM

## 2024-02-13 DIAGNOSIS — I10 PRIMARY HYPERTENSION: ICD-10-CM

## 2024-02-13 DIAGNOSIS — M51.26 DISPLACEMENT OF LUMBAR INTERVERTEBRAL DISC WITHOUT MYELOPATHY: ICD-10-CM

## 2024-02-13 RX ORDER — LOSARTAN POTASSIUM 25 MG/1
25 TABLET ORAL DAILY
Qty: 90 TABLET | Refills: 3 | Status: SHIPPED | OUTPATIENT
Start: 2024-02-13 | End: 2024-02-13 | Stop reason: SDUPTHER

## 2024-02-14 RX ORDER — ATORVASTATIN CALCIUM 40 MG/1
40 TABLET, FILM COATED ORAL EVERY EVENING
Qty: 90 TABLET | Refills: 1 | Status: SHIPPED | OUTPATIENT
Start: 2024-02-14

## 2024-02-14 RX ORDER — LOSARTAN POTASSIUM 25 MG/1
25 TABLET ORAL DAILY
Qty: 90 TABLET | Refills: 3 | Status: SHIPPED | OUTPATIENT
Start: 2024-02-14

## 2024-02-14 NOTE — TELEPHONE ENCOUNTER
Received request via: Pharmacy    Was the patient seen in the last year in this department? Yes    Does the patient have an active prescription (recently filled or refills available) for medication(s) requested? No    Pharmacy Name: [id number] - [place], [place] - [address]     Does the patient have alf Plus and need 100 day supply (blood pressure, diabetes and cholesterol meds only)? Patient does not have SCP

## 2024-03-13 ENCOUNTER — TELEPHONE (OUTPATIENT)
Dept: VASCULAR LAB | Facility: MEDICAL CENTER | Age: 44
End: 2024-03-13
Payer: MEDICAID

## 2024-03-13 NOTE — TELEPHONE ENCOUNTER
Caller: Christine Miguel    Topic/issue: Patient called to see if she needs labwork done and if so if [facility] or she can pick them up.      Please advise.     Callback Number: [phone]    Thank you,     Brooke CASIANO

## 2024-03-14 NOTE — TELEPHONE ENCOUNTER
Called and left message.  There are no new orders in chart for labs, only ones from May 2023.  Which was prior to most recent f/u in Nov 2023.  Please disregard message and we will see her at her next f/u.    Catrachita Martinez, Med Ass't  [facility]  Ph. [phone]  Fx. [phone]

## 2024-03-27 ENCOUNTER — OFFICE VISIT (OUTPATIENT)
Dept: INTERNAL MEDICINE | Facility: OTHER | Age: 44
End: 2024-03-27
Payer: MEDICAID

## 2024-03-27 VITALS
SYSTOLIC BLOOD PRESSURE: 92 MMHG | HEART RATE: 74 BPM | WEIGHT: 161 LBS | OXYGEN SATURATION: 98 % | BODY MASS INDEX: 27.49 KG/M2 | TEMPERATURE: 98.2 F | DIASTOLIC BLOOD PRESSURE: 60 MMHG | HEIGHT: 64 IN

## 2024-03-27 DIAGNOSIS — E78.5 DYSLIPIDEMIA: ICD-10-CM

## 2024-03-27 DIAGNOSIS — F51.01 PRIMARY INSOMNIA: ICD-10-CM

## 2024-03-27 RX ORDER — ZOLPIDEM TARTRATE 5 MG/1
5 TABLET ORAL NIGHTLY PRN
Qty: 14 TABLET | Refills: 0 | Status: SHIPPED | OUTPATIENT
Start: 2024-03-27 | End: 2024-04-26

## 2024-03-27 RX ORDER — TRAZODONE HYDROCHLORIDE 50 MG/1
50 TABLET ORAL NIGHTLY
Qty: 30 TABLET | Refills: 5 | Status: CANCELLED | OUTPATIENT
Start: 2024-03-27

## 2024-03-27 RX ORDER — OXYCODONE HYDROCHLORIDE AND ACETAMINOPHEN 5; 325 MG/1; MG/1
TABLET ORAL
COMMUNITY

## 2024-03-27 ASSESSMENT — PAIN SCALES - GENERAL: PAINLEVEL: 4=SLIGHT-MODERATE PAIN

## 2024-03-27 ASSESSMENT — FIBROSIS 4 INDEX: FIB4 SCORE: .6933752452815364025

## 2024-03-27 NOTE — PROGRESS NOTES
"CC:   Chief Complaint   Patient presents with    Back Pain    Follow-Up    Overdue Lab And Imaging Result Follow-up     Patient here for back pain and sleep study         HPI:   Christine presents today to discuss her:      # Gil   Describes she had this issue about 10 years ago when she was used to work nights.  She used Ambien 5 mg for about 2-3 years and then stopped using it and since then she has been able to have a good sleep cycle.  She furthermore describes that over the last 1 month she has had decreased amount of sleep.  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  She usually sleeps around 8-9 PM, however over the last 1 month she is barely had any sleep.  Describes for the last 1 week she is not able to sleep until 6 AM and when she does, she has to wake up at 8 AM to take care of her kids.  She is very concerned as staying awake all the night is causing her to binge eat.  She is lost a lot of weight recently and does not want to get it back.  Describes her sleep hygiene is good, her 7-year-old sleeps in the same room as her so she keeps a small light on, however the room was dark otherwise, there is no TV, she does not use her phone.      # Dyslipidemia   Requesting repeat blood work      # Back pain   - Xrays negative  - PT referral in place, pending appt       # Sleep Apnea   Following up with [facility].  Diagnosed with moderate sleep apnea on recent sleep study along with hypoxemia related to the respiratory events.  Per documentation from sleep specialist, \" due to severity, optimal treatment for this condition would be nasal CPAP which will require an auto CPAP to set to a range of 5-15 cm H2O or in lab titration study to determine the pressure necessary for overcoming upper airway resistance.\"  The furthermore recommended \" an ENT evaluation and surgery may be beneficial in facilitating correlating the need for CPAP in the future\"         No problems " "updated.    Health Maintenance: Completed    ROS:  Review of Systems   Constitutional:  Negative for chills, fever and malaise/fatigue.   HENT:  Negative for hearing loss and tinnitus.    Eyes:  Negative for blurred vision and double vision.   Respiratory:  Negative for cough and hemoptysis.    Cardiovascular:  Negative for chest pain and palpitations.   Gastrointestinal:  Negative for heartburn, nausea and vomiting.   Genitourinary:  Negative for dysuria, frequency and urgency.   Musculoskeletal:  Negative for myalgias and neck pain.   Skin:  Negative for itching and rash.   Neurological:  Negative for dizziness and headaches.   Psychiatric/Behavioral:  The patient is not nervous/anxious.        Objective:     Exam:  BP 92/60 (BP Location: Left arm, Patient Position: Sitting, BP Cuff Size: Adult long)   Pulse 74   Temp 36.8 °C (98.2 °F) (Temporal)   Ht 1.626 m (5' 4\")   Wt 73 kg (161 lb)   LMP 03/13/2024   SpO2 98%   Breastfeeding No   BMI 27.64 kg/m²  Body mass index is 27.64 kg/m².    Physical Exam  Constitutional:       General: She is not in acute distress.     Appearance: Normal appearance. She is not ill-appearing.   HENT:      Head: Normocephalic and atraumatic.      Right Ear: External ear normal.      Left Ear: External ear normal.      Nose: Nose normal.      Mouth/Throat:      Mouth: Mucous membranes are moist.   Eyes:      Extraocular Movements: Extraocular movements intact.      Pupils: Pupils are equal, round, and reactive to light.   Cardiovascular:      Rate and Rhythm: Normal rate.      Pulses: Normal pulses.   Pulmonary:      Effort: Pulmonary effort is normal.   Musculoskeletal:         General: Normal range of motion.   Skin:     General: Skin is warm.      Capillary Refill: Capillary refill takes less than 2 seconds.   Neurological:      Mental Status: She is alert and oriented to person, place, and time.   Psychiatric:         Mood and Affect: Mood normal.                 Assessment & " Plan:     43 y.o. female with the following -       # Insomia   Describes she had this issue about 10 years ago when she was used to work nights.  She used Ambien 5 mg for about 2-3 years and then stopped using it and since then she has been able to have a good sleep cycle.  She furthermore describes that over the last 1 month she has had decreased amount of sleep.  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  She usually sleeps around 8-9 PM, however over the last 1 month she is barely had any sleep.  Describes for the last 1 week she is not able to sleep until 6 AM and when she does, she has to wake up at 8 AM to take care of her kids.  She is very concerned as staying awake all the night is causing her to binge eat.  She is lost a lot of weight recently and does not want to get it back.  Describes her sleep hygiene is good, her 7-year-old sleeps in the same room as her so she keeps a small light on, however the room was dark otherwise, there is no TV, she does not use her phone.     Denies any recent stress.  Endorses a good relationship with her fiancé.  She is currently not working and takes care of her kids.  Good social support.  Denies any recent stress, no anxiety/depression symptoms reported.    Plan:  -Discussed sleep hygiene in detail today, handouts provided  We discussed trial of trazodone versus Ambien.  We addressed how long-term use of Ambien would not be a good choice.  She did not have any side effects with the prior use of Ambien, no sleep walking or weight dreams.  Recheck decision making we decided to try Ambien for 2 weeks to reset her sleep cycle.  Advised her to update me after 2 weeks as to how she is doing and he could initiate trazodone versus continue Ambien thereafter.       # Dyslipidemia   Requesting repeat blood work  - I advise reducing sugars/carbohydrates/saturated fats/alcohol, and eating more vegetables and lean meats such as fish/chicken/turkey. I also  "recommend 30 minutes of cardiovascular exercise most days of the week.  -Cont statin   - Repeat lipid panel prior to next visit       # Musculoskeletal back pain   Xrays negative  PT referral in place, pending appt   - Advised to set up an appt with PT   - Trial of diclofenac gel       # Sleep Apnea   Following up with [facility].  Diagnosed with moderate sleep apnea on recent sleep study along with hypoxemia related to the respiratory events.  Per documentation from sleep specialist, \" due to severity, optimal treatment for this condition would be nasal CPAP which will require an auto CPAP to set to a range of 5-15 cm H2O or in lab titration study to determine the pressure necessary for overcoming upper airway resistance.\"  The furthermore recommended \" an ENT evaluation and surgery may be beneficial in facilitating correlating the need for CPAP in the future\"  - Pending CPAP from sleep clinic, this has been ordered and is pending delivery, appreciate specialist support           I spent a total of 30 minutes with record review, exam, communication with the patient, communication with other providers, and documentation of this encounter.      No follow-ups on file.    Please note that this dictation was created using voice recognition software. I have made every reasonable attempt to correct obvious errors, but I expect that there are errors of grammar and possibly content that I did not discover before finalizing the note.        "

## 2024-03-27 NOTE — PATIENT INSTRUCTIONS
"Thank you for visiting today!  Please follow-up in 5 weeks     Please get lab work done at least 5 days before next visit.     Please try and eat healthy, get at least 30 minutes of cardiovascular exercise a day to help keep your health as best as it can be.     If you have any questions or concerns please feel free to contact us at [phone].     If you feel like you are experiencing a medical emergency please seek immediate medical attention at an urgent care or in the emergency department.    - TRY MELATONIN 3 mg at night     SLEEP HYGIENE   Sleep only long enough to feel rested and then get out of bed  Go to bed and get up at the same time every day  Do not try to force yourself to sleep. If you can't sleep, get out of bed and try again later.  Have coffee, tea, energy drinks, soft drinks and other foods that have caffeine only in the morning  Keep your bedroom dark, cool, quiet, and free of reminders of work or other things that cause you stress  Solve problems you have before you go to bed  Get plenty of physical activity, but avoid heavy exercise right before bed  Avoid looking at phones, computer screens, or reading devices (\"e-books\") that give off light before bed. This can make it harder to fall asleep.     Relaxation exercises -- Techniques to help with relaxation are often part of a CBT-I program. Common approaches include:     ?Progressive muscle relaxation - This involves progressively relaxing your muscles from your head down to your feet. Here is a sample of a relaxation program: Beginning with the muscles in your face, squeeze (contract) your muscles gently for one to two seconds and then relax. Repeat several times. Use the same technique for other muscle groups, usually in the following sequence (working your way down the body): jaw and neck, shoulders, upper arms, lower arms, fingers, chest, abdomen, buttocks, thighs, calves, and feet.     ?Mindfulness - This involves trying to be present in " "the moment and aware of your physical sensations, thoughts, and emotions.     ?Diaphragmatic breathing - This is a deep breathing technique that can help you relax.       Breathing with your diaphragm  NOTE: Before using this type of breathing to help with sleep, practice it daily until it becomes easy to get into a relaxed state. It might help to write down each time you practice, so you can keep track.  Wear comfortable clothing.  Sit in a comfortable chair with both feet on the floor.  Place one hand at the top of your chest and the other on your belly with your little finger about 1 inch above your belly button.  Breathe slowly through your nose using only your diaphragm. Try not to use your chest muscles at all. When you are doing this correctly:  Your belly should swell out as you breathe in, and fall back in as you breathe out.  Only your lower hand (on your belly) should move.  Once you are breathing only with your diaphragm, start counting your breaths:  Count each time you breathe in, then think the word \"out\" as you breathe out (think \"1, out,\" \"2, out,\" \"3, out...\").  Do this for a total of 10 breaths counting forward from 1 to 10. Then do another 10 breaths counting backward from 10 to 1.  Breathe at your own pace. Try not to breathe faster or slower than normal. Concentrate on using only your diaphragm.  When you are done counting breaths, let your hands rest in your lap or at your side for a minute while you breathe normally. Then get up slowly.      "

## 2024-03-28 ASSESSMENT — ENCOUNTER SYMPTOMS
VOMITING: 0
CHILLS: 0
DIZZINESS: 0
HEADACHES: 0
HEARTBURN: 0
COUGH: 0
DOUBLE VISION: 0
PALPITATIONS: 0
NAUSEA: 0
NECK PAIN: 0
HEMOPTYSIS: 0
FEVER: 0
NERVOUS/ANXIOUS: 0
MYALGIAS: 0
BLURRED VISION: 0

## 2024-04-16 DIAGNOSIS — F51.01 PRIMARY INSOMNIA: ICD-10-CM

## 2024-04-16 NOTE — TELEPHONE ENCOUNTER
Received request via: Patient    Was the patient seen in the last year in this department? Yes    Does the patient have an active prescription (recently filled or refills available) for medication(s) requested? No    Pharmacy Name: [id number] - [place], [place] - [address]     Does the patient have custodial Plus and need 100 day supply (blood pressure, diabetes and cholesterol meds only)? Patient does not have SCP

## 2024-04-18 RX ORDER — ZOLPIDEM TARTRATE 5 MG/1
5 TABLET ORAL NIGHTLY PRN
Qty: 30 TABLET | Refills: 0 | Status: SHIPPED | OUTPATIENT
Start: 2024-04-18 | End: 2024-05-18

## 2024-05-13 ENCOUNTER — OFFICE VISIT (OUTPATIENT)
Dept: VASCULAR LAB | Facility: MEDICAL CENTER | Age: 44
End: 2024-05-13
Attending: FAMILY MEDICINE
Payer: MEDICAID

## 2024-05-13 VITALS
WEIGHT: 155 LBS | BODY MASS INDEX: 25.83 KG/M2 | HEIGHT: 65 IN | DIASTOLIC BLOOD PRESSURE: 64 MMHG | SYSTOLIC BLOOD PRESSURE: 95 MMHG | HEART RATE: 74 BPM

## 2024-05-13 DIAGNOSIS — I27.20 PULMONARY HYPERTENSION (HCC): ICD-10-CM

## 2024-05-13 DIAGNOSIS — E78.5 DYSLIPIDEMIA: ICD-10-CM

## 2024-05-13 DIAGNOSIS — M34.1 CREST SYNDROME (HCC): ICD-10-CM

## 2024-05-13 DIAGNOSIS — R73.03 PREDIABETES: ICD-10-CM

## 2024-05-13 DIAGNOSIS — Z82.49 FAMILY HISTORY OF PREMATURE CORONARY HEART DISEASE: ICD-10-CM

## 2024-05-13 DIAGNOSIS — I10 PRIMARY HYPERTENSION: ICD-10-CM

## 2024-05-13 DIAGNOSIS — Z79.02 LONG TERM (CURRENT) USE OF ANTITHROMBOTICS/ANTIPLATELETS: ICD-10-CM

## 2024-05-13 DIAGNOSIS — E78.41 ELEVATED LIPOPROTEIN(A): ICD-10-CM

## 2024-05-13 DIAGNOSIS — I73.00 RAYNAUD'S PHENOMENON (SECONDARY): ICD-10-CM

## 2024-05-13 PROCEDURE — 3078F DIAST BP <80 MM HG: CPT | Performed by: FAMILY MEDICINE

## 2024-05-13 PROCEDURE — 99214 OFFICE O/P EST MOD 30 MIN: CPT | Performed by: FAMILY MEDICINE

## 2024-05-13 PROCEDURE — 3074F SYST BP LT 130 MM HG: CPT | Performed by: FAMILY MEDICINE

## 2024-05-13 RX ORDER — FLUOXETINE 20 MG/1
20 TABLET, FILM COATED ORAL DAILY
Qty: 100 TABLET | Refills: 3 | Status: SHIPPED | OUTPATIENT
Start: 2024-05-13 | End: 2024-05-16

## 2024-05-13 RX ORDER — ASPIRIN 81 MG/1
81 TABLET ORAL
COMMUNITY
Start: 2024-05-13

## 2024-05-13 ASSESSMENT — ENCOUNTER SYMPTOMS
COUGH: 0
PALPITATIONS: 0
CHILLS: 0
SPUTUM PRODUCTION: 0
HEMOPTYSIS: 0
ORTHOPNEA: 0
WHEEZING: 0
CLAUDICATION: 0
BRUISES/BLEEDS EASILY: 0
SHORTNESS OF BREATH: 0
PND: 0
FEVER: 0

## 2024-05-13 ASSESSMENT — FIBROSIS 4 INDEX: FIB4 SCORE: .6933752452815364025

## 2024-05-13 NOTE — PROGRESS NOTES
FOLLOW-UP VASCULAR MEDICINE VISIT  05/13/24      Christine Ita Miguel is a. female  who presents for  initially referred by Lilly Abreu A.P.RN for eval for vasc etiology of RUE non-healing wound, est 4/24/22      Subjective         Interval hx/concerns:  last seen 11/2023, reports no new ulcerations on fingers but still having pallor despite current CCB, losartan tx.  Failed topical NTG ointment.  Did not tolerate sildenafil in past.  No prior Ady to date.   Still seeing rheum in  for CREST syndrome therapy on hydroxychloroquine.     RUE non-healing wound:  Initial visit hx:  Started age 39 with episodes, can be fingers and toes and has been progressive with ulcerations forms.   Seen by UC 4/2022 for R index finger non-healing wound.  Had subung lac 3mo prior.  Reports prior episodes of non-healing finger wounds in the past.  Hx of recurrent cold/painful fingers with intermittent cyanosis.  Has WBI normal in past 2021 and has apparently been seen by vasc specialist w/o formal dx or tx in the past.   (No prior visits of [facility] vasc med or available  Has seen by Dr. Pierre in latter half of 2021 and informed no interventions available but started nifedipine, DAPT, atorva.  Currently on ASA only, stopped nifedipine due to HA     HTN: Stable , home BP 100s/60s most days.  Mild dizz with positional changes     HLD: Stable, on current treatment: lifestyle modifications and High intensity statin atorva   Baseline lipid    Latest Reference Range & Units 01/03/12 12:10   Cholesterol,Tot 100 - 199 mg/dL 218 (H)   Triglycerides 0 - 149 mg/dL 86   HDL >=40 mg/dL 37 !   LDL <100 mg/dL 164     Antiplatelet/anticoagulation: not on ASA citing hx of bariatric surg      Current Outpatient Medications on File Prior to Visit   Medication Sig Dispense Refill    zolpidem (AMBIEN) 5 MG Tab Take 1 Tablet by mouth at bedtime as needed for Sleep for up to 30 days. 30 Tablet 0    oxyCODONE-acetaminophen (PERCOCET) 5-325 MG Tab  TAKE 1 TABLET BY MOUTH EVERY 4 HOURS AS NEEDED FOR ABDOMINAL PAIN FOR 7 DAYS      atorvastatin (LIPITOR) 40 MG Tab Take 1 Tablet by mouth every evening. 90 Tablet 1    losartan (COZAAR) 25 MG Tab Take 1 Tablet by mouth every day. 90 Tablet 3    ondansetron (ZOFRAN ODT) 4 MG TABLET DISPERSIBLE Take 1 Tablet by mouth every 6 hours as needed for Nausea/Vomiting. 10 Tablet 0    ondansetron (ZOFRAN) 4 MG Tab tablet Take 1 tablet by mouth every 6 hours as needed for nausea/vomiting for 5 days (Patient not taking: Reported on 2/12/2024) 20 Tablet 0    HYDROcodone-acetaminophen 2.5-108 mg/5mL (HYCET) 7.5-325 MG/15ML solution Take 15 ml by mouth every 4 hours as needed for 5 days (Patient not taking: Reported on 3/27/2024) 450 mL 0    enoxaparin (LOVENOX) 40 MG/0.4ML Solution Prefilled Syringe inj Inject 0.4 ml subcutaneously once a day for 10 days (Patient not taking: Reported on 2/12/2024) 10 Each 0    famotidine (PEPCID) 40 MG Tab Take 1 Tablet by mouth every day. 60 Tablet     omeprazole (PRILOSEC) 40 MG delayed-release capsule Take 1 Capsule by mouth 2 times a day. 90 Capsule 0    baclofen (LIORESAL) 10 MG Tab Take 10 mg by mouth 3 times a day.      amLODIPine (NORVASC) 10 MG Tab TAKE 1 TABLET BY MOUTH EVERY DAY AT BEDTIME  DAYS      sucralfate (CARAFATE) 1 GM Tab CRUSH AND MIX 1 TABLET WITH 30 ML OF WATER AND TAKE BY MOUTH 4 TIMES DAILY AS NEEDED      albuterol 108 (90 Base) MCG/ACT Aero Soln inhalation aerosol Inhale 2 Puffs every four hours as needed for Shortness of Breath. 1 Each 0    hydroxychloroquine (PLAQUENIL) 200 MG Tab Take 200 mg by mouth every day.       No current facility-administered medications on file prior to visit.      Allergies   Allergen Reactions    Latex Rash and Itching    Duloxetine      HA, dizz     Nifedipine Vomiting     Headache, vomiting    Sildenafil      HA, nausea      Family History   Problem Relation Age of Onset    Rheumatologic Disease Mother         RA    Heart Attack  "Father          MI (age 45)    Kidney Disease Father         ESRD on HD    Diabetes Father     Heart Disease Sister       Social History     Tobacco Use    Smoking status: Never     Passive exposure: Never    Smokeless tobacco: Never   Vaping Use    Vaping Use: Never used   Substance Use Topics    Alcohol use: No    Drug use: No     Review of Systems   Constitutional:  Negative for chills, fever and malaise/fatigue.   Respiratory:  Negative for cough, hemoptysis, sputum production, shortness of breath and wheezing.    Cardiovascular:  Negative for chest pain, palpitations, orthopnea, claudication, leg swelling and PND.   Skin:  Negative for itching and rash.   Endo/Heme/Allergies:  Does not bruise/bleed easily.       Objective   Vitals:    24 1342   BP: 95   BP Location: Left arm   Patient Position: Sitting   BP Cuff Size: Adult   Pulse: 74   Weight: 70.3 kg (155 lb)   Height: 1.651 m (5' 5\")      BP Readings from Last 5 Encounters:   24 95/64   24 92/60   24 102/61   23 112/62   23 109/77      Body mass index is 25.79 kg/m².  Physical Exam  Constitutional:       Appearance: Normal appearance.   HENT:      Head: Normocephalic.   Eyes:      Extraocular Movements: Extraocular movements intact.      Conjunctiva/sclera: Conjunctivae normal.   Pulmonary:      Effort: Pulmonary effort is normal.   Musculoskeletal:      Cervical back: Normal range of motion.        Feet:    Skin:     General: Skin is dry.      Coloration: Skin is not pale.      Findings: No rash.   Neurological:      General: No focal deficit present.      Mental Status: She is alert and oriented to person, place, and time.   Psychiatric:         Mood and Affect: Mood normal.         Behavior: Behavior normal.       Labs:    [facility] 22   Lp(a) 181      HDL 42  LDL 62   CMP wnl   a1c wnl  Cbc wnl   ESR, CRP, ANCA (MPO ab, PR3 ab) - wnl   GEOVANY - positive >1:1280 (centromere pattern)  CCP ab 71 " "(elevated)   HCV ab neg     [facility] 7/20/22   CMP - wnl   SCL70 ab - neg   AT III activity wnl   FVL neg   Prot C wnl   PGM neg   B2GP1 ab wnl  ACL ab wnl   Lupus AC neg   D-dimer 0.32       [facility] 8/2022   C3, C4, RA factor - wnl                   No results found for: \"LIPOPROTA\"   No results found for: \"APOB\"    Lab Results   Component Value Date    PROTHROMBTM 14.3 12/22/2023    INR 1.10 12/22/2023         Lab Results   Component Value Date    SODIUM 136 12/27/2023    POTASSIUM 4.0 12/27/2023    CHLORIDE 101 12/27/2023    CO2 24 12/27/2023    GLUCOSE 126 (H) 12/27/2023    BUN 7 (L) 12/27/2023    CREATININE 0.49 (L) 12/27/2023        Lab Results   Component Value Date    WBC 6.0 12/28/2023    RBC 3.98 (L) 12/28/2023    HEMOGLOBIN 11.8 (L) 12/28/2023    HEMATOCRIT 34.7 (L) 12/28/2023    MCV 87.2 12/28/2023    MCH 29.6 12/28/2023    MCHC 34.0 12/28/2023    MPV 10.0 12/28/2023         No results found for: \"MICROALBCALC\", \"MALBCRT\", \"MALBEXCR\", \"BSRBRC67\", \"MICROALBUR\", \"MICRALB\", \"UMICROALBUM\", \"MICROALBTIM\"      Latest Reference Range & Units 09/16/14 04:15 10/28/15 12:04 01/30/16 11:08 04/15/16 14:17 12/26/17 11:05   Hepatitis B Surface Antigen Negative   Negative      HIV 1/0/2 Non Reactive   see below      Rubella IgG Antibody IU/mL  135.40      Strep Gp B DNA PCR Negative     Negative    Syphilis, Treponemal Qual Non Reactive   Non Reactive Non Reactive     Stat C-Reactive Protein 0.00 - 0.75 mg/dL 0.32    0.35       VASCULAR IMAGING:     WBI 8/2021  Normal WBI.   Absent 3, 4, 5th digit flow could be artifactual or secondary to embolic    disease.  Recommend clinical correlation.   Right.    Arterial doppler waveforms are of high amplitude and triphasic.   Wrist brachial index is within normal limits. WBI=1.05   PPG of the digits demonstrates absent flow in the right 3rd, 4th, and 5th    digits. The 1st and 2nd digits appear to have slightly diminished flow in    comparsion to the contralateral digits.    CT " chest 8/2021   1.  No thoracic aortic aneurysm or dissection.  2.  Subclavian arteries are normal in caliber and patent.  LEFT subclavian artery is partially secured.  3.  Probable medial RIGHT lower lobe atelectasis.  Thoracic aorta is normal in caliber.  No dissection demonstrated.  Great vessel origins are intact.  RIGHT subclavian artery is normal in caliber and patent.  LEFT subclavian artery is partially obscured by streak artifact however also appears normal in caliber and patent.    Echo 9/2021   No prior study is available for comparison.   Normal left ventricular systolic function.  The left ventricular ejection fraction is visually estimated to be 65%.  No significant valve abnormalities.     WBI/art duplex 8/2022    Wrist-brachial indices are normal.   FBI:   Right-  1.07   Left-     0.98   Right 3rd and 4th finger waveform are diminished, most likely due to    microvascular disease.   Left 2nd and 3rd finger waveforms are diminished, most likely due to    microvascular disease.    Echo 12/2022  Normal left ventricular size, thickness, systolic function, and   diastolic function.  The left ventricular ejection fraction is visually estimated to be 60-  65%.  The right ventricle is borderline dilated in size with preserved   systolic function.  Estimated right ventricular systolic pressure is mildly elevated at 40 mmHg.  Normal left atrial size.  No significant valvular abnormalities.   Normal inferior vena cava size and inspiratory collapse.    CT chest 12/2022  1.  No CT evidence of interstitial lung disease.   2.  No significant incidental findings are identified.         Medical Decision Making:  Today's Assessment / Status / Plan:     1. Primary hypertension        2. CREST syndrome (HCC)        3. Pulmonary hypertension (HCC)        4. Raynaud's phenomenon (secondary)  aspirin 81 MG EC tablet    fluoxetine (PROZAC) 20 MG tablet      5. Dyslipidemia  aspirin 81 MG EC tablet      6. Elevated  lipoprotein(a)  aspirin 81 MG EC tablet      7. Prediabetes        8. Long term (current) use of antithrombotics/antiplatelets        9. Family history of premature coronary heart disease          Patient Type: Primary Prevention    Etiology of Established CVD if Present:     1) secondary Raynaud's d/t CREST syndrome - persistent symptoms, no current ulcerations   - 4/2022  - recurrent non-healing finger wounds, most recently right index - improving  - prior normal WBI with PPGs showing absent flow , repeat duplex 8/2022 shows nl WBI/FBI with reduced waveforms to R 3r/4th and L 2nd/3rd fingers   - high prob secondary raynaud's due to later adulthood (around 39yo) onset with intense pain and ischemic ulcerations with pain and classic discolorations affecting fingers/toes.    - thrombophilia w/u neg,  ANCA neg   - fhx of RA but not SLE, she has no nephritic findings or recurrent malar rash or arthralgia  - no COVID prior to initial episode   - CTA chest wnl w/o proximal plaque or stenosis, no indications of thoracic Ao diss, plaque  Plan:  - defer further CREST-specific tx to rheum MD for definitive dx and tx  - continue warming practices    Meds:  - restart ASA 81mg QOD with PPI (safe in post-bariatric pts)   - continue atorvastatin   - failed sildenafil - HA, nausea  - continue amlodipine to 10mg daily - failed nifedipine  - continue losartan 25mg daily to increase vasodilation   - topical nitro-Bid ointment Q6h to affected fingertips - no relief   - start fluoxetine 10mg daily, then increase to 20mg after 2 weeks   - consider cilostazol as next agent   - next step will be oral iloprost (prostacyclin analog vs ERA (eg. Bosentan) - qualifies due to refractory RP and pulm HTN     2) pulm HTN, secondary to CREST   2022 echo = RVSP 40mmHg (mild)   Plan:  - candidate for ERA therapy - will review in future     BLOOD PRESSURE MANAGEMENT:  ACC/AHA (2017) goal <130/80  Home BP at goal: yes  Office BP at goal:  yes - mild  hypoTN  - hx of preg-induced HTN on nifedipine   Plan:   - continue healthy diet, activity, weight mgmt   - routine clinic-based BP measurements at least once annually   Medications:   - continue amlodipine 10mg daily for Raynaud's   - continue losartan 25mg daily for vasodilt    LIPID MANAGEMENT:   Qualifies for Statin Therapy Based on 2018 ACC/AHA Guidelines: yes, Primary Prevention - 40-76yo, LDLc >70, <190 w/o DM  The ASCVD Risk score (Cong DK, et al., 2019) failed to calculate.  Major ASCVD events: None  High-risk condition: N/A  Risk-enhancers: Metabolic syndrome  and Lp(a) >50   - reviewed with patient this is an independent risk factor for ASCVD but is currently controversial if lowering has impact on outcomes in primary prevention, so targeting LDLc lowering is primary objective for now and consider lp(a) lowering if worsening ascvd risk or if ascvd event occurs - definitely prothrombotic and needs ASA   Currently on Statin: Yes  Tx goals: LDL-C <100 (consider non-HDL-C <130, apoB <90)  At goal? Yes, 7/2022   Plan:   - reinforced ongoing TLC measures as noted   - monitor labs   Meds:   - continue atorva 40mg daily     ANTITHROMBOTIC/ANTIPLATELET THERAPY: continue ASA 81mg daily for potential antithromb benefit due to non-healing wounds and high lp(a)     GLYCEMIC STATUS: Prediabetic      Plan:  - update a1c   - continue healthy diet, weight reduction, daily physical activity   - consider metformin up to 850mg BID to slow or offset progression to T2D as per DPP trial   - monitor labs Q6-12mo    LIFESTYLE INTERVENTIONS  TOBACCO: continued complete avoidance of all tobacco products   PHYSICAL ACTIVITY: >150min/week of mod-intensity activity or as much as tolerated  NUTRITION: Mediterranean   ETOH: complete abstinence recommended  WT MGMT: maintain healthy weight     OTHER:     # CREST syndrome - stable, on meds   - serology testing indicative of CREST syndrome, possible RA overlay per CCP ab elevations as  well   - strong centromere ab pattern and GEOVANY 1:1280 with high CCP ab  - prior echo with pulm HTN (RVSP = 40mmHg) in 2022  Plan:  - ongoing care with rheum MD     # GERD - stable, continue PPI     # L foot lesion - not arterial ulceratinos, ddx includes plantar wart vs corn  - recommend to f/u with PCP and/or podiatry   Studies to Be Obtained: none   Labs to Be Obtained: as noted above     Follow up in:  3mo    Scott Acuña M.D.  [facility]   [facility]   [phone]

## 2024-05-14 ENCOUNTER — OFFICE VISIT (OUTPATIENT)
Dept: INTERNAL MEDICINE | Facility: OTHER | Age: 44
End: 2024-05-14
Payer: MEDICAID

## 2024-05-14 VITALS
BODY MASS INDEX: 25.99 KG/M2 | TEMPERATURE: 98.5 F | OXYGEN SATURATION: 98 % | HEART RATE: 74 BPM | HEIGHT: 65 IN | SYSTOLIC BLOOD PRESSURE: 98 MMHG | WEIGHT: 156 LBS | DIASTOLIC BLOOD PRESSURE: 64 MMHG

## 2024-05-14 DIAGNOSIS — L97.521 ULCER OF LEFT FOOT, LIMITED TO BREAKDOWN OF SKIN (HCC): ICD-10-CM

## 2024-05-14 PROCEDURE — 3074F SYST BP LT 130 MM HG: CPT | Mod: GC | Performed by: INTERNAL MEDICINE

## 2024-05-14 PROCEDURE — 99214 OFFICE O/P EST MOD 30 MIN: CPT | Mod: GC | Performed by: INTERNAL MEDICINE

## 2024-05-14 PROCEDURE — 3078F DIAST BP <80 MM HG: CPT | Mod: GC | Performed by: INTERNAL MEDICINE

## 2024-05-14 ASSESSMENT — ENCOUNTER SYMPTOMS
NECK PAIN: 0
PALPITATIONS: 0
DIZZINESS: 0
DOUBLE VISION: 0
NAUSEA: 0
HEARTBURN: 0
FEVER: 0
CHILLS: 0
COUGH: 0
BLURRED VISION: 0
NERVOUS/ANXIOUS: 0
HEADACHES: 0
HEMOPTYSIS: 0
VOMITING: 0
MYALGIAS: 0

## 2024-05-14 ASSESSMENT — FIBROSIS 4 INDEX: FIB4 SCORE: .6933752452815364025

## 2024-05-14 NOTE — PROGRESS NOTES
"CC:   Chief Complaint   Patient presents with    Follow-Up     Blood test--- sore on bottom left foot--been there for a year   When \"moist\" she feels pain          HPI:   Christine presents today for follow-up of:     Dyslipidemia  Recent lipid panel with cholesterol 118, HDL 47, triglycerides 74 and LDL 56  On 40 mg of atorvastatin  Has a history of crest syndrome and Raynaud's phenomena, established with vascular, appreciate support    Ulcer on sole left foot  Chronic, ongoing for many years. Describes that how the Raynaud's was diagnosed.   Describes that the ulcer was initially very bad, however it has gotten better over the last 1 year, it is not completely healed though.  She discussed with her vascular specialist yesterday who recommended getting a podiatry referral for further workup and treatment.    Insomnia   Prescribes zolpidem at last visit.     Back pain   - Xrays were negative, PT referral in place       No problems updated.    Health Maintenance: Completed    ROS:  Review of Systems   Constitutional:  Negative for chills, fever and malaise/fatigue.   HENT:  Negative for hearing loss and tinnitus.    Eyes:  Negative for blurred vision and double vision.   Respiratory:  Negative for cough and hemoptysis.    Cardiovascular:  Negative for chest pain and palpitations.   Gastrointestinal:  Negative for heartburn, nausea and vomiting.   Genitourinary:  Negative for dysuria, frequency and urgency.   Musculoskeletal:  Negative for myalgias and neck pain.   Skin:  Negative for itching and rash.   Neurological:  Negative for dizziness and headaches.   Psychiatric/Behavioral:  The patient is not nervous/anxious.        Objective:     Exam:  BP 98/64 (BP Location: Left arm, Patient Position: Sitting, BP Cuff Size: Adult)   Pulse 74   Temp 36.9 °C (98.5 °F) (Temporal)   Ht 1.651 m (5' 5\")   Wt 70.8 kg (156 lb)   SpO2 98%   BMI 25.96 kg/m²  Body mass index is 25.96 kg/m².    Physical Exam  Constitutional:     "   General: She is not in acute distress.     Appearance: Normal appearance. She is not ill-appearing.   HENT:      Head: Normocephalic and atraumatic.      Right Ear: External ear normal.      Left Ear: External ear normal.      Nose: Nose normal. No congestion.      Mouth/Throat:      Mouth: Mucous membranes are moist.   Eyes:      Extraocular Movements: Extraocular movements intact.      Pupils: Pupils are equal, round, and reactive to light.   Cardiovascular:      Rate and Rhythm: Normal rate.   Pulmonary:      Effort: Pulmonary effort is normal.   Musculoskeletal:         General: Normal range of motion.   Skin:     General: Skin is warm.      Capillary Refill: Capillary refill takes less than 2 seconds.      Comments: Ulcer at the sole of left foot    Neurological:      Mental Status: She is alert and oriented to person, place, and time.   Psychiatric:         Mood and Affect: Mood normal.                 Assessment & Plan:     43 y.o. female with the following -       Dyslipidemia  - Recent lipid panel with cholesterol 118, HDL 47, triglycerides 74 and LDL 56  - On 40 mg of atorvastatin  - Has a history of crest syndrome and Raynaud's phenomena, established with vascular, describes that the specialist recommends continuing atorvastatin, appreciate support    Ulcer on sole left foot  - Chronic, ongoing for many years. Describes that how the Raynaud's was diagnosed.   - Describes that the ulcer was initially very bad, however it has gotten better over the last 1 year, it is not completely healed though.  She discussed with her vascular specialist yesterday who recommended getting a podiatry referral for further workup and treatment.  Plan:  - Referral to podiatry placed     Insomnia   - Prescribes zolpidem at last visit.  Describes she uses it at bedtime and that has helped a lot with the sleep.  She has received his CPAP and has been compliant to it, however it was hard to sleep with the CPAP, zolpidem has been  helping.  She is going to try to get off, however would like to wait until she gets used the CPAP.    Back pain   - Xrays were negative, PT referral in place.  Has been participating with physical therapy with continued improvement  - Continue physical therapy        I spent a total of 30 minutes with record review, exam, communication with the patient, communication with other providers, and documentation of this encounter.      Return in about 3 months (around 8/14/2024).    Please note that this dictation was created using voice recognition software. I have made every reasonable attempt to correct obvious errors, but I expect that there are errors of grammar and possibly content that I did not discover before finalizing the note.

## 2024-05-15 ENCOUNTER — TELEPHONE (OUTPATIENT)
Dept: CARDIOLOGY | Facility: MEDICAL CENTER | Age: 44
End: 2024-05-15
Payer: MEDICAID

## 2024-05-15 NOTE — TELEPHONE ENCOUNTER
LYNN          Caller: Christine Miguel      Topic/issue: Patient was calling about the fluoxetine (PROZAC) 20 MG tablet and her pharmacy reached out to her about her medication and that there was an interaction with her hydroxychloroquine (PLAQUENIL) 200 MG Tab and she was asking for a call back to discuss it and her pharmacy asked for a call back also( [facility] [phone])      Callback Number: [phone]    Thank you    -Bony LARKIN

## 2024-05-15 NOTE — TELEPHONE ENCOUNTER
Drug interaction concerns between:   fluoxetine (PROZAC) 20 MG and hydroxychloroquine (PLAQUENIL) 200 MG     Please advise    Catrachita Martinez, Med Ass't  [facility]  Ph. [phone]  Fx. [phone]

## 2024-05-16 DIAGNOSIS — I27.20 PULMONARY HYPERTENSION (HCC): ICD-10-CM

## 2024-05-16 DIAGNOSIS — I73.00 RAYNAUD'S PHENOMENON WITHOUT GANGRENE: ICD-10-CM

## 2024-05-16 RX ORDER — SILDENAFIL CITRATE 20 MG/1
10 TABLET ORAL DAILY
Qty: 15 TABLET | Refills: 5 | Status: SHIPPED | OUTPATIENT
Start: 2024-05-16 | End: 2024-05-30 | Stop reason: SDUPTHER

## 2024-05-16 NOTE — TELEPHONE ENCOUNTER
Returned ph call and s/w patient.    Patient will stop fluoxetine due to potential interaction.    Patient is requesting a substitute Rx since she recently saw Dr Acuña on 05/13, and patient is still experiencing pain/wounds in fingers.     I advised if unable to provide substitute right now, we'd have to discuss at sooner f/u with Dr Acuña.    Please advise.  Catrachita Martinez, Med Ass't  [facility]  Ph. [phone]  Fx. [phone]

## 2024-05-16 NOTE — TELEPHONE ENCOUNTER
Called s/w patient.     Patient will p/u sildenafil, ultralow dosing of 20mg 1/2 tab daily.     Catrachita Martinez, Med Ass't  [facility]  Ph. [phone]  Fx. [phone]

## 2024-05-16 NOTE — PROGRESS NOTES
Sildenafil trial of lower dosing for secondary raynaud's.  If fails, will consider ET inhibitor therapy in light of established pHTN and secondary raynauds from CREST

## 2024-05-17 DIAGNOSIS — M54.50 BACK PAIN AT L4-L5 LEVEL: ICD-10-CM

## 2024-05-17 NOTE — TELEPHONE ENCOUNTER
Scott Acuña M.D.  Catrachita Martinez, Med Ass't18 hours ago (4:03 PM)     LYNN  Recommend retrial of sildenafil but we will use ultralow dosing of 20mg 1/2 tab daily.    Rx sent

## 2024-05-28 ENCOUNTER — TELEPHONE (OUTPATIENT)
Dept: VASCULAR LAB | Facility: MEDICAL CENTER | Age: 44
End: 2024-05-28
Payer: MEDICAID

## 2024-05-28 DIAGNOSIS — F51.01 PRIMARY INSOMNIA: ICD-10-CM

## 2024-05-28 RX ORDER — ZOLPIDEM TARTRATE 5 MG/1
5 TABLET ORAL NIGHTLY PRN
Qty: 30 TABLET | Refills: 0 | Status: SHIPPED | OUTPATIENT
Start: 2024-05-28 | End: 2024-06-27

## 2024-05-28 NOTE — TELEPHONE ENCOUNTER
Prior Authorization for SILDENAFIL 20MG TABLETS  (Quantity: 15, Days: 30) has been submitted via Fax: [phone] ; [phone]    Insurance: [employer] [facility]    Will follow up in 24-48 business hours.     RAMONITA Colin, PhT  Vascular Pharmacy Liaison (Rx Coordinator)  P: [phone]  5/28/2024 11:11 AM

## 2024-05-28 NOTE — TELEPHONE ENCOUNTER
Received New start PA request via  EMR   for SILDENAFIL 20MG TABLETS. (Quantity:15, Day Supply:30)     Insurance: [employer] [place]   Member ID:  [id number]  BIN: [id number]  PCN: [id number]  Group: [id number]     Ran Test claim via [place] & medication Rejects stating prior authorization is required.    RAMONITA Colin, PhT  Vascular Pharmacy Liaison (Rx Coordinator)  P: [phone]  5/28/2024 11:11 AM

## 2024-05-30 DIAGNOSIS — I27.20 PULMONARY HYPERTENSION (HCC): ICD-10-CM

## 2024-05-30 DIAGNOSIS — I73.00 RAYNAUD'S PHENOMENON WITHOUT GANGRENE: ICD-10-CM

## 2024-05-30 RX ORDER — SILDENAFIL CITRATE 20 MG/1
10 TABLET ORAL 3 TIMES DAILY
Qty: 90 TABLET | Refills: 5 | Status: SHIPPED | OUTPATIENT
Start: 2024-05-30

## 2024-05-30 NOTE — TELEPHONE ENCOUNTER
PA for SILDENAFIL 20MG TABLETS  has been approved for a quantity of 15 , day supply 30    PA reference number: [id number]  Insurance: [employer] [place]   Effective dates: 05/28/2024-05/28/2025  Copay: $0     Is patient eligible to fill with [facility]? No, test claim rejected stating insurance not contracted with [facility] .    Next Steps:  Called [facility] pharmacy @ [phone] and spoke with Hazel and informed her to re-run the medication. Per Hazel, she states that she still receives a rejection claim. Informed Hazel that I will give the pt's insurance a call to verify.     Called [facility] @ [phone] and spoke with Sagrario to verify the issue with the ongoing rejection claim. Per Sagrario, Sildenafil has to be filled under the contracted pharmacy: [facility]. Obtained pharmacy's phone number for confirmation.    Called [facility] @ [phone] and spoke with Deysi to verify pt's eligibility to fill through them. Per Deysi, pt is eligible and approved to fill Sildenafil. Ran a live test claim and shows $0/30DS copay.    Called and spoke to Pt in regards to the approval status for her medication. Informed pt that her medication has to be requested to be sent to [facility] as they are contracted with the medication. Pt verbalized understanding. Provided my contact information if she has questions and/or concerns.     Notified and updated provider for the status.     Nancy Thomas, RAMONITA, PhT  Vascular Pharmacy Liaison (Rx Coordinator)  P: [phone]  5/30/2024 2:15 PM

## 2024-05-30 NOTE — PROGRESS NOTES
Sent new Rx for revatio 20mg at lower dosing 1/2 tab up to TID for refractory raynaud's -- sent to [facility].  PA approved

## 2024-06-10 ENCOUNTER — PATIENT MESSAGE (OUTPATIENT)
Dept: INTERNAL MEDICINE | Facility: OTHER | Age: 44
End: 2024-06-10
Payer: MEDICAID

## 2024-06-10 DIAGNOSIS — M25.561 PAIN IN BOTH KNEES, UNSPECIFIED CHRONICITY: ICD-10-CM

## 2024-06-10 DIAGNOSIS — M25.562 PAIN IN BOTH KNEES, UNSPECIFIED CHRONICITY: ICD-10-CM

## 2024-06-10 DIAGNOSIS — G47.30 SLEEP APNEA, UNSPECIFIED TYPE: ICD-10-CM

## 2024-06-10 DIAGNOSIS — M15.3 OTHER SECONDARY OSTEOARTHRITIS OF MULTIPLE SITES: ICD-10-CM

## 2024-06-10 DIAGNOSIS — M25.562 LEFT KNEE PAIN, UNSPECIFIED CHRONICITY: ICD-10-CM

## 2024-06-10 DIAGNOSIS — M54.50 BACK PAIN AT L4-L5 LEVEL: ICD-10-CM

## 2024-06-10 DIAGNOSIS — M25.552 BILATERAL HIP PAIN: ICD-10-CM

## 2024-06-10 DIAGNOSIS — M51.26 DISPLACEMENT OF LUMBAR INTERVERTEBRAL DISC WITHOUT MYELOPATHY: ICD-10-CM

## 2024-06-10 DIAGNOSIS — M15.8 OTHER OSTEOARTHRITIS INVOLVING MULTIPLE JOINTS: ICD-10-CM

## 2024-06-10 DIAGNOSIS — F51.01 PRIMARY INSOMNIA: ICD-10-CM

## 2024-06-10 DIAGNOSIS — M25.551 BILATERAL HIP PAIN: ICD-10-CM

## 2024-06-20 NOTE — PATIENT COMMUNICATION
Patient called, referral was written for lower back but should have been written for Both knees and hips. Patient would like referral updated.

## 2024-06-24 DIAGNOSIS — F51.01 PRIMARY INSOMNIA: ICD-10-CM

## 2024-06-24 NOTE — TELEPHONE ENCOUNTER
Received request via: Patient    Was the patient seen in the last year in this department? Yes    Does the patient have an active prescription (recently filled or refills available) for medication(s) requested? No    [facility]    Does the patient have shelter Plus and need 100 day supply (blood pressure, diabetes and cholesterol meds only)? Patient does not have SCP

## 2024-06-25 RX ORDER — ZOLPIDEM TARTRATE 5 MG/1
5 TABLET ORAL NIGHTLY PRN
Qty: 30 TABLET | Refills: 0 | Status: SHIPPED | OUTPATIENT
Start: 2024-06-25 | End: 2024-07-25

## 2024-07-02 ENCOUNTER — APPOINTMENT (OUTPATIENT)
Dept: PHYSICAL THERAPY | Facility: REHABILITATION | Age: 44
End: 2024-07-02
Attending: INTERNAL MEDICINE
Payer: MEDICAID

## 2024-07-09 ENCOUNTER — APPOINTMENT (OUTPATIENT)
Dept: PHYSICAL THERAPY | Facility: REHABILITATION | Age: 44
End: 2024-07-09
Attending: INTERNAL MEDICINE
Payer: MEDICAID

## 2024-07-16 ENCOUNTER — APPOINTMENT (OUTPATIENT)
Dept: PHYSICAL THERAPY | Facility: REHABILITATION | Age: 44
End: 2024-07-16
Attending: INTERNAL MEDICINE
Payer: MEDICAID

## 2024-07-18 ENCOUNTER — TELEPHONE (OUTPATIENT)
Dept: HEALTH INFORMATION MANAGEMENT | Facility: OTHER | Age: 44
End: 2024-07-18
Payer: MEDICAID

## 2024-07-23 ENCOUNTER — APPOINTMENT (OUTPATIENT)
Dept: PHYSICAL THERAPY | Facility: REHABILITATION | Age: 44
End: 2024-07-23
Attending: INTERNAL MEDICINE
Payer: MEDICAID

## 2024-07-30 ENCOUNTER — APPOINTMENT (OUTPATIENT)
Dept: PHYSICAL THERAPY | Facility: REHABILITATION | Age: 44
End: 2024-07-30
Attending: INTERNAL MEDICINE
Payer: MEDICAID

## 2024-08-06 ENCOUNTER — APPOINTMENT (OUTPATIENT)
Dept: PHYSICAL THERAPY | Facility: REHABILITATION | Age: 44
End: 2024-08-06
Attending: INTERNAL MEDICINE
Payer: MEDICAID

## 2024-08-07 ENCOUNTER — OFFICE VISIT (OUTPATIENT)
Dept: INTERNAL MEDICINE | Facility: OTHER | Age: 44
End: 2024-08-07
Payer: MEDICAID

## 2024-08-07 VITALS
OXYGEN SATURATION: 98 % | HEART RATE: 83 BPM | WEIGHT: 155.6 LBS | DIASTOLIC BLOOD PRESSURE: 67 MMHG | HEIGHT: 65 IN | SYSTOLIC BLOOD PRESSURE: 103 MMHG | TEMPERATURE: 99.7 F | BODY MASS INDEX: 25.92 KG/M2

## 2024-08-07 DIAGNOSIS — R10.32 LLQ PAIN: ICD-10-CM

## 2024-08-07 DIAGNOSIS — Z79.899 ENCOUNTER FOR EYE EXAM DUE TO HIGH RISK MEDICATION: ICD-10-CM

## 2024-08-07 DIAGNOSIS — G47.33 OSA ON CPAP: ICD-10-CM

## 2024-08-07 DIAGNOSIS — M15.8 OTHER OSTEOARTHRITIS INVOLVING MULTIPLE JOINTS: ICD-10-CM

## 2024-08-07 DIAGNOSIS — M54.50 BACK PAIN AT L4-L5 LEVEL: ICD-10-CM

## 2024-08-07 DIAGNOSIS — Z90.3 H/O GASTRIC SLEEVE: ICD-10-CM

## 2024-08-07 DIAGNOSIS — E78.5 DYSLIPIDEMIA: ICD-10-CM

## 2024-08-07 DIAGNOSIS — Z98.51 H/O TUBAL LIGATION: ICD-10-CM

## 2024-08-07 DIAGNOSIS — N83.202 CYST OF LEFT OVARY: ICD-10-CM

## 2024-08-07 DIAGNOSIS — F51.01 PRIMARY INSOMNIA: ICD-10-CM

## 2024-08-07 DIAGNOSIS — M34.1 CREST (CALCINOSIS, RAYNAUD'S PHENOMENON, ESOPHAGEAL DYSFUNCTION, SCLERODACTYLY, TELANGIECTASIA) (HCC): ICD-10-CM

## 2024-08-07 DIAGNOSIS — K21.9 GASTROESOPHAGEAL REFLUX DISEASE, UNSPECIFIED WHETHER ESOPHAGITIS PRESENT: ICD-10-CM

## 2024-08-07 PROBLEM — R06.83 SNORING: Status: ACTIVE | Noted: 2024-08-07

## 2024-08-07 PROBLEM — M19.90 OSTEOARTHROSIS: Status: ACTIVE | Noted: 2024-08-07

## 2024-08-07 PROBLEM — E78.00 HYPERCHOLESTEROLEMIA: Status: ACTIVE | Noted: 2024-08-07

## 2024-08-07 PROBLEM — I10 BENIGN HYPERTENSION: Status: ACTIVE | Noted: 2024-08-07

## 2024-08-07 PROCEDURE — 99214 OFFICE O/P EST MOD 30 MIN: CPT | Mod: GC

## 2024-08-07 RX ORDER — ZOLPIDEM TARTRATE 5 MG/1
1 TABLET ORAL
COMMUNITY
End: 2024-08-07

## 2024-08-07 RX ORDER — PILOCARPINE HYDROCHLORIDE 5 MG/1
1 TABLET, FILM COATED ORAL 3 TIMES DAILY
COMMUNITY

## 2024-08-07 RX ORDER — ZOLPIDEM TARTRATE 6.25 MG/1
6.25 TABLET, FILM COATED, EXTENDED RELEASE ORAL NIGHTLY PRN
Qty: 30 TABLET | Refills: 0 | Status: SHIPPED | OUTPATIENT
Start: 2024-08-07 | End: 2024-09-06

## 2024-08-07 ASSESSMENT — ENCOUNTER SYMPTOMS
COUGH: 0
PALPITATIONS: 0
FEVER: 0
EYE PAIN: 0
HEMOPTYSIS: 0
FLANK PAIN: 0
WEIGHT LOSS: 0
SORE THROAT: 0

## 2024-08-07 ASSESSMENT — FIBROSIS 4 INDEX: FIB4 SCORE: .6933752452815364025

## 2024-08-07 NOTE — PATIENT INSTRUCTIONS
- refer to optho.  - extended release zolpidem, to help maintain sleep.  - transvaginal ultrasound for llq pain, previous history of ovarian cyst.

## 2024-08-07 NOTE — PROGRESS NOTES
ESTABLISHED PATIENT VISIT    PATIENT ID:  Name: Christine Heaton  YOB: 1980  Patient Care Team:  Sandra Logan M.D. as PCP - General (Internal Medicine)    CHIEF COMPLAINT(s):  Follow-Up and Medication Refill (/sildenafil (REVATIO) 20 MG tablet/ /)    Follow up for Diagnoses of LLQ pain, Cyst of left ovary, Primary insomnia, Encounter for eye exam due to high risk medication, CREST (calcinosis, Raynaud's phenomenon, esophageal dysfunction, sclerodactyly, telangiectasia) ([facility]), FRANK on CPAP, Other osteoarthritis involving multiple joints, Back pain at L4-L5 level, Dyslipidemia, Gastroesophageal reflux disease, unspecified whether esophagitis present, H/O gastric sleeve, and H/O tubal ligation were pertinent to this visit.    History of Present Illness:    This is a pleasant 43 y.o. female with a past medical history of GERD syndrome, dyslipidemia, GERD, FRANK on CPAP, status post gastric sleeve, tubal ligation.  She presented to the clinic today to reestablish care, get a referral to ophthalmology for annual eye screening for hydroxychloroquine use, and to refill her zolpidem.  She is following with cardiology, vascular surgery, sleep medicine, orthopedics regularly.    Today in clinic she complained of 1 week of left lower quadrant pain that she first noticed when she was having sexual intercourse with her regular partner. She says that she also has noted increased swelling in her left side compared to her right side.  This discomfort is preventing her from pursuing further sexual intercourse.  She has removed her fallopian tubes.  She has a history of a 4.7 cm cyst in her left ovary found via ultrasound in the past.  She takes hydrocodone/acetaminophen for pain as needed.    The patient reports taking zolpidem to help fall asleep but states it only keeps them asleep for about three hours. They also use melatonin gummies but find them ineffective. The patient uses a CPAP machine due to  sleep apnea, diagnosed post-weight loss, and reports discomfort from the machine.    Medications:  - Aspirin, 81 mg daily  - Amlodipine, 5 mg daily  - Atorvastatin, 40 mg daily  - Hydroxychloroquine, 200 mg daily  - Losartan, 25 mg daily  - Omeprazole, 40 mg daily  - Carafate, as needed  - Pilocarpine 5 mg 3 times a day for dry mouth  - Albuterol inhaler, as needed  - Baclofen (Lioresal), not regularly taken  - Hydrocodone/acetaminophen, as needed for severe pain  - Zolpidem, for sleep  - Melatonin gummies, for sleep    Social History:  - Non-smoker  - No alcohol or illicit drug use  - Single parent of four children, including one special needs child  - Homeschools children    Family History:  - Hypertension in both parents  - Diabetes mellitus in both parents  - Thyroid issues in two sisters  - Heart surgeries in three brothers  - Father  at 50 due to a heart attack  - Mother alive, 69, with arthritis, osteoporosis, and hypertension    Patient Active Problem List    Diagnosis Date Noted    Hypercholesterolemia 2024    Snoring 2024    Osteoarthrosis 2024    Gastroesophageal reflux disease 2024    Status post sleeve gastrectomy 2024    Benign hypertension 2024    Asthma 2023    Sleep apnea 2023    GERD (gastroesophageal reflux disease) 2023    Hx of gastric bypass 2023    H/O gastric sleeve 2023    Dysmenorrhea 2023    HTN (hypertension) 03/10/2023    Elevated lipoprotein(a) 03/10/2023    Degenerative joint disease involving multiple joints 2023    Family history of premature coronary heart disease 2022    Dyslipidemia 2022    Prediabetes 2022    Raynaud's phenomenon (secondary) 2022    Obesity (BMI 30-39.9) 2022    Postprocedural pseudomeningocele 2014    Displacement of lumbar intervertebral disc without myelopathy 2014    Cataract 2014         Review of Systems   Constitutional:   "Negative for fever and weight loss.   HENT:  Negative for congestion, ear discharge, ear pain and sore throat.    Eyes:  Negative for pain.   Respiratory:  Negative for cough and hemoptysis.    Cardiovascular:  Negative for chest pain and palpitations.   Genitourinary:  Negative for flank pain.        Patient was complaining of left lower quadrant pain that is worsened upon sexual intercourse and palpation whenever she is showering. Whenever she is moving around she does not note the pain.  No pain at rest as well.   Skin:  Negative for rash.       Past Medical History:   Diagnosis Date    Acid reflux     GEOVANY positive 2022    Anesthesia     Family Hx-father ; was alwasy told it was anesthesia \"related\"; not sure what the actual reaction was; pt-PONV    Arthritis 2023    osteoarthritis    Asthma     Breath shortness 2023    on exertion    CREST syndrome (HCC)     Cyclic citrullinated peptide (CCP) antibody positive 2022    Degenerative joint disease involving multiple joints 2023    Dyslipidemia 2022    Dyspareunia, female 2023    Enlargement - tonsil/adenoid 2013    Fatty liver     Heart burn     Hiatus hernia syndrome 2023    High cholesterol 2023    on medication    Hypertension 2023    on medication    Indigestion     Intractable back pain 2017    Left knee pain 2023    Obesity due to excess calories with serious comorbidity 2023    Oligohydramnios antepartum-IOL  9 AM 04/15/2016    IMO load 2020    Osteoporosis     Pain 2014    lower back/L leg=7/10    Pain 2023    Chronic pain \"all over my body\" rates pain 7 out of 10    Patellofemoral instability of left knee with pain 2023    Pelvic pain 2023    Right ventricular enlargement     Sciatica of right side 2017    Sleep apnea 2023    awaiting CPAP    Snoring     Tachycardia, unspecified     Unspecified urinary incontinence " 03/20/2023    stress incontinence     Past Surgical History:   Procedure Laterality Date    IN LAP, NILS RESTRICT PROC, LONGITUDINAL GAS*  12/26/2023    Procedure: ROBOTIC SONNY EN Y GASTROENTEROSTOMY;  Surgeon: Olivier Oliveros M.D.;  Location: SURGERY [facility];  Service: Gen Robotic    ROBOTIC ASSISTED LAP HIATAL HERNIA REP  12/26/2023    Procedure: REPAIR, HERNIA, HIATAL, ROBOT-ASSISTED, LAPAROSCOPIC;  Surgeon: Olivier Oliveros M.D.;  Location: SURGERY [facility];  Service: Gen Robotic    IN LAP, NILS RESTRICT PROC, LONGITUDINAL GAS*  7/14/2023    Procedure: ROBOTIC PARTIAL SLEEVE GASTRECTOMY;  Surgeon: Olivier Oliveros M.D.;  Location: SURGERY [facility];  Service: Gen Robotic    ROBOTIC ASSISTED LAP HIATAL HERNIA REP  7/14/2023    Procedure: REPAIR, HERNIA, HIATAL, ROBOT-ASSISTED, LAPAROSCOPIC;  Surgeon: Olivier Oliveros M.D.;  Location: SURGERY [facility];  Service: Gen Robotic    IN LAP,DIAGNOSTIC ABDOMEN N/A 3/27/2023    Procedure: LAPAROSCOPY, DIAGNOSTIC AND OPERATIVE AND LAPAROSCOPIC BILATERAL SALPINGECTOMY;  Surgeon: Nolan Rodriguez M.D.;  Location: SURGERY SAME DAY [facility];  Service: Gynecology    IRRIGATION & DEBRIDEMENT ORTHO  9/7/2014    Performed by El Gomez M.D. at [facility] ORS    IRRIGATION & DEBRIDEMENT NEURO  9/5/2014    Performed by El Gomez M.D. at [facility] ORS    LUMBAR LAMINECTOMY DISKECTOMY  8/27/2014    Performed by El Gomez M.D. at [facility] ORS    INJ,EPIDURAL/LUMB/SAC SINGLE  6/5/2014    Performed by Alexis Cornell M.D. at [facility] ORS    TONSILLECTOMY  5/14/2013    Performed by Aaron Hutton M.D. at SURGERY SAME DAY [facility] ORS    NASAL SEPTAL RECONSTRUCTION  5/14/2013    Performed by Aaron Hutton M.D. at SURGERY SAME DAY [facility] ORS    TURBINOPLASTY  5/14/2013    Performed by Aaron Hutton M.D. at SURGERY SAME DAY [facility] ORS     Family History   Problem Relation Age  of Onset    Rheumatologic Disease Mother         RA    Heart Attack Father          MI (age 45)    Kidney Disease Father         ESRD on HD    Diabetes Father     Heart Disease Sister      Latex, Duloxetine, Nifedipine, and Sildenafil  Social History     Tobacco Use    Smoking status: Never     Passive exposure: Never    Smokeless tobacco: Never   Vaping Use    Vaping status: Never Used   Substance Use Topics    Alcohol use: No    Drug use: No     Current Outpatient Medications   Medication Sig Dispense Refill    zolpidem (AMBIEN CR) 6.25 MG CR tablet Take 1 Tablet by mouth at bedtime as needed for Sleep for up to 30 days. 30 Tablet 0    aspirin 81 MG EC tablet Take 1 Tablet by mouth every 48 hours.      oxyCODONE-acetaminophen (PERCOCET) 5-325 MG Tab TAKE 1 TABLET BY MOUTH EVERY 4 HOURS AS NEEDED FOR ABDOMINAL PAIN FOR 7 DAYS      atorvastatin (LIPITOR) 40 MG Tab Take 1 Tablet by mouth every evening. 90 Tablet 1    losartan (COZAAR) 25 MG Tab Take 1 Tablet by mouth every day. 90 Tablet 3    HYDROcodone-acetaminophen 2.5-108 mg/5mL (HYCET) 7.5-325 MG/15ML solution Take 15 ml by mouth every 4 hours as needed for 5 days 450 mL 0    famotidine (PEPCID) 40 MG Tab Take 1 Tablet by mouth every day. 60 Tablet     omeprazole (PRILOSEC) 40 MG delayed-release capsule Take 1 Capsule by mouth 2 times a day. 90 Capsule 0    amLODIPine (NORVASC) 10 MG Tab TAKE 1 TABLET BY MOUTH EVERY DAY AT BEDTIME  DAYS      sucralfate (CARAFATE) 1 GM Tab CRUSH AND MIX 1 TABLET WITH 30 ML OF WATER AND TAKE BY MOUTH 4 TIMES DAILY AS NEEDED      albuterol 108 (90 Base) MCG/ACT Aero Soln inhalation aerosol Inhale 2 Puffs every four hours as needed for Shortness of Breath. 1 Each 0    hydroxychloroquine (PLAQUENIL) 200 MG Tab Take 200 mg by mouth every day.      pilocarpine (SALAGEN) 5 MG Tab Take 1 Tablet by mouth 3 times a day.      sildenafil (REVATIO) 20 MG tablet Take 0.5 Tablets by mouth 3 times a day. 90 Tablet 5    baclofen  "(LIORESAL) 10 MG Tab Take 10 mg by mouth 3 times a day.       No current facility-administered medications for this visit.       OBJECTIVE:  /67 (BP Location: Left arm, Patient Position: Sitting, BP Cuff Size: Adult)   Pulse 83   Temp 37.6 °C (99.7 °F) (Temporal)   Ht 1.651 m (5' 5\")   Wt 70.6 kg (155 lb 9.6 oz)   SpO2 98%   BMI 25.89 kg/m²   Physical Exam  Constitutional:       Appearance: Normal appearance. She is normal weight.   HENT:      Head: Normocephalic.      Right Ear: External ear normal.      Left Ear: External ear normal.      Nose: Nose normal.      Mouth/Throat:      Mouth: Mucous membranes are moist.   Cardiovascular:      Rate and Rhythm: Normal rate and regular rhythm.      Pulses: Normal pulses.      Heart sounds: Normal heart sounds.   Pulmonary:      Effort: Pulmonary effort is normal.      Breath sounds: Normal breath sounds.   Abdominal:      General: Abdomen is flat. Bowel sounds are normal.      Comments: Patient says that the left lower quadrant is swelling more than the right side.  Notable pain in the left lower quadrant to palpation and sexual intercourse.   Musculoskeletal:         General: Normal range of motion.   Skin:     General: Skin is warm.      Capillary Refill: Capillary refill takes less than 2 seconds.   Neurological:      General: No focal deficit present.      Mental Status: She is alert and oriented to person, place, and time.   Psychiatric:         Mood and Affect: Mood normal.         Behavior: Behavior normal.         Thought Content: Thought content normal.         Judgment: Judgment normal.         ASSESSMENT AND PLAN:     1. LLQ pain  2. Cyst of left ovary  -Patient has left lower quadrant pain that started 1 week ago, she says that it is worsened upon intercourse and on deep palpation.  She has a previous history of left ovarian cyst seen on pelvic ultrasound.  She said that she has had urinalysis in the past which showed blood in her urine along with " urinary tract infections.  - URINALYSIS,CULTURE IF INDICATED; Future  - US-OB TRANSVAGINAL ONLY; Future  - CBC WITH DIFFERENTIAL; Future    3. Primary insomnia  -Patient has trouble falling asleep, she is using zolpidem as needed she says that it helps her fall asleep within 10 minutes, but she wakes back up after 3 hours.  She says she wants medication that helps her stay asleep overnight.  - zolpidem (AMBIEN CR) 6.25 MG CR tablet; Take 1 Tablet by mouth at bedtime as needed for Sleep for up to 30 days.  Dispense: 30 Tablet; Refill: 0    4. Encounter for eye exam due to high risk medication  - Patient has chronic use of hydroxychloroquine, needs annual eye exam  - Referral to Ophthalmology    5. CREST (calcinosis, Raynaud's phenomenon, esophageal dysfunction, sclerodactyly, telangiectasia) (HCC)  - Anti-hypertensive medications managed by cardiology, is following next week for repeat echo as last echo showed RVSP 36 indicating possible pulmonary hypertension.  -Continue hydroxychloroquine  -Joint pain managed per orthopedics    6. FRANK on CPAP  -Continues regular use of CPAP, no new complaints.    7. Other osteoarthritis involving multiple joints  -This orthopedics to get steroid injection in her knees, considering having hip and knee replacement surgery as it has been causing her chronic pain.  She is deferring surgery right now as she needs to take care of her special needs child.    8. Back pain at L4-L5 level  -Chronic back pain, has history of surgery on her back.  Takes morphine acetaminophen as needed for pain    9. Dyslipidemia  -History of hyperlipidemia, managed with atorvastatin 40 Mg.  Continue.  -Annual lipid exam, history of early cardiac death in father.    10. Gastroesophageal reflux disease, unspecified whether esophagitis present  - Takes omeprazole 40 as needed, continue    11. H/O gastric sleeve  -Reports excess, lost over 100 pounds, no longer obese.    12. H/O tubal ligation  -Previously has  had tubes removed to prevent pregnancy.  Could be contributing to her pelvic pain, however more likely attributed to reemerging ovarian cyst.    Problem List Items Addressed This Visit       Dyslipidemia    Degenerative joint disease involving multiple joints    H/O gastric sleeve    Gastroesophageal reflux disease     Other Visit Diagnoses       LLQ pain        Relevant Orders    URINALYSIS,CULTURE IF INDICATED    US-OB TRANSVAGINAL ONLY    CBC WITH DIFFERENTIAL    Cyst of left ovary        Relevant Orders    US-OB TRANSVAGINAL ONLY    Primary insomnia        Relevant Medications    zolpidem (AMBIEN CR) 6.25 MG CR tablet    Encounter for eye exam due to high risk medication        Relevant Orders    Referral to Ophthalmology    CREST (calcinosis, Raynaud's phenomenon, esophageal dysfunction, sclerodactyly, telangiectasia) (HCC)        FRANK on CPAP        Back pain at L4-L5 level        H/O tubal ligation                Orders Placed This Encounter    US-OB TRANSVAGINAL ONLY    URINALYSIS,CULTURE IF INDICATED    CBC WITH DIFFERENTIAL    Referral to Ophthalmology    DISCONTD: zolpidem (AMBIEN) 5 MG Tab    pilocarpine (SALAGEN) 5 MG Tab    zolpidem (AMBIEN CR) 6.25 MG CR tablet       Return in about 5 weeks (around 9/11/2024).      Ana Paula Grant M.D. PGY-1  [facility] Internal Medicine Resident

## 2024-08-13 ENCOUNTER — APPOINTMENT (OUTPATIENT)
Dept: PHYSICAL THERAPY | Facility: REHABILITATION | Age: 44
End: 2024-08-13
Attending: INTERNAL MEDICINE
Payer: MEDICAID

## 2024-08-20 ENCOUNTER — APPOINTMENT (OUTPATIENT)
Dept: PHYSICAL THERAPY | Facility: REHABILITATION | Age: 44
End: 2024-08-20
Attending: INTERNAL MEDICINE
Payer: MEDICAID

## 2024-08-27 ENCOUNTER — APPOINTMENT (OUTPATIENT)
Dept: PHYSICAL THERAPY | Facility: REHABILITATION | Age: 44
End: 2024-08-27
Attending: INTERNAL MEDICINE
Payer: MEDICAID

## 2024-08-28 ENCOUNTER — OFFICE VISIT (OUTPATIENT)
Dept: VASCULAR LAB | Facility: MEDICAL CENTER | Age: 44
End: 2024-08-28
Attending: FAMILY MEDICINE
Payer: MEDICAID

## 2024-08-28 VITALS
SYSTOLIC BLOOD PRESSURE: 114 MMHG | WEIGHT: 155 LBS | BODY MASS INDEX: 25.83 KG/M2 | HEART RATE: 70 BPM | DIASTOLIC BLOOD PRESSURE: 72 MMHG | HEIGHT: 65 IN

## 2024-08-28 DIAGNOSIS — E66.9 OBESITY (BMI 30-39.9): ICD-10-CM

## 2024-08-28 DIAGNOSIS — M34.1 CREST SYNDROME (HCC): ICD-10-CM

## 2024-08-28 DIAGNOSIS — I73.00 RAYNAUD'S PHENOMENON WITHOUT GANGRENE: ICD-10-CM

## 2024-08-28 DIAGNOSIS — I10 PRIMARY HYPERTENSION: ICD-10-CM

## 2024-08-28 DIAGNOSIS — E78.41 ELEVATED LIPOPROTEIN(A): ICD-10-CM

## 2024-08-28 DIAGNOSIS — I27.20 PULMONARY HYPERTENSION (HCC): ICD-10-CM

## 2024-08-28 DIAGNOSIS — S61.209D FINGER WOUND, SIMPLE, OPEN, SUBSEQUENT ENCOUNTER: ICD-10-CM

## 2024-08-28 DIAGNOSIS — Z79.02 LONG TERM (CURRENT) USE OF ANTITHROMBOTICS/ANTIPLATELETS: ICD-10-CM

## 2024-08-28 DIAGNOSIS — R73.03 PREDIABETES: ICD-10-CM

## 2024-08-28 DIAGNOSIS — E78.5 DYSLIPIDEMIA: ICD-10-CM

## 2024-08-28 PROCEDURE — 3078F DIAST BP <80 MM HG: CPT | Performed by: FAMILY MEDICINE

## 2024-08-28 PROCEDURE — 99214 OFFICE O/P EST MOD 30 MIN: CPT | Performed by: FAMILY MEDICINE

## 2024-08-28 PROCEDURE — 3074F SYST BP LT 130 MM HG: CPT | Performed by: FAMILY MEDICINE

## 2024-08-28 PROCEDURE — 99212 OFFICE O/P EST SF 10 MIN: CPT

## 2024-08-28 RX ORDER — FLUOXETINE 10 MG/1
10 CAPSULE ORAL DAILY
Qty: 100 CAPSULE | Refills: 3 | Status: SHIPPED | OUTPATIENT
Start: 2024-08-28

## 2024-08-28 ASSESSMENT — ENCOUNTER SYMPTOMS
WHEEZING: 0
HEMOPTYSIS: 0
COUGH: 0
ORTHOPNEA: 0
PND: 0
FEVER: 0
PALPITATIONS: 0
SHORTNESS OF BREATH: 0
SPUTUM PRODUCTION: 0
CHILLS: 0
CLAUDICATION: 0
BRUISES/BLEEDS EASILY: 0

## 2024-08-28 ASSESSMENT — FIBROSIS 4 INDEX: FIB4 SCORE: .6933752452815364025

## 2024-08-28 NOTE — PROGRESS NOTES
FOLLOW-UP VASCULAR MEDICINE VISIT  08/28/24      Christine Ita Miguel is a. female  who presents for f/u  initially referred by Lilly Abreu A.P.RN for eval for vasc etiology of RUE non-healing wound, est 4/24/22      Subjective        Interval hx/concerns:  last seen 5/2024, ongoing pain in finger tips.  Noting some changes in skin.  No major new arterial lesions.  Reports ongoing but stable pain and mild impact on daily QOL.    Has increased hydroxychloroquine per rheum MD in [place].   Never started fluoxetine.    No other new sx     RUE non-healing wound: stable, persistent pain w/o new lesions.   Initial visit hx:  Started age 39 with episodes, can be fingers and toes and has been progressive with ulcerations forms.   Seen by  4/2022 for R index finger non-healing wound.  Had subung lac 3mo prior.  Reports prior episodes of non-healing finger wounds in the past.  Hx of recurrent cold/painful fingers with intermittent cyanosis.  Has WBI normal in past 2021 and has apparently been seen by vasc specialist w/o formal dx or tx in the past.   (No prior visits of [facility] vasc med or available  Has seen by Dr. Pierre in latter half of 2021 and informed no interventions available but started nifedipine, DAPT, atorva.  Currently on ASA only, stopped nifedipine due to HA     HTN: Stable , home BP 100s/60s most days.  Mild dizz with positional changes   HLD: Stable, on current treatment: lifestyle modifications and High intensity statin atorva   Baseline lipid    Latest Reference Range & Units 01/03/12 12:10   Cholesterol,Tot 100 - 199 mg/dL 218 (H)   Triglycerides 0 - 149 mg/dL 86   HDL >=40 mg/dL 37 !   LDL <100 mg/dL 164     Antiplatelet/anticoagulation: ASA 81mg QOD     Current Outpatient Medications on File Prior to Visit   Medication Sig Dispense Refill    pilocarpine (SALAGEN) 5 MG Tab Take 1 Tablet by mouth 3 times a day.      zolpidem (AMBIEN CR) 6.25 MG CR tablet Take 1 Tablet by mouth at bedtime as  needed for Sleep for up to 30 days. 30 Tablet 0    aspirin 81 MG EC tablet Take 1 Tablet by mouth every 48 hours.      atorvastatin (LIPITOR) 40 MG Tab Take 1 Tablet by mouth every evening. 90 Tablet 1    losartan (COZAAR) 25 MG Tab Take 1 Tablet by mouth every day. 90 Tablet 3    famotidine (PEPCID) 40 MG Tab Take 1 Tablet by mouth every day. 60 Tablet     omeprazole (PRILOSEC) 40 MG delayed-release capsule Take 1 Capsule by mouth 2 times a day. 90 Capsule 0    amLODIPine (NORVASC) 10 MG Tab TAKE 1 TABLET BY MOUTH EVERY DAY AT BEDTIME  DAYS      albuterol 108 (90 Base) MCG/ACT Aero Soln inhalation aerosol Inhale 2 Puffs every four hours as needed for Shortness of Breath. 1 Each 0    hydroxychloroquine (PLAQUENIL) 200 MG Tab Take 200 mg by mouth every day.      oxyCODONE-acetaminophen (PERCOCET) 5-325 MG Tab TAKE 1 TABLET BY MOUTH EVERY 4 HOURS AS NEEDED FOR ABDOMINAL PAIN FOR 7 DAYS (Patient not taking: Reported on 8/28/2024)      HYDROcodone-acetaminophen 2.5-108 mg/5mL (HYCET) 7.5-325 MG/15ML solution Take 15 ml by mouth every 4 hours as needed for 5 days (Patient not taking: Reported on 8/28/2024) 450 mL 0    baclofen (LIORESAL) 10 MG Tab Take 10 mg by mouth 3 times a day. (Patient not taking: Reported on 8/28/2024)      sucralfate (CARAFATE) 1 GM Tab CRUSH AND MIX 1 TABLET WITH 30 ML OF WATER AND TAKE BY MOUTH 4 TIMES DAILY AS NEEDED (Patient not taking: Reported on 8/28/2024)       No current facility-administered medications on file prior to visit.      Allergies   Allergen Reactions    Latex Rash and Itching    Duloxetine      HA, dizz     Nifedipine Vomiting     Headache, vomiting    Sildenafil      HA, nausea      Social History     Tobacco Use    Smoking status: Never     Passive exposure: Never    Smokeless tobacco: Never   Vaping Use    Vaping status: Never Used   Substance Use Topics    Alcohol use: No    Drug use: No      Review of Systems   Constitutional:  Negative for chills, fever and  "malaise/fatigue.   Respiratory:  Negative for cough, hemoptysis, sputum production, shortness of breath and wheezing.    Cardiovascular:  Negative for chest pain, palpitations, orthopnea, claudication, leg swelling and PND.   Skin:  Negative for itching and rash.   Endo/Heme/Allergies:  Does not bruise/bleed easily.       Objective   Vitals:    08/28/24 1325   BP: 114/72   BP Location: Left arm   Patient Position: Sitting   BP Cuff Size: Adult long   Pulse: 70   Weight: 70.3 kg (155 lb)   Height: 1.651 m (5' 5\")      BP Readings from Last 5 Encounters:   08/28/24 114/72   08/07/24 103/67   05/14/24 98/64   05/13/24 95/64   03/27/24 92/60      BMI Readings from Last 1 Encounters:   08/28/24 25.79 kg/m²      Wt Readings from Last 3 Encounters:   08/28/24 70.3 kg (155 lb)   08/07/24 70.6 kg (155 lb 9.6 oz)   05/14/24 70.8 kg (156 lb)      Physical Exam  Constitutional:       Appearance: Normal appearance.   HENT:      Head: Normocephalic.   Eyes:      Extraocular Movements: Extraocular movements intact.      Conjunctiva/sclera: Conjunctivae normal.   Pulmonary:      Effort: Pulmonary effort is normal.   Musculoskeletal:      Cervical back: Normal range of motion.        Feet:    Skin:     General: Skin is dry.      Coloration: Skin is not pale.      Findings: No rash.   Neurological:      General: No focal deficit present.      Mental Status: She is alert and oriented to person, place, and time.   Psychiatric:         Mood and Affect: Mood normal.         Behavior: Behavior normal.       Labs:    [facility] 6/27/22   Lp(a) 181      HDL 42  LDL 62   CMP wnl   a1c wnl  Cbc wnl   ESR, CRP, ANCA (MPO ab, PR3 ab) - wnl   GEOVANY - positive >1:1280 (centromere pattern)  CCP ab 71 (elevated)   HCV ab neg     [facility] 7/20/22   CMP - wnl   SCL70 ab - neg   AT III activity wnl   FVL neg   Prot C wnl   PGM neg   B2GP1 ab wnl  ACL ab wnl   Lupus AC neg   D-dimer 0.32       [facility] 8/2022   C3, C4, RA factor - wnl         Lab " "Results   Component Value Date/Time    CHOLSTRLTOT 179 09/03/2015 10:15 AM     (H) 09/03/2015 10:15 AM    HDL 39 (A) 09/03/2015 10:15 AM    TRIGLYCERIDE 102 09/03/2015 10:15 AM     No results found for: \"LIPOPROTA\"   No results found for: \"APOB\"   Lab Results   Component Value Date/Time    CRPHIGHSEN 2.8 10/13/2014 01:30 PM       Lab Results   Component Value Date/Time    SODIUM 136 12/27/2023 01:39 AM    POTASSIUM 4.0 12/27/2023 01:39 AM    CHLORIDE 101 12/27/2023 01:39 AM    CO2 24 12/27/2023 01:39 AM    GLUCOSE 126 (H) 12/27/2023 01:39 AM    BUN 7 (L) 12/27/2023 01:39 AM    CREATININE 0.49 (L) 12/27/2023 01:39 AM    CREATININE 0.9 01/04/2008 02:42 AM     Lab Results   Component Value Date/Time    ALKPHOSPHAT 65 12/22/2023 03:27 PM    ASTSGOT 10 (L) 12/22/2023 03:27 PM    ALTSGPT 13 12/22/2023 03:27 PM    TBILIRUBIN 0.4 12/22/2023 03:27 PM         No results found for: \"MICROALBCALC\", \"MALBCRT\", \"MALBEXCR\", \"XOFDTW22\", \"MICROALBUR\", \"MICRALB\", \"UMICROALBUM\", \"MICROALBTIM\"      Latest Reference Range & Units 09/16/14 04:15 10/28/15 12:04 01/30/16 11:08 04/15/16 14:17 12/26/17 11:05   Hepatitis B Surface Antigen Negative   Negative      HIV 1/0/2 Non Reactive   see below      Rubella IgG Antibody IU/mL  135.40      Strep Gp B DNA PCR Negative     Negative    Syphilis, Treponemal Qual Non Reactive   Non Reactive Non Reactive     Stat C-Reactive Protein 0.00 - 0.75 mg/dL 0.32    0.35     VASCULAR IMAGING    WBI 8/2021  Normal WBI.   Absent 3, 4, 5th digit flow could be artifactual or secondary to embolic    disease.  Recommend clinical correlation.   Right.    Arterial doppler waveforms are of high amplitude and triphasic.   Wrist brachial index is within normal limits. WBI=1.05   PPG of the digits demonstrates absent flow in the right 3rd, 4th, and 5th    digits. The 1st and 2nd digits appear to have slightly diminished flow in    comparsion to the contralateral digits.    CT chest 8/2021   1.  No thoracic " aortic aneurysm or dissection.  2.  Subclavian arteries are normal in caliber and patent.  LEFT subclavian artery is partially secured.  3.  Probable medial RIGHT lower lobe atelectasis.  Thoracic aorta is normal in caliber.  No dissection demonstrated.  Great vessel origins are intact.  RIGHT subclavian artery is normal in caliber and patent.  LEFT subclavian artery is partially obscured by streak artifact however also appears normal in caliber and patent.    Echo 9/2021   No prior study is available for comparison.   Normal left ventricular systolic function.  The left ventricular ejection fraction is visually estimated to be 65%.  No significant valve abnormalities.     WBI/art duplex 8/2022    Wrist-brachial indices are normal.   FBI:   Right-  1.07   Left-     0.98   Right 3rd and 4th finger waveform are diminished, most likely due to    microvascular disease.   Left 2nd and 3rd finger waveforms are diminished, most likely due to    microvascular disease.    Echo 12/2022  Normal left ventricular size, thickness, systolic function, and   diastolic function.  The left ventricular ejection fraction is visually estimated to be 60-  65%.  The right ventricle is borderline dilated in size with preserved   systolic function.  Estimated right ventricular systolic pressure is mildly elevated at 40 mmHg.  Normal left atrial size.  No significant valvular abnormalities.   Normal inferior vena cava size and inspiratory collapse.    CT chest 12/2022  1.  No CT evidence of interstitial lung disease.   2.  No significant incidental findings are identified.         Medical Decision Making:  Today's Assessment / Status / Plan:     1. Raynaud's phenomenon without gangrene  FLUoxetine (PROZAC) 10 MG Cap    US-EXTREMITY ARTERY UPPER BILAT W/WBI (COMBO)      2. CREST syndrome (HCC)  EC-ECHOCARDIOGRAM COMPLETE W/O CONT      3. Pulmonary hypertension (HCC)  EC-ECHOCARDIOGRAM COMPLETE W/O CONT      4. Primary hypertension        5.  Dyslipidemia        6. Elevated lipoprotein(a)        7. Prediabetes        8. Long term (current) use of antithrombotics/antiplatelets        9. Obesity (BMI 30-39.9)        10. Finger wound, simple, open, subsequent encounter             Established CVD:     1) secondary Raynaud's d/t CREST syndrome - persistent symptoms, minimal erosive lesions, no true ulcerations, stable overall  - 4/2022  - recurrent non-healing finger wounds, most recently right index - improving  - prior normal WBI with PPGs showing absent flow   8/2022 duplex = shows nl WBI/FBI with reduced waveforms to R 3r/4th and L 2nd/3rd fingers   - thrombophilia w/u neg,  ANCA neg   - no COVID prior to initial episode   - CTA chest wnl w/o proximal plaque or stenosis, no indications of thoracic Ao diss, plaque  Plan:  - defer further CREST-specific tx to rheum MD for definitive dx and tx  - continue warming practices    Meds:  - conitnue ASA 81mg QOD with PPI (safe in post-bariatric pts)   - continue atorvastatin   - failed sildenafil - HA, nausea  - continue amlodipine 10mg daily - failed nifedipine  - continue losartan 25mg daily to increase vasodilation   - topical nitro-Bid ointment Q6h to affected fingertips - no relief   - start fluoxetine 10mg daily, then increase to 20mg after 2 weeks   - consider cilostazol as next agent   - next step will be ERA (eg. Ambrisentan) - qualifies due to refractory RP and pulm HTN     2) pulm HTN, secondary to CREST - no overt NGO, edema today  2022 echo = RVSP 40mmHg (mild)   Plan:  - candidate for ERA therapy - will review in future   - repeat echo prior to next visit   - may need further input from cardiologist     BLOOD PRESSURE MANAGEMENT:  ACC/AHA (2017) goal <130/80  Home BP at goal: yes  Office BP at goal:  yes - mild hypoTN  - hx of preg-induced HTN on nifedipine   Plan:   - continue healthy diet, activity, weight mgmt   - routine clinic-based BP measurements at least once annually   Medications:   -  continue amlodipine 10mg daily for Raynaud's   - continue losartan 25mg daily for vasodilt    LIPID MANAGEMENT:   Qualifies for Statin Therapy Based on 2018 ACC/AHA Guidelines: yes, Primary Prevention - 40-76yo, LDLc >70, <190 w/o DM  The ASCVD Risk score (Cong KUHN, et al., 2019) failed to calculate.  Major ASCVD events: None  High-risk condition: N/A  Risk-enhancers: Metabolic syndrome  and Lp(a) >50   - reviewed with patient this is an independent risk factor for ASCVD but is currently controversial if lowering has impact on outcomes in primary prevention, so targeting LDLc lowering is primary objective for now and consider lp(a) lowering if worsening ascvd risk or if ascvd event occurs - definitely prothrombotic and needs ASA   Currently on Statin: Yes  Tx goals: LDL-C <100 (consider non-HDL-C <130, apoB <90)  At goal? Yes, 7/2022   Plan:   - reinforced ongoing TLC measures as noted   - monitor labs   Meds:   - continue atorva 40mg daily     ANTITHROMBOTIC/ANTIPLATELET THERAPY: continue ASA 81mg daily for potential antithromb benefit due to non-healing wounds and high lp(a)     GLYCEMIC STATUS: Prediabetic        Plan:  - update a1c   - continue healthy diet, weight reduction, daily physical activity   - consider metformin up to 850mg BID to slow or offset progression to T2D as per DPP trial   - monitor labs Q6-12mo    LIFESTYLE INTERVENTIONS  TOBACCO: continued complete avoidance of all tobacco products   PHYSICAL ACTIVITY: >150min/week of mod-intensity activity or as much as tolerated  NUTRITION: Mediterranean   ETOH: complete abstinence recommended  WT MGMT: maintain healthy weight     OTHER:     # CREST syndrome - stable, on meds   - serology testing indicative of CREST syndrome, possible RA overlay per CCP ab elevations as well   - strong centromere ab pattern and GEOVANY 1:1280 with high CCP ab  - prior echo with pulm HTN (RVSP = 40mmHg) in 2022  Plan:  - ongoing care with rheum MD     # GERD - stable,  continue PPI     STUDIES: echo 11/2024   LABS: as noted above  Follow up in: RTC in 3 months     Scott Acuña M.D.  [facility]   [facility]   [phone]

## 2024-09-11 ENCOUNTER — TELEPHONE (OUTPATIENT)
Dept: INTERNAL MEDICINE | Facility: OTHER | Age: 44
End: 2024-09-11
Payer: MEDICAID

## 2024-09-11 ENCOUNTER — HOSPITAL ENCOUNTER (OUTPATIENT)
Dept: RADIOLOGY | Facility: MEDICAL CENTER | Age: 44
End: 2024-09-11
Payer: MEDICAID

## 2024-09-11 DIAGNOSIS — N83.202 CYST OF LEFT OVARY: ICD-10-CM

## 2024-09-11 DIAGNOSIS — R10.32 LLQ PAIN: ICD-10-CM

## 2024-09-11 PROCEDURE — 76830 TRANSVAGINAL US NON-OB: CPT

## 2024-09-11 NOTE — TELEPHONE ENCOUNTER
Patient was transferred to the back line. Patient would like results of ultrasound explain in detail as she received the results on [employer] seems to not be normal. Patient is ok with receiving a message via [employer] or a call.

## 2024-09-11 NOTE — TELEPHONE ENCOUNTER
Ana Paula Grant M.D.  You11 minutes ago (4:39 PM)     MF  I called the patient on the phone, provided more detail on the results of her ultrasound.

## 2024-09-16 ENCOUNTER — OFFICE VISIT (OUTPATIENT)
Dept: INTERNAL MEDICINE | Facility: OTHER | Age: 44
End: 2024-09-16
Payer: MEDICAID

## 2024-09-16 VITALS
HEIGHT: 65 IN | WEIGHT: 158.4 LBS | TEMPERATURE: 98.1 F | BODY MASS INDEX: 26.39 KG/M2 | HEART RATE: 64 BPM | SYSTOLIC BLOOD PRESSURE: 105 MMHG | OXYGEN SATURATION: 100 % | DIASTOLIC BLOOD PRESSURE: 71 MMHG

## 2024-09-16 DIAGNOSIS — N85.8 UTERINE MASS: ICD-10-CM

## 2024-09-16 DIAGNOSIS — Z11.59 NEED FOR HEPATITIS C SCREENING TEST: ICD-10-CM

## 2024-09-16 DIAGNOSIS — I95.1 ORTHOSTATIC HYPOTENSION: ICD-10-CM

## 2024-09-16 PROCEDURE — 3078F DIAST BP <80 MM HG: CPT | Mod: GC

## 2024-09-16 PROCEDURE — 99214 OFFICE O/P EST MOD 30 MIN: CPT | Mod: GC

## 2024-09-16 PROCEDURE — 3074F SYST BP LT 130 MM HG: CPT | Mod: GC

## 2024-09-16 ASSESSMENT — ENCOUNTER SYMPTOMS
DEPRESSION: 0
FOCAL WEAKNESS: 0
CARDIOVASCULAR NEGATIVE: 1
SPEECH CHANGE: 0
HEARTBURN: 1
TREMORS: 0
POLYDIPSIA: 0
TINGLING: 0
NEUROLOGICAL NEGATIVE: 1
PSYCHIATRIC NEGATIVE: 1
NAUSEA: 1
BACK PAIN: 1
RESPIRATORY NEGATIVE: 1
EYES NEGATIVE: 1

## 2024-09-16 ASSESSMENT — LIFESTYLE VARIABLES: SUBSTANCE_ABUSE: 0

## 2024-09-16 ASSESSMENT — FIBROSIS 4 INDEX: FIB4 SCORE: .6933752452815364025

## 2024-09-16 NOTE — PATIENT INSTRUCTIONS
It was nice seeing you today!  - Please schedule an appointment with gynecology at your earliest convenience.   - Initiate discussion with cardiology regarding your low blood pressure and medication dosages.

## 2024-09-16 NOTE — PROGRESS NOTES
Teaching Physician Attestation      Level of Participation    I have personally interviewed and examined the patient.  In addition, I discussed with the resident physician the patient's history, exam, assessment and plan in detail.  Topics listed in my addendum were the focus of the visit.  Healthcare maintenance was not addressed this visit unless listed as a topic in my addendum.  I agree with the plan as written along with the following additions/modifications:    Lightheadedness likely secondary to orthostasis in the setting of treatment of hypertension  -Patient reports chronically runs low blood pressures and has chronic lightheadedness and nausea, although reports acute worsening of this recently with an episode where when she went from sitting to standing she became acutely lightheaded and fell over, although is denies loss of consciousness.  She is not sure whether she had palpitations during the episode, but denies any preceding or subsequent palpitations.  On exam she has perhaps a subtle systolic ejection murmur, approximately 1 out of 6 at most if present, she also did appear at 1 point to have a S2 that split with inspiration although this did seem to resolve.  Otherwise, no significant murmurs.  She was able to reproduce her symptoms for rest when going from sitting to standing, lying down heart rate was approximately 56 bpm (although patient had not been laying for 2 minutes, thus baseline could have been lower), standing heart rate was approximately 68 bpm.    Plan  -Encouraged hydration, patient reports she is drinking a minimum of 2 L water daily  -As hypertension is managed by vascular and cardiology, will reach out to her cardiologist to see if he would be comfortable with reducing one of the blood pressure medications given this new development.  Per report there was some concern previously about down titration, although given worsening of her lightheadedness and hypotension symptoms seems  worth revisiting.  For now since no subsequent episodes will not adjust medications, would like to speak to primary team before adjusting their medications.  Appreciate cardiology and vascular surgery support.    Addendum: contact made by Dr. Grant with her cardiologist Dr. Coronado.  Dr. Coronado agreed with stopping losartan, home bp log, and following up with him in 1 week for further monitoring.  Dr. Grant discussed and documented with the patient.  Will defer to cardio for further management, appreciate support.      LLQ pain likely secondary to endometrial mass  -Patient reports onset of left lower quadrant pain specifically triggered by sexual intercourse, she reports history of constipation but recently has had regular normal stools, denies any bloody stools, diarrhea, other systemic symptoms were not mentioned.  On exam she is tender to palpation in the left lower quadrant and also somewhat in the suprapubic area, although has no guarding, her abdomen is soft.  Recent ultrasound obtained showed an 8 mm echogenic focus along the endometrium,  Normal ovaries.    Plan  -Referral to gynecology, appreciate support  -On review after precepting, appears patient reported heavier than normal menstrual bleeding to Dr. Grant, she did not discuss this with me in the room.  Have added CBC to monitor pending gynecology follow-up given patient was mildly anemic in December 2023.    Insomnia  Although not directly addressed this visit, cautioned on the importance of always wearing a CPAP when she is utilizing the zolpidem that was prescribed at her last visit for sleep.  Patient reports that she is just adjusting to wearing her full CPAP mask, encouraged close follow-up with sleep medicine, defer to sleep medicine for further management, appreciate support.    Check hep c at next labdraw to complete uspstf recommended screenings, not otherwise addressed.      Appreciate subspecialty support in general of Ms. Vu Miguel.       RTC w/I 3 mo, needs f/u for gerd and confirm status of foot ulcer, suggest 6 weeks if patient amenable, d/w Dr. Grant..  Pcr.

## 2024-09-16 NOTE — PROGRESS NOTES
ESTABLISHED PATIENT VISIT    PATIENT ID:  Name: Christine Heaton  YOB: 1980  Patient Care Team:  Ana Paula Grant M.D. as PCP - General (Internal Medicine)    CHIEF COMPLAINT(s):  Results (Lab results from [facility] as well as Ultrasound)    Follow up for Diagnoses of Uterine mass, Need for hepatitis C screening test, and Orthostatic hypotension were pertinent to this visit.    History of Present Illness:    This is a pleasant 43 y.o. female clinic patient here for follow-up regarding her ultrasound findings and lab work.  She has a past medical history of crest syndrome, dyslipidemia, GERD, FRANK on CPAP, Raynaud's, status post gastric sleeve, status post bilateral salpingectomy, chronic polyarthritis, pulmonary hypertension.  She is following with sleep medicine, cardiology, vascular medicine, rheumatology regarding her comorbidities.    Lastly she complained of left lower quadrant pain with dyspareunia.  She was referred out for ultrasound uterus which revealed 8 x 8 x 5 mm echogenic focus along the endometrium.  Today she says that her left lower quadrant pain has worsened since her transvaginal ultrasound.  She also says that her pain is not constant and not isolated to postcoital.  She also complains of increased swelling bilaterally in her pelvis.  She does not have constipation, history of bloody stool.  She has ongoing fever, chills.  She reports that her periods have gotten heavier, but she does not have any intermenstrual bleeding.    She also mentioned that she had a recent episode where her blood pressure fell to 60/38.  This happened while she was getting up from a sitting position.  She felt her heart racing and briefly collapsed.  She did not hit her head or lose consciousness.  She measured her blood pressure shortly after and saw the low reading.  Has previously discussed with her cardiologist/vascular doctors regarding the dosage of her blood pressure  medication.  However, she said that they did not want adjust her medications as she has not had any syncopal episodes and are afraid of autoamputation of her digits due to her history of Raynaud's phenomenon.  Upon examination, she experienced orthostatic symptoms from laying down to sitting position.  Her pulse increased from 56-68.  Findings consistent with orthostatic symptoms.    Her recent lab results were discussed with her.    She does not have any new complaints this visit.      Patient Active Problem List    Diagnosis Date Noted    Hypercholesterolemia 08/07/2024    Snoring 08/07/2024    Osteoarthrosis 08/07/2024    Gastroesophageal reflux disease 08/07/2024    Status post sleeve gastrectomy 08/07/2024    Benign hypertension 08/07/2024    Asthma 11/22/2023    Sleep apnea 11/22/2023    GERD (gastroesophageal reflux disease) 11/22/2023    Hx of gastric bypass 09/08/2023    H/O gastric sleeve 09/08/2023    Dysmenorrhea 03/27/2023    HTN (hypertension) 03/10/2023    Elevated lipoprotein(a) 03/10/2023    Degenerative joint disease involving multiple joints 02/07/2023    Family history of premature coronary heart disease 08/01/2022    Dyslipidemia 08/01/2022    Prediabetes 08/01/2022    Raynaud's phenomenon (secondary) 08/01/2022    Obesity (BMI 30-39.9) 04/28/2022    Postprocedural pseudomeningocele 09/02/2014    Displacement of lumbar intervertebral disc without myelopathy 08/27/2014    Cataract 06/05/2014         Review of Systems   Constitutional:  Positive for malaise/fatigue.   HENT: Negative.     Eyes: Negative.    Respiratory: Negative.     Cardiovascular: Negative.    Gastrointestinal:  Positive for heartburn and nausea.        Constant left lower quadrant pain worsened after transvaginal ultrasound.   Genitourinary: Negative.  Negative for dysuria, frequency and urgency.   Musculoskeletal:  Positive for back pain and joint pain.   Skin: Negative.    Neurological: Negative.  Negative for tingling,  "tremors, speech change and focal weakness.   Endo/Heme/Allergies: Negative.  Negative for environmental allergies and polydipsia.   Psychiatric/Behavioral: Negative.  Negative for depression, substance abuse and suicidal ideas.        Past Medical History:   Diagnosis Date    Acid reflux     GEOVANY positive 2022    Anesthesia     Family Hx-father ; was alwasy told it was anesthesia \"related\"; not sure what the actual reaction was; pt-PONV    Arthritis 2023    osteoarthritis    Asthma     Breath shortness 2023    on exertion    CREST syndrome (HCC)     Cyclic citrullinated peptide (CCP) antibody positive 2022    Degenerative joint disease involving multiple joints 2023    Dyslipidemia 2022    Dyspareunia, female 2023    Enlargement - tonsil/adenoid 2013    Fatty liver     Heart burn     Hiatus hernia syndrome 2023    High cholesterol 2023    on medication    Hypertension 2023    on medication    Indigestion     Intractable back pain 2017    Left knee pain 2023    Obesity due to excess calories with serious comorbidity 2023    Oligohydramnios antepartum-IOL  9 AM 04/15/2016    IMO load 2020    Osteoporosis     Pain 2014    lower back/L leg=7/10    Pain 2023    Chronic pain \"all over my body\" rates pain 7 out of 10    Patellofemoral instability of left knee with pain 2023    Pelvic pain 2023    Right ventricular enlargement     Sciatica of right side 2017    Sleep apnea 2023    awaiting CPAP    Snoring     Tachycardia, unspecified     Unspecified urinary incontinence 2023    stress incontinence     Past Surgical History:   Procedure Laterality Date    FL LAP NILS RESTRICT PROC LONGITUDINAL GAS*  2023    Procedure: ROBOTIC SONNY EN Y GASTROENTEROSTOMY;  Surgeon: Olivier Oliveros M.D.;  Location: SURGERY [facility];  Service: Gen Robotic    ROBOTIC ASSISTED LAP HIATAL HERNIA " REP  2023    Procedure: REPAIR, HERNIA, HIATAL, ROBOT-ASSISTED, LAPAROSCOPIC;  Surgeon: Olivier Oliveros M.D.;  Location: SURGERY [facility];  Service: Gen Robotic    ME LAP NILS RESTRICT PROC LONGITUDINAL GAS*  2023    Procedure: ROBOTIC PARTIAL SLEEVE GASTRECTOMY;  Surgeon: Olivier Oliveros M.D.;  Location: SURGERY [facility];  Service: Gen Robotic    ROBOTIC ASSISTED LAP HIATAL HERNIA REP  2023    Procedure: REPAIR, HERNIA, HIATAL, ROBOT-ASSISTED, LAPAROSCOPIC;  Surgeon: Olivier Oliveros M.D.;  Location: SURGERY [facility];  Service: Gen Robotic    ME LAP,DIAGNOSTIC ABDOMEN N/A 3/27/2023    Procedure: LAPAROSCOPY, DIAGNOSTIC AND OPERATIVE AND LAPAROSCOPIC BILATERAL SALPINGECTOMY;  Surgeon: Nolan Rodriguez M.D.;  Location: SURGERY [facility];  Service: Gynecology    IRRIGATION & DEBRIDEMENT ORTHO  2014    Performed by El Gomez M.D. at SURGERY [facility] ORS    IRRIGATION & DEBRIDEMENT NEURO  2014    Performed by El Gomez M.D. at [facility] ORS    LUMBAR LAMINECTOMY DISKECTOMY  2014    Performed by El Gomez M.D. at [facility] ORS    INJ,EPIDURAL/LUMB/SAC SINGLE  2014    Performed by Alexis Cornell M.D. at [facility] ORS    TONSILLECTOMY  2013    Performed by Aaron Hutton M.D. at SURGERY [facility] DAY [facility] ORS    NASAL SEPTAL RECONSTRUCTION  2013    Performed by Aaron Hutton M.D. at SURGERY [facility] DAY [facility] ORS    TURBINOPLASTY  2013    Performed by Aaron Hutton M.D. at SURGERY [facility] ORS     Family History   Problem Relation Age of Onset    Rheumatologic Disease Mother         RA    Heart Attack Father          MI (age 45)    Kidney Disease Father         ESRD on HD    Diabetes Father     Heart Disease Sister      Latex, Duloxetine, Nifedipine, and Sildenafil  Social History     Tobacco Use    Smoking status: Never     Passive exposure: Never     "Smokeless tobacco: Never   Vaping Use    Vaping status: Never Used   Substance Use Topics    Alcohol use: No    Drug use: No     Current Outpatient Medications   Medication Sig Dispense Refill    FLUoxetine (PROZAC) 10 MG Cap Take 1 Capsule by mouth every day. 100 Capsule 3    pilocarpine (SALAGEN) 5 MG Tab Take 1 Tablet by mouth 3 times a day.      aspirin 81 MG EC tablet Take 1 Tablet by mouth every 48 hours.      oxyCODONE-acetaminophen (PERCOCET) 5-325 MG Tab       atorvastatin (LIPITOR) 40 MG Tab Take 1 Tablet by mouth every evening. 90 Tablet 1    losartan (COZAAR) 25 MG Tab Take 1 Tablet by mouth every day. 90 Tablet 3    HYDROcodone-acetaminophen 2.5-108 mg/5mL (HYCET) 7.5-325 MG/15ML solution Take 15 ml by mouth every 4 hours as needed for 5 days 450 mL 0    famotidine (PEPCID) 40 MG Tab Take 1 Tablet by mouth every day. 60 Tablet     omeprazole (PRILOSEC) 40 MG delayed-release capsule Take 1 Capsule by mouth 2 times a day. 90 Capsule 0    baclofen (LIORESAL) 10 MG Tab Take 10 mg by mouth 3 times a day.      amLODIPine (NORVASC) 10 MG Tab TAKE 1 TABLET BY MOUTH EVERY DAY AT BEDTIME  DAYS      sucralfate (CARAFATE) 1 GM Tab       albuterol 108 (90 Base) MCG/ACT Aero Soln inhalation aerosol Inhale 2 Puffs every four hours as needed for Shortness of Breath. 1 Each 0    hydroxychloroquine (PLAQUENIL) 200 MG Tab Take 200 mg by mouth every day.       No current facility-administered medications for this visit.       OBJECTIVE:  /71 (BP Location: Right arm, Patient Position: Sitting, BP Cuff Size: Adult)   Pulse 64   Temp 36.7 °C (98.1 °F) (Temporal)   Ht 1.651 m (5' 5\")   Wt 71.8 kg (158 lb 6.4 oz)   SpO2 100%   BMI 26.36 kg/m²   Physical Exam  Constitutional:       Appearance: Normal appearance. She is normal weight.   HENT:      Head: Normocephalic.      Right Ear: External ear normal.      Left Ear: External ear normal.      Nose: Nose normal.      Mouth/Throat:      Mouth: Mucous membranes " are moist.   Cardiovascular:      Rate and Rhythm: Regular rhythm. Bradycardia present.      Pulses: Normal pulses.      Heart sounds: Normal heart sounds.   Pulmonary:      Effort: Pulmonary effort is normal.      Breath sounds: Normal breath sounds.   Abdominal:      General: Abdomen is flat. Bowel sounds are normal.      Tenderness: There is abdominal tenderness.      Comments: Upon examination, patient had bilateral swelling in her left and right lower quadrants.  She reports tenderness on palpation of the left side.  Having consistent pain on her left lower quadrant since her transvaginal ultrasound.   Musculoskeletal:         General: Normal range of motion.   Skin:     General: Skin is warm.      Capillary Refill: Capillary refill takes less than 2 seconds.   Neurological:      General: No focal deficit present.      Mental Status: She is alert and oriented to person, place, and time.   Psychiatric:         Mood and Affect: Mood normal.         Behavior: Behavior normal.         Thought Content: Thought content normal.         Judgment: Judgment normal.       ASSESSMENT AND PLAN:     1. Uterine mass  -Transvaginal ultrasound revealed 8 x 8 x 5 mm echogenic focus along the endometrium, possibly polyp/hyperplasia/malignancy.  -Patient has worsening left lower quadrant pain, she says it was greatly exacerbated by ultrasound. She is having heavier menstrual bleeding, still bleeding 3 to 5 days per cycle. Uses a pack of 48 pads within 4 to 5 days. Still avoiding sexual intercourse.  Noted on physical examination bilateral lower quadrants.  - Referral to Gynecology    2. Need for hepatitis C screening test  - HEP C VIRUS ANTIBODY; Future    3. Orthostatic Hypotension  -Patient is currently taking Shell 25, amlodipine 10 for hypertension/Raynaud's phenomenon.   -She recently experienced an episode of low blood pressure which caused her to fall over. She did not lose consciousness or hit her head on the way down.  She measure blood pressure at the time and it was found to be 60/38.  Previous discussion with her cardiologist/vascular Dr. Castaneda did not want increase her dosage of blood pressure medications as they are worried it will interfere with her Raynaud's phenomena and ultimately to digit autoamputation.  -Will initiate discussion with her specialist regarding medication adjustments to prevent future episodes of hypotension.      Problem List Items Addressed This Visit    None  Visit Diagnoses       Uterine mass        Relevant Orders    Referral to Gynecology    Need for hepatitis C screening test        Relevant Orders    HEP C VIRUS ANTIBODY    Orthostatic hypotension                Orders Placed This Encounter    HEP C VIRUS ANTIBODY    Referral to Gynecology       Return in about 3 months (around 12/16/2024).      Ana Paula Grant M.D. PGY-1  [facility] Internal Medicine Resident

## 2024-09-17 ENCOUNTER — TELEPHONE (OUTPATIENT)
Dept: INTERNAL MEDICINE | Facility: OTHER | Age: 44
End: 2024-09-17
Payer: MEDICAID

## 2024-09-17 DIAGNOSIS — I73.00 RAYNAUD'S PHENOMENON WITHOUT GANGRENE: ICD-10-CM

## 2024-09-17 DIAGNOSIS — I27.20 PULMONARY HYPERTENSION (HCC): ICD-10-CM

## 2024-09-17 DIAGNOSIS — R00.2 PALPITATION: ICD-10-CM

## 2024-09-17 NOTE — TELEPHONE ENCOUNTER
Spoke with Dr. Neville Coronado, Mr. Miguel and his cardiologist regarding Ms. Miguel's complaint.  I explained to him how she fell over after standing from a sitting position.  She did not lose consciousness or hit her head on the ground.  Whenever she measured her blood pressure was 60/38.  She was wondering if it is okay to reduce her blood pressure medication.  Dr. Coronado agreed that would be a good idea to hold off on her losartan 25 mg for the time being until we can see her again in person.  She was instructed to bring a blood pressure log upon her next visit with Dr. Coronado.  Ms. Miguel also agreed with this plan.

## 2024-09-18 RX ORDER — FLUOXETINE 10 MG/1
10 CAPSULE ORAL DAILY
Qty: 100 CAPSULE | Refills: 3 | Status: SHIPPED | OUTPATIENT
Start: 2024-09-18

## 2024-09-18 NOTE — TELEPHONE ENCOUNTER
DDI with fluoxetine and plaquenil and risk for long QT reviewed.   Will use lowest dosage of fluoxetine for benefit of treatment resistant, symptomatic Raynauds  No hx of long QT on prior EKG.   Will update EKG approximate 2-4 weeks after fluoxetine initiation and monitor - order placed    MA - please contact to inform due to use of fluoxetine and hydroxychloroquine, we will need to update an ekg about 3-4 weeks after starting the fluoxetine to verify no changes in her heart rhythm.  New order for EKG placed, she can call to schedule and I will alert her to the results.     If after starting fluoxetine she notes any faiting, racing or fluttering heart feelings please stop fluoxetine and let our clinic know.  Thanks

## 2024-09-25 ENCOUNTER — APPOINTMENT (OUTPATIENT)
Dept: ADMISSIONS | Facility: MEDICAL CENTER | Age: 44
End: 2024-09-25
Attending: COLON & RECTAL SURGERY
Payer: MEDICAID

## 2024-09-30 ENCOUNTER — TELEPHONE (OUTPATIENT)
Dept: INTERNAL MEDICINE | Facility: OTHER | Age: 44
End: 2024-09-30
Payer: MEDICAID

## 2024-09-30 ENCOUNTER — PRE-ADMISSION TESTING (OUTPATIENT)
Dept: ADMISSIONS | Facility: MEDICAL CENTER | Age: 44
End: 2024-09-30
Attending: COLON & RECTAL SURGERY
Payer: MEDICAID

## 2024-09-30 RX ORDER — LOSARTAN POTASSIUM 25 MG/1
25 TABLET ORAL DAILY
COMMUNITY

## 2024-10-01 ENCOUNTER — PRE-ADMISSION TESTING (OUTPATIENT)
Dept: ADMISSIONS | Facility: MEDICAL CENTER | Age: 44
End: 2024-10-01
Attending: COLON & RECTAL SURGERY
Payer: MEDICAID

## 2024-10-01 DIAGNOSIS — Z01.812 PRE-OPERATIVE LABORATORY EXAMINATION: ICD-10-CM

## 2024-10-01 DIAGNOSIS — Z01.810 PRE-OPERATIVE CARDIOVASCULAR EXAMINATION: ICD-10-CM

## 2024-10-01 LAB
ALBUMIN SERPL BCP-MCNC: 4.1 G/DL (ref 3.2–4.9)
ALBUMIN/GLOB SERPL: 1.6 G/DL
ALP SERPL-CCNC: 76 U/L (ref 30–99)
ALT SERPL-CCNC: 22 U/L (ref 2–50)
ANION GAP SERPL CALC-SCNC: 15 MMOL/L (ref 7–16)
AST SERPL-CCNC: 15 U/L (ref 12–45)
BILIRUB SERPL-MCNC: 0.5 MG/DL (ref 0.1–1.5)
BUN SERPL-MCNC: 10 MG/DL (ref 8–22)
CALCIUM ALBUM COR SERPL-MCNC: 8.9 MG/DL (ref 8.5–10.5)
CALCIUM SERPL-MCNC: 9 MG/DL (ref 8.5–10.5)
CHLORIDE SERPL-SCNC: 101 MMOL/L (ref 96–112)
CO2 SERPL-SCNC: 23 MMOL/L (ref 20–33)
CREAT SERPL-MCNC: 0.47 MG/DL (ref 0.5–1.4)
EKG IMPRESSION: NORMAL
EKG IMPRESSION: NORMAL
ERYTHROCYTE [DISTWIDTH] IN BLOOD BY AUTOMATED COUNT: 42.8 FL (ref 35.9–50)
GFR SERPLBLD CREATININE-BSD FMLA CKD-EPI: 120 ML/MIN/1.73 M 2
GLOBULIN SER CALC-MCNC: 2.5 G/DL (ref 1.9–3.5)
GLUCOSE SERPL-MCNC: 93 MG/DL (ref 65–99)
HCT VFR BLD AUTO: 41.6 % (ref 37–47)
HGB BLD-MCNC: 13.8 G/DL (ref 12–16)
MCH RBC QN AUTO: 30.1 PG (ref 27–33)
MCHC RBC AUTO-ENTMCNC: 33.2 G/DL (ref 32.2–35.5)
MCV RBC AUTO: 90.6 FL (ref 81.4–97.8)
PLATELET # BLD AUTO: 150 K/UL (ref 164–446)
PMV BLD AUTO: 9.5 FL (ref 9–12.9)
POTASSIUM SERPL-SCNC: 3.7 MMOL/L (ref 3.6–5.5)
PROT SERPL-MCNC: 6.6 G/DL (ref 6–8.2)
RBC # BLD AUTO: 4.59 M/UL (ref 4.2–5.4)
SODIUM SERPL-SCNC: 139 MMOL/L (ref 135–145)
WBC # BLD AUTO: 4 K/UL (ref 4.8–10.8)

## 2024-10-01 PROCEDURE — 85027 COMPLETE CBC AUTOMATED: CPT

## 2024-10-01 PROCEDURE — 93010 ELECTROCARDIOGRAM REPORT: CPT | Performed by: INTERNAL MEDICINE

## 2024-10-01 PROCEDURE — 36415 COLL VENOUS BLD VENIPUNCTURE: CPT

## 2024-10-01 PROCEDURE — 93005 ELECTROCARDIOGRAM TRACING: CPT

## 2024-10-01 PROCEDURE — 80053 COMPREHEN METABOLIC PANEL: CPT

## 2024-10-04 ENCOUNTER — HOSPITAL ENCOUNTER (OUTPATIENT)
Facility: MEDICAL CENTER | Age: 44
End: 2024-10-04
Attending: COLON & RECTAL SURGERY | Admitting: COLON & RECTAL SURGERY
Payer: MEDICAID

## 2024-10-04 ENCOUNTER — ANESTHESIA (OUTPATIENT)
Dept: SURGERY | Facility: MEDICAL CENTER | Age: 44
End: 2024-10-04
Payer: MEDICAID

## 2024-10-04 ENCOUNTER — ANESTHESIA EVENT (OUTPATIENT)
Dept: SURGERY | Facility: MEDICAL CENTER | Age: 44
End: 2024-10-04
Payer: MEDICAID

## 2024-10-04 VITALS
DIASTOLIC BLOOD PRESSURE: 68 MMHG | TEMPERATURE: 97.5 F | SYSTOLIC BLOOD PRESSURE: 128 MMHG | HEART RATE: 74 BPM | OXYGEN SATURATION: 96 % | RESPIRATION RATE: 16 BRPM

## 2024-10-04 LAB
HCG UR QL: NEGATIVE
INR PPP: 1.07 (ref 0.87–1.13)
PROTHROMBIN TIME: 13.9 SEC (ref 12–14.6)

## 2024-10-04 PROCEDURE — 160002 HCHG RECOVERY MINUTES (STAT): Performed by: COLON & RECTAL SURGERY

## 2024-10-04 PROCEDURE — 36415 COLL VENOUS BLD VENIPUNCTURE: CPT

## 2024-10-04 PROCEDURE — C1726 CATH, BAL DIL, NON-VASCULAR: HCPCS | Performed by: COLON & RECTAL SURGERY

## 2024-10-04 PROCEDURE — 160046 HCHG PACU - 1ST 60 MINS PHASE II: Performed by: COLON & RECTAL SURGERY

## 2024-10-04 PROCEDURE — 160025 RECOVERY II MINUTES (STATS): Performed by: COLON & RECTAL SURGERY

## 2024-10-04 PROCEDURE — 160202 HCHG ENDO MINUTES - 1ST 30 MINS LEVEL 3: Performed by: COLON & RECTAL SURGERY

## 2024-10-04 PROCEDURE — C1889 IMPLANT/INSERT DEVICE, NOC: HCPCS | Performed by: COLON & RECTAL SURGERY

## 2024-10-04 PROCEDURE — 85610 PROTHROMBIN TIME: CPT

## 2024-10-04 PROCEDURE — 700105 HCHG RX REV CODE 258: Mod: UD | Performed by: STUDENT IN AN ORGANIZED HEALTH CARE EDUCATION/TRAINING PROGRAM

## 2024-10-04 PROCEDURE — 700111 HCHG RX REV CODE 636 W/ 250 OVERRIDE (IP): Mod: UD | Performed by: STUDENT IN AN ORGANIZED HEALTH CARE EDUCATION/TRAINING PROGRAM

## 2024-10-04 PROCEDURE — 160048 HCHG OR STATISTICAL LEVEL 1-5: Performed by: COLON & RECTAL SURGERY

## 2024-10-04 PROCEDURE — 700111 HCHG RX REV CODE 636 W/ 250 OVERRIDE (IP): Mod: UD | Performed by: COLON & RECTAL SURGERY

## 2024-10-04 PROCEDURE — 700101 HCHG RX REV CODE 250: Mod: UD | Performed by: STUDENT IN AN ORGANIZED HEALTH CARE EDUCATION/TRAINING PROGRAM

## 2024-10-04 PROCEDURE — 160009 HCHG ANES TIME/MIN: Performed by: COLON & RECTAL SURGERY

## 2024-10-04 PROCEDURE — 160035 HCHG PACU - 1ST 60 MINS PHASE I: Performed by: COLON & RECTAL SURGERY

## 2024-10-04 PROCEDURE — 700105 HCHG RX REV CODE 258: Mod: UD | Performed by: COLON & RECTAL SURGERY

## 2024-10-04 PROCEDURE — 81025 URINE PREGNANCY TEST: CPT

## 2024-10-04 RX ORDER — ONDANSETRON 2 MG/ML
4 INJECTION INTRAMUSCULAR; INTRAVENOUS
Status: DISCONTINUED | OUTPATIENT
Start: 2024-10-04 | End: 2024-10-04 | Stop reason: HOSPADM

## 2024-10-04 RX ORDER — DIPHENHYDRAMINE HYDROCHLORIDE 50 MG/ML
12.5 INJECTION INTRAMUSCULAR; INTRAVENOUS
Status: DISCONTINUED | OUTPATIENT
Start: 2024-10-04 | End: 2024-10-04 | Stop reason: HOSPADM

## 2024-10-04 RX ORDER — LIDOCAINE HYDROCHLORIDE 20 MG/ML
INJECTION, SOLUTION EPIDURAL; INFILTRATION; INTRACAUDAL; PERINEURAL PRN
Status: DISCONTINUED | OUTPATIENT
Start: 2024-10-04 | End: 2024-10-04 | Stop reason: SURG

## 2024-10-04 RX ORDER — OXYCODONE HCL 5 MG/5 ML
10 SOLUTION, ORAL ORAL
Status: DISCONTINUED | OUTPATIENT
Start: 2024-10-04 | End: 2024-10-04 | Stop reason: HOSPADM

## 2024-10-04 RX ORDER — SODIUM CHLORIDE, SODIUM LACTATE, POTASSIUM CHLORIDE, CALCIUM CHLORIDE 600; 310; 30; 20 MG/100ML; MG/100ML; MG/100ML; MG/100ML
INJECTION, SOLUTION INTRAVENOUS CONTINUOUS
Status: DISCONTINUED | OUTPATIENT
Start: 2024-10-04 | End: 2024-10-04 | Stop reason: HOSPADM

## 2024-10-04 RX ORDER — EPHEDRINE SULFATE 50 MG/ML
5 INJECTION, SOLUTION INTRAVENOUS
Status: DISCONTINUED | OUTPATIENT
Start: 2024-10-04 | End: 2024-10-04 | Stop reason: HOSPADM

## 2024-10-04 RX ORDER — MIDAZOLAM HYDROCHLORIDE 1 MG/ML
INJECTION INTRAMUSCULAR; INTRAVENOUS PRN
Status: DISCONTINUED | OUTPATIENT
Start: 2024-10-04 | End: 2024-10-04 | Stop reason: SURG

## 2024-10-04 RX ORDER — TRIAMCINOLONE ACETONIDE 40 MG/ML
INJECTION, SUSPENSION INTRA-ARTICULAR; INTRAMUSCULAR
Status: DISCONTINUED | OUTPATIENT
Start: 2024-10-04 | End: 2024-10-04 | Stop reason: HOSPADM

## 2024-10-04 RX ORDER — DEXAMETHASONE SODIUM PHOSPHATE 4 MG/ML
INJECTION, SOLUTION INTRA-ARTICULAR; INTRALESIONAL; INTRAMUSCULAR; INTRAVENOUS; SOFT TISSUE PRN
Status: DISCONTINUED | OUTPATIENT
Start: 2024-10-04 | End: 2024-10-04 | Stop reason: SURG

## 2024-10-04 RX ORDER — HALOPERIDOL 5 MG/ML
1 INJECTION INTRAMUSCULAR
Status: DISCONTINUED | OUTPATIENT
Start: 2024-10-04 | End: 2024-10-04 | Stop reason: HOSPADM

## 2024-10-04 RX ORDER — SODIUM CHLORIDE, SODIUM LACTATE, POTASSIUM CHLORIDE, CALCIUM CHLORIDE 600; 310; 30; 20 MG/100ML; MG/100ML; MG/100ML; MG/100ML
INJECTION, SOLUTION INTRAVENOUS
Status: DISCONTINUED | OUTPATIENT
Start: 2024-10-04 | End: 2024-10-04 | Stop reason: SURG

## 2024-10-04 RX ORDER — MEPERIDINE HYDROCHLORIDE 25 MG/ML
12.5 INJECTION INTRAMUSCULAR; INTRAVENOUS; SUBCUTANEOUS
Status: DISCONTINUED | OUTPATIENT
Start: 2024-10-04 | End: 2024-10-04 | Stop reason: HOSPADM

## 2024-10-04 RX ORDER — ACETAMINOPHEN 325 MG/1
325-650 TABLET ORAL EVERY 4 HOURS PRN
COMMUNITY

## 2024-10-04 RX ORDER — OXYCODONE HCL 5 MG/5 ML
5 SOLUTION, ORAL ORAL
Status: DISCONTINUED | OUTPATIENT
Start: 2024-10-04 | End: 2024-10-04 | Stop reason: HOSPADM

## 2024-10-04 RX ORDER — ALBUTEROL SULFATE 5 MG/ML
2.5 SOLUTION RESPIRATORY (INHALATION)
Status: DISCONTINUED | OUTPATIENT
Start: 2024-10-04 | End: 2024-10-04 | Stop reason: HOSPADM

## 2024-10-04 RX ORDER — HYDRALAZINE HYDROCHLORIDE 20 MG/ML
5 INJECTION INTRAMUSCULAR; INTRAVENOUS
Status: DISCONTINUED | OUTPATIENT
Start: 2024-10-04 | End: 2024-10-04 | Stop reason: HOSPADM

## 2024-10-04 RX ORDER — ROCURONIUM BROMIDE 10 MG/ML
INJECTION, SOLUTION INTRAVENOUS PRN
Status: DISCONTINUED | OUTPATIENT
Start: 2024-10-04 | End: 2024-10-04 | Stop reason: SURG

## 2024-10-04 RX ADMIN — SODIUM CHLORIDE, POTASSIUM CHLORIDE, SODIUM LACTATE AND CALCIUM CHLORIDE: 600; 310; 30; 20 INJECTION, SOLUTION INTRAVENOUS at 08:51

## 2024-10-04 RX ADMIN — FENTANYL CITRATE 50 MCG: 50 INJECTION, SOLUTION INTRAMUSCULAR; INTRAVENOUS at 08:55

## 2024-10-04 RX ADMIN — MIDAZOLAM HYDROCHLORIDE 2 MG: 2 INJECTION, SOLUTION INTRAMUSCULAR; INTRAVENOUS at 08:49

## 2024-10-04 RX ADMIN — SODIUM CHLORIDE, POTASSIUM CHLORIDE, SODIUM LACTATE AND CALCIUM CHLORIDE: 600; 310; 30; 20 INJECTION, SOLUTION INTRAVENOUS at 09:30

## 2024-10-04 RX ADMIN — DEXAMETHASONE SODIUM PHOSPHATE 4 MG: 4 INJECTION INTRA-ARTICULAR; INTRALESIONAL; INTRAMUSCULAR; INTRAVENOUS; SOFT TISSUE at 08:57

## 2024-10-04 RX ADMIN — PROPOFOL 150 MG: 10 INJECTION, EMULSION INTRAVENOUS at 08:55

## 2024-10-04 RX ADMIN — LIDOCAINE HYDROCHLORIDE 80 MG: 20 INJECTION, SOLUTION EPIDURAL; INFILTRATION; INTRACAUDAL at 08:55

## 2024-10-04 RX ADMIN — ROCURONIUM BROMIDE 30 MG: 50 INJECTION, SOLUTION INTRAVENOUS at 08:55

## 2024-10-04 RX ADMIN — SUGAMMADEX 200 MG: 100 INJECTION, SOLUTION INTRAVENOUS at 09:07

## 2024-10-04 ASSESSMENT — PAIN SCALES - GENERAL: PAIN_LEVEL: 2

## 2024-10-04 ASSESSMENT — PAIN DESCRIPTION - PAIN TYPE: TYPE: CHRONIC PAIN

## 2024-11-04 ENCOUNTER — APPOINTMENT (OUTPATIENT)
Dept: VASCULAR LAB | Facility: MEDICAL CENTER | Age: 44
End: 2024-11-04
Attending: FAMILY MEDICINE
Payer: MEDICAID

## 2024-11-04 ENCOUNTER — DOCUMENTATION (OUTPATIENT)
Dept: VASCULAR LAB | Facility: MEDICAL CENTER | Age: 44
End: 2024-11-04
Payer: MEDICAID

## 2024-11-04 NOTE — PROGRESS NOTES
Christine Miguel     Same day cancellation today. Did not reschedule.     Scheduling staff to remind patient to complete any previously ordered labs/imaging and of the importance of maintaining appointments as scheduled.     We request to contact our office at least 24 hours in advance if they need to reschedule. We also offer virtual visits if requested.        Patient should be aware of the potential for worsening conditions without appropriate follow-up and monitoring.      [facility]   [facility]   [phone]

## 2024-11-07 ENCOUNTER — APPOINTMENT (OUTPATIENT)
Dept: ADMISSIONS | Facility: MEDICAL CENTER | Age: 44
End: 2024-11-07
Attending: SPECIALIST
Payer: MEDICAID

## 2024-11-12 ENCOUNTER — PRE-ADMISSION TESTING (OUTPATIENT)
Dept: ADMISSIONS | Facility: MEDICAL CENTER | Age: 44
End: 2024-11-12
Attending: SPECIALIST
Payer: MEDICAID

## 2024-11-12 NOTE — OR NURSING
Fasting and medication instructions given to patient per anesthesia guidelines.  Medication instructions emailed - email address confirmed with patient.  Patient understands all instructions and denies questions at this time.

## 2024-11-22 NOTE — OR NURSING
Preadmit: Call to patient to encourage increased oral fluid intake the day prior to procedure/surgery including intake of electrolyte drinks such as Gatorade or electrolyte water. Patient may have clear liquids until 2 hours prior to surgery.  Surgery date 11/25/24

## 2024-11-24 ENCOUNTER — ANESTHESIA EVENT (OUTPATIENT)
Dept: SURGERY | Facility: MEDICAL CENTER | Age: 44
End: 2024-11-24
Payer: MEDICAID

## 2024-11-25 ENCOUNTER — HOSPITAL ENCOUNTER (OUTPATIENT)
Facility: MEDICAL CENTER | Age: 44
End: 2024-11-25
Attending: SPECIALIST | Admitting: SPECIALIST
Payer: MEDICAID

## 2024-11-25 ENCOUNTER — ANESTHESIA (OUTPATIENT)
Dept: SURGERY | Facility: MEDICAL CENTER | Age: 44
End: 2024-11-25
Payer: MEDICAID

## 2024-11-25 VITALS
WEIGHT: 158.95 LBS | OXYGEN SATURATION: 97 % | TEMPERATURE: 97.9 F | DIASTOLIC BLOOD PRESSURE: 75 MMHG | HEIGHT: 66 IN | BODY MASS INDEX: 25.55 KG/M2 | RESPIRATION RATE: 15 BRPM | SYSTOLIC BLOOD PRESSURE: 118 MMHG | HEART RATE: 79 BPM

## 2024-11-25 DIAGNOSIS — G89.18 POSTOPERATIVE PAIN: ICD-10-CM

## 2024-11-25 PROBLEM — M34.1 CREST SYNDROME (HCC): Status: ACTIVE | Noted: 2022-08-01

## 2024-11-25 PROBLEM — N92.1 METRORRHAGIA: Status: ACTIVE | Noted: 2024-11-25

## 2024-11-25 LAB
HCG UR QL: NEGATIVE
PATHOLOGY CONSULT NOTE: NORMAL

## 2024-11-25 PROCEDURE — 160041 HCHG SURGERY MINUTES - EA ADDL 1 MIN LEVEL 4: Performed by: SPECIALIST

## 2024-11-25 PROCEDURE — 700111 HCHG RX REV CODE 636 W/ 250 OVERRIDE (IP): Mod: UD | Performed by: ANESTHESIOLOGY

## 2024-11-25 PROCEDURE — 700105 HCHG RX REV CODE 258: Mod: UD | Performed by: ANESTHESIOLOGY

## 2024-11-25 PROCEDURE — 160035 HCHG PACU - 1ST 60 MINS PHASE I: Performed by: SPECIALIST

## 2024-11-25 PROCEDURE — 700101 HCHG RX REV CODE 250: Mod: UD | Performed by: SPECIALIST

## 2024-11-25 PROCEDURE — 700111 HCHG RX REV CODE 636 W/ 250 OVERRIDE (IP): Mod: JZ,UD | Performed by: ANESTHESIOLOGY

## 2024-11-25 PROCEDURE — 160046 HCHG PACU - 1ST 60 MINS PHASE II: Performed by: SPECIALIST

## 2024-11-25 PROCEDURE — 160002 HCHG RECOVERY MINUTES (STAT): Performed by: SPECIALIST

## 2024-11-25 PROCEDURE — 160029 HCHG SURGERY MINUTES - 1ST 30 MINS LEVEL 4: Performed by: SPECIALIST

## 2024-11-25 PROCEDURE — 160048 HCHG OR STATISTICAL LEVEL 1-5: Performed by: SPECIALIST

## 2024-11-25 PROCEDURE — 88305 TISSUE EXAM BY PATHOLOGIST: CPT

## 2024-11-25 PROCEDURE — 160009 HCHG ANES TIME/MIN: Performed by: SPECIALIST

## 2024-11-25 PROCEDURE — A9270 NON-COVERED ITEM OR SERVICE: HCPCS | Mod: UD | Performed by: ANESTHESIOLOGY

## 2024-11-25 PROCEDURE — 160036 HCHG PACU - EA ADDL 30 MINS PHASE I: Performed by: SPECIALIST

## 2024-11-25 PROCEDURE — 160025 RECOVERY II MINUTES (STATS): Performed by: SPECIALIST

## 2024-11-25 PROCEDURE — 81025 URINE PREGNANCY TEST: CPT

## 2024-11-25 PROCEDURE — 700101 HCHG RX REV CODE 250: Mod: UD | Performed by: ANESTHESIOLOGY

## 2024-11-25 PROCEDURE — 700102 HCHG RX REV CODE 250 W/ 637 OVERRIDE(OP): Mod: UD | Performed by: ANESTHESIOLOGY

## 2024-11-25 RX ORDER — ACETAMINOPHEN 500 MG
1000 TABLET ORAL ONCE
Status: COMPLETED | OUTPATIENT
Start: 2024-11-25 | End: 2024-11-25

## 2024-11-25 RX ORDER — ALBUTEROL SULFATE 5 MG/ML
2.5 SOLUTION RESPIRATORY (INHALATION)
Status: DISCONTINUED | OUTPATIENT
Start: 2024-11-25 | End: 2024-11-25 | Stop reason: HOSPADM

## 2024-11-25 RX ORDER — ONDANSETRON 2 MG/ML
INJECTION INTRAMUSCULAR; INTRAVENOUS PRN
Status: DISCONTINUED | OUTPATIENT
Start: 2024-11-25 | End: 2024-11-25 | Stop reason: SURG

## 2024-11-25 RX ORDER — LIDOCAINE HYDROCHLORIDE 20 MG/ML
INJECTION, SOLUTION EPIDURAL; INFILTRATION; INTRACAUDAL; PERINEURAL PRN
Status: DISCONTINUED | OUTPATIENT
Start: 2024-11-25 | End: 2024-11-25 | Stop reason: SURG

## 2024-11-25 RX ORDER — BUPIVACAINE HYDROCHLORIDE AND EPINEPHRINE 2.5; 5 MG/ML; UG/ML
INJECTION, SOLUTION EPIDURAL; INFILTRATION; INTRACAUDAL; PERINEURAL
Status: DISCONTINUED | OUTPATIENT
Start: 2024-11-25 | End: 2024-11-25 | Stop reason: HOSPADM

## 2024-11-25 RX ORDER — MEPERIDINE HYDROCHLORIDE 25 MG/ML
12.5 INJECTION INTRAMUSCULAR; INTRAVENOUS; SUBCUTANEOUS
Status: DISCONTINUED | OUTPATIENT
Start: 2024-11-25 | End: 2024-11-25 | Stop reason: HOSPADM

## 2024-11-25 RX ORDER — MIDAZOLAM HYDROCHLORIDE 1 MG/ML
1 INJECTION INTRAMUSCULAR; INTRAVENOUS
Status: DISCONTINUED | OUTPATIENT
Start: 2024-11-25 | End: 2024-11-25 | Stop reason: HOSPADM

## 2024-11-25 RX ORDER — ROCURONIUM BROMIDE 10 MG/ML
INJECTION, SOLUTION INTRAVENOUS PRN
Status: DISCONTINUED | OUTPATIENT
Start: 2024-11-25 | End: 2024-11-25 | Stop reason: SURG

## 2024-11-25 RX ORDER — HYDROMORPHONE HYDROCHLORIDE 1 MG/ML
0.2 INJECTION, SOLUTION INTRAMUSCULAR; INTRAVENOUS; SUBCUTANEOUS
Status: DISCONTINUED | OUTPATIENT
Start: 2024-11-25 | End: 2024-11-25 | Stop reason: HOSPADM

## 2024-11-25 RX ORDER — HYDRALAZINE HYDROCHLORIDE 20 MG/ML
5 INJECTION INTRAMUSCULAR; INTRAVENOUS
Status: DISCONTINUED | OUTPATIENT
Start: 2024-11-25 | End: 2024-11-25 | Stop reason: HOSPADM

## 2024-11-25 RX ORDER — HYDROMORPHONE HYDROCHLORIDE 1 MG/ML
0.1 INJECTION, SOLUTION INTRAMUSCULAR; INTRAVENOUS; SUBCUTANEOUS
Status: DISCONTINUED | OUTPATIENT
Start: 2024-11-25 | End: 2024-11-25 | Stop reason: HOSPADM

## 2024-11-25 RX ORDER — EPHEDRINE SULFATE 50 MG/ML
5 INJECTION, SOLUTION INTRAVENOUS
Status: DISCONTINUED | OUTPATIENT
Start: 2024-11-25 | End: 2024-11-25 | Stop reason: HOSPADM

## 2024-11-25 RX ORDER — SODIUM CHLORIDE 9 MG/ML
INJECTION, SOLUTION INTRAVENOUS CONTINUOUS
Status: DISCONTINUED | OUTPATIENT
Start: 2024-11-25 | End: 2024-11-25 | Stop reason: HOSPADM

## 2024-11-25 RX ORDER — CEFAZOLIN SODIUM 1 G/3ML
INJECTION, POWDER, FOR SOLUTION INTRAMUSCULAR; INTRAVENOUS PRN
Status: DISCONTINUED | OUTPATIENT
Start: 2024-11-25 | End: 2024-11-25 | Stop reason: HOSPADM

## 2024-11-25 RX ORDER — SODIUM CHLORIDE 9 MG/ML
INJECTION, SOLUTION INTRAVENOUS
Status: DISCONTINUED | OUTPATIENT
Start: 2024-11-25 | End: 2024-11-25 | Stop reason: SURG

## 2024-11-25 RX ORDER — MIDAZOLAM HYDROCHLORIDE 1 MG/ML
INJECTION INTRAMUSCULAR; INTRAVENOUS PRN
Status: DISCONTINUED | OUTPATIENT
Start: 2024-11-25 | End: 2024-11-25 | Stop reason: SURG

## 2024-11-25 RX ORDER — OXYCODONE HCL 5 MG/5 ML
10 SOLUTION, ORAL ORAL
Status: COMPLETED | OUTPATIENT
Start: 2024-11-25 | End: 2024-11-25

## 2024-11-25 RX ORDER — HYDROMORPHONE HYDROCHLORIDE 1 MG/ML
0.4 INJECTION, SOLUTION INTRAMUSCULAR; INTRAVENOUS; SUBCUTANEOUS
Status: DISCONTINUED | OUTPATIENT
Start: 2024-11-25 | End: 2024-11-25 | Stop reason: HOSPADM

## 2024-11-25 RX ORDER — OXYCODONE AND ACETAMINOPHEN 5; 325 MG/1; MG/1
1 TABLET ORAL EVERY 6 HOURS PRN
Qty: 28 TABLET | Refills: 0 | Status: SHIPPED | OUTPATIENT
Start: 2024-11-25 | End: 2024-12-02

## 2024-11-25 RX ORDER — SCOLOPAMINE TRANSDERMAL SYSTEM 1 MG/1
1 PATCH, EXTENDED RELEASE TRANSDERMAL
Status: DISCONTINUED | OUTPATIENT
Start: 2024-11-25 | End: 2024-11-25 | Stop reason: HOSPADM

## 2024-11-25 RX ORDER — OXYCODONE HCL 5 MG/5 ML
5 SOLUTION, ORAL ORAL
Status: COMPLETED | OUTPATIENT
Start: 2024-11-25 | End: 2024-11-25

## 2024-11-25 RX ORDER — CELECOXIB 200 MG/1
200 CAPSULE ORAL ONCE
Status: DISCONTINUED | OUTPATIENT
Start: 2024-11-25 | End: 2024-11-25 | Stop reason: HOSPADM

## 2024-11-25 RX ORDER — ONDANSETRON 2 MG/ML
4 INJECTION INTRAMUSCULAR; INTRAVENOUS
Status: COMPLETED | OUTPATIENT
Start: 2024-11-25 | End: 2024-11-25

## 2024-11-25 RX ORDER — DEXAMETHASONE SODIUM PHOSPHATE 4 MG/ML
INJECTION, SOLUTION INTRA-ARTICULAR; INTRALESIONAL; INTRAMUSCULAR; INTRAVENOUS; SOFT TISSUE PRN
Status: DISCONTINUED | OUTPATIENT
Start: 2024-11-25 | End: 2024-11-25 | Stop reason: SURG

## 2024-11-25 RX ORDER — DIPHENHYDRAMINE HYDROCHLORIDE 50 MG/ML
12.5 INJECTION INTRAMUSCULAR; INTRAVENOUS
Status: DISCONTINUED | OUTPATIENT
Start: 2024-11-25 | End: 2024-11-25 | Stop reason: HOSPADM

## 2024-11-25 RX ORDER — EPHEDRINE SULFATE 50 MG/ML
INJECTION, SOLUTION INTRAVENOUS PRN
Status: DISCONTINUED | OUTPATIENT
Start: 2024-11-25 | End: 2024-11-25 | Stop reason: SURG

## 2024-11-25 RX ORDER — BUPIVACAINE HYDROCHLORIDE AND EPINEPHRINE 2.5; 5 MG/ML; UG/ML
INJECTION, SOLUTION EPIDURAL; INFILTRATION; INTRACAUDAL; PERINEURAL
Status: DISCONTINUED
Start: 2024-11-25 | End: 2024-11-25 | Stop reason: HOSPADM

## 2024-11-25 RX ORDER — HALOPERIDOL 5 MG/ML
1 INJECTION INTRAMUSCULAR
Status: DISCONTINUED | OUTPATIENT
Start: 2024-11-25 | End: 2024-11-25 | Stop reason: HOSPADM

## 2024-11-25 RX ORDER — IBUPROFEN 800 MG/1
800 TABLET, FILM COATED ORAL EVERY 8 HOURS PRN
Qty: 30 TABLET | Refills: 0 | Status: SHIPPED | OUTPATIENT
Start: 2024-11-25 | End: 2024-12-09

## 2024-11-25 RX ADMIN — MIDAZOLAM HYDROCHLORIDE 2 MG: 1 INJECTION, SOLUTION INTRAMUSCULAR; INTRAVENOUS at 10:11

## 2024-11-25 RX ADMIN — OXYCODONE HYDROCHLORIDE 10 MG: 5 SOLUTION ORAL at 12:01

## 2024-11-25 RX ADMIN — CEFAZOLIN 2 G: 1 INJECTION, POWDER, FOR SOLUTION INTRAMUSCULAR; INTRAVENOUS at 10:07

## 2024-11-25 RX ADMIN — ACETAMINOPHEN 1000 MG: 500 TABLET ORAL at 08:06

## 2024-11-25 RX ADMIN — DEXAMETHASONE SODIUM PHOSPHATE 8 MG: 4 INJECTION INTRA-ARTICULAR; INTRALESIONAL; INTRAMUSCULAR; INTRAVENOUS; SOFT TISSUE at 10:11

## 2024-11-25 RX ADMIN — ROCURONIUM BROMIDE 50 MG: 50 INJECTION, SOLUTION INTRAVENOUS at 10:11

## 2024-11-25 RX ADMIN — SCOPOLAMINE 1 PATCH: 1.5 PATCH, EXTENDED RELEASE TRANSDERMAL at 08:06

## 2024-11-25 RX ADMIN — SODIUM CHLORIDE: 9 INJECTION, SOLUTION INTRAVENOUS at 10:07

## 2024-11-25 RX ADMIN — ONDANSETRON 4 MG: 2 INJECTION INTRAMUSCULAR; INTRAVENOUS at 10:11

## 2024-11-25 RX ADMIN — EPHEDRINE SULFATE 10 MG: 50 INJECTION, SOLUTION INTRAVENOUS at 10:22

## 2024-11-25 RX ADMIN — SUGAMMADEX 200 MG: 100 INJECTION, SOLUTION INTRAVENOUS at 11:18

## 2024-11-25 RX ADMIN — FENTANYL CITRATE 50 MCG: 50 INJECTION, SOLUTION INTRAMUSCULAR; INTRAVENOUS at 11:15

## 2024-11-25 RX ADMIN — LIDOCAINE HYDROCHLORIDE 100 MG: 20 INJECTION, SOLUTION EPIDURAL; INFILTRATION; INTRACAUDAL; PERINEURAL at 10:11

## 2024-11-25 RX ADMIN — FENTANYL CITRATE 50 MCG: 50 INJECTION, SOLUTION INTRAMUSCULAR; INTRAVENOUS at 11:19

## 2024-11-25 RX ADMIN — ONDANSETRON 4 MG: 2 INJECTION INTRAMUSCULAR; INTRAVENOUS at 12:01

## 2024-11-25 RX ADMIN — FENTANYL CITRATE 100 MCG: 50 INJECTION, SOLUTION INTRAMUSCULAR; INTRAVENOUS at 10:11

## 2024-11-25 RX ADMIN — PROPOFOL 150 MG: 10 INJECTION, EMULSION INTRAVENOUS at 10:11

## 2024-11-25 ASSESSMENT — PAIN DESCRIPTION - PAIN TYPE
TYPE: SURGICAL PAIN
TYPE: SURGICAL PAIN

## 2024-11-25 ASSESSMENT — FIBROSIS 4 INDEX: FIB4 SCORE: .938083151964685911

## 2024-11-25 ASSESSMENT — PAIN SCALES - GENERAL: PAIN_LEVEL: 0

## 2024-11-25 NOTE — H&P
Christine Acuña  YOB: 1980  Date of today's admission/surgery: Monday, November 25, 2024  Facility: [facility]    ID: The patient is a very pleasant 44-year-old multipara (para-4 and Christine is had 4 previous vaginal deliveries).    Chief complaint: The patient complains of pelvic pain.    History of present illness:  The patient complains of pelvic pain and she also complains of dyspareunia. She complains of metrorrhagia. She is scheduled today to undergo laparoscopy, both diagnostic and if necessary and depending on findings at the time of laparoscopy, operative, including possible lysis of adhesions should adhesions be discovered at the time of laparoscopy, as well as hysteroscopy, D&C, and removal of endometrial polyp should endometrial polyp be discovered at the time of hysteroscopy.  Of note, transabdominal and transvaginal pelvic ultrasound performed at [facility] on September 11, 2024 (performed according to the report because of left lower quadrant pain) revealed according to the report an 8 millimeter focus along the endometrium possibly endometrial polyp    Past medical history:  the patient says she has a history of asthma, hypertension, and says that she has been diagnosed as having CREST syndrome and she says she has Raynaud's syndrome. She says she has gastroesophageal reflux. She says she has been told she has right ventricular enlargement. She says she has sleep apnea. She is obese.    Past surgical history: The patient has undergone lumbar laminectomy. She says she underwent tonsillectomy and 2013 as well as nasal septum reconstruction and turbinoplasty. On March 27, 2023 she underwent diagnostic and operative laparoscopy with laparoscopic bilateral salpingectomy.    Medications:  The patient takes losartan and amlodipine and furosemide and Ambien and hydroxychloroquine.    Allergies: The patient says she is  allergic to nifedipine and to duloxetine and to sildenafil.    Social history: The patient denies smoking. She only rarely consumes alcoholic beverages. She denies the use of recreational drugs.    Review of Systems:    General: The patient denies any fevers or chills or sweats.  Pulmonary: The patient denies any coughing or wheezing or chest pain or shortness of breath.  Cardiovascular: The patient denies any palpitations, chest pain, dyspnea.  Gastrointestinal: The patient denies any nausea, vomiting, diarrhea, constipation, hematochezia, melena.  Genitourinary: The patient complains of pelvic pain. She complains of dyspareunia. She complains of metrorrhagia.  Musculoskeletal: The patient tells me that she does have diffuse arthralgias and myalgias. She says that she has arthralgias in both knees and also in both hips.  Neurological: The patient denies any headaches or syncope or seizures.      Physical Exam:    Vital signs: The patient's vital signs are stable and she is afebrile.  General: The patient appears well developed and well-nourished and relaxed and alert and comfortable and in no apparent distress.  HEENT: Normocephalic, atraumatic, pupils equal, round, reactive to light and accommodation, extra ocular motions intact, pharynx clear.  There is no thyromegaly.  There is no cervical lymphadenopathy.  Chest: Heart regular rate and rhythm, with no murmurs or rubs or gallops.  The lungs are clear to auscultation bilaterally.  Abdomen: Examination of the patient's abdomen today with the patient in the dorsal supine position reveals that the abdomen is soft and nontender and nondistended and that there is no evidence of hepatomegaly and that there is no evidence of splenomegaly.  There is no evidence detected on today's abdominal exam of any abdominal masses.  Extremities: No clubbing or cyanosis or edema.  Neurological: Nonfocal.        Assessment:   Pelvic pain.  Dyspareunia.  Metrorrhagia.  Recent  ultrasound has revealed a small mass in the endometrium and the report stated that this finding might possibly represent an endometrial polyp.    Plan:   We will proceed today with undergo laparoscopy, both diagnostic and if necessary and depending on findings at the time of laparoscopy, operative, including possible lysis of adhesions should adhesions be discovered at the time of laparoscopy, as well as hysteroscopy, D&C, and removal of endometrial polyp should endometrial polyp be discovered at the time of hysteroscopy. I have discussed with the patient and explained to the patient in detail and at length what undergo laparoscopy, both diagnostic and if necessary and depending on findings at the time of laparoscopy, operative, including possible lysis of adhesions should adhesions be discovered at the time of laparoscopy, as well as hysteroscopy, D&C, and removal of endometrial polyp should endometrial polyp be discovered at the time of hysteroscopy is and what undergo laparoscopy, both diagnostic and if necessary and depending on findings at the time of laparoscopy, operative, including possible lysis of adhesions should adhesions be discovered at the time of laparoscopy, as well as hysteroscopy, D&C, and removal of endometrial polyp should endometrial polyp be discovered at the time of hysteroscopy involves, and I have discussed with the patient is explained to the patient in detail and at length the risks and benefits and alternatives of undergo laparoscopy, both diagnostic and if necessary and depending on findings at the time of laparoscopy, operative, including possible lysis of adhesions should adhesions be discovered at the time of laparoscopy, as well as hysteroscopy, D&C, and removal of endometrial polyp should endometrial polyp be discovered at the time of hysteroscopy. After our discussions and after answering her questions she told me she wishes for us to proceed with undergo laparoscopy, both  diagnostic and if necessary and depending on findings at the time of laparoscopy, operative, including possible lysis of adhesions should adhesions be discovered at the time of laparoscopy, as well as hysteroscopy, D&C, and removal of endometrial polyp should endometrial polyp be discovered at the time of hysteroscopy.        __________________  Nolan Rodriguez M.D.

## 2024-11-25 NOTE — OP REPORT
DATE OF SERVICE:  11/25/2024     PREOPERATIVE DIAGNOSES:  1.  Pelvic pain.  2.  Dyspareunia.  3.  Metrorrhagia.  4.  Recent ultrasound revealed a small mass in the endometrium and the report   stated that this finding might possibly represent an endometrial polyp.     POSTOPERATIVE DIAGNOSES:  1.  Pelvic pain.  2.  Dyspareunia.  3.  Metrorrhagia.  4.  During hysteroscopy, excellent views of the intrauterine cavity were   obtained and no endometrial polyp is present and no endocervical polyp is   present.     PROCEDURES:  1.  Hysteroscopy, D and C.  2.  Diagnostic laparoscopy.     SURGEON:  Nolan Rodriguez MD     ANESTHESIA:  General endotracheal tube anesthesia.     ANESTHESIOLOGIST:  Sudeep Wiggins MD     FINDINGS:  Bimanual exam under anesthesia reveals no evidence of uterine   enlargement and no evidence of any adnexal masses either on the right or the   left.     Speculum exam under anesthesia reveals no vulvar or vaginal or cervical   lesions and no cervical or vaginal discharge.  The vulva and vaginal mucosa   are well estrogenized.  The cervix is large and multiparous and as well   visualized.     The cervix was easily dilated with Hanks and Hegar dilators and is easily   dilated to a Hegar #8.     During hysteroscopy, excellent views of the intrauterine cavity were obtained.    During hysteroscopy, no evidence of any endometrial polyp is seen and during   hysteroscopy, no evidence of any endocervical polyp is seen.  During   hysteroscopy, no submucosal fibroid is seen.  During hysteroscopy, no evidence   of any congenital uterine anomaly is seen.  During hysteroscopy, both tubal   ostia were clearly identified.     During laparoscopy, excellent views of the pelvis were obtained.  During   laparoscopy, absence of both fallopian tubes is noted.  During laparoscopy,   serosal surfaces of the uterus are found to be normal.  The cul-de-sac is   normal.  Both ovaries are well visualized during laparoscopy  and are found to   be normal.  Both ovarian fossae are found to be normal during laparoscopy.    The liver edge is normal.  During laparoscopy, no intraperitoneal pelvic   adhesions are seen and during laparoscopy, no indisputable evidence of   endometriosis was seen.     SPECIMENS:  Endometrial curettings.     COMPLICATIONS:  None.     ESTIMATED BLOOD LOSS:  Less than 20 mL.     DESCRIPTION OF PROCEDURE:  After appropriate consents have been obtained, the   patient was taken to the operating room and given general anesthesia.  She was   prepped and draped in the dorsal lithotomy position.  After the patient was   given general anesthesia, but before she was prepped and draped in the dorsal   lithotomy position, I did perform a bimanual exam under anesthesia and   bimanual exam reveals no evidence of uterine enlargement and no adnexal masses   either on the right and left.     The patient was prepped and draped in the dorsal lithotomy position.  I placed   a Whalen catheter and this Whalen catheter was noted to be draining urine.    Speculum exam was performed and reveals no vulvar or vaginal or cervical   lesions and no cervical or vaginal discharge.  The vulva and vaginal mucosa   are well estrogenized.  The cervix was well visualized and is large and   multiparous.  The anterior aspect of the cervix was grasped with a single   tooth tenaculum.  The cervix was easily dilated with Hanks and then Hegar   dilators and was dilated to a Hegar #8.  The hysteroscope was inserted through   the endocervical canal into the intrauterine cavity and the entire   intrauterine cavity is well visualized during hysteroscopy.  Both tubal ostia   were clearly identified.  During hysteroscopy, no endometrial polyp is seen   and no submucosal fibroid is seen and no evidence of any congenital uterine   anomaly is seen.  As the hysteroscope was very slowly withdrawn into the   endocervical canal and then slowly withdrawn out of the  endocervical canal,   the endocervical canal was actually well visualized and no endocervical polyp   was seen.  The hysteroscope was removed from the uterus and a sharp curette   was inserted through the endocervical canal into the intrauterine cavity and   all 4 quadrants of the intrauterine cavity were thoroughly curetted and   curettings are submitted on a Telfa pad.  The uterus was sounded to   approximately 8 cm.  The 8 cm KIKO uterine manipulator was inserted through   the endocervical canal into the intrauterine cavity and the balloon at the tip   of the KIKO uterine manipulator was inflated with a few mL of water.  The   single tooth tenaculum was removed from the cervix.  A gauze sponge was placed   in the vaginal vault posterior to the KIKO uterine manipulator and left in   place as the speculum was removed.  The 's gloves were then changed.    Attention was then directed to the abdomen where a small (approximately 1 cm)   horizontal infraumbilical incision was made with a scalpel after the overlying   skin and subcutaneous tissue had been infiltrated with local anesthetic.  A   Veress needle was advanced through this incision, entered into the peritoneal   cavity and proper placement in the peritoneal cavity was verified with a   Abarca hanging drop technique.  The peritoneal cavity was then insufflated   with approximately 3-4 liters of carbon dioxide gas.  The Veress needle was   removed and a 5 mm port was introduced through the infraumbilical incision   entered into the peritoneal cavity utilizing the VersaStep trocar system.  The   central portion of this port was removed.  The 5 mm 0-degree laparoscope was   inserted through the remaining sleeve and proper entry in the peritoneal   cavity was verified visually with laparoscope.  A 5 mm suprapubic port was   placed under direct laparoscopic visualization after the overlying skin and   subcutaneous tissue had been infiltrated with local  anesthetic and utilizing   the VersaStep trocar technique.  The central portion of this port was removed   and laparoscope was placed through the suprapubic port and used to examine the   anterior abdominal wall, especially in the vicinity of the umbilicus and the   infraumbilical port was examined and no adhesions to the anterior abdominal   wall are seen and especially no adhesions are seen to the anterior abdominal   wall in the vicinity of the umbilicus.  The laparoscope was then removed and   replaced through the infraumbilical port.  The patient was placed in further   Trendelenburg position.  The uterus was mobilized with the KIKO uterine   manipulator and findings are as noted above and pictures were taken.  The   Prestige instrument was placed through the suprapubic port and used to   manipulate structures and again findings are as noted above and pictures were   taken.  The Prestige instrument was removed.  The suction irrigation   instrument was placed through the suprapubic port and the pelvis was copiously   irrigated and drained and hemostasis was noted to be excellent.    Pneumoperitoneum was evacuated with the suction irrigation instrument and then   the suction irrigation instrument and laparoscope were both removed.  The   patient was taken out of Trendelenburg position.  Both ports were removed.    Skin incisions were reapproximated with placement of interrupted buried   sutures of 4-0 Monocryl placed in the dermis.  One such suture placed for the   infraumbilical incision and two such sutures were placed for the suprapubic   incision.  The KIKO uterine manipulator and vaginal gauze sponge were both   removed.  The urine in the Whalen catheter bag and the Whalen catheter itself   was examined and it was yellow and appears normal.  The procedure was   terminated.  Final lap and needle counts reported to be correct x2 at the end   of the procedure.  The patient tolerated the procedure well and sent to    postanesthesia recovery in stable condition.        ______________________________  MD RAÚL Ramos/MIKA    DD:  11/25/2024 11:52  DT:  11/25/2024 14:40    Job#:  [id number]

## 2024-11-25 NOTE — ANESTHESIA PREPROCEDURE EVALUATION
Case: [id number] Date/Time: 11/25/24 0915    Procedures:       DIAGNOSTIC AND OPERATIVE LAPAROSCOPY, HYSTEROSCOPY, DILATION AND CURETTAGE, REMOVAL OF ENDOMETRIAL POLYP, POSSIBLE LYSIS OF ADHESIONS, AND ALSO ANY AND ALL OTHER INDICATED PROCEDURES      HYSTEROSCOPY, DIAGNOSTIC      DILATION AND CURETTAGE    Pre-op diagnosis: METRORRHAGIA, PELVIC PAIN, DYSPAREUNIA    Location: [facility] ROOM 23 / SURGERY SAME DAY [facility]    Surgeons: Nolan Rodriguez M.D.            Relevant Problems   ANESTHESIA   (positive) Sleep apnea      PULMONARY   (positive) Asthma      CARDIAC   (positive) Benign hypertension   (positive) HTN (hypertension)      GI   (positive) GERD (gastroesophageal reflux disease)   (positive) Gastroesophageal reflux disease      Other   (positive) CREST syndrome (HCC)       Physical Exam    Airway   Mallampati: II  TM distance: >3 FB  Neck ROM: full       Cardiovascular - normal exam  Rhythm: regular  Rate: normal  (-) murmur     Dental - normal exam           Pulmonary - normal exam  Breath sounds clear to auscultation     Abdominal    Neurological - normal exam                 Anesthesia Plan    ASA 2       Plan - general       Airway plan will be ETT          Induction: intravenous    Postoperative Plan: Postoperative administration of opioids is intended.    Pertinent diagnostic labs and testing reviewed    Informed Consent:    Anesthetic plan and risks discussed with patient.    Use of blood products discussed with: patient whom consented to blood products.

## 2024-11-25 NOTE — ANESTHESIA PROCEDURE NOTES
Airway    Date/Time: 11/25/2024 10:13 AM    Performed by: Sudeep Wiggins M.D.  Authorized by: Sudeep Wiggins M.D.    Location:  OR  Urgency:  Elective  Indications for Airway Management:  Anesthesia      Spontaneous Ventilation: absent    Sedation Level:  Deep  Preoxygenated: Yes    Patient Position:  Sniffing  Mask Difficulty Assessment:  1 - vent by mask  Final Airway Type:  Endotracheal airway  Final Endotracheal Airway:  ETT  Cuffed: Yes    Technique Used for Successful ETT Placement:  Direct laryngoscopy    Insertion Site:  Oral  Blade Type:  Glide  Laryngoscope Blade/Videolaryngoscope Blade Size:  3  ETT Size (mm):  6.5  Measured from:  Teeth  ETT to Teeth (cm):  21  Placement Verified by: auscultation and capnometry    Cormack-Lehane Classification:  Grade III - view of epiglottis only  Number of Attempts at Approach:  1   Limited view via DL, switched to video laryngoscopy to minimize risk of trauma.

## 2024-11-25 NOTE — DISCHARGE INSTRUCTIONS
HOME CARE INSTRUCTIONS    ACTIVITY: Rest and take it easy for the first 24 hours.  A responsible adult is recommended to remain with you during that time.  It is normal to feel sleepy.  We encourage you to not do anything that requires balance, judgment or coordination.    FOR 24 HOURS DO NOT:  Drive, operate machinery or run household appliances.  Drink beer or alcoholic beverages.  Make important decisions or sign legal documents.    SPECIAL INSTRUCTIONS: nothing in the vagina until after follow  up appt.     DIET: To avoid nausea, slowly advance diet as tolerated, avoiding spicy or greasy foods for the first day.  Add more substantial food to your diet according to your physician's instructions.  Babies can be fed formula or breast milk as soon as they are hungry.  INCREASE FLUIDS AND FIBER TO AVOID CONSTIPATION.    SURGICAL DRESSING/BATHING: May shower tomorrow, no hot tubs, swimming, or baths until after follow up appt. Scope patch can come off in 3 days - make sure to wash your hands after removing!    MEDICATIONS: Resume taking daily medication.  Take prescribed pain medication with food.  If no medication is prescribed, you may take non-aspirin pain medication if needed.  PAIN MEDICATION CAN BE VERY CONSTIPATING.  Take a stool softener or laxative such as senokot, pericolace, or milk of magnesia if needed.    Prescription given for Percocet and Motrin at the [facility] on [facility]. Last pain medication given at 10mg Oxycodone at noon. Tylenol 1,000 mg given at 8 am    A follow-up appointment should be arranged with your doctor in 1-2 weeks; call to schedule.    You should CALL YOUR PHYSICIAN if you develop:  Fever greater than 101 degrees F.  Pain not relieved by medication, or persistent nausea or vomiting.  Excessive bleeding (blood soaking through dressing) or unexpected drainage from the wound.  Extreme redness or swelling around the incision site, drainage of pus or foul smelling drainage.  Inability to  urinate or empty your bladder within 8 hours.  Problems with breathing or chest pain.    You should call 911 if you develop problems with breathing or chest pain.  If you are unable to contact your doctor or surgical center, you should go to the nearest emergency room or urgent care center.  Physician's telephone #: [phone]     MILD FLU-LIKE SYMPTOMS ARE NORMAL.  YOU MAY EXPERIENCE GENERALIZED MUSCLE ACHES, THROAT IRRITATION, HEADACHE AND/OR SOME NAUSEA.    If any questions arise, call your doctor.  If your doctor is not available, please feel free to call the Surgical Center at [phone].  The Center is open Monday through Friday from 7AM to 7PM.      A registered nurse may call you a few days after your surgery to see how you are doing after your procedure.    You may also receive a survey in the mail within the next two weeks and we ask that you take a few moments to complete the survey and return it to us.  Our goal is to provide you with very good care and we value your comments.     Depression / Suicide Risk    As you are discharged from this [facility] facility, it is important to learn how to keep safe from harming yourself.    Recognize the warning signs:  Abrupt changes in personality, positive or negative- including increase in energy   Giving away possessions  Change in eating patterns- significant weight changes-  positive or negative  Change in sleeping patterns- unable to sleep or sleeping all the time   Unwillingness or inability to communicate  Depression  Unusual sadness, discouragement and loneliness  Talk of wanting to die  Neglect of personal appearance   Rebelliousness- reckless behavior  Withdrawal from people/activities they love  Confusion- inability to concentrate     If you or a loved one observes any of these behaviors or has concerns about self-harm, here's what you can do:  Talk about it- your feelings and reasons for harming yourself  Remove any means that you might use to  hurt yourself (examples: pills, rope, extension cords, firearm)  Get professional help from the community (Mental Health, Substance Abuse, psychological counseling)  Do not be alone:Call your Safe Contact- someone whom you trust who will be there for you.  Call your local CRISIS HOTLINE 688-5507 or 095-606-3284  Call your local Children's Mobile Crisis Response Team [place] (289) 162-6521 or www.Pocketbook  Call the toll free National Suicide Prevention Hotlines   National Suicide Prevention Lifeline 172-979-GUSV (5845)  [facility] Line Network 800-SUICIDE (051-5000)    I acknowledge receipt and understanding of these Home Care instructions.

## 2024-11-25 NOTE — OR NURSING
Discharge orders received. Meets discharge criteria at this time. Tolerable pain. No nausea. Tolerating PO. On RA. Monitors discontinued. IV removed with tip intact. Reviewed discharge paperwork with pt and sister. Discussed diet, activity, medications, follow up care and worsening symptoms. No questions at this time. Pt to be discharged home via private vehicle. 2 scripts to be picked up at pharmacy

## 2024-11-25 NOTE — ANESTHESIA TIME REPORT
Anesthesia Start and Stop Event Times       Date Time Event    11/25/2024 0937 Ready for Procedure     1007 Anesthesia Start     1133 Anesthesia Stop          Responsible Staff  11/25/24      Name Role Begin End    Sudeep Wiggins M.D. Anesth 1007 1133          Overtime Reason:  no overtime (within assigned shift)    Comments:

## 2024-11-25 NOTE — OR SURGEON
Immediate Post OP Note    PreOp Diagnosis:   Pelvic pain.  Dyspareunia.  Metrorrhagia.  Recent ultrasound has revealed a small mass in the endometrium and the report stated that this finding might possibly represent an endometrial polyp.    PostOp Diagnosis:   Pelvic pain.  Dyspareunia.  Metrorrhagia.  During hysteroscopy excellent views of the intrauterine cavity are obtained and no endometrial polyp is present and no endocervical polyp is present.    Procedure(s):  DIAGNOSTIC AND OPERATIVE LAPAROSCOPY, HYSTEROSCOPY, DILATION AND CURETTAGE, REMOVAL OF ENDOMETRIAL POLYP, POSSIBLE LYSIS OF ADHESIONS, AND ALSO ANY AND ALL OTHER INDICATED PROCEDURES - Wound Class: Clean Contaminated  HYSTEROSCOPY, DIAGNOSTIC - Wound Class: Clean Contaminated  DILATION AND CURETTAGE - Wound Class: Clean Contaminated    Surgeon(s):  Nolan Rodriguez M.D.    Anesthesiologist/Type of Anesthesia:  Anesthesiologist: Sudeep Wiggins M.D./General    Surgical Staff:  Circulator: Lauren Kaplan R.N.  Scrub Person: Citlali Faith    Specimens removed if any:  ID Type Source Tests Collected by Time Destination   A : endometrial currettings Tissue Endometrium PATHOLOGY SPECIMEN Nolan Rodriguez M.D. 11/25/2024 10:52 AM        Estimated Blood Loss:   Less than 20 cc's.    Findings:   Bimanual exam under anesthesia reveals no evidence of uterine enlargement and no evidence of any adnexal masses either on the right or the left.  Speculum exam under anesthesia reveals no vulvar or vaginal or cervical lesions and no cervical or vaginal discharge.  The vulva and vaginal mucosa are well estrogenized.  The cervix is large and multiparous.  The cervix is easily dilated with Hanks and Hegar dilators and is dilated to a Hegar #8.  During hysteroscopy excellent views of the intrauterine cavity are obtained.  During hysteroscopy no evidence of any endometrial polyp is seen and during hysteroscopy no evidence of any endocervical polyp is seen and during  hysteroscopy no submucosal fibroid is seen and during hysteroscopy no evidence of any congenital uterine anomaly is seen.  During hysteroscopy both tubal ostia are clearly visualized.  During laparoscopy excellent views of the pelvis are obtained.  During laparoscopy absence of both fallopian tubes is noted.  Serosal surfaces of the uterus are normal.  The cul-de-sac is normal.  Both ovaries are normal.  Both ovarian fossa are normal.  The liver edge is normal.  No intraperitoneal pelvic adhesions are seen during laparoscopy and no in dispute will evidence of endometriosis is seen during laparoscopy.    Complications:   None.        11/25/2024 11:29 AM Nolan Rodriguez M.D.

## 2024-11-25 NOTE — ANESTHESIA POSTPROCEDURE EVALUATION
Patient: Christine Miguel    Procedure Summary       Date: 11/25/24 Room / Location: [facility] ROOM 23 / SURGERY SAME DAY [facility]    Anesthesia Start: 1007 Anesthesia Stop: 1133    Procedures:       DIAGNOSTIC AND OPERATIVE LAPAROSCOPY, HYSTEROSCOPY, DILATION AND CURETTAGE, REMOVAL OF ENDOMETRIAL POLYP, POSSIBLE LYSIS OF ADHESIONS, AND ALSO ANY AND ALL OTHER INDICATED PROCEDURES (Abdomen)      HYSTEROSCOPY, DIAGNOSTIC (Uterus)      DILATION AND CURETTAGE (Uterus) Diagnosis: (METRORRHAGIA, PELVIC PAIN, DYSPAREUNIA)    Surgeons: Nolan Rodriguez M.D. Responsible Provider: Sudeep Wiggins M.D.    Anesthesia Type: general ASA Status: 2            Final Anesthesia Type: general  Last vitals  BP   Blood Pressure: 136/72    Temp   36.6 °C (97.9 °F)    Pulse   75   Resp   17    SpO2   100 %      Anesthesia Post Evaluation    Patient location during evaluation: PACU  Patient participation: complete - patient participated  Level of consciousness: awake and alert  Pain score: 0    Airway patency: patent  Anesthetic complications: no  Cardiovascular status: hemodynamically stable  Respiratory status: acceptable  Hydration status: euvolemic    PONV: none        No notable events documented.     Nurse Pain Score: 0 (NPRS)

## 2024-11-27 ENCOUNTER — APPOINTMENT (OUTPATIENT)
Dept: RADIOLOGY | Facility: MEDICAL CENTER | Age: 44
End: 2024-11-27
Attending: EMERGENCY MEDICINE
Payer: MEDICAID

## 2024-11-27 ENCOUNTER — HOSPITAL ENCOUNTER (EMERGENCY)
Facility: MEDICAL CENTER | Age: 44
End: 2024-11-27
Attending: EMERGENCY MEDICINE
Payer: MEDICAID

## 2024-11-27 VITALS
HEIGHT: 66 IN | OXYGEN SATURATION: 99 % | DIASTOLIC BLOOD PRESSURE: 58 MMHG | RESPIRATION RATE: 16 BRPM | BODY MASS INDEX: 25.79 KG/M2 | WEIGHT: 160.5 LBS | SYSTOLIC BLOOD PRESSURE: 106 MMHG | HEART RATE: 59 BPM | TEMPERATURE: 97.6 F

## 2024-11-27 DIAGNOSIS — M79.601 RIGHT ARM PAIN: ICD-10-CM

## 2024-11-27 PROCEDURE — 93971 EXTREMITY STUDY: CPT | Mod: 26,RT | Performed by: INTERNAL MEDICINE

## 2024-11-27 PROCEDURE — A9270 NON-COVERED ITEM OR SERVICE: HCPCS | Mod: UD | Performed by: EMERGENCY MEDICINE

## 2024-11-27 PROCEDURE — 93971 EXTREMITY STUDY: CPT | Mod: RT

## 2024-11-27 PROCEDURE — 99283 EMERGENCY DEPT VISIT LOW MDM: CPT

## 2024-11-27 PROCEDURE — 700102 HCHG RX REV CODE 250 W/ 637 OVERRIDE(OP): Mod: UD | Performed by: EMERGENCY MEDICINE

## 2024-11-27 RX ORDER — MORPHINE SULFATE 15 MG/1
15 TABLET ORAL ONCE
Status: COMPLETED | OUTPATIENT
Start: 2024-11-27 | End: 2024-11-27

## 2024-11-27 RX ORDER — METAXALONE 800 MG/1
800 TABLET ORAL 3 TIMES DAILY
Qty: 9 TABLET | Refills: 0 | Status: SHIPPED | OUTPATIENT
Start: 2024-11-27 | End: 2024-11-30

## 2024-11-27 RX ADMIN — MORPHINE SULFATE 15 MG: 15 TABLET ORAL at 13:43

## 2024-11-27 ASSESSMENT — FIBROSIS 4 INDEX: FIB4 SCORE: .938083151964685911

## 2024-11-27 NOTE — ED TRIAGE NOTES
".  Chief Complaint   Patient presents with    Arm Pain     Since 11/24   7/10 aching with movement    w/ swelling and warmth to extremity   Pt denies trauma to area      ./59   Pulse 68   Temp 36.3 °C (97.4 °F) (Temporal)   Resp 16   Ht 1.676 m (5' 6\")   Wt 72.8 kg (160 lb 7.9 oz)   LMP 11/22/2024 (Exact Date)   SpO2 100%   BMI 25.90 kg/m²       .Patient ambulatory to triage for above complaint. Patient aware to notify RN of any changes in symptoms. Patient educated on triage process. A&O x 4, able to talk in full sentences     "

## 2024-11-27 NOTE — ED NOTES
Patient discharged home per ER MD.   Discharge instructions reviewed and discussed with patient. Patient verbalized understanding of discharge teaching and education. POC discussed.     RX x 1 sent to pharmacy by MD.     Patient alert and oriented x 4. VSS. Patient able to ambulate off unit with steady gait. Friend to drive pt home. All belongings with patient at time of discharge.

## 2024-11-27 NOTE — ED PROVIDER NOTES
ED Provider Note    CHIEF COMPLAINT  Chief Complaint   Patient presents with    Arm Pain     Since 11/24   7/10 aching with movement    w/ swelling and warmth to extremity   Pt denies trauma to area        EXTERNAL RECORDS REVIEWED  7/25/2024, operative note, hysteroscopy, D&C and diagnostic laparoscopy    SHARA/EVA King Ita Miguel is a 44 y.o. female who presents for evaluation of right arm pain.  She feels like the arm is swollen.  No weakness or numbness.  She had a surgery on Monday but states that this pain began the day before the surgery.  She has CREST syndrome/Raynaud's.  No history of DVT but she is specifically concerned about a clot in that arm.  No other complaints.  Denies injury.    PAST MEDICAL HISTORY   has a past medical history of Acid reflux, GEOVANY positive (08/01/2022), Anesthesia, Arthritis (03/20/2023), Asthma, Breath shortness (03/20/2023), CREST syndrome (HCC), Cyclic citrullinated peptide (CCP) antibody positive (08/01/2022), Degenerative joint disease involving multiple joints (02/07/2023), Dyslipidemia (08/01/2022), Dyspareunia, female (03/27/2023), Enlargement - tonsil/adenoid (05/14/2013), Fatty liver, Heart burn, Hiatus hernia syndrome (07/06/2023), High cholesterol (03/20/2023), Hypertension (03/20/2023), Indigestion, Intractable back pain (12/26/2017), Left knee pain (02/07/2023), Obesity due to excess calories with serious comorbidity (02/07/2023), Oligohydramnios antepartum-IOL 4/24 9 AM (04/15/2016), Osteoporosis, Pain (08/22/2014), Pain (03/20/2023), Patellofemoral instability of left knee with pain (02/07/2023), Pelvic pain (03/27/2023), PFO (patent foramen ovale) (2024), Right ventricular enlargement, Sciatica of right side (12/26/2017), Sleep apnea (03/20/2023), Snoring, Tachycardia, unspecified, and Unspecified urinary incontinence (03/20/2023).    SURGICAL HISTORY   has a past surgical history that includes tonsillectomy (5/14/2013); nasal septal reconstruction  (2013); turbinoplasty (2013); inj,epidural/lumb/sac single (2014); lumbar laminectomy diskectomy (2014); irrigation & debridement neuro (2014); irrigation & debridement ortho (2014); lap,diagnostic abdomen (N/A, 3/27/2023); lap ubaldo restrict proc longitudinal gas* (2023); robotic assisted lap hiatal hernia rep (2023); lap ubaldo restrict proc longitudinal gas* (2023); robotic assisted lap hiatal hernia rep (2023); upper gi endoscopy,diagnosis (N/A, 10/4/2024); lap,diagnostic abdomen (N/A, 2024); hysteroscopy,dx,sep proc (N/A, 2024); and dilation and curettage (N/A, 2024).    FAMILY HISTORY  Family History   Problem Relation Age of Onset    Rheumatologic Disease Mother         RA    Heart Attack Father          MI (age 45)    Kidney Disease Father         ESRD on HD    Diabetes Father     Heart Disease Sister        SOCIAL HISTORY  Social History     Tobacco Use    Smoking status: Never     Passive exposure: Never    Smokeless tobacco: Never   Vaping Use    Vaping status: Never Used   Substance and Sexual Activity    Alcohol use: No    Drug use: No    Sexual activity: Not on file       CURRENT MEDICATIONS  Home Medications       Reviewed by JOSH DianaRegistered Nurse) on 24 at 1245  Med List Status: Partial     Medication Last Dose Status   albuterol 108 (90 Base) MCG/ACT Aero Soln inhalation aerosol  Active   amLODIPine (NORVASC) 10 MG Tab  Active   aspirin 81 MG EC tablet  Active   atorvastatin (LIPITOR) 40 MG Tab  Active   BIOTIN PO  Active   Cholecalciferol (VITAMIN D-3 PO)  Active   Cyanocobalamin (B-12 PO)  Active   Glucosamine HCl (GLUCOSAMINE PO)  Active   hydroxychloroquine (PLAQUENIL) 200 MG Tab  Active   ibuprofen (MOTRIN) 800 MG Tab  Active   losartan (COZAAR) 25 MG Tab  Active   NON SPECIFIED  Active   NON SPECIFIED  Active   NON SPECIFIED  Active   omeprazole (PRILOSEC) 40 MG delayed-release capsule  Active  "  oxyCODONE-acetaminophen (PERCOCET) 5-325 MG Tab  Active   pilocarpine (SALAGEN) 5 MG Tab  Active   VITAMIN E PO  Active                  Audit from Redirected Encounters    **Home medications have not yet been reviewed for this encounter**         ALLERGIES  Allergies   Allergen Reactions    Latex Rash and Itching    Duloxetine      HA, dizz     Fluoxetine Vomiting and Nausea     Nausea, vomiting, \"made me feel woozy\"    Nifedipine Vomiting     Headache, vomiting    Sildenafil      HA, nausea       PHYSICAL EXAM  VITAL SIGNS: /59   Pulse 68   Temp 36.3 °C (97.4 °F) (Temporal)   Resp 16   Ht 1.676 m (5' 6\")   Wt 72.8 kg (160 lb 7.9 oz)   LMP 11/22/2024 (Exact Date)   SpO2 100%   BMI 25.90 kg/m²    Constitutional: Alert in no apparent distress.  HENT: No signs of significant acute trauma.   Eyes: Conjunctiva normal, non-icteric.   Chest: Normal nonlabored respirations  Skin: No appreciable rash on the exposed skin  Musculoskeletal: No asymmetric edema of the upper extremities.  Normal radial pulse and sensation of the right arm, tenderness right in the bicipital groove  Neurologic: Alert, no obvious focal deficits noted.        RADIOLOGY/PROCEDURES   I have independently interpreted the diagnostic imaging associated with this visit and am waiting the final reading from the radiologist.   My preliminary interpretation is as follows: No noncompressible veins    Radiologist interpretation:  US-EXTREMITY VENOUS UPPER UNILAT RIGHT         No DVT    COURSE & MEDICAL DECISION MAKING    ASSESSMENT, COURSE AND PLAN  Care Narrative: 44-year-old female concerned about a DVT in the right arm.  Recent surgery although the symptoms began the day of surgery just before the surgery actually.  Neuro vastly intact.  No other objective findings on exam.  Duplex study is negative for clot.  Discharged home in stable condition, will follow with her primary care physician.  Prescription for Skelaxin  Prescription for " Skelaxin      FINAL DIAGNOSIS  1. Right arm pain         Electronically signed by: Esdras Murray M.D., 11/27/2024 1:34 PM

## 2024-12-09 ENCOUNTER — OFFICE VISIT (OUTPATIENT)
Dept: INTERNAL MEDICINE | Facility: OTHER | Age: 44
End: 2024-12-09
Payer: MEDICAID

## 2024-12-09 VITALS
BODY MASS INDEX: 26.16 KG/M2 | HEART RATE: 81 BPM | SYSTOLIC BLOOD PRESSURE: 110 MMHG | WEIGHT: 162.8 LBS | TEMPERATURE: 98.3 F | HEIGHT: 66 IN | DIASTOLIC BLOOD PRESSURE: 62 MMHG | OXYGEN SATURATION: 100 %

## 2024-12-09 DIAGNOSIS — R68.89 FLU-LIKE SYMPTOMS: ICD-10-CM

## 2024-12-09 DIAGNOSIS — K22.2 ESOPHAGEAL STENOSIS: ICD-10-CM

## 2024-12-09 DIAGNOSIS — N95.1 MENOPAUSAL SYMPTOMS: ICD-10-CM

## 2024-12-09 DIAGNOSIS — G44.229 CHRONIC TENSION-TYPE HEADACHE, NOT INTRACTABLE: ICD-10-CM

## 2024-12-09 DIAGNOSIS — R42 DIZZINESS: ICD-10-CM

## 2024-12-09 PROBLEM — R06.83 SNORING: Status: RESOLVED | Noted: 2024-08-07 | Resolved: 2024-12-09

## 2024-12-09 LAB
FLUAV RNA SPEC QL NAA+PROBE: NEGATIVE
FLUBV RNA SPEC QL NAA+PROBE: NEGATIVE
RSV RNA SPEC QL NAA+PROBE: NEGATIVE
SARS-COV-2 RNA RESP QL NAA+PROBE: NEGATIVE

## 2024-12-09 PROCEDURE — 3074F SYST BP LT 130 MM HG: CPT | Mod: GC

## 2024-12-09 PROCEDURE — 99214 OFFICE O/P EST MOD 30 MIN: CPT | Mod: GC

## 2024-12-09 PROCEDURE — 87637 SARSCOV2&INF A&B&RSV AMP PRB: CPT | Mod: GC,QW

## 2024-12-09 PROCEDURE — 3078F DIAST BP <80 MM HG: CPT | Mod: GC

## 2024-12-09 RX ORDER — ZOLPIDEM TARTRATE 6.25 MG/1
TABLET, FILM COATED, EXTENDED RELEASE ORAL
COMMUNITY

## 2024-12-09 ASSESSMENT — ENCOUNTER SYMPTOMS
HEMOPTYSIS: 0
TREMORS: 0
PND: 0
BLURRED VISION: 1
CHILLS: 1
MYALGIAS: 0
PSYCHIATRIC NEGATIVE: 1
DIAPHORESIS: 1
DIZZINESS: 1
NECK PAIN: 1
SHORTNESS OF BREATH: 1
NERVOUS/ANXIOUS: 0
SORE THROAT: 0
FEVER: 1
NAUSEA: 0
SENSORY CHANGE: 0
SPUTUM PRODUCTION: 1
DOUBLE VISION: 0
COUGH: 1
PHOTOPHOBIA: 0
ABDOMINAL PAIN: 0
HEARTBURN: 1
TINGLING: 0
DEPRESSION: 0
LOSS OF CONSCIOUSNESS: 0
HEADACHES: 1
SINUS PAIN: 0
PALPITATIONS: 0
VOMITING: 0
BACK PAIN: 0

## 2024-12-09 ASSESSMENT — LIFESTYLE VARIABLES: SUBSTANCE_ABUSE: 0

## 2024-12-09 ASSESSMENT — FIBROSIS 4 INDEX: FIB4 SCORE: .938083151964685911

## 2024-12-10 NOTE — PROGRESS NOTES
ESTABLISHED PATIENT VISIT    PATIENT ID:  Name: Christine Heaton  YOB: 1980  Patient Care Team:  Ana Paula Grant M.D. as PCP - General (Internal Medicine)    CHIEF COMPLAINT(s):  Influenza (Pt has flu symptoms and requested testing/) and Follow-Up (Low bp)    Follow up for Diagnoses of Flu-like symptoms, Chronic tension-type headache, not intractable, Esophageal stenosis, Menopausal symptoms, and Dizziness were pertinent to this visit.    History of Present Illness:    This is a pleasant 44 y.o. female clinic patient established with me who presents today for a follow-up visit. She has a past medical history of crest syndrome, dyslipidemia, GERD, FRANK on CPAP, Raynaud's, status post gastric sleeve, status post bilateral salpingectomy, chronic polyarthritis, pulmonary hypertension. She is following with sleep medicine, cardiology, vascular medicine, rheumatology regarding her comorbidities.  Today, the following problems were addressed:    1. Flu-like symptoms  presents with a 2-3 day history of cough, rhinorrhea, and sore throat, without any significant change in body aches or fever. She reports a mild increase in her use of a rescue inhaler but otherwise feels stable. She has had sick contact with others displaying similar symptoms. On examination, she is speaking in full sentences with no signs of respiratory distress. Her lungs are clear to auscultation bilaterally, and there are no abnormal findings on respiratory assessment. Her vitals are stable, and she is not experiencing chest pain. A COVID, flu, and RSV swab were all negative during her visit to the clinic. Given her presentation, including the absence of fever, a low Centor score, and clear lung findings, she is considered at low risk for pneumonia and strep throat. Overall, her symptoms are mild, and her condition remains stable.    2. Chronic tension-type headache, not intractable  The patient presents with a one-year  "history of a diffuse, bilateral \"squeezing\" headache. The headaches are not characterized by throbbing, and there are no associated symptoms such as nausea or vomiting. While the patient is uncertain about the exact duration of each headache episode, she reports that the intensity is not disabling. The headaches are persistent but do not significantly interfere with her daily activities. On physical examination, there is a mild reduction in sensation over the left lower extremity, specifically around the knee area. The patient attributes this sensory change to her chronic knee pain, for which she is scheduled to undergo knee replacement surgery in the near future. Apart from this localized finding, all other aspects of her neurological exam are normal, including cranial nerve function, sensation, strength, cerebellar coordination, Matthieu's reflexes, and overall gait. Her gait appears slightly antalgic, which is likely related to her chronic knee pain, but otherwise shows no other abnormalities.    3. Esophageal stenosis  Patient underwent EGD last month (11/24) which revealed stenosis at the gastrojejunal junction.  Balloon dilation was performed by Dr. Evans, recommended subsequent balloon dilation procedures.  Patient requested that she be re- referred and will continue to follow per their recommendations.    4. Menopausal symptoms  Patient is concerned that her chills and night sweats could be related to hot flashes due to menopause.  Patient states that she unable experiencing increasing dyspareunia and vaginal dryness upon intercourse.  She also states that she has been feeling fatigue, insomnia, and irregular periods.  It is possible that patient is transitioning into menopause given her symptoms.  Will reassess, as flulike symptoms resolved and will incorporate results of FSH/LH.    5. Dizziness  The patient presents with dizziness/lightheadedness, which is likely due to low-normal blood pressure, possibly " exacerbated by dehydration. She was assessed by cardiology, and according to her report, no intervention was recommended for a very small intra-atrial shunt. However, it is advised to request the medical records for further details. The patient's lightheadedness improved after discontinuing losartan, although the symptoms have not fully resolved. Notably, she was previously taking both losartan and amlodipine when her body weight was significantly higher, as per her account. She also reports inadequate oral intake, which may be contributing to her ongoing symptoms.    Patient Active Problem List    Diagnosis Date Noted    Metrorrhagia 11/25/2024    Hypercholesterolemia 08/07/2024    Osteoarthrosis 08/07/2024    Gastroesophageal reflux disease 08/07/2024    Status post sleeve gastrectomy 08/07/2024    Benign hypertension 08/07/2024    Asthma 11/22/2023    Sleep apnea 11/22/2023    GERD (gastroesophageal reflux disease) 11/22/2023    Hx of gastric bypass 09/08/2023    H/O gastric sleeve 09/08/2023    Pelvic pain 03/27/2023    Dysmenorrhea 03/27/2023    Dyspareunia in female 03/27/2023    HTN (hypertension) 03/10/2023    Elevated lipoprotein(a) 03/10/2023    Degenerative joint disease involving multiple joints 02/07/2023    Family history of premature coronary heart disease 08/01/2022    Dyslipidemia 08/01/2022    Prediabetes 08/01/2022    CREST syndrome (HCC) 08/01/2022    Obesity (BMI 30-39.9) 04/28/2022    Postprocedural pseudomeningocele 09/02/2014    Displacement of lumbar intervertebral disc without myelopathy 08/27/2014    Cataract 06/05/2014     Review of Systems   Constitutional:  Positive for chills, diaphoresis, fever and malaise/fatigue.   HENT:  Negative for congestion, ear pain, hearing loss, nosebleeds, sinus pain and sore throat.    Eyes:  Positive for blurred vision. Negative for double vision and photophobia.   Respiratory:  Positive for cough, sputum production and shortness of breath. Negative for  "hemoptysis.    Cardiovascular:  Negative for chest pain, palpitations, leg swelling and PND.   Gastrointestinal:  Positive for heartburn. Negative for abdominal pain, nausea and vomiting.   Genitourinary:  Negative for dysuria, frequency and urgency.   Musculoskeletal:  Positive for neck pain. Negative for back pain and myalgias.   Skin: Negative.    Neurological:  Positive for dizziness and headaches. Negative for tingling, tremors, sensory change and loss of consciousness.   Endo/Heme/Allergies: Negative.    Psychiatric/Behavioral: Negative.  Negative for depression, substance abuse and suicidal ideas. The patient is not nervous/anxious.      Past Medical History:   Diagnosis Date    Acid reflux     GEOVANY positive 2022    Anesthesia     Family Hx-father ; was always told it was anesthesia \"related\"; not sure what the actual reaction was; pt-PONV    Arthritis 2023    osteoarthritis    Asthma     inhaler prn    Breath shortness 2023    on exertion    CREST syndrome (HCC)     Cyclic citrullinated peptide (CCP) antibody positive 2022    Degenerative joint disease involving multiple joints 2023    Dyslipidemia 2022    Dyspareunia, female 2023    Enlargement - tonsil/adenoid 2013    Fatty liver     Heart burn     medicated    Hiatus hernia syndrome 2023    High cholesterol 2023    on medication    Hypertension 2023    on medication, 2024 pt states that she has been on this same dose of medication even when she weighed more but was told to stay on same dose d/t Raynaud's    Indigestion     Intractable back pain 2017    Left knee pain 2023    Obesity due to excess calories with serious comorbidity 2023    Oligohydramnios antepartum-IOL  9 AM 04/15/2016    IMO load 2020    Osteoporosis     Pain 2014    lower back/L leg=7/10    Pain 2023    Chronic pain \"all over my body\" rates pain 7 out of 10    Patellofemoral " instability of left knee with pain 02/07/2023    Pelvic pain 03/27/2023    PFO (patent foramen ovale) 2024    pt sees [facility] cardiologist (Dr. Coronado)    Right ventricular enlargement     Sciatica of right side 12/26/2017    Sleep apnea 03/20/2023    uses cpap    Snoring     sleep study done    Tachycardia, unspecified     Unspecified urinary incontinence 03/20/2023    stress incontinence     Past Surgical History:   Procedure Laterality Date    CT LAP,DIAGNOSTIC ABDOMEN N/A 11/25/2024    Procedure: DIAGNOSTIC AND OPERATIVE LAPAROSCOPY, HYSTEROSCOPY, DILATION AND CURETTAGE, REMOVAL OF ENDOMETRIAL POLYP, POSSIBLE LYSIS OF ADHESIONS, AND ALSO ANY AND ALL OTHER INDICATED PROCEDURES;  Surgeon: Nolan Rodriguez M.D.;  Location: SURGERY SAME DAY [facility];  Service: Gynecology    CT HYSTEROSCOPY,DX,SEP PROC N/A 11/25/2024    Procedure: HYSTEROSCOPY, DIAGNOSTIC;  Surgeon: Nolan Rodriguez M.D.;  Location: SURGERY SAME DAY [facility];  Service: Gynecology    DILATION AND CURETTAGE N/A 11/25/2024    Procedure: DILATION AND CURETTAGE;  Surgeon: Nolan Rodriguez M.D.;  Location: SURGERY SAME DAY [facility];  Service: Gynecology    CT UPPER GI ENDOSCOPY,DIAGNOSIS N/A 10/4/2024    Procedure: ESOPHAGOGASTRODUODENOSCOPY (EGD) WITH BRUSHINGS AND DILATATION;  Surgeon: Uriel Evans M.D.;  Location: [facility];  Service: General    CT LAP NILS RESTRICT PROC LONGITUDINAL GAS*  12/26/2023    Procedure: ROBOTIC SONNY EN Y GASTROENTEROSTOMY;  Surgeon: Olivier Oliveros M.D.;  Location: [facility];  Service: Gen Robotic    ROBOTIC ASSISTED LAP HIATAL HERNIA REP  12/26/2023    Procedure: REPAIR, HERNIA, HIATAL, ROBOT-ASSISTED, LAPAROSCOPIC;  Surgeon: Olivier Oliveros M.D.;  Location: [facility];  Service: Gen Robotic    CT LAP NILS RESTRICT PROC LONGITUDINAL GAS*  7/14/2023    Procedure: ROBOTIC PARTIAL SLEEVE GASTRECTOMY;  Surgeon: Olivier Oliveros M.D.;  Location: [facility];  Service: Gen  Robotic    ROBOTIC ASSISTED LAP HIATAL HERNIA REP  2023    Procedure: REPAIR, HERNIA, HIATAL, ROBOT-ASSISTED, LAPAROSCOPIC;  Surgeon: Olivier Oliveros M.D.;  Location: SURGERY [facility];  Service: Gen Robotic    DC LAP,DIAGNOSTIC ABDOMEN N/A 3/27/2023    Procedure: LAPAROSCOPY, DIAGNOSTIC AND OPERATIVE AND LAPAROSCOPIC BILATERAL SALPINGECTOMY;  Surgeon: Nolan Rodriguez M.D.;  Location: SURGERY SAME DAY [facility];  Service: Gynecology    IRRIGATION & DEBRIDEMENT ORTHO  2014    Performed by El Gomez M.D. at SURGERY [facility]S    IRRIGATION & DEBRIDEMENT NEURO  2014    Performed by El Gomez M.D. at SURGERY [facility]    LUMBAR LAMINECTOMY DISKECTOMY  2014    Performed by El Gomez M.D. at [facility] ORS    INJ,EPIDURAL/LUMB/SAC SINGLE  2014    Performed by Alexis Cornell M.D. at SURGERY [facility]S    TONSILLECTOMY  2013    Performed by Aaron Hutton M.D. at SURGERY SAME DAY [facility]    NASAL SEPTAL RECONSTRUCTION  2013    Performed by Aaron Hutton M.D. at SURGERY SAME DAY [facility]    TURBINOPLASTY  2013    Performed by Aaron Hutton M.D. at SURGERY SAME DAY [facility]     Family History   Problem Relation Age of Onset    Rheumatologic Disease Mother         RA    Heart Attack Father          MI (age 45)    Kidney Disease Father         ESRD on HD    Diabetes Father     Heart Disease Sister      Latex, Duloxetine, Fluoxetine, Nifedipine, and Sildenafil  Social History     Tobacco Use    Smoking status: Never     Passive exposure: Never    Smokeless tobacco: Never   Vaping Use    Vaping status: Never Used   Substance Use Topics    Alcohol use: No    Drug use: No     Current Outpatient Medications   Medication Sig Dispense Refill    VITAMIN E PO Take 1 Tablet by mouth every day. FOR PROCEDURE SCHEDULED 2024 - DO NOT TAKE 7 DAYS PRIOR TO SURGERY      Cholecalciferol (VITAMIN D-3 PO) Take 1  Tablet by mouth every day. FOR PROCEDURE SCHEDULED 11/25/2024 - DO NOT TAKE 7 DAYS PRIOR TO SURGERY      NON SPECIFIED Take 1 Tablet by mouth every day. BARIATRIC FUSION - HAIR SKIN AND NAILS    FOR PROCEDURE SCHEDULED 11/25/2024 - DO NOT TAKE 7 DAYS PRIOR TO SURGERY      Cyanocobalamin (B-12 PO) Take 1 Tablet by mouth every day. FOR PROCEDURE SCHEDULED 11/25/2024 - DO NOT TAKE 7 DAYS PRIOR TO SURGERY      BIOTIN PO Take 1 Tablet by mouth every day. NATURE'S BOUNTY - HAIR SKIN AND NAILS    FOR PROCEDURE SCHEDULED 11/25/2024 - DO NOT TAKE 7 DAYS PRIOR TO SURGERY      Glucosamine HCl (GLUCOSAMINE PO) Take 1 Tablet by mouth every day. JOINT [facility]    FOR PROCEDURE SCHEDULED 11/25/2024 - DO NOT TAKE 7 DAYS PRIOR TO SURGERY      losartan (COZAAR) 25 MG Tab Take 25 mg by mouth every day. FOR PROCEDURE SCHEDULED 11/25/2024 - DO NOT TAKE 24 HOURS PRIOR TO SURGERY      NON SPECIFIED Apply 1 Application topically as needed (raynauds). NITROBID 2% ON HANDS FOR RAYNAUD'S      pilocarpine (SALAGEN) 5 MG Tab Take 1 Tablet by mouth 3 times a day. FOR PROCEDURE SCHEDULED 11/25/2024 - CONTINUE AS PRESCRIBED      aspirin 81 MG EC tablet Take 1 Tablet by mouth every 48 hours. (Patient taking differently: Take 81 mg by mouth every 48 hours. FOR PROCEDURE SCHEDULED 11/25/2024 - FOLLOW DOCTOR INSTRUCTIONS)      atorvastatin (LIPITOR) 40 MG Tab Take 1 Tablet by mouth every evening. (Patient taking differently: Take 40 mg by mouth every evening. FOR PROCEDURE SCHEDULED 11/25/2024 - CONTINUE AS PRESCRIBED) 90 Tablet 1    omeprazole (PRILOSEC) 40 MG delayed-release capsule Take 1 Capsule by mouth 2 times a day. (Patient taking differently: Take 40 mg by mouth 2 times a day. FOR PROCEDURE SCHEDULED 11/25/2024 - CONTINUE AS PRESCRIBED) 90 Capsule 0    amLODIPine (NORVASC) 10 MG Tab Take 10 mg by mouth every day. FOR PROCEDURE SCHEDULED 11/25/2024 - CONTINUE AS PRESCRIBED      albuterol 108 (90 Base) MCG/ACT Aero Soln inhalation aerosol Inhale  "2 Puffs every four hours as needed for Shortness of Breath. (Patient taking differently: Inhale 2 Puffs every four hours as needed for Shortness of Breath. FOR PROCEDURE SCHEDULED 11/25/2024 - CONTINUE AS PRESCRIBED) 1 Each 0    hydroxychloroquine (PLAQUENIL) 200 MG Tab Take 200 mg by mouth 2 times a day. FOR PROCEDURE SCHEDULED 11/25/2024 - FOLLOW DOCTOR INSTRUCTIONS      zolpidem (AMBIEN CR) 6.25 MG CR tablet TAKE 1 TABLET BY MOUTH AT BEDTIME AS NEEDED FOR SLEEP FOR UP TO 30 DAYS Oral for 30 Days      NON SPECIFIED Take 2 Tablets by mouth every day. BARIATRIC FUSION TROPICAL - COMPLETE MINERAL AND VITAMIN SUPPLEMENT    FOR PROCEDURE SCHEDULED 11/25/2024 - DO NOT TAKE 7 DAYS PRIOR TO SURGERY       No current facility-administered medications for this visit.       OBJECTIVE:  /62 (BP Location: Right arm, Patient Position: Sitting, BP Cuff Size: Adult)   Pulse 81   Temp 36.8 °C (98.3 °F) (Temporal)   Ht 1.676 m (5' 6\")   Wt 73.8 kg (162 lb 12.8 oz)   LMP 11/22/2024 (Exact Date)   SpO2 100%   BMI 26.28 kg/m²   Physical Exam  Constitutional:       General: She is in acute distress.      Appearance: Normal appearance. She is normal weight.   HENT:      Head: Normocephalic and atraumatic.      Right Ear: External ear normal.      Left Ear: External ear normal.      Nose: Nose normal.      Mouth/Throat:      Mouth: Mucous membranes are moist.   Eyes:      Conjunctiva/sclera: Conjunctivae normal.      Pupils: Pupils are equal, round, and reactive to light.   Cardiovascular:      Rate and Rhythm: Normal rate and regular rhythm.      Pulses: Normal pulses.      Heart sounds: Normal heart sounds.   Pulmonary:      Effort: Pulmonary effort is normal. No respiratory distress.      Breath sounds: Wheezing present.   Abdominal:      General: Abdomen is flat. Bowel sounds are normal.      Palpations: Abdomen is soft.      Tenderness: There is no abdominal tenderness.   Musculoskeletal:         General: Normal range of " "motion.      Cervical back: Normal range of motion.   Skin:     General: Skin is warm and dry.      Capillary Refill: Capillary refill takes less than 2 seconds.   Neurological:      General: No focal deficit present.      Mental Status: She is alert and oriented to person, place, and time. Mental status is at baseline.   Psychiatric:         Mood and Affect: Mood normal.         Behavior: Behavior normal.         Thought Content: Thought content normal.         Judgment: Judgment normal.       ASSESSMENT AND PLAN:     1. Flu-like symptoms  Presents with a 2-3 day history of cough, rhinorrhea, and sore throat, without any significant change in body aches or fever. She reports a mild increase in her use of a rescue inhaler but otherwise feels stable. She has had sick contact with others displaying similar symptoms. On examination, she is speaking in full sentences with no signs of respiratory distress. Her lungs are clear to auscultation bilaterally, and there are no abnormal findings on respiratory assessment. Her vitals are stable, and she is not experiencing chest pain.   - POCT CoV-2, Flu A/B, RSV by PCR negative  -Supportive care (rest, fluids, saline nasal spray, and over-the-counter analgesics as needed).  - Emphasize hydration, aiming for at least 2 liters of fluids daily.  - Monitor for any worsening respiratory symptoms (e.g., shortness of breath or increased inhaler use). If these occur, advise the patient to seek emergency care.  - No antibiotics required given the negative tests and low risk for bacterial infection.    2. Chronic tension-type headache, not intractable  The patient presents with a one-year history of a diffuse, bilateral \"squeezing\" headache. The headaches are not characterized by throbbing, and there are no associated symptoms such as nausea or vomiting. While the patient is uncertain about the exact duration of each headache episode, she reports that the intensity is not disabling. The " headaches are persistent but do not significantly interfere with her daily activities. On physical examination, there is a mild reduction in sensation over the left lower extremity, specifically around the knee area. The patient attributes this sensory change to her chronic knee pain, for which she is scheduled to undergo knee replacement surgery in the near future. Apart from this localized finding, all other aspects of her neurological exam are normal, including cranial nerve function, sensation, strength, cerebellar coordination, Matthieu's reflexes, and overall gait.  -Initiate trial of Tylenol (acetaminophen) for headache relief (LFTs were recently normal).  -Encourage regular use of CPAP, increased hydration, and gentle neck stretching exercises, especially for the trapezius muscles.  -Advise the patient to monitor for headache triggers.  -Given the worsening symptoms and increased frequency of headaches, order an MRI of the brain without contrast to rule out any underlying pathology.  If the headache improves with conservative measures, the MRI may be cancelled.    3. Esophageal stenosis  Patient underwent EGD last month (11/24) which revealed stenosis at the gastrojejunal junction.  Balloon dilation was performed by Dr. Evans, recommended subsequent balloon dilation procedures.  Patient requested that she be re- referred and will continue to follow per their recommendations.  - Referral to General Surgery    4. Menopausal symptoms  Patient is concerned that her chills and night sweats could be related to hot flashes due to menopause.  Patient states that she unable experiencing increasing dyspareunia and vaginal dryness upon intercourse.  She also states that she has been feeling fatigue, insomnia, and irregular periods.  It is possible that patient is transitioning into menopause given her symptoms.  Will reassess, as flulike symptoms resolved and will incorporate results of FSH/LH.  - FSH/LH; Future    5.  Dizziness  The patient presents with dizziness/lightheadedness, which is likely due to low-normal blood pressure, possibly exacerbated by dehydration. She was assessed by cardiology, and according to her report, no intervention was recommended for a very small intra-atrial shunt. However, it is advised to request the medical records for further details. The patient's lightheadedness improved after discontinuing losartan, although the symptoms have not fully resolved. Notably, she was previously taking both losartan and amlodipine when her body weight was significantly higher, as per her account. She also reports inadequate oral intake, which may be contributing to her ongoing symptoms.  - Focus on improving oral intake, particularly with electrolytes, aiming for at least 2 liters of fluid daily to address dehydration.  - Monitor the patient's response to increased hydration.  If symptoms persist or worsen, consider further reducing amlodipine dosage in consultation with the cardiologist, given the patient’s previous cardiovascular management and the impact of dehydration.     6. Right Upper Extremity Discomfort  Follow up on recent ED visit for right upper extremity discomfort, patient reports resolution of symptoms.    Problem List Items Addressed This Visit    None  Visit Diagnoses       Flu-like symptoms        Relevant Orders    POCT CoV-2, Flu A/B, RSV by PCR (Completed)    Chronic tension-type headache, not intractable        Esophageal stenosis        Relevant Orders    Referral to General Surgery    Menopausal symptoms        Relevant Orders    FSH/LH    Dizziness              Orders Placed This Encounter    FSH/LH    Referral to General Surgery    POCT CoV-2, Flu A/B, RSV by PCR    zolpidem (AMBIEN CR) 6.25 MG CR tablet     Return in about 5 weeks (around 1/13/2025).    Ana Paula Grant M.D. PGY-1  [facility] Internal Medicine Resident

## 2024-12-10 NOTE — PROGRESS NOTES
"Teaching Physician Attestation      Level of Participation    I have personally interviewed and examined the patient.  In addition, I discussed with the resident physician the patient's history, exam, assessment and plan in detail.  Topics listed in my addendum were the focus of the visit.  Healthcare maintenance was not addressed this visit unless listed as a topic in my addendum.  I agree with the plan as written along with the following additions/modifications:    Uri  -2-3 days of cough, sore throat, rhinorrhea, no change in body aches, no fevers.  Has sick contact with similar symptoms.  Mild change in her rescue inhaler use but overall is stable.  On exam lungs are clear to auscultation bilaterally, she is speaking full sentences with no respiratory distress.  COVID/flu/RSV swab is negative in clinic.  No chest pain.  -Stable vitals with clear lungs, low risk of pneumonia  -Coughing without fever, low Centor score  -COVID swab negative    Plan  -Supportive care, emphasize importance of hydration with minimum 2 L daily  -If any increase in rescue inhaler use that shortness of breath patient should present to the emergency department for evaluation.      Likely tension Headache  -Patient reports 1 year of a diffuse \"squeezing\" headache, it is not throbbing, no nausea vomiting, she is unsure how long they last, they are not disabling in intensity, it is bilateral.  On exam she has mild decrease in sensation of the left lower extremity on the left hand side around the knee (patient reports chronic knee pain and will need upcoming knee replacement), otherwise cranial nerves, sensation, strength, cerebellar, Matthieu's reflexes, and gait are all normal (gait is perhaps slightly antalgic in the setting of chronic knee pain per report.    Plan  -Trial Tylenol (LFTs recently normal), continue wearing CPAP, increasing hydration, gentle neck stretching exercises and trapezius.  -Patient will monitor for triggers  -Given " some worsening from prior and increased frequency, will order MRI without contrast.  If symptoms improve/resolve with above interventions can cancel    Lightheadedness, likely secondary to low normal bp in the setting of dehydration  -saw cardio, per report no intervention recommended for very small intraatrial shunt, need to request records  -Symptoms of lightheadedness improved after removal of losartan, but have not completely resolved.  Of note, patient was on losartan and amlodipine previously when she had significantly higher body weight per her report.  She reports poor p.o. intake    Plan  -Increase p.o. intake with electrolytes to a minimum of 2 L daily  -If no improvement, consider further reducing amlodipine after discussion with her cardiologist, who is the primary managing.  She reports saw her cardiologist recently and did not make any changes.    Recent emergency department visit for right upper extremity discomfort noted, patient did not mention this during the visit to me, which to focus on URI symptoms.    Appreciate bariatic surgery    Appreciate gyn support ffor pelvic pain, dysparenunia, metrorrhagia (s/p hysteroscopy, with no polyp found)    RTC w/I 1 mo for HA, lightheadedess, right upper extremity discomfort, then needs f/u for gerd and confirm status of foot ulcer, Pcr.

## 2024-12-11 ENCOUNTER — TELEPHONE (OUTPATIENT)
Dept: INTERNAL MEDICINE | Facility: OTHER | Age: 44
End: 2024-12-11

## 2024-12-11 ENCOUNTER — OFFICE VISIT (OUTPATIENT)
Dept: INTERNAL MEDICINE | Facility: OTHER | Age: 44
End: 2024-12-11
Payer: MEDICAID

## 2024-12-11 VITALS
BODY MASS INDEX: 26.46 KG/M2 | HEIGHT: 65 IN | WEIGHT: 158.8 LBS | OXYGEN SATURATION: 98 % | TEMPERATURE: 98.8 F | DIASTOLIC BLOOD PRESSURE: 54 MMHG | SYSTOLIC BLOOD PRESSURE: 89 MMHG | HEART RATE: 81 BPM

## 2024-12-11 DIAGNOSIS — M36.8: ICD-10-CM

## 2024-12-11 DIAGNOSIS — M34.1 CREST SYNDROME (HCC): ICD-10-CM

## 2024-12-11 DIAGNOSIS — I73.00: ICD-10-CM

## 2024-12-11 PROCEDURE — 3078F DIAST BP <80 MM HG: CPT | Mod: GC

## 2024-12-11 PROCEDURE — 3074F SYST BP LT 130 MM HG: CPT | Mod: GC

## 2024-12-11 PROCEDURE — 99214 OFFICE O/P EST MOD 30 MIN: CPT | Mod: GC

## 2024-12-11 RX ORDER — LOSARTAN POTASSIUM 25 MG/1
25 TABLET ORAL DAILY
COMMUNITY
Start: 2024-11-21 | End: 2024-12-12 | Stop reason: SDUPTHER

## 2024-12-11 ASSESSMENT — FIBROSIS 4 INDEX: FIB4 SCORE: .938083151964685911

## 2024-12-11 NOTE — PATIENT INSTRUCTIONS
Thank you for visiting today!  Please ensure to arrive at least 15 minutes prior to your scheduled appointment time to ensure that we can fully take advantage of our scheduled time slot.     If we have discussed completing any imaging during this visit, please expect a call from [facility] to the phone number listed on your medical record in the next few days for scheduling. Otherwise please give [facility] a call at [phone].  If we have discussed completion of any labs during this visit, please complete them a 3~5 days prior to our next visit. If your primary lab collection site is either [facility] or [facility], please ensure you bring your printed out lab request forms to the lab during collection. Most [facility] are by appointment bases, however the main [facility] labs does take in walk-ins depending on how busy they are. Please ensure you call the lab before hand if you are attempting a walk in collection.   If you have any questions or concerns please feel free to contact us at .  If you feel like you are experiencing a medical emergency please seek immediate medical attention at an urgent care or in the emergency department.

## 2024-12-11 NOTE — PROGRESS NOTES
Established Patient    Patient Care Team:  Ana Paula Grant M.D. as PCP - General (Internal Medicine)    Christine Miguel is a 44 y.o. female who presents today with the Chief Complaint of foot ache and sores    HPI:  Ms. King is a 44 year old female with past medical history of crest syndrome, dyslipidemia, GERD, FRANK on CPAP, Raynaud's, status post gastric sleeve, status post bilateral salpingectomy, chronic polyarthritis, pulmonary hypertension. She is following with sleep medicine, cardiology, vascular medicine, rheumatology regarding her comorbidities.     Patient reports that she was told that she had sores on her bottom of the feet. Patient reports that she has losses in sensation for a long time since diagnosed with crest syndrome. She has been taking her amlodipine 10mg every 48 hours without any missed doses. Patient reports that pain is triggered with cold and accompanied by changes in color and feels cold. Patient tries to keep her feet warm however has had difficulty this time during winter. Unclear when her sores noted as patient has loss of sensation on her feet.  Denies history of fevers, chills, chest pain, shortness of breath.  Patient is a never smoker.  Patient currently follows rheumatologist in [place], sees them every 3 months.      Patient Active Problem List    Diagnosis Date Noted    Metrorrhagia 11/25/2024    Hypercholesterolemia 08/07/2024    Osteoarthrosis 08/07/2024    Gastroesophageal reflux disease 08/07/2024    Status post sleeve gastrectomy 08/07/2024    Benign hypertension 08/07/2024    Asthma 11/22/2023    Sleep apnea 11/22/2023    GERD (gastroesophageal reflux disease) 11/22/2023    Hx of gastric bypass 09/08/2023    H/O gastric sleeve 09/08/2023    Pelvic pain 03/27/2023    Dysmenorrhea 03/27/2023    Dyspareunia in female 03/27/2023    HTN (hypertension) 03/10/2023    Elevated lipoprotein(a) 03/10/2023    Degenerative joint disease involving multiple  "joints 2023    Family history of premature coronary heart disease 2022    Dyslipidemia 2022    Prediabetes 2022    CREST syndrome (HCC) 2022    Obesity (BMI 30-39.9) 2022    Postprocedural pseudomeningocele 2014    Displacement of lumbar intervertebral disc without myelopathy 2014    Cataract 2014       Past Medical History:   Diagnosis Date    Acid reflux     GEOVANY positive 2022    Anesthesia     Family Hx-father ; was always told it was anesthesia \"related\"; not sure what the actual reaction was; pt-PONV    Arthritis 2023    osteoarthritis    Asthma     inhaler prn    Breath shortness 2023    on exertion    CREST syndrome (HCC)     Cyclic citrullinated peptide (CCP) antibody positive 2022    Degenerative joint disease involving multiple joints 2023    Dyslipidemia 2022    Dyspareunia, female 2023    Enlargement - tonsil/adenoid 2013    Fatty liver     Heart burn     medicated    Hiatus hernia syndrome 2023    High cholesterol 2023    on medication    Hypertension 2023    on medication, 2024 pt states that she has been on this same dose of medication even when she weighed more but was told to stay on same dose d/t Raynaud's    Indigestion     Intractable back pain 2017    Left knee pain 2023    Obesity due to excess calories with serious comorbidity 2023    Oligohydramnios antepartum-IOL  9 AM 04/15/2016    IMO load 2020    Osteoporosis     Pain 2014    lower back/L leg=7/10    Pain 2023    Chronic pain \"all over my body\" rates pain 7 out of 10    Patellofemoral instability of left knee with pain 2023    Pelvic pain 2023    PFO (patent foramen ovale)     pt sees [facility] cardiologist (Dr. Coronado)    Right ventricular enlargement     Sciatica of right side 2017    Sleep apnea 2023    uses cpap    Snoring     sleep study " done    Tachycardia, unspecified     Unspecified urinary incontinence 03/20/2023    stress incontinence     Social History     Tobacco Use    Smoking status: Never     Passive exposure: Never    Smokeless tobacco: Never   Vaping Use    Vaping status: Never Used   Substance Use Topics    Alcohol use: No    Drug use: No     Current Outpatient Medications   Medication Sig Dispense Refill    losartan (COZAAR) 25 MG Tab Take 25 mg by mouth every day.      VITAMIN E PO Take 1 Tablet by mouth every day. FOR PROCEDURE SCHEDULED 11/25/2024 - DO NOT TAKE 7 DAYS PRIOR TO SURGERY      Cholecalciferol (VITAMIN D-3 PO) Take 1 Tablet by mouth every day. FOR PROCEDURE SCHEDULED 11/25/2024 - DO NOT TAKE 7 DAYS PRIOR TO SURGERY      NON SPECIFIED Take 1 Tablet by mouth every day. BARIATRIC FUSION - HAIR SKIN AND NAILS    FOR PROCEDURE SCHEDULED 11/25/2024 - DO NOT TAKE 7 DAYS PRIOR TO SURGERY      NON SPECIFIED Take 2 Tablets by mouth every day. BARIATRIC FUSION TROPICAL - COMPLETE MINERAL AND VITAMIN SUPPLEMENT    FOR PROCEDURE SCHEDULED 11/25/2024 - DO NOT TAKE 7 DAYS PRIOR TO SURGERY      Cyanocobalamin (B-12 PO) Take 1 Tablet by mouth every day. FOR PROCEDURE SCHEDULED 11/25/2024 - DO NOT TAKE 7 DAYS PRIOR TO SURGERY      BIOTIN PO Take 1 Tablet by mouth every day. NATURE'S BOUNTY - HAIR SKIN AND NAILS    FOR PROCEDURE SCHEDULED 11/25/2024 - DO NOT TAKE 7 DAYS PRIOR TO SURGERY      Glucosamine HCl (GLUCOSAMINE PO) Take 1 Tablet by mouth every day. JOINT HEALTH    FOR PROCEDURE SCHEDULED 11/25/2024 - DO NOT TAKE 7 DAYS PRIOR TO SURGERY      NON SPECIFIED Apply 1 Application topically as needed (raynauds). NITROBID 2% ON HANDS FOR RAYNAUD'S      pilocarpine (SALAGEN) 5 MG Tab Take 1 Tablet by mouth 3 times a day. FOR PROCEDURE SCHEDULED 11/25/2024 - CONTINUE AS PRESCRIBED      aspirin 81 MG EC tablet Take 1 Tablet by mouth every 48 hours. (Patient taking differently: Take 81 mg by mouth every 48 hours. FOR PROCEDURE SCHEDULED  "11/25/2024 - FOLLOW DOCTOR INSTRUCTIONS)      atorvastatin (LIPITOR) 40 MG Tab Take 1 Tablet by mouth every evening. (Patient taking differently: Take 40 mg by mouth every evening. FOR PROCEDURE SCHEDULED 11/25/2024 - CONTINUE AS PRESCRIBED) 90 Tablet 1    omeprazole (PRILOSEC) 40 MG delayed-release capsule Take 1 Capsule by mouth 2 times a day. (Patient taking differently: Take 40 mg by mouth 2 times a day. FOR PROCEDURE SCHEDULED 11/25/2024 - CONTINUE AS PRESCRIBED) 90 Capsule 0    amLODIPine (NORVASC) 10 MG Tab Take 10 mg by mouth every day. FOR PROCEDURE SCHEDULED 11/25/2024 - CONTINUE AS PRESCRIBED      albuterol 108 (90 Base) MCG/ACT Aero Soln inhalation aerosol Inhale 2 Puffs every four hours as needed for Shortness of Breath. (Patient taking differently: Inhale 2 Puffs every four hours as needed for Shortness of Breath. FOR PROCEDURE SCHEDULED 11/25/2024 - CONTINUE AS PRESCRIBED) 1 Each 0    hydroxychloroquine (PLAQUENIL) 200 MG Tab Take 200 mg by mouth 2 times a day. FOR PROCEDURE SCHEDULED 11/25/2024 - FOLLOW DOCTOR INSTRUCTIONS      zolpidem (AMBIEN CR) 6.25 MG CR tablet TAKE 1 TABLET BY MOUTH AT BEDTIME AS NEEDED FOR SLEEP FOR UP TO 30 DAYS Oral for 30 Days (Patient not taking: Reported on 12/11/2024)       No current facility-administered medications for this visit.       BP (!) 89/54 (BP Location: Left arm, Patient Position: Sitting, BP Cuff Size: Adult)   Pulse 81   Temp 37.1 °C (98.8 °F)   Ht 1.651 m (5' 5\")   Wt 72 kg (158 lb 12.8 oz)   LMP 11/22/2024 (Exact Date)   SpO2 98%   BMI 26.43 kg/m²   Physical Exam  Constitutional:       Appearance: Normal appearance. She is normal weight.   HENT:      Head: Normocephalic and atraumatic.      Nose: Nose normal.      Mouth/Throat:      Mouth: Mucous membranes are moist.      Pharynx: Oropharynx is clear.   Eyes:      General: No scleral icterus.     Extraocular Movements: Extraocular movements intact.      Conjunctiva/sclera: Conjunctivae normal.     "  Pupils: Pupils are equal, round, and reactive to light.   Cardiovascular:      Rate and Rhythm: Normal rate and regular rhythm.      Pulses: Normal pulses.      Heart sounds: Normal heart sounds.   Pulmonary:      Effort: Pulmonary effort is normal.      Breath sounds: Normal breath sounds.   Abdominal:      General: Abdomen is flat. Bowel sounds are normal.      Palpations: Abdomen is soft.   Musculoskeletal:         General: Normal range of motion.   Skin:     General: Skin is dry.      Capillary Refill: Capillary refill takes less than 2 seconds.      Comments: Pallor noted on soles of feet bilaterally with coolness to touch. Superficial ulcers noted on 1st toe, painful to firm palpation. No breakage of epidermis, no discharges noted.    Neurological:      Mental Status: She is alert.         Assessment and Plan:   1. CREST syndrome ([facility])  2. Raynaud phenomenon due to autoimmune disease (HCC)  Patient reports that she was told that she had sores on her bottom of the feet. Patient reports that she has losses in sensation for a long time since diagnosed with crest syndrome. She has been taking her amlodipine 10mg every 48 hours without any missed doses. Patient reports that pain is triggered with cold and accompanied by changes in color and feels cold. Patient tries to keep her feet warm however has had difficulty this time during winter. Unclear when her sores noted as patient has loss of sensation on her feet.  Denies history of fevers, chills, chest pain, shortness of breath.  Patient is a never smoker.  Patient currently follows rheumatologist in [place], sees them every 3 months.  Patient has a history of crest syndrome, currently followed by rheumatology in [place].  On examination patient noted with right no acute flare, cool to touch.  Advised patient to continue amlodipine, keep lower extremity warm, avoid exposure to cold, round hands and feet in warm water.  Advised patient to take a photo and send  it to rheumatologist in [place] if they have any recommendations.    Staff: Dr. Heredia  Follow up: 1 month with PCP    Camila Olsen M.D. PGY II  [facility]    This note was created using a mixture of voice recognition and HIPAA compliant data processing software. Patient consent is obtained prior to every audio documentation encounter if utilized. While every attempt is made to ensure accuracy of transcription, occasionally errors occur.     Note to patient: If you are reviewing this note and have questions about the meaning or medical terms being used, please schedule an appointment or bring it up at your next follow-up appointment.  Please remember that these notes are meant to be a communication tool between medical professionals and require medical terms to be used for clarity, safety, and efficiency. They are not a comprehensive transcript of your visit. Thank you.

## 2024-12-11 NOTE — TELEPHONE ENCOUNTER
Patient called stating she missed a call from Dr. Grant regarding lab results and would like a call back.

## 2024-12-12 ENCOUNTER — OFFICE VISIT (OUTPATIENT)
Dept: VASCULAR LAB | Facility: MEDICAL CENTER | Age: 44
End: 2024-12-12
Attending: FAMILY MEDICINE
Payer: MEDICAID

## 2024-12-12 VITALS
WEIGHT: 159 LBS | DIASTOLIC BLOOD PRESSURE: 70 MMHG | SYSTOLIC BLOOD PRESSURE: 104 MMHG | BODY MASS INDEX: 26.49 KG/M2 | HEART RATE: 86 BPM | HEIGHT: 65 IN

## 2024-12-12 DIAGNOSIS — M34.1 CREST SYNDROME (HCC): ICD-10-CM

## 2024-12-12 DIAGNOSIS — I27.20 PULMONARY HYPERTENSION (HCC): ICD-10-CM

## 2024-12-12 DIAGNOSIS — E78.5 DYSLIPIDEMIA: ICD-10-CM

## 2024-12-12 DIAGNOSIS — I10 PRIMARY HYPERTENSION: ICD-10-CM

## 2024-12-12 DIAGNOSIS — E78.41 ELEVATED LIPOPROTEIN(A): ICD-10-CM

## 2024-12-12 DIAGNOSIS — Z82.49 FAMILY HISTORY OF PREMATURE CORONARY HEART DISEASE: ICD-10-CM

## 2024-12-12 DIAGNOSIS — Z79.02 LONG TERM (CURRENT) USE OF ANTITHROMBOTICS/ANTIPLATELETS: ICD-10-CM

## 2024-12-12 DIAGNOSIS — I73.00 RAYNAUD'S PHENOMENON WITHOUT GANGRENE: ICD-10-CM

## 2024-12-12 PROCEDURE — 3074F SYST BP LT 130 MM HG: CPT | Performed by: FAMILY MEDICINE

## 2024-12-12 PROCEDURE — 99212 OFFICE O/P EST SF 10 MIN: CPT

## 2024-12-12 PROCEDURE — 3078F DIAST BP <80 MM HG: CPT | Performed by: FAMILY MEDICINE

## 2024-12-12 PROCEDURE — 99214 OFFICE O/P EST MOD 30 MIN: CPT | Performed by: FAMILY MEDICINE

## 2024-12-12 ASSESSMENT — ENCOUNTER SYMPTOMS
BRUISES/BLEEDS EASILY: 0
SPUTUM PRODUCTION: 0
FEVER: 0
COUGH: 0
WHEEZING: 0
ORTHOPNEA: 0
HEMOPTYSIS: 0
PALPITATIONS: 0
CLAUDICATION: 0
SHORTNESS OF BREATH: 0
PND: 0
CHILLS: 0

## 2024-12-12 ASSESSMENT — FIBROSIS 4 INDEX: FIB4 SCORE: .938083151964685911

## 2024-12-12 NOTE — PROGRESS NOTES
FOLLOW-UP VASCULAR MEDICINE CLINIC  12/12/24     Christinekelsey Miguel is a. female  who presents for f/u  initially referred by Lilly Abreu A.P.RN for eval for vasc etiology of RUE non-healing wound, est 4/24/22      Subjective        Interval hx/concerns:  last seen 8/2024, noting new bilat plantar aspect of toes with trophic lesions.      Secondary raynaud's (known CREST syndrome) : stable, persistent pain w/o new lesions. Has limited foot sensation so not painful.  No bleeding or pus noted.  Finger lesions are stable and mainly resolved. R index and middle finger, L index with shallow ulcerations.   Pending with rheum MD 12/27/24.  No recent med changes.   Still having lifestyle limiting pain and impact on social and occupational life - inability to type due to pain in future.   Initial visit hx:  Started age 39 with episodes, can be fingers and toes and has been progressive with ulcerations forms.   Seen by UC 4/2022 for R index finger non-healing wound.  Had subung lac 3mo prior.  Reports prior episodes of non-healing finger wounds in the past.  Hx of recurrent cold/painful fingers with intermittent cyanosis.  Has WBI normal in past 2021 and has apparently been seen by vasc specialist w/o formal dx or tx in the past.   (No prior visits of [facility] vasc med or available  Has seen by Dr. Pierre in latter half of 2021 and informed no interventions available but started nifedipine, DAPT, atorva.  Currently on ASA only, stopped nifedipine due to HA     HTN: Stable , home BP 100s/60s most days.  Mild dizz with positional changes   HLD: Stable, on current treatment: lifestyle modifications and High intensity statin atorva   Baseline lipid    Latest Reference Range & Units 01/03/12 12:10   Cholesterol,Tot 100 - 199 mg/dL 218 (H)   Triglycerides 0 - 149 mg/dL 86   HDL >=40 mg/dL 37 !   LDL <100 mg/dL 164     Antiplatelet/anticoagulation: ASA 81mg QOD     Current Outpatient Medications on File Prior to Visit    Medication Sig Dispense Refill    zolpidem (AMBIEN CR) 6.25 MG CR tablet       VITAMIN E PO Take 1 Tablet by mouth every day. FOR PROCEDURE SCHEDULED 11/25/2024 - DO NOT TAKE 7 DAYS PRIOR TO SURGERY      Cholecalciferol (VITAMIN D-3 PO) Take 1 Tablet by mouth every day. FOR PROCEDURE SCHEDULED 11/25/2024 - DO NOT TAKE 7 DAYS PRIOR TO SURGERY      NON SPECIFIED Take 1 Tablet by mouth every day. BARIATRIC FUSION - HAIR SKIN AND NAILS    FOR PROCEDURE SCHEDULED 11/25/2024 - DO NOT TAKE 7 DAYS PRIOR TO SURGERY      NON SPECIFIED Take 2 Tablets by mouth every day. BARIATRIC FUSION TROPICAL - COMPLETE MINERAL AND VITAMIN SUPPLEMENT    FOR PROCEDURE SCHEDULED 11/25/2024 - DO NOT TAKE 7 DAYS PRIOR TO SURGERY      Cyanocobalamin (B-12 PO) Take 1 Tablet by mouth every day. FOR PROCEDURE SCHEDULED 11/25/2024 - DO NOT TAKE 7 DAYS PRIOR TO SURGERY      BIOTIN PO Take 1 Tablet by mouth every day. NATURE'S BOUNTY - HAIR SKIN AND NAILS    FOR PROCEDURE SCHEDULED 11/25/2024 - DO NOT TAKE 7 DAYS PRIOR TO SURGERY      Glucosamine HCl (GLUCOSAMINE PO) Take 1 Tablet by mouth every day. JO[facility]    FOR PROCEDURE SCHEDULED 11/25/2024 - DO NOT TAKE 7 DAYS PRIOR TO SURGERY      NON SPECIFIED Apply 1 Application topically as needed (raynauds). NITROBID 2% ON HANDS FOR RAYNAUD'S      pilocarpine (SALAGEN) 5 MG Tab Take 1 Tablet by mouth 3 times a day. FOR PROCEDURE SCHEDULED 11/25/2024 - CONTINUE AS PRESCRIBED      aspirin 81 MG EC tablet Take 1 Tablet by mouth every 48 hours. (Patient taking differently: Take 81 mg by mouth every 48 hours. FOR PROCEDURE SCHEDULED 11/25/2024 - FOLLOW DOCTOR INSTRUCTIONS)      atorvastatin (LIPITOR) 40 MG Tab Take 1 Tablet by mouth every evening. (Patient taking differently: Take 40 mg by mouth every evening. FOR PROCEDURE SCHEDULED 11/25/2024 - CONTINUE AS PRESCRIBED) 90 Tablet 1    omeprazole (PRILOSEC) 40 MG delayed-release capsule Take 1 Capsule by mouth 2 times a day. (Patient taking differently:  "Take 40 mg by mouth 2 times a day. FOR PROCEDURE SCHEDULED 11/25/2024 - CONTINUE AS PRESCRIBED) 90 Capsule 0    albuterol 108 (90 Base) MCG/ACT Aero Soln inhalation aerosol Inhale 2 Puffs every four hours as needed for Shortness of Breath. (Patient taking differently: Inhale 2 Puffs every four hours as needed for Shortness of Breath. FOR PROCEDURE SCHEDULED 11/25/2024 - CONTINUE AS PRESCRIBED) 1 Each 0    hydroxychloroquine (PLAQUENIL) 200 MG Tab Take 200 mg by mouth 2 times a day. FOR PROCEDURE SCHEDULED 11/25/2024 - FOLLOW DOCTOR INSTRUCTIONS       No current facility-administered medications on file prior to visit.      Allergies   Allergen Reactions    Latex Rash and Itching    Duloxetine      HA, dizz     Fluoxetine Vomiting and Nausea     Nausea, vomiting, \"made me feel woozy\"    Nifedipine Vomiting     Headache, vomiting    Sildenafil      HA, nausea      Social History     Tobacco Use    Smoking status: Never     Passive exposure: Never    Smokeless tobacco: Never   Vaping Use    Vaping status: Never Used   Substance Use Topics    Alcohol use: No    Drug use: No      Review of Systems   Constitutional:  Negative for chills, fever and malaise/fatigue.   Respiratory:  Negative for cough, hemoptysis, sputum production, shortness of breath and wheezing.    Cardiovascular:  Negative for chest pain, palpitations, orthopnea, claudication, leg swelling and PND.   Skin:  Negative for itching and rash.   Endo/Heme/Allergies:  Does not bruise/bleed easily.       Objective   Vitals:    12/12/24 1423   BP: 104/70   BP Location: Left arm   Patient Position: Sitting   BP Cuff Size: Large adult   Pulse: 86   Weight: 72.1 kg (159 lb)   Height: 1.651 m (5' 5\")      BP Readings from Last 5 Encounters:   12/12/24 104/70   12/11/24 (!) 89/54   12/09/24 110/62   11/27/24 106/58   11/25/24 118/75      BMI Readings from Last 1 Encounters:   12/12/24 26.46 kg/m²      Wt Readings from Last 3 Encounters:   12/12/24 72.1 kg (159 lb) " "  12/11/24 72 kg (158 lb 12.8 oz)   12/09/24 73.8 kg (162 lb 12.8 oz)      Physical Exam  Constitutional:       Appearance: Normal appearance.   HENT:      Head: Normocephalic.   Eyes:      Extraocular Movements: Extraocular movements intact.      Conjunctiva/sclera: Conjunctivae normal.   Pulmonary:      Effort: Pulmonary effort is normal.   Musculoskeletal:      Cervical back: Normal range of motion.        Feet:    Skin:     General: Skin is dry.      Coloration: Skin is not pale.      Findings: No rash.   Neurological:      General: No focal deficit present.      Mental Status: She is alert and oriented to person, place, and time.   Psychiatric:         Mood and Affect: Mood normal.         Behavior: Behavior normal.       Labs:    [facility] 6/27/22   Lp(a) 181      HDL 42  LDL 62   CMP wnl   a1c wnl  Cbc wnl   ESR, CRP, ANCA (MPO ab, PR3 ab) - wnl   GEOVANY - positive >1:1280 (centromere pattern)  CCP ab 71 (elevated)   HCV ab neg     [facility] 7/20/22   CMP - wnl   SCL70 ab - neg   AT III activity wnl   FVL neg   Prot C wnl   PGM neg   B2GP1 ab wnl  ACL ab wnl   Lupus AC neg   D-dimer 0.32       [facility] 8/2022   C3, C4, RA factor - wnl         Lab Results   Component Value Date/Time    CHOLSTRLTOT 179 09/03/2015 10:15 AM     (H) 09/03/2015 10:15 AM    HDL 39 (A) 09/03/2015 10:15 AM    TRIGLYCERIDE 102 09/03/2015 10:15 AM     No results found for: \"LIPOPROTA\"   No results found for: \"APOB\"   Lab Results   Component Value Date/Time    CRPHIGHSEN 2.8 10/13/2014 01:30 PM       Lab Results   Component Value Date/Time    SODIUM 139 10/01/2024 03:40 PM    POTASSIUM 3.7 10/01/2024 03:40 PM    CHLORIDE 101 10/01/2024 03:40 PM    CO2 23 10/01/2024 03:40 PM    GLUCOSE 93 10/01/2024 03:40 PM    BUN 10 10/01/2024 03:40 PM    CREATININE 0.47 (L) 10/01/2024 03:40 PM    CREATININE 0.9 01/04/2008 02:42 AM     Lab Results   Component Value Date/Time    ALKPHOSPHAT 76 10/01/2024 03:40 PM    ASTSGOT 15 10/01/2024 03:40 PM    " "ALTSGPT 22 10/01/2024 03:40 PM    TBILIRUBIN 0.5 10/01/2024 03:40 PM         No results found for: \"MICROALBCALC\", \"MALBCRT\", \"MALBEXCR\", \"XFKFDP02\", \"MICROALBUR\", \"MICRALB\", \"UMICROALBUM\", \"MICROALBTIM\"      Latest Reference Range & Units 09/16/14 04:15 10/28/15 12:04 01/30/16 11:08 04/15/16 14:17 12/26/17 11:05   Hepatitis B Surface Antigen Negative   Negative      HIV 1/0/2 Non Reactive   see below      Rubella IgG Antibody IU/mL  135.40      Strep Gp B DNA PCR Negative     Negative    Syphilis, Treponemal Qual Non Reactive   Non Reactive Non Reactive     Stat C-Reactive Protein 0.00 - 0.75 mg/dL 0.32    0.35         IMAGING    WBI 8/2021  Normal WBI.   Absent 3, 4, 5th digit flow could be artifactual or secondary to embolic    disease.  Recommend clinical correlation.   Right.    Arterial doppler waveforms are of high amplitude and triphasic.   Wrist brachial index is within normal limits. WBI=1.05   PPG of the digits demonstrates absent flow in the right 3rd, 4th, and 5th    digits. The 1st and 2nd digits appear to have slightly diminished flow in    comparsion to the contralateral digits.    CT chest 8/2021   1.  No thoracic aortic aneurysm or dissection.  2.  Subclavian arteries are normal in caliber and patent.  LEFT subclavian artery is partially secured.  3.  Probable medial RIGHT lower lobe atelectasis.  Thoracic aorta is normal in caliber.  No dissection demonstrated.  Great vessel origins are intact.  RIGHT subclavian artery is normal in caliber and patent.  LEFT subclavian artery is partially obscured by streak artifact however also appears normal in caliber and patent.    Echo 9/2021   No prior study is available for comparison.   Normal left ventricular systolic function.  The left ventricular ejection fraction is visually estimated to be 65%.  No significant valve abnormalities.     WBI/art duplex 8/2022    Wrist-brachial indices are normal.   FBI:   Right-  1.07   Left-     0.98   Right 3rd and 4th " finger waveform are diminished, most likely due to    microvascular disease.   Left 2nd and 3rd finger waveforms are diminished, most likely due to    microvascular disease.    Echo 12/2022  Normal left ventricular size, thickness, systolic function, and   diastolic function.  The left ventricular ejection fraction is visually estimated to be 60-65%.  The right ventricle is borderline dilated in size with preserved   systolic function.  Estimated right ventricular systolic pressure is mildly elevated at 40 mmHg.  Normal left atrial size.  No significant valvular abnormalities.   Normal inferior vena cava size and inspiratory collapse.    CT chest 12/2022  1.  No CT evidence of interstitial lung disease.   2.  No significant incidental findings are identified.    8/9/24 Echo with bubble  1. The left ventricle is normal in size, thickness, and systolic function with an ejection fraction of 60 to 65% by Delacruz's biplane. There is normal LV segmental wall motion. The left ventricular diastolic function is normal.  2. Mild tricuspid insufficiency.  3. Injection of bubbles documented a very small interatrial shunt.  4. The right ventricle is normal in size and function. Estimated RVSP = 37 mmHg (RAP = 3 mmHg) is mildly elevated.    11/2024 NM stress   1. Negative pharmacological stress test for ischemia or infarction.  2. Normal left ventricular systolic function with a calculated ejection fraction of 70% with normal wall motion.  3. No ischemic EKG changes seen with pharmacologic stress.  4. No chest pain with pharmacologic stress.    11/2024 RUE venous    No superficial or deep venous thrombosis.          PROCEDURES:    DATE OF SERVICE:  03/27/2023    POSTOPERATIVE DIAGNOSES:  1.  Recent left sided pelvic pain, which seems to be resolving.  2.  Recent left sided adnexal mass, which has since resolved.  During today's   laparoscopy no ovarian/adnexal mass or cyst is seen either on the right or the   left.  At  laparoscopy both ovaries are well visualized and both ovaries are   found during today's laparoscopy to be normal.  3.  Dyspareunia.  4.  Dysmenorrhea.  5.  The patient desires permanent tubal sterilization.  6.  Obesity.   PROCEDURES:  Laparoscopy, diagnostic and operative, with laparoscopic   bilateral salpingectomy.   SURGEON:  Nolan Rodriguez MD            Medical Decision Making:  Today's Assessment / Status / Plan:     1. Raynaud's phenomenon without gangrene  Referral to Vascular Medicine    amLODIPine (NORVASC) 10 MG Tab    losartan (COZAAR) 25 MG Tab      2. Pulmonary hypertension (HCC)  ambrisentan (LETAIRIS) 5 MG tablet      3. CREST syndrome (HCC)        4. Primary hypertension  amLODIPine (NORVASC) 10 MG Tab    losartan (COZAAR) 25 MG Tab      5. Dyslipidemia        6. Elevated lipoprotein(a)        7. Long term (current) use of antithrombotics/antiplatelets        8. Family history of premature coronary heart disease           Established CVD:     1) pulmonary hypertension d/t CREST - mild NGO  Echo with RVSP = 37-40mmHg   Plan: intolerant to sildenafil, start trial of ambrisentan 5mg daily   - I am REMS-certified and we will report her to the REMS program   - pt has been sterilized with bilat salpigectomies as of 3/2023   - pt advised of ADRs and fetotoxicity risks as per REMS,    2) secondary Raynaud's d/t CREST syndrome, refractory - persistent symptoms, persistent erosive trophic fingertip lesions with new telangectasia on toes  4/2022  - recurrent non-healing finger wounds, most recently right index - improving  - prior normal WBI with PPGs showing absent flow   8/2022 duplex = shows nl WBI/FBI with reduced waveforms to R 3r/4th and L 2nd/3rd fingers   - thrombophilia w/u neg,  ANCA neg   - no COVID prior to initial episode   - CTA chest wnl w/o proximal plaque or stenosis, no indications of thoracic Ao diss, plaque  Plan:  - defer further CREST-specific tx to rheum MD for definitive dx and tx -  needs intensified tx   - continue warming practices      Meds:  - failed topical nitro-Bid ointment Q6h to affected fingertips - no relief   - continue amlodipine 10mg daily - failed nifedipine  - continue losartan 25mg daily to increase vasodilation   - failed fluoxetine due to lethargy, mood disturbanc   - start ambrisentan 5mg daily, may titrate to 10mg daily    FOR PRIOR AUTH: qualifies due to refractory RP and pulm HTN  - consider cilostazol as next agent if new ulcerations       Counter-measures:  Stay warm and avoid exposure to cold  - Wear mittens (or gloves) and wool socks  - Use a avitia when drinking from cold cans or bottles  - Wear layers to keep your whole body warm, including a hat  - Avoid tight-fitting socks and shoes  - parafin wax dips  - warming pads for feet in cold weather or after exposure   Avoid the use of vibrating hand tools  Take good care of your skin  - Use moisturizers to prevent drying and cracking  - Inspect fingers and toes regularly for ulcers  Avoid smoking or exposure to smoke and illicit substances (eg. Cocaine)  Limit caffeine if it worsens your symptoms - Switch to decaffeinated drinks  Avoid vasoconstricting medications / substances   - best to avoid narcotics, estrogen, BB, clonidine, ergots/triptans, bromocriptine, stimulants (including phentermine and ADHD meds), and OTC sympathomimetics  Reduce stress  - Exercise helps to reduce stress  - Consider yoga or meditation    2) pulm HTN, secondary to CREST - no overt NGO, edema today  2022 echo = RVSP 40mmHg (mild)   8/2024 echo RVSP = 37mmHg (mild)  Plan:  - candidate for ERA therapy - will review in future   - repeat echo prior to next visit   - may need further input from cardiologist     BLOOD PRESSURE MANAGEMENT:  ACC/AHA (2017) goal <130/80  Home BP at goal: yes  Office BP at goal:  yes - mild hypoTN  - hx of preg-induced HTN on nifedipine   Plan:   - continue healthy diet, activity, weight mgmt   - routine clinic-based  BP measurements at least once annually   Medications:   - continue amlodipine 10mg daily for Raynaud's   - continue losartan 25mg daily for vasodilt    LIPID MANAGEMENT:   Qualifies for Statin Therapy Based on 2018 ACC/AHA Guidelines: yes, Primary Prevention - 40-74yo, LDLc >70, <190 w/o DM  The ASCVD Risk score (Cong KUHN, et al., 2019) failed to calculate.  Major ASCVD events: None  High-risk condition: N/A  Risk-enhancers: Metabolic syndrome  and Lp(a) >50   - reviewed with patient this is an independent risk factor for ASCVD but is currently controversial if lowering has impact on outcomes in primary prevention, so targeting LDLc lowering is primary objective for now and consider lp(a) lowering if worsening ascvd risk or if ascvd event occurs - definitely prothrombotic and needs ASA   Currently on Statin: Yes  Tx goals: LDL-C <100 (consider non-HDL-C <130, apoB <90)  At goal? Yes, 7/2022   Plan:   - reinforced ongoing TLC measures as noted   - monitor labs   Meds:   - continue atorva 40mg daily     ANTITHROMBOTIC/ANTIPLATELET THERAPY: continue ASA 81mg daily for potential antithromb benefit due to non-healing wounds and high lp(a)     GLYCEMIC STATUS: Prediabetic        Plan:  - update a1c   - continue healthy diet, weight reduction, daily physical activity   - consider metformin up to 850mg BID to slow or offset progression to T2D as per DPP trial   - monitor labs Q6-12mo    LIFESTYLE INTERVENTIONS  TOBACCO: continued complete avoidance of all tobacco products   PHYSICAL ACTIVITY: >150min/week of mod-intensity activity or as much as tolerated  NUTRITION: Mediterranean   ETOH: complete abstinence recommended  WT MGMT: maintain healthy weight     OTHER:     # CREST syndrome - stable, on meds   - serology testing indicative of CREST syndrome, possible RA overlay per CCP ab elevations as well   - strong centromere ab pattern and GEOVANY 1:1280 with high CCP ab  - prior echo with pulm HTN (RVSP = 40mmHg) in  2022  Plan:  - ongoing care with rheum MD     # GERD - stable, continue PPI     # family planning - s/p bilat salpingectomy (6/2024)    STUDIES: none   LABS: none  Follow up: 3 months     Scott Acuña M.D.  Vascular Medicine Clinic   [facility]   [phone]

## 2024-12-13 DIAGNOSIS — I10 PRIMARY HYPERTENSION: ICD-10-CM

## 2024-12-13 DIAGNOSIS — I73.00 RAYNAUD'S PHENOMENON WITHOUT GANGRENE: ICD-10-CM

## 2024-12-13 RX ORDER — AMLODIPINE BESYLATE 10 MG/1
10 TABLET ORAL DAILY
Qty: 100 TABLET | Refills: 3 | Status: SHIPPED | OUTPATIENT
Start: 2024-12-13 | End: 2024-12-16

## 2024-12-13 RX ORDER — LOSARTAN POTASSIUM 25 MG/1
25 TABLET ORAL DAILY
Qty: 100 TABLET | Refills: 3 | Status: SHIPPED | OUTPATIENT
Start: 2024-12-13

## 2024-12-13 RX ORDER — AMBRISENTAN 5 MG/1
5 TABLET, FILM COATED ORAL DAILY
Qty: 30 TABLET | Refills: 5 | Status: SHIPPED | OUTPATIENT
Start: 2024-12-13

## 2024-12-16 RX ORDER — AMLODIPINE BESYLATE 10 MG/1
10 TABLET ORAL DAILY
Qty: 100 TABLET | Refills: 3 | Status: SHIPPED | OUTPATIENT
Start: 2024-12-16

## 2024-12-19 ENCOUNTER — TELEPHONE (OUTPATIENT)
Dept: INTERNAL MEDICINE | Facility: OTHER | Age: 44
End: 2024-12-19
Payer: MEDICAID

## 2024-12-19 NOTE — TELEPHONE ENCOUNTER
Ita Perera routed conversation to Unr  [facility]47 minutes ago (2:54 PM)     Christine BOWERS [facility] Customer Service1 hour ago (2:07 PM)     GR  Topic: [facility] Question/Issue.     Hello my caught has gotten really severe that no over the counter medication has worked it’s a really dry horrible caught I can’t sleep at night and are not able to wear   C-pap machine from how bad my caught has gotten during the day my caught is so severe I have horrible headaches and some incontinence.

## 2024-12-20 ENCOUNTER — TELEPHONE (OUTPATIENT)
Dept: HOSPITALIST | Facility: MEDICAL CENTER | Age: 44
End: 2024-12-20
Payer: MEDICAID

## 2024-12-20 DIAGNOSIS — R05.1 ACUTE COUGH: ICD-10-CM

## 2024-12-20 RX ORDER — GUAIFENESIN 200 MG/10ML
10 LIQUID ORAL EVERY 4 HOURS PRN
Qty: 840 ML | Refills: 0 | Status: SHIPPED | OUTPATIENT
Start: 2024-12-20

## 2024-12-21 NOTE — TELEPHONE ENCOUNTER
The patient (or their proxy) verbally consented to this telephone encounter. This telephone encounter was conducted via secure, interactive audio telecommunications. The patient's identity was established before proceeding with the telephone encounter by confirmation of their name and an additional identifier.    Reason for the call:  I called the patient to discuss their worsening cough and associated symptoms.  - The patient reports a worsening cough that is interfering with rest, and has tried over-the-counter medications including NyQuil, DayQuil, and Theraflu.  - The patient describes their cough as mostly dry but occasionally producing clear sputum.  - They have been experiencing body aches, a sore throat, and headaches, which are somewhat alleviated by ibuprofen.  - The patient mentioned using albuterol at home, but it is not providing relief.  - There are no signs of worsening shortness of breath.    Any changes made to prescriptions:  - I will send a prescription for guaifenesin syrup to help break up mucus.    Next steps or changes to the plan:  - I advised the patient that if they experience worsening shortness of breath, chest pain, or difficulty breathing, they should seek care at the emergency department for potential nebulizer treatment and to rule out pneumonia.  - I recommended trying NyQuil syrup as it may be more effective.  - The patient should consider starting a symptom diary for their headaches once they recover from the current illness.    Any questions asked by the patient, and the respective answers by the physician:  - The patient inquired about the status of an MRI or CT scan request. I explained that due to the nature of their headaches, which seem related to the acute illness, we are currently holding off on imaging.    I spent 8 minute/s with this patient and/or family on this phone visit today.

## 2024-12-23 NOTE — TELEPHONE ENCOUNTER
December 20, 2024  Ana Paula Grant M.D.   MF    12/20/24  5:04 PM  Note     The patient (or their proxy) verbally consented to this telephone encounter. This telephone encounter was conducted via secure, interactive audio telecommunications. The patient's identity was established before proceeding with the telephone encounter by confirmation of their name and an additional identifier.     Reason for the call:  I called the patient to discuss their worsening cough and associated symptoms.  - The patient reports a worsening cough that is interfering with rest, and has tried over-the-counter medications including NyQuil, DayQuil, and Theraflu.  - The patient describes their cough as mostly dry but occasionally producing clear sputum.  - They have been experiencing body aches, a sore throat, and headaches, which are somewhat alleviated by ibuprofen.  - The patient mentioned using albuterol at home, but it is not providing relief.  - There are no signs of worsening shortness of breath.     Any changes made to prescriptions:  - I will send a prescription for guaifenesin syrup to help break up mucus.     Next steps or changes to the plan:  - I advised the patient that if they experience worsening shortness of breath, chest pain, or difficulty breathing, they should seek care at the emergency department for potential nebulizer treatment and to rule out pneumonia.  - I recommended trying NyQuil syrup as it may be more effective.  - The patient should consider starting a symptom diary for their headaches once they recover from the current illness.     Any questions asked by the patient, and the respective answers by the physician:  - The patient inquired about the status of an MRI or CT scan request. I explained that due to the nature of their headaches, which seem related to the acute illness, we are currently holding off on imaging.     I spent 8 minute/s with this patient and/or family on this phone visit today.

## 2025-01-06 ENCOUNTER — OFFICE VISIT (OUTPATIENT)
Dept: SURGICAL ONCOLOGY | Facility: MEDICAL CENTER | Age: 45
End: 2025-01-06
Payer: MEDICAID

## 2025-01-06 VITALS
HEIGHT: 65 IN | DIASTOLIC BLOOD PRESSURE: 70 MMHG | SYSTOLIC BLOOD PRESSURE: 104 MMHG | BODY MASS INDEX: 26.62 KG/M2 | WEIGHT: 159.8 LBS | TEMPERATURE: 98.9 F

## 2025-01-06 DIAGNOSIS — Z98.84 S/P GASTRIC BYPASS: ICD-10-CM

## 2025-01-06 DIAGNOSIS — K91.89 ANASTOMOTIC STRICTURE OF GASTROJEJUNOSTOMY: ICD-10-CM

## 2025-01-06 DIAGNOSIS — R63.4 EXCESSIVE WEIGHT LOSS: ICD-10-CM

## 2025-01-06 DIAGNOSIS — Z86.39 HISTORY OF MORBID OBESITY: ICD-10-CM

## 2025-01-06 PROCEDURE — 99202 OFFICE O/P NEW SF 15 MIN: CPT | Performed by: SURGERY

## 2025-01-06 PROCEDURE — 3078F DIAST BP <80 MM HG: CPT | Performed by: SURGERY

## 2025-01-06 PROCEDURE — 3074F SYST BP LT 130 MM HG: CPT | Performed by: SURGERY

## 2025-01-06 ASSESSMENT — FIBROSIS 4 INDEX: FIB4 SCORE: .938083151964685911

## 2025-01-06 NOTE — PROGRESS NOTES
"    DEPARTMENT OF SURGERY  BARIATRIC AND GASTROESOPHAGEAL SURGERY      DATE OF SERVICE:  2025    REASON FOR VISIT:  Postoperative Evaluation / Establishing Care    HISTORY OF PRESENT ILLNESS:  Ms. Vu Miguel is a pleasant 44 y.o. F, well known to me from my prior practice, who underwent a robotic Jayna-en-Y gastric bypass with hiatal hernia repair in 2023 for history of morbid obesity and GERD. She has lost ~ 110 lbs since her operation but has developed occasional dysphagia (to certain solid foods) with regurgitation over the last 4-5 months. Patient then underwent an endoscopic evaluation in 10/2024 by Dr. Evans that demonstrated a mild GJ anastomotic stricture that was dilated to 20 mm.    Today, she returns to establish care @ [facility] and to discuss her symptoms of dysphagia with regurgitation. Patient notes that he symptoms have improved somewhat since initial dilation 3 months ago, however, she still has occasional dysphagia with regurgitation and epigastric abdominal pain. O/w, she continues to lose weight slowly, is maintaining her water and protein intake, and is taking  her vitamins as previously directed. No recent history of fevers/chills, jaundice/icterus, heartburn/reflux, odynophagia, hematemesis, bloating/distention, worsening abdominal pain, dysuria/hematuria, BRBPR/melena, or diarrhea. All abdominal incisions have healed well, without evidence of infection or bulges.     The patient has lost 110 lbs since surgery. Current Body mass index is 26.59 kg/m².  The patient is meeting fluid requirements.   The patient is meeting protein requirements.  The patient is consuming bariatric vitamin/mineral supplements.  The patient is not using tobacco/nicotine.    Past Medical History:   Diagnosis Date    Acid reflux     GEOVANY positive 2022    Anesthesia     Family Hx-father ; was always told it was anesthesia \"related\"; not sure what the actual reaction was; pt-PONV    Arthritis 2023    " "osteoarthritis    Asthma     inhaler prn    Breath shortness 03/20/2023    on exertion    CREST syndrome (HCC)     Cyclic citrullinated peptide (CCP) antibody positive 08/01/2022    Degenerative joint disease involving multiple joints 02/07/2023    Dyslipidemia 08/01/2022    Dyspareunia, female 03/27/2023    Enlargement - tonsil/adenoid 05/14/2013    Fatty liver     Heart burn     medicated    Hiatus hernia syndrome 07/06/2023    High cholesterol 03/20/2023    on medication    Hypertension 03/20/2023    on medication, 11/12/2024 pt states that she has been on this same dose of medication even when she weighed more but was told to stay on same dose d/t Raynaud's    Indigestion     Intractable back pain 12/26/2017    Left knee pain 02/07/2023    Obesity due to excess calories with serious comorbidity 02/07/2023    Oligohydramnios antepartum-IOL 4/24 9 AM 04/15/2016    IMO load March 2020    Osteoporosis     Pain 08/22/2014    lower back/L leg=7/10    Pain 03/20/2023    Chronic pain \"all over my body\" rates pain 7 out of 10    Patellofemoral instability of left knee with pain 02/07/2023    Pelvic pain 03/27/2023    PFO (patent foramen ovale) 2024    pt sees [facility] cardiologist (Dr. Coronado)    Right ventricular enlargement     Sciatica of right side 12/26/2017    Sleep apnea 03/20/2023    uses cpap    Snoring     sleep study done    Tachycardia, unspecified     Unspecified urinary incontinence 03/20/2023    stress incontinence     Past Surgical History:   Procedure Laterality Date    WY LAP,DIAGNOSTIC ABDOMEN N/A 11/25/2024    Procedure: DIAGNOSTIC AND OPERATIVE LAPAROSCOPY, HYSTEROSCOPY, DILATION AND CURETTAGE, REMOVAL OF ENDOMETRIAL POLYP, POSSIBLE LYSIS OF ADHESIONS, AND ALSO ANY AND ALL OTHER INDICATED PROCEDURES;  Surgeon: Nolan Rodriguez M.D.;  Location: SURGERY SAME DAY [facility];  Service: Gynecology    WY HYSTEROSCOPY,DX,SEP PROC N/A 11/25/2024    Procedure: HYSTEROSCOPY, DIAGNOSTIC;  Surgeon: Nolan ALEXANDRA" JONATHON Rodriguez;  Location: SURGERY SAME DAY [facility];  Service: Gynecology    DILATION AND CURETTAGE N/A 11/25/2024    Procedure: DILATION AND CURETTAGE;  Surgeon: Nolan Rodriguez M.D.;  Location: SURGERY SAME DAY [facility];  Service: Gynecology    AL UPPER GI ENDOSCOPY,DIAGNOSIS N/A 10/4/2024    Procedure: ESOPHAGOGASTRODUODENOSCOPY (EGD) WITH BRUSHINGS AND DILATATION;  Surgeon: Uriel Evans M.D.;  Location: SURGERY [facility];  Service: General    AL LAP NILS RESTRICT PROC LONGITUDINAL GAS*  12/26/2023    Procedure: ROBOTIC SONNY EN Y GASTROENTEROSTOMY;  Surgeon: Olivier Oliveros M.D.;  Location: [facility];  Service: Gen Robotic    ROBOTIC ASSISTED LAP HIATAL HERNIA REP  12/26/2023    Procedure: REPAIR, HERNIA, HIATAL, ROBOT-ASSISTED, LAPAROSCOPIC;  Surgeon: Olivier Oliveros M.D.;  Location: [facility];  Service: Gen Robotic    AL LAP NILS RESTRICT PROC LONGITUDINAL GAS*  7/14/2023    Procedure: ROBOTIC PARTIAL SLEEVE GASTRECTOMY;  Surgeon: Olivier Oliveros M.D.;  Location: [facility];  Service: Gen Robotic    ROBOTIC ASSISTED LAP HIATAL HERNIA REP  7/14/2023    Procedure: REPAIR, HERNIA, HIATAL, ROBOT-ASSISTED, LAPAROSCOPIC;  Surgeon: Olivier Oliveros M.D.;  Location: [facility];  Service: Gen Robotic    AL LAP,DIAGNOSTIC ABDOMEN N/A 3/27/2023    Procedure: LAPAROSCOPY, DIAGNOSTIC AND OPERATIVE AND LAPAROSCOPIC BILATERAL SALPINGECTOMY;  Surgeon: Nolan Rodriguez M.D.;  Location: SURGERY SAME DAY [facility];  Service: Gynecology    IRRIGATION & DEBRIDEMENT ORTHO  9/7/2014    Performed by El Gomez M.D. at [facility] ORS    IRRIGATION & DEBRIDEMENT NEURO  9/5/2014    Performed by El Gomez M.D. at [facility] ORS    LUMBAR LAMINECTOMY DISKECTOMY  8/27/2014    Performed by El Gomez M.D. at [facility] ORS    INJ,EPIDURAL/LUMB/SAC SINGLE  6/5/2014    Performed by Alexis Cornell M.D. at [facility]  "[facility] ORS    TONSILLECTOMY  5/14/2013    Performed by Aaron Hutton M.D. at SURGERY SAME DAY [facility] ORS    NASAL SEPTAL RECONSTRUCTION  5/14/2013    Performed by Aaron Hutton M.D. at SURGERY SAME DAY [facility] ORS    TURBINOPLASTY  5/14/2013    Performed by Aaron Hutton M.D. at SURGERY SAME DAY [facility] ORS     Allergies   Allergen Reactions    Latex Rash and Itching    Duloxetine      HA, dizz     Fluoxetine Vomiting and Nausea     Nausea, vomiting, \"made me feel woozy\"    Nifedipine Vomiting     Headache, vomiting    Sildenafil      HA, nausea       Current Outpatient Medications   Medication Sig Dispense Refill    guaiFENesin (ROBITUSSIN) 100 MG/5ML liquid Take 10 mL by mouth every four hours as needed for Cough. 840 mL 0    amLODIPine (NORVASC) 10 MG Tab TAKE 1 TABLET BY MOUTH ONCE DAILY 100 Tablet 3    losartan (COZAAR) 25 MG Tab Take 1 Tablet by mouth every day. 100 Tablet 3    ambrisentan (LETAIRIS) 5 MG tablet Take 1 Tablet by mouth every day. 30 Tablet 5    zolpidem (AMBIEN CR) 6.25 MG CR tablet       VITAMIN E PO Take 1 Tablet by mouth every day. FOR PROCEDURE SCHEDULED 11/25/2024 - DO NOT TAKE 7 DAYS PRIOR TO SURGERY      Cholecalciferol (VITAMIN D-3 PO) Take 1 Tablet by mouth every day. FOR PROCEDURE SCHEDULED 11/25/2024 - DO NOT TAKE 7 DAYS PRIOR TO SURGERY      NON SPECIFIED Take 1 Tablet by mouth every day. BARIATRIC FUSION - HAIR SKIN AND NAILS    FOR PROCEDURE SCHEDULED 11/25/2024 - DO NOT TAKE 7 DAYS PRIOR TO SURGERY      NON SPECIFIED Take 2 Tablets by mouth every day. BARIATRIC FUSION TROPICAL - COMPLETE MINERAL AND VITAMIN SUPPLEMENT    FOR PROCEDURE SCHEDULED 11/25/2024 - DO NOT TAKE 7 DAYS PRIOR TO SURGERY      Cyanocobalamin (B-12 PO) Take 1 Tablet by mouth every day. FOR PROCEDURE SCHEDULED 11/25/2024 - DO NOT TAKE 7 DAYS PRIOR TO SURGERY      BIOTIN PO Take 1 Tablet by mouth every day. NATURE'S BOUNTY - HAIR SKIN AND NAILS    FOR PROCEDURE SCHEDULED 11/25/2024 - DO NOT TAKE 7 DAYS " "PRIOR TO SURGERY      Glucosamine HCl (GLUCOSAMINE PO) Take 1 Tablet by mouth every day. On license of [facility]    FOR PROCEDURE SCHEDULED 11/25/2024 - DO NOT TAKE 7 DAYS PRIOR TO SURGERY      NON SPECIFIED Apply 1 Application topically as needed (raynauds). NITROBID 2% ON HANDS FOR RAYNAUD'S      pilocarpine (SALAGEN) 5 MG Tab Take 1 Tablet by mouth 3 times a day. FOR PROCEDURE SCHEDULED 11/25/2024 - CONTINUE AS PRESCRIBED      aspirin 81 MG EC tablet Take 1 Tablet by mouth every 48 hours. (Patient taking differently: Take 81 mg by mouth every 48 hours. FOR PROCEDURE SCHEDULED 11/25/2024 - FOLLOW DOCTOR INSTRUCTIONS)      atorvastatin (LIPITOR) 40 MG Tab Take 1 Tablet by mouth every evening. (Patient taking differently: Take 40 mg by mouth every evening. FOR PROCEDURE SCHEDULED 11/25/2024 - CONTINUE AS PRESCRIBED) 90 Tablet 1    omeprazole (PRILOSEC) 40 MG delayed-release capsule Take 1 Capsule by mouth 2 times a day. (Patient taking differently: Take 40 mg by mouth 2 times a day. FOR PROCEDURE SCHEDULED 11/25/2024 - CONTINUE AS PRESCRIBED) 90 Capsule 0    albuterol 108 (90 Base) MCG/ACT Aero Soln inhalation aerosol Inhale 2 Puffs every four hours as needed for Shortness of Breath. (Patient taking differently: Inhale 2 Puffs every four hours as needed for Shortness of Breath. FOR PROCEDURE SCHEDULED 11/25/2024 - CONTINUE AS PRESCRIBED) 1 Each 0    hydroxychloroquine (PLAQUENIL) 200 MG Tab Take 200 mg by mouth 2 times a day. FOR PROCEDURE SCHEDULED 11/25/2024 - FOLLOW DOCTOR INSTRUCTIONS       No current facility-administered medications for this visit.     PHYSICAL EXAM:  /70 (BP Location: Left arm, Patient Position: Sitting, BP Cuff Size: Adult)   Temp 37.2 °C (98.9 °F) (Temporal)   Ht 1.651 m (5' 5\")   Wt 72.5 kg (159 lb 12.8 oz)   BMI 26.59 kg/m²   Body mass index is 26.59 kg/m².    General: healthy, alert, oriented, no distress, relaxed, cooperative  Abdomen: soft, no TTP, no distention, no palpable bulges or " masses  Incisions: healed well, no evidence of infection or bulges  Drains/Tubes: none  Extremities: warm and well-perfused, without clubbing, cyanosis or edema bilaterally  Skin: no rashes, no ecchymoses, no petechiae, no jaundice, no open wounds  Psych: good judgement, mood and affect are appropriate, excellent hygiene    ASSESSMENT:  Ms. Vu Miguel returns to our clinic today for a one year postoperative visit (and to establish care) after undergoing her robotic Jayna-en-Y gastric bypass with hiatal hernia repair in 12/2023 for history of morbid obesity and GERD. She has generally done well after surgery and has voiced approval of this procedure and its outcome thus far. We discussed the expected postoperative course and potential long-term complications of bariatric surgery (such as vitamin/nutrient deficiencies, heartburn/reflux, and inadequate weight loss/weight regain).     Her symptoms of dysphagia with regurgitation are most likely due to her recently diagnosed GJ anastomotic stricture. Initial dilation in 10/2024 helped alleviate these symptoms partially, but it's likely that she may need another one (or more) dilation.    PLAN:  1.  The following issues were addressed during this visit:  A. Continue daily PPI for at least another month and continue daily Bariatric vitamins/supplements indefinitely  B. Continue to follow Post-Bariatric Surgery diet  C. NO restrictions on lifting, strenuous activity, or physical exercise  D. Recommend 150 minutes of exercise per week - importance of resistance training was emphasized    2.  Postoperative labs will be ordered yearly:    CBC  CMP (including Liver panel)  Iron panel (including Ferritin/Transferrin)  Lipid panel (fasting)  Pre-albumin  Vit B1, B6, B9 (Folate), B12  Vit D,25  iPTH  DHEA  HgA1C (if diabetic only)  TSH, T4 (if history of thyroid disease)  Vit A, Vit E, Copper, Zinc (only if having difficulties with absorption)    3.  Ms. Vu Miguel has been  instructed to return to our clinic in 3 months for a repeat evaluation.    I advised the patient to call or return to our office ASAP if new symptoms or concerns arise in the near future.    Thank you for giving us the opportunity to care for your patient.      Sincerely,    Olivier Oliveros MD MPH FACS [facility]  Bariatric and Gastroesophageal Surgery  Department of Surgery, [facility]  Clinical Assistant Professor of Surgery  [facility]    1/6/2025

## 2025-01-13 ENCOUNTER — APPOINTMENT (OUTPATIENT)
Dept: INTERNAL MEDICINE | Facility: OTHER | Age: 45
End: 2025-01-13
Payer: MEDICAID

## 2025-01-21 ENCOUNTER — OFFICE VISIT (OUTPATIENT)
Dept: INTERNAL MEDICINE | Facility: OTHER | Age: 45
End: 2025-01-21
Payer: MEDICAID

## 2025-01-21 VITALS
SYSTOLIC BLOOD PRESSURE: 110 MMHG | BODY MASS INDEX: 27.12 KG/M2 | HEART RATE: 74 BPM | DIASTOLIC BLOOD PRESSURE: 69 MMHG | OXYGEN SATURATION: 98 % | TEMPERATURE: 98.8 F | WEIGHT: 162.8 LBS | HEIGHT: 65 IN

## 2025-01-21 DIAGNOSIS — Z12.31 SCREENING MAMMOGRAM FOR BREAST CANCER: ICD-10-CM

## 2025-01-21 DIAGNOSIS — Z23 NEED FOR PNEUMOCOCCAL 20-VALENT CONJUGATE VACCINATION: ICD-10-CM

## 2025-01-21 PROCEDURE — 99213 OFFICE O/P EST LOW 20 MIN: CPT | Mod: GC,25 | Performed by: INTERNAL MEDICINE

## 2025-01-21 PROCEDURE — 96372 THER/PROPH/DIAG INJ SC/IM: CPT | Performed by: INTERNAL MEDICINE

## 2025-01-21 PROCEDURE — 90677 PCV20 VACCINE IM: CPT | Performed by: INTERNAL MEDICINE

## 2025-01-21 PROCEDURE — 90471 IMMUNIZATION ADMIN: CPT | Performed by: INTERNAL MEDICINE

## 2025-01-21 ASSESSMENT — ENCOUNTER SYMPTOMS
GASTROINTESTINAL NEGATIVE: 1
NEUROLOGICAL NEGATIVE: 1
EYES NEGATIVE: 1
CARDIOVASCULAR NEGATIVE: 1
MUSCULOSKELETAL NEGATIVE: 1
RESPIRATORY NEGATIVE: 1
CONSTITUTIONAL NEGATIVE: 1
PSYCHIATRIC NEGATIVE: 1

## 2025-01-21 ASSESSMENT — PATIENT HEALTH QUESTIONNAIRE - PHQ9: CLINICAL INTERPRETATION OF PHQ2 SCORE: 0

## 2025-01-21 ASSESSMENT — FIBROSIS 4 INDEX: FIB4 SCORE: .938083151964685911

## 2025-01-21 NOTE — PATIENT INSTRUCTIONS
-Please schedule mammogram at your earliest convenience   -Thank you for getting your pneumococcal vaccine today!    See you in 3 months!

## 2025-01-21 NOTE — PROGRESS NOTES
ESTABLISHED PATIENT VISIT    PATIENT ID:  Name: Christine Heaton  YOB: 1980  Patient Care Team:  Ana Paula Grant M.D. as PCP - General (Internal Medicine)    CHIEF COMPLAINT(s):  Follow-Up    Follow up for Diagnoses of Screening mammogram for breast cancer and Need for pneumococcal 20-valent conjugate vaccination were pertinent to this visit.    History of Present Illness:    This is a pleasant 44 y.o. female clinic patient established with me as primary care physician who presents a for follow-up visit regarding flulike symptoms she was experiencing December and the status of toe/foot lesions.  She has a past medical history of crest syndrome, dyslipidemia, GERD, FRANK on CPAP, Raynaud's, status post gastric sleeve, status post bilateral salpingectomy, chronic polyarthritis, pulmonary hypertension, chronic endometriosis.  She is following with sleep medicine, cardiology, vascular medicine, gynecology, rheumatology regarding her comorbidities.     Patient states that she has no complaints during today's visit.  Overall feels well.    Since last visit with Dr. Olsen, patient was evaluated by vascular medicine and is following recommendations for footcare.  Lesions in her feet have greatly improved.  Physical examination did not reveal any open wounds.  6/7 superficial punctate lesions noted on the left great toe without erythema or drainage.  Patient's feet feel cold, however she states this is baseline.  Sensation grossly intact.    She was also evaluated by bariatric surgery for 1 year follow-up regarding her Jayna-en-Y gastric bypass.  Recommend continuing her omeprazole at current dosage for 1 more month and balloon dilation for her symptoms of dysphagia.  Patient, however, states that the dysphagia is not bothering her too much and would like to defer dilation for a different date.  Otherwise yearly postop labs ordered.    Also reports that headaches and flulike symptoms, being  "followed previously have resolved.  She attributes her headaches to getting influenza infection, as her son had recently contracted leading him to be evaluated urgent department and diagnosed.  She states that since her symptoms have resolved, her headaches have also slowly gone away.    She agrees to being referred for yearly breast cancer screening mammogram.  Patient reports no changes on breast examination such as new masses, nipple retraction, discharge.  Patient also agrees to receiving PCV 20 vaccine given her autoimmune conditions managed with hydroxychloroquine and ongoing pulmonary hypertension.    Review of Systems   Constitutional: Negative.    HENT: Negative.     Eyes: Negative.    Respiratory: Negative.     Cardiovascular: Negative.    Gastrointestinal: Negative.    Genitourinary: Negative.    Musculoskeletal: Negative.    Skin: Negative.    Neurological: Negative.    Endo/Heme/Allergies: Negative.    Psychiatric/Behavioral: Negative.       Past Medical History:   Diagnosis Date    Acid reflux     GEOVANY positive 2022    Anesthesia     Family Hx-father ; was always told it was anesthesia \"related\"; not sure what the actual reaction was; pt-PONV    Arthritis 2023    osteoarthritis    Asthma     inhaler prn    Breath shortness 2023    on exertion    CREST syndrome (HCC)     Cyclic citrullinated peptide (CCP) antibody positive 2022    Degenerative joint disease involving multiple joints 2023    Dyslipidemia 2022    Dyspareunia, female 2023    Enlargement - tonsil/adenoid 2013    Fatty liver     Heart burn     medicated    Hiatus hernia syndrome 2023    High cholesterol 2023    on medication    Hypertension 2023    on medication, 2024 pt states that she has been on this same dose of medication even when she weighed more but was told to stay on same dose d/t Raynaud's    Indigestion     Intractable back pain 2017    Left knee " "pain 02/07/2023    Obesity due to excess calories with serious comorbidity 02/07/2023    Oligohydramnios antepartum-IOL 4/24 9 AM 04/15/2016    IMO load March 2020    Osteoporosis     Pain 08/22/2014    lower back/L leg=7/10    Pain 03/20/2023    Chronic pain \"all over my body\" rates pain 7 out of 10    Patellofemoral instability of left knee with pain 02/07/2023    Pelvic pain 03/27/2023    PFO (patent foramen ovale) 2024    pt sees [facility] cardiologist (Dr. Coronado)    Right ventricular enlargement     Sciatica of right side 12/26/2017    Sleep apnea 03/20/2023    uses cpap    Snoring     sleep study done    Tachycardia, unspecified     Unspecified urinary incontinence 03/20/2023    stress incontinence     Past Surgical History:   Procedure Laterality Date    AK LAP,DIAGNOSTIC ABDOMEN N/A 11/25/2024    Procedure: DIAGNOSTIC AND OPERATIVE LAPAROSCOPY, HYSTEROSCOPY, DILATION AND CURETTAGE, REMOVAL OF ENDOMETRIAL POLYP, POSSIBLE LYSIS OF ADHESIONS, AND ALSO ANY AND ALL OTHER INDICATED PROCEDURES;  Surgeon: Nolan Rodriguez M.D.;  Location: SURGERY SAME DAY [facility];  Service: Gynecology    AK HYSTEROSCOPY,DX,SEP PROC N/A 11/25/2024    Procedure: HYSTEROSCOPY, DIAGNOSTIC;  Surgeon: Nolan Rodriguez M.D.;  Location: SURGERY SAME DAY [facility];  Service: Gynecology    DILATION AND CURETTAGE N/A 11/25/2024    Procedure: DILATION AND CURETTAGE;  Surgeon: Nolan Rodriguez M.D.;  Location: SURGERY SAME DAY [facility];  Service: Gynecology    AK UPPER GI ENDOSCOPY,DIAGNOSIS N/A 10/4/2024    Procedure: ESOPHAGOGASTRODUODENOSCOPY (EGD) WITH BRUSHINGS AND DILATATION;  Surgeon: Uriel Evans M.D.;  Location: SURGERY [facility];  Service: General    AK LAP NILS RESTRICT PROC LONGITUDINAL GAS*  12/26/2023    Procedure: ROBOTIC SONNY EN Y GASTROENTEROSTOMY;  Surgeon: Olivier Oliveros M.D.;  Location: [facility];  Service: Gen Robotic    ROBOTIC ASSISTED LAP HIATAL HERNIA REP  12/26/2023    Procedure: REPAIR, HERNIA, " HIATAL, ROBOT-ASSISTED, LAPAROSCOPIC;  Surgeon: Olivier Oliveros M.D.;  Location: SURGERY [facility];  Service: Gen Robotic    ME LAP NILS RESTRICT PROC LONGITUDINAL GAS*  2023    Procedure: ROBOTIC PARTIAL SLEEVE GASTRECTOMY;  Surgeon: Olivier Oliveros M.D.;  Location: SURGERY [facility];  Service: Gen Robotic    ROBOTIC ASSISTED LAP HIATAL HERNIA REP  2023    Procedure: REPAIR, HERNIA, HIATAL, ROBOT-ASSISTED, LAPAROSCOPIC;  Surgeon: Olivier Oliveros M.D.;  Location: SURGERY [facility];  Service: Gen Robotic    ME LAP,DIAGNOSTIC ABDOMEN N/A 3/27/2023    Procedure: LAPAROSCOPY, DIAGNOSTIC AND OPERATIVE AND LAPAROSCOPIC BILATERAL SALPINGECTOMY;  Surgeon: Nolan Rodriguez M.D.;  Location: SURGERY SAME DAY [facility];  Service: Gynecology    IRRIGATION & DEBRIDEMENT ORTHO  2014    Performed by El Gomez M.D. at SURGERY [facility] ORS    IRRIGATION & DEBRIDEMENT NEURO  2014    Performed by El Gomez M.D. at SURGERY [facility] ORS    LUMBAR LAMINECTOMY DISKECTOMY  2014    Performed by El Gomez M.D. at [facility] ORS    INJ,EPIDURAL/LUMB/SAC SINGLE  2014    Performed by Alexis Cornell M.D. at [facility] ORS    TONSILLECTOMY  2013    Performed by Aaron Hutton M.D. at SURGERY SAME DAY [facility] ORS    NASAL SEPTAL RECONSTRUCTION  2013    Performed by Aaron Hutton M.D. at SURGERY SAME DAY [facility] ORS    TURBINOPLASTY  2013    Performed by Aaron Hutton M.D. at SURGERY SAME DAY [facility] ORS     Family History   Problem Relation Age of Onset    Rheumatologic Disease Mother         RA    Heart Attack Father          MI (age 45)    Kidney Disease Father         ESRD on HD    Diabetes Father     Heart Disease Sister      Latex, Duloxetine, Fluoxetine, Nifedipine, and Sildenafil  Social History     Tobacco Use    Smoking status: Never     Passive exposure: Never    Smokeless tobacco: Never   Vaping Use     Vaping status: Never Used   Substance Use Topics    Alcohol use: No    Drug use: No     Current Outpatient Medications   Medication Sig Dispense Refill    amLODIPine (NORVASC) 10 MG Tab TAKE 1 TABLET BY MOUTH ONCE DAILY 100 Tablet 3    losartan (COZAAR) 25 MG Tab Take 1 Tablet by mouth every day. 100 Tablet 3    ambrisentan (LETAIRIS) 5 MG tablet Take 1 Tablet by mouth every day. 30 Tablet 5    zolpidem (AMBIEN CR) 6.25 MG CR tablet       VITAMIN E PO Take 1 Tablet by mouth every day. FOR PROCEDURE SCHEDULED 11/25/2024 - DO NOT TAKE 7 DAYS PRIOR TO SURGERY      Cholecalciferol (VITAMIN D-3 PO) Take 1 Tablet by mouth every day. FOR PROCEDURE SCHEDULED 11/25/2024 - DO NOT TAKE 7 DAYS PRIOR TO SURGERY      NON SPECIFIED Take 1 Tablet by mouth every day. BARIATRIC FUSION - HAIR SKIN AND NAILS    FOR PROCEDURE SCHEDULED 11/25/2024 - DO NOT TAKE 7 DAYS PRIOR TO SURGERY      NON SPECIFIED Take 2 Tablets by mouth every day. BARIATRIC FUSION TROPICAL - COMPLETE MINERAL AND VITAMIN SUPPLEMENT    FOR PROCEDURE SCHEDULED 11/25/2024 - DO NOT TAKE 7 DAYS PRIOR TO SURGERY      Cyanocobalamin (B-12 PO) Take 1 Tablet by mouth every day. FOR PROCEDURE SCHEDULED 11/25/2024 - DO NOT TAKE 7 DAYS PRIOR TO SURGERY      BIOTIN PO Take 1 Tablet by mouth every day. NATURE'[place] - HAIR SKIN AND NAILS    FOR PROCEDURE SCHEDULED 11/25/2024 - DO NOT TAKE 7 DAYS PRIOR TO SURGERY      Glucosamine HCl (GLUCOSAMINE PO) Take 1 Tablet by mouth every day. JOINT HEALTH    FOR PROCEDURE SCHEDULED 11/25/2024 - DO NOT TAKE 7 DAYS PRIOR TO SURGERY      NON SPECIFIED Apply 1 Application topically as needed (raynauds). NITROBID 2% ON HANDS FOR RAYNAUD'S      pilocarpine (SALAGEN) 5 MG Tab Take 1 Tablet by mouth 3 times a day. FOR PROCEDURE SCHEDULED 11/25/2024 - CONTINUE AS PRESCRIBED      aspirin 81 MG EC tablet Take 1 Tablet by mouth every 48 hours. (Patient taking differently: Take 81 mg by mouth every 48 hours. FOR PROCEDURE SCHEDULED 11/25/2024 -  "FOLLOW DOCTOR INSTRUCTIONS)      atorvastatin (LIPITOR) 40 MG Tab Take 1 Tablet by mouth every evening. (Patient taking differently: Take 40 mg by mouth every evening. FOR PROCEDURE SCHEDULED 11/25/2024 - CONTINUE AS PRESCRIBED) 90 Tablet 1    omeprazole (PRILOSEC) 40 MG delayed-release capsule Take 1 Capsule by mouth 2 times a day. (Patient taking differently: Take 40 mg by mouth 2 times a day. FOR PROCEDURE SCHEDULED 11/25/2024 - CONTINUE AS PRESCRIBED) 90 Capsule 0    albuterol 108 (90 Base) MCG/ACT Aero Soln inhalation aerosol Inhale 2 Puffs every four hours as needed for Shortness of Breath. (Patient taking differently: Inhale 2 Puffs every four hours as needed for Shortness of Breath. FOR PROCEDURE SCHEDULED 11/25/2024 - CONTINUE AS PRESCRIBED) 1 Each 0    hydroxychloroquine (PLAQUENIL) 200 MG Tab Take 200 mg by mouth 2 times a day. FOR PROCEDURE SCHEDULED 11/25/2024 - FOLLOW DOCTOR INSTRUCTIONS      guaiFENesin (ROBITUSSIN) 100 MG/5ML liquid Take 10 mL by mouth every four hours as needed for Cough. (Patient not taking: Reported on 1/21/2025) 840 mL 0     No current facility-administered medications for this visit.     OBJECTIVE:  /69 (BP Location: Left arm, Patient Position: Sitting, BP Cuff Size: Adult)   Pulse 74   Temp 37.1 °C (98.8 °F) (Temporal)   Ht 1.651 m (5' 5\")   Wt 73.8 kg (162 lb 12.8 oz)   SpO2 98%   BMI 27.09 kg/m²   Physical Exam  Constitutional:       Appearance: Normal appearance. She is normal weight.   HENT:      Head: Normocephalic.      Right Ear: External ear normal.      Left Ear: External ear normal.      Nose: Nose normal.      Mouth/Throat:      Mouth: Mucous membranes are moist.   Cardiovascular:      Rate and Rhythm: Normal rate and regular rhythm.      Pulses: Normal pulses.      Heart sounds: Normal heart sounds.   Pulmonary:      Effort: Pulmonary effort is normal.      Breath sounds: Normal breath sounds.   Abdominal:      General: Abdomen is flat. Bowel sounds are " normal.   Musculoskeletal:         General: Normal range of motion.   Skin:     General: Skin is warm.      Capillary Refill: Capillary refill takes less than 2 seconds.   Neurological:      General: No focal deficit present.      Mental Status: She is alert and oriented to person, place, and time. Mental status is at baseline.   Psychiatric:         Mood and Affect: Mood normal.         Behavior: Behavior normal.         Thought Content: Thought content normal.         Judgment: Judgment normal.     ASSESSMENT AND PLAN:     1. Screening mammogram for breast cancer  Agrees to being referred for yearly breast cancer screening mammogram.  Patient reports no changes on breast examination such as new masses, nipple retraction, discharge.  - MA-SCREENING MAMMO BILAT W/CAD; Future    2. Need for pneumococcal 20-valent conjugate vaccination  agrees to receiving PCV 20 vaccine given her autoimmune conditions managed with hydroxychloroquine and ongoing pulmonary hypertension.  - Pneumococcal Conjugate Vaccine 20-Valent (6 wks+)     Problem List Items Addressed This Visit    None  Visit Diagnoses       Screening mammogram for breast cancer        Need for pneumococcal 20-valent conjugate vaccination              No orders of the defined types were placed in this encounter.    Return in about 3 months (around 4/21/2025).    Ana Paula Grant M.D. PGY-1  [facility] Internal Medicine Resident

## 2025-01-21 NOTE — PROGRESS NOTES
Teaching Physician Attestation      Level of Participation    I have personally interviewed and examined the patient.  In addition, I discussed with the resident physician the patient's history, exam, assessment and plan in detail.  Topics listed in my addendum were the focus of the visit.  Healthcare maintenance was not addressed this visit unless listed as a topic in my addendum.  I agree with the plan as written along with the following additions/modifications:    Foot wounds  -L big toe wounds - a few 2-3 mm dark areas likely represening resolving wounds, no open wounds.  Her children are helpiong her check her feet daily. Vascular and rheum also following, appreciate support, defer furhter mgmt.    URI  -resolved    HM, partially addressed  -pcv 20 given chronic lung condition and CREST syndrome on hydroxychloroquine.  -screening mammogram today (no symptoms or lumps reported)    Apprecaite subspecialty support    Rtc 3 mo.  pcr

## 2025-01-30 ENCOUNTER — HOSPITAL ENCOUNTER (OUTPATIENT)
Dept: RADIOLOGY | Facility: MEDICAL CENTER | Age: 45
End: 2025-01-30
Payer: MEDICAID

## 2025-01-30 DIAGNOSIS — Z12.31 SCREENING MAMMOGRAM FOR BREAST CANCER: ICD-10-CM

## 2025-01-30 PROCEDURE — 77067 SCR MAMMO BI INCL CAD: CPT

## 2025-02-03 ENCOUNTER — TELEPHONE (OUTPATIENT)
Dept: HEALTH INFORMATION MANAGEMENT | Facility: OTHER | Age: 45
End: 2025-02-03
Payer: MEDICAID

## 2025-02-07 ENCOUNTER — OFFICE VISIT (OUTPATIENT)
Dept: SLEEP MEDICINE | Facility: MEDICAL CENTER | Age: 45
End: 2025-02-07
Attending: INTERNAL MEDICINE
Payer: MEDICAID

## 2025-02-07 VITALS
DIASTOLIC BLOOD PRESSURE: 86 MMHG | WEIGHT: 160 LBS | HEART RATE: 53 BPM | HEIGHT: 65 IN | RESPIRATION RATE: 16 BRPM | SYSTOLIC BLOOD PRESSURE: 132 MMHG | OXYGEN SATURATION: 93 % | BODY MASS INDEX: 26.66 KG/M2

## 2025-02-07 DIAGNOSIS — G47.33 OSA (OBSTRUCTIVE SLEEP APNEA): ICD-10-CM

## 2025-02-07 DIAGNOSIS — F51.01 PRIMARY INSOMNIA: ICD-10-CM

## 2025-02-07 DIAGNOSIS — G47.30 SLEEP APNEA, UNSPECIFIED TYPE: ICD-10-CM

## 2025-02-07 PROCEDURE — 99212 OFFICE O/P EST SF 10 MIN: CPT | Performed by: STUDENT IN AN ORGANIZED HEALTH CARE EDUCATION/TRAINING PROGRAM

## 2025-02-07 PROCEDURE — 99204 OFFICE O/P NEW MOD 45 MIN: CPT | Performed by: STUDENT IN AN ORGANIZED HEALTH CARE EDUCATION/TRAINING PROGRAM

## 2025-02-07 RX ORDER — TRAZODONE HYDROCHLORIDE 50 MG/1
50-100 TABLET ORAL NIGHTLY
Qty: 60 TABLET | Refills: 2 | Status: SHIPPED | OUTPATIENT
Start: 2025-02-07

## 2025-02-07 RX ORDER — TRAZODONE HYDROCHLORIDE 50 MG/1
50 TABLET ORAL NIGHTLY
Qty: 30 TABLET | Refills: 3 | Status: SHIPPED | OUTPATIENT
Start: 2025-02-07 | End: 2025-02-07

## 2025-02-07 ASSESSMENT — FIBROSIS 4 INDEX: FIB4 SCORE: .938083151964685911

## 2025-02-07 NOTE — PROGRESS NOTES
[facility] Consult Note     Date: 2/7/2025 / Time: 1:46 PM      Thank you for requesting a sleep medicine consultation on Christine Miguel at the sleep center. Presents today with the chief complaints of establishing care for management of obstructive sleep apnea and to discuss insomnia. She is referred by Sandra Logan M.D.  [address] [place] [address] for evaluation and treatment of obstructive sleep apnea.     HISTORY OF PRESENT ILLNESS:     Christine Miguel is a 44 y.o. female with CREST syndrome, hypertension, Raynaud's, GERD, history of gastric sleeve, insomnia, and obstructive sleep apnea on CPAP.  Presents to Sleep Clinic for evaluation of sleep.    She reports being diagnosed with obstructive sleep apnea prior to her bariatric surgery.  Following surgery she did lose almost 100 pounds and thought her sleep apnea may have resolved.  She did complete a 2 night home sleep study through [facility] in February 2024 that showed moderate obstructive sleep apnea was still present despite her weight loss.  She was then placed on CPAP and has continued CPAP since.  She uses her CPAP machine nightly.    With using CPAP she finds that she wakes up feeling bloated at times with some abdominal discomfort.  Overall finds mask comfortable.  Has no other acute complaints other than dry mouth regarding the device.  She has not adjusted the humidity regularly.    She reports trouble with sleep initiation and sleep maintenance.  She is finding that she is waking up multiple times a night to use the restroom.  She has upcoming appointment with urogynecology.  She is currently taking Ambien that was prescribed by her PCP.  She has not tried any other hypnotics in the past.    DME provider: [facility]   Device: Airsense   Mask: fullface   Aerophagia: Yes can be painful   Snoring: No   Dry mouth: Yes  Leak: No   Skin irritation: No   Chin strap: No       As per  "supplemental questionnaire to be scanned or imported into chart:    [place] Sleepiness Score: 12    Sleep Schedule  Bedtime:  9-930pm  Weekend same  Wake time:  7am  Weekend 7 am   Sleep-onset latency: 2 hours, on ambien 15 min   Awakenings from sleep: 5-10 times   Difficulty falling back asleep: nightly >30min, ambien has helped  Bedroom partner: No   Naps: sometimes     DAYTIME SYMPTOMS:   Excessive daytime sleepiness: Yes  Daytime fatigue: Yes  Difficulty concentrating: Yes  Memory problems: Yes  Irritability:Yes  Work/school performance issues: Not currently   Sleepiness with driving: Yes  doesn't matter length of drive   Caffeine/stimulant use: No   Alcohol use:No     SLEEP RELATED SYMPTOMS  Snoring: No   Witnessed apnea or gasping/choking: sometimes   Dry mouth or mouth breathing: Yes  Night Sweating: No   Teeth grinding/biting: Yes  Morning headaches: sometimes   Refreshed Upon Awakening: No      SLEEP RELATED BEHAVIORS:  Parasomnias (walking, talking, eating, violence): No   Leg kicking: No   Restless legs - \"urge to move\": Yes 4 out 7 nights  Nightmares: No  Recurrent: No   Dream enactment: No      NARCOLEPSY:  Cataplexy: No   Sleep paralysis: No   Sleep attacks: No   Hypnagogic/hypnopompic hallucinations: No     MEDICAL HISTORY  Past Medical History:   Diagnosis Date    Acid reflux     GEOVANY positive 2022    Anesthesia     Family Hx-father ; was always told it was anesthesia \"related\"; not sure what the actual reaction was; pt-PONV    Arthritis 2023    osteoarthritis    Asthma     inhaler prn    Breath shortness 2023    on exertion    CREST syndrome (HCC)     Cyclic citrullinated peptide (CCP) antibody positive 2022    Daytime sleepiness     Degenerative joint disease involving multiple joints 2023    Dyslipidemia 2022    Dyspareunia, female 2023    Enlargement - tonsil/adenoid 2013    Fatty liver     Heart burn     medicated    Hiatus hernia " "syndrome 07/06/2023    High cholesterol 03/20/2023    on medication    Hypertension 03/20/2023    on medication, 11/12/2024 pt states that she has been on this same dose of medication even when she weighed more but was told to stay on same dose d/t Raynaud's    Indigestion     Insomnia     Intractable back pain 12/26/2017    Left knee pain 02/07/2023    Morning headache     Obesity due to excess calories with serious comorbidity 02/07/2023    Oligohydramnios antepartum-IOL 4/24 9 AM 04/15/2016    IMO load March 2020    Osteoporosis     Pain 08/22/2014    lower back/L leg=7/10    Pain 03/20/2023    Chronic pain \"all over my body\" rates pain 7 out of 10    Patellofemoral instability of left knee with pain 02/07/2023    Pelvic pain 03/27/2023    PFO (patent foramen ovale) 2024    pt sees [facility] cardiologist (Dr. Coronado)    Right ventricular enlargement     Sciatica of right side 12/26/2017    Sleep apnea 03/20/2023    uses cpap    Snoring     sleep study done    Tachycardia, unspecified     Unspecified urinary incontinence 03/20/2023    stress incontinence        SURGICAL HISTORY  Past Surgical History:   Procedure Laterality Date    TN LAP,DIAGNOSTIC ABDOMEN N/A 11/25/2024    Procedure: DIAGNOSTIC AND OPERATIVE LAPAROSCOPY, HYSTEROSCOPY, DILATION AND CURETTAGE, REMOVAL OF ENDOMETRIAL POLYP, POSSIBLE LYSIS OF ADHESIONS, AND ALSO ANY AND ALL OTHER INDICATED PROCEDURES;  Surgeon: Nolan Rodriguez M.D.;  Location: SURGERY SAME DAY [facility];  Service: Gynecology    TN HYSTEROSCOPY,DX,SEP PROC N/A 11/25/2024    Procedure: HYSTEROSCOPY, DIAGNOSTIC;  Surgeon: Nolan Rodriguez M.D.;  Location: SURGERY SAME DAY [facility];  Service: Gynecology    DILATION AND CURETTAGE N/A 11/25/2024    Procedure: DILATION AND CURETTAGE;  Surgeon: Nolan Rodriguez M.D.;  Location: SURGERY SAME DAY [facility];  Service: Gynecology    TN UPPER GI ENDOSCOPY,DIAGNOSIS N/A 10/4/2024    Procedure: ESOPHAGOGASTRODUODENOSCOPY (EGD) WITH BRUSHINGS AND " DILATATION;  Surgeon: Uriel Evans M.D.;  Location: SURGERY [facility];  Service: General    TN LAP NILS RESTRICT PROC LONGITUDINAL GAS*  12/26/2023    Procedure: ROBOTIC SONNY EN Y GASTROENTEROSTOMY;  Surgeon: Olivier Oliveros M.D.;  Location: SURGERY [facility];  Service: Gen Robotic    ROBOTIC ASSISTED LAP HIATAL HERNIA REP  12/26/2023    Procedure: REPAIR, HERNIA, HIATAL, ROBOT-ASSISTED, LAPAROSCOPIC;  Surgeon: Olivier Oliveros M.D.;  Location: SURGERY [facility];  Service: Gen Robotic    TN LAP NILS RESTRICT PROC LONGITUDINAL GAS*  7/14/2023    Procedure: ROBOTIC PARTIAL SLEEVE GASTRECTOMY;  Surgeon: Olivier Oliveros M.D.;  Location: SURGERY [facility];  Service: Gen Robotic    ROBOTIC ASSISTED LAP HIATAL HERNIA REP  7/14/2023    Procedure: REPAIR, HERNIA, HIATAL, ROBOT-ASSISTED, LAPAROSCOPIC;  Surgeon: Olivier Oliveros M.D.;  Location: [facility];  Service: Gen Robotic    TN LAP,DIAGNOSTIC ABDOMEN N/A 3/27/2023    Procedure: LAPAROSCOPY, DIAGNOSTIC AND OPERATIVE AND LAPAROSCOPIC BILATERAL SALPINGECTOMY;  Surgeon: Nolan Rodriguez M.D.;  Location: SURGERY SAME DAY [facility];  Service: Gynecology    IRRIGATION & DEBRIDEMENT ORTHO  9/7/2014    Performed by El Gomez M.D. at [facility] ORS    IRRIGATION & DEBRIDEMENT NEURO  9/5/2014    Performed by El Gomez M.D. at [facility] ORS    LUMBAR LAMINECTOMY DISKECTOMY  8/27/2014    Performed by El Gomez M.D. at [facility] ORS    INJ,EPIDURAL/LUMB/SAC SINGLE  6/5/2014    Performed by Alexis Cornell M.D. at [facility] ORS    TONSILLECTOMY  5/14/2013    Performed by Aaron Hutton M.D. at SURGERY SAME DAY [facility] ORS    NASAL SEPTAL RECONSTRUCTION  5/14/2013    Performed by Aaron Hutton M.D. at SURGERY SAME DAY [facility] ORS    TURBINOPLASTY  5/14/2013    Performed by Aaron Hutton M.D. at SURGERY SAME DAY [facility] ORS        FAMILY HISTORY  Family History    Problem Relation Age of Onset    Rheumatologic Disease Mother         RA    Heart Attack Father          MI (age 45)    Kidney Disease Father         ESRD on HD    Diabetes Father     Heart Disease Sister        SOCIAL HISTORY  Social History     Socioeconomic History    Marital status: Single    Highest education level: Some college, no degree   Tobacco Use    Smoking status: Never     Passive exposure: Never    Smokeless tobacco: Never   Vaping Use    Vaping status: Never Used   Substance and Sexual Activity    Alcohol use: No    Drug use: No   Social History Narrative    ** Merged History Encounter **          Social Drivers of Health     Financial Resource Strain: Low Risk  (2022)    Overall Financial Resource Strain (CARDIA)     Difficulty of Paying Living Expenses: Not hard at all   Food Insecurity: No Food Insecurity (2022)    Hunger Vital Sign     Worried About Running Out of Food in the Last Year: Never true     Ran Out of Food in the Last Year: Never true   Transportation Needs: No Transportation Needs (2022)    PRAPARE - Transportation     Lack of Transportation (Medical): No     Lack of Transportation (Non-Medical): No   Physical Activity: Insufficiently Active (2022)    Exercise Vital Sign     Days of Exercise per Week: 3 days     Minutes of Exercise per Session: 30 min   Stress: No Stress Concern Present (2022)    [employer] [employer] - Occupational Stress Questionnaire     Feeling of Stress : Not at all   Social Connections: Moderately Integrated (2022)    Social Connection and Isolation Panel [NHANES]     Frequency of Communication with Friends and Family: More than three times a week     Frequency of Social Gatherings with Friends and Family: Once a week     Attends Pentec[employer]al Services: More than 4 times per year     Active Member of Clubs or Organizations: Yes     Attends Club or Organization Meetings: More than 4 times per year     Marital  Status: Never    Housing Stability: Low Risk  (4/27/2022)    Housing Stability Vital Sign     Unable to Pay for Housing in the Last Year: No     Number of Places Lived in the Last Year: 1     Unstable Housing in the Last Year: No        Occupation: Not working     CURRENT MEDICATIONS  Current Outpatient Medications   Medication Sig Dispense Refill    traZODone (DESYREL) 50 MG Tab Take 1-2 Tablets by mouth every evening. 60 Tablet 2    guaiFENesin (ROBITUSSIN) 100 MG/5ML liquid Take 10 mL by mouth every four hours as needed for Cough. 840 mL 0    amLODIPine (NORVASC) 10 MG Tab TAKE 1 TABLET BY MOUTH ONCE DAILY 100 Tablet 3    losartan (COZAAR) 25 MG Tab Take 1 Tablet by mouth every day. 100 Tablet 3    ambrisentan (LETAIRIS) 5 MG tablet Take 1 Tablet by mouth every day. 30 Tablet 5    zolpidem (AMBIEN CR) 6.25 MG CR tablet       VITAMIN E PO Take 1 Tablet by mouth every day. FOR PROCEDURE SCHEDULED 11/25/2024 - DO NOT TAKE 7 DAYS PRIOR TO SURGERY      Cholecalciferol (VITAMIN D-3 PO) Take 1 Tablet by mouth every day. FOR PROCEDURE SCHEDULED 11/25/2024 - DO NOT TAKE 7 DAYS PRIOR TO SURGERY      NON SPECIFIED Take 1 Tablet by mouth every day. BARIATRIC FUSION - HAIR SKIN AND NAILS    FOR PROCEDURE SCHEDULED 11/25/2024 - DO NOT TAKE 7 DAYS PRIOR TO SURGERY      NON SPECIFIED Take 2 Tablets by mouth every day. BARIATRIC FUSION TROPICAL - COMPLETE MINERAL AND VITAMIN SUPPLEMENT    FOR PROCEDURE SCHEDULED 11/25/2024 - DO NOT TAKE 7 DAYS PRIOR TO SURGERY      Cyanocobalamin (B-12 PO) Take 1 Tablet by mouth every day. FOR PROCEDURE SCHEDULED 11/25/2024 - DO NOT TAKE 7 DAYS PRIOR TO SURGERY      BIOTIN PO Take 1 Tablet by mouth every day. NATURE'S [place] - HAIR SKIN AND NAILS    FOR PROCEDURE SCHEDULED 11/25/2024 - DO NOT TAKE 7 DAYS PRIOR TO SURGERY      Glucosamine HCl (GLUCOSAMINE PO) Take 1 Tablet by mouth every day. JOINT [facility]ALTH    FOR PROCEDURE SCHEDULED 11/25/2024 - DO NOT TAKE 7 DAYS PRIOR TO SURGERY      NON  "SPECIFIED Apply 1 Application topically as needed (raynauds). NITROBID 2% ON HANDS FOR RAYNAUD'S      pilocarpine (SALAGEN) 5 MG Tab Take 1 Tablet by mouth 3 times a day. FOR PROCEDURE SCHEDULED 11/25/2024 - CONTINUE AS PRESCRIBED      aspirin 81 MG EC tablet Take 1 Tablet by mouth every 48 hours. (Patient taking differently: Take 81 mg by mouth every 48 hours. FOR PROCEDURE SCHEDULED 11/25/2024 - FOLLOW DOCTOR INSTRUCTIONS)      atorvastatin (LIPITOR) 40 MG Tab Take 1 Tablet by mouth every evening. (Patient taking differently: Take 40 mg by mouth every evening. FOR PROCEDURE SCHEDULED 11/25/2024 - CONTINUE AS PRESCRIBED) 90 Tablet 1    omeprazole (PRILOSEC) 40 MG delayed-release capsule Take 1 Capsule by mouth 2 times a day. (Patient taking differently: Take 40 mg by mouth 2 times a day. FOR PROCEDURE SCHEDULED 11/25/2024 - CONTINUE AS PRESCRIBED) 90 Capsule 0    albuterol 108 (90 Base) MCG/ACT Aero Soln inhalation aerosol Inhale 2 Puffs every four hours as needed for Shortness of Breath. (Patient taking differently: Inhale 2 Puffs every four hours as needed for Shortness of Breath. FOR PROCEDURE SCHEDULED 11/25/2024 - CONTINUE AS PRESCRIBED) 1 Each 0    hydroxychloroquine (PLAQUENIL) 200 MG Tab Take 200 mg by mouth 2 times a day. FOR PROCEDURE SCHEDULED 11/25/2024 - FOLLOW DOCTOR INSTRUCTIONS       No current facility-administered medications for this visit.       REVIEW OF SYSTEMS  Constitutional: Denies fevers, Denies weight changes  Ears/Nose/Throat/Mouth: Denies nasal congestion or sore throat   Cardiovascular: Denies chest pain  Respiratory: Denies shortness of breath, Denies cough  Gastrointestinal/Hepatic: Denies nausea, vomiting  Sleep: see HPI    Physical Examination:  Vitals/ General Appearance:   Weight/BMI: Body mass index is 26.63 kg/m².  /86 (BP Location: Left arm, Patient Position: Sitting, BP Cuff Size: Adult)   Pulse (!) 53   Resp 16   Ht 1.651 m (5' 5\")   Wt 72.6 kg (160 lb)   SpO2 93% " "  Vitals:    02/07/25 1344   BP: 132/86   BP Location: Left arm   Patient Position: Sitting   BP Cuff Size: Adult   Pulse: (!) 53   Resp: 16   SpO2: 93%   Weight: 72.6 kg (160 lb)   Height: 1.651 m (5' 5\")       Pt. is alert and oriented to time, place and person. Cooperative and in no apparent distress.     Constitutional: Alert, no distress, well-groomed.  Skin: No rashes in visible areas.  Eye: Round. Conjunctiva clear, lids normal. No icterus.   ENT EXAM  Nasal alae/valves collapsible: No   Nasal septum deviation: Yes  Nasal turbinate hypertrophy: No   Hard palate narrow: No   Hard palate high: No   Soft palate/uvula (Mallampati score): 3  Tongue Scalloping: No   Retrognathia: No   Micrognathia: No   Cardiovascular:no murmus/gallops/rubs, normal S1 and S2 heart sounds, regular rate and rhythm  Pulmonary:Clear to auscultation, No wheezes, No crackles.  Neurologic:Awake, alert and oriented x 3, Normal age appropriate gait, No involuntary motions.  Extremities: No clubbing, cyanosis, or edema       ASSESSMENT AND PLAN   Lamar Ita Miguel is a 44 y.o. female with CREST syndrome, hypertension, Raynaud's, GERD, history of gastric sleeve, insomnia, and obstructive sleep apnea on CPAP.  Presents to Sleep Clinic for evaluation of sleep.    1.  Obstructive sleep apnea  The medical record was reviewed.    Diagnostic  home sleep apnea tests, and compliance reports reviewed.  Compliance data reviewed showing 67% usage > 4hours in last 30  days. Average AHI 0.6 events/hour. 90-95% pressure 8.5 CWP. Pt continues to use and benefit from machine.     Auto CPAP 5-15.    Discussed aerophagia.  Will decrease maximum pressure to 9 cmH2O to see if helps.  Also discussed she could elevate the head of the bed 15 to 20 degrees.  If continues to have significant aerophagia may consider titration study to explore BiPAP therapy.      PLAN:   -Order placed for mask and supplies   -Advised to reach out via MyChart with questions "     Has been advised to continue the current CPAP, clean equipment frequently, and get new mask and supplies as allowed by insurance and DME. Recommend an earlier appointment, if significant treatment barriers develop.    Patients with FRANK are at increased risk of cardiovascular disease including coronary artery disease, systemic arterial hypertension, pulmonary arterial hypertension, cardiac arrythmias, and stroke.     2. Chronic insomnia  With prolonged sleep latency, frequent awakenings with difficulty falling back asleep and feeling this is impacting daily functioning for years meets criteria for chronic insomnia. Discussed potential causes of insomnia and importance of having a routine around bedtime. Advised to avoid laying in bed for more then 20-30 min if unable to fall asleep. Dicussed cognitive behavioral therapy for insomnia (CBTi) which is first line treatment for insomnia. Reviewed pharmacologic therapy which is second line treatment and may be considered if circumstances permit.     RECOMMENDATIONS  -Discussed trazodone for sleep.  Prescription generated for  mg of trazodone at bedtime.  -We also discussed a few interventions noted below to help with the insomnia:  -Maintain a regular sleep schedule  -Avoid caffeinated beverages after lunch, and alcohol in late afternoon and evening  -Avoid prolonged use of light-emitting screens before bedtime  -Exercise regularly for at least 20 minutes, preferably more than four to five hours prior to bedtime  -Avoid daytime naps, especially if they are longer than 20 to 30 minutes or occur late in the day        Return in about 3 months (around 5/7/2025).      2.  Regarding treatment of other past medical problems and general health maintenance,  Pt is urged to follow up with PCP.      Please note portions of this record was created using voice recognition software. I have made every reasonable attempt to correct obvious errors, but I expect that there are  errors of grammar and possibly content I did not discover before finalizing the note.

## 2025-02-10 NOTE — PATIENT INSTRUCTIONS
Thank you for visiting today!    Please follow-up in 1 weeks     Please drink at least 8-10 large glasses of water daily    Please try and eat healthy, get at least 30 minutes of cardiovascular exercise a day to help keep your health as best as it can be.    If you have any questions or concerns please feel free to contact us at [phone].    If you feel like you are experiencing a medical emergency please seek immediate medical attention at an urgent care or in the emergency department.     Bed in lowest position, wheels locked, appropriate side rails in place/Call bell, personal items and telephone in reach/Instruct patient to call for assistance before getting out of bed or chair/Non-slip footwear when patient is out of bed/[place] to call system/Physically safe environment - no spills, clutter or unnecessary equipment/Purposeful Proactive Rounding/Room/bathroom lighting operational, light cord in reach

## 2025-02-11 DIAGNOSIS — I27.20 PULMONARY HYPERTENSION (HCC): ICD-10-CM

## 2025-02-11 RX ORDER — AMBRISENTAN 5 MG/1
5 TABLET, FILM COATED ORAL DAILY
Qty: 30 TABLET | Refills: 5 | Status: SHIPPED | OUTPATIENT
Start: 2025-02-11

## 2025-02-11 NOTE — TELEPHONE ENCOUNTER
LYNN      Received request via: Patient    Was the patient seen in the last year in this department? Yes    Does the patient have an active prescription (recently filled or refills available) for medication(s) requested? Yes. Refill request has been refused in [facility]. Contacted pharmacy and called in most recent prescription.    Pharmacy Name: [id number] - [place], [place] - [address] [[id number]]  (patient stated it had to be sent to this location due to her insurance being able to cover it at that location)    Does the patient have FPC Plus and need 100-day supply? (This applies to ALL medications) Patient does not have SCP    Thank you    -Bony LARKIN

## 2025-02-22 ENCOUNTER — TELEPHONE (OUTPATIENT)
Dept: VASCULAR LAB | Facility: MEDICAL CENTER | Age: 45
End: 2025-02-22
Payer: MEDICAID

## 2025-02-22 NOTE — TELEPHONE ENCOUNTER
Received New start PA request via  FAX   for AMBRISENTAN 5MG TABLETS. (Quantity:30, Day Supply:30)     Insurance: [employer] [place]   Member ID:  [id number]  BIN: [id number]  PCN: [id number]  Group: [id number]     Ran Test claim via [place] & medication Rejects stating prior authorization is required.    RAMONITA Colin, PhT  Vascular Pharmacy Liaison (Rx Coordinator)  P: [phone]  2/22/2025 9:40 AM

## 2025-02-22 NOTE — TELEPHONE ENCOUNTER
Prior Authorization for AMBRISENTAN 5MG TABLETS  (Quantity: 30, Days: 30) has been submitted via Cover My Meds: Key ([id number])    Insurance: [employer] [place]    Will follow up in 24-48 business hours.     RAMONITA Colin, PhT  Vascular Pharmacy Liaison (Rx Coordinator)  P: [phone]  2/22/2025 9:41 AM

## 2025-02-24 NOTE — TELEPHONE ENCOUNTER
PA submitted for AMBRISENTAN 5MG TABLETS  has been denied for a quantity of 30 , day supply 30    Prior authorization was denied per the following: Ambrisentan 5mg tablets, 30 tablets per 30 days for pulmonary hypertension, unspecified (high blood pressure in the lungs) is not approved. For your drug to be approved, you need to have met the guidelines listed in the PAH agents protocol. Our records and/or records from your provider show that the following criteria has not been met: you do not have the required diagnosis    PA denial reference number: N/A  Insurance: [employer] [place]       Next Steps: notified and updated provider for the status. Will await for the provider's response for the next process.     Full denial letter will be uploaded under the Media tab.     RAMONITA Colin, PhT  Vascular Pharmacy Liaison (Rx Coordinator)  P: [phone]  2/24/2025 8:44 AM

## 2025-02-28 ENCOUNTER — APPOINTMENT (OUTPATIENT)
Dept: RADIOLOGY | Facility: MEDICAL CENTER | Age: 45
End: 2025-02-28
Attending: FAMILY MEDICINE
Payer: MEDICAID

## 2025-03-05 PROBLEM — I27.21 PULMONARY ARTERIAL HYPERTENSION (HCC): Status: ACTIVE | Noted: 2025-03-05

## 2025-03-05 PROBLEM — I73.00 RAYNAUD'S PHENOMENON WITHOUT GANGRENE: Status: ACTIVE | Noted: 2025-03-05

## 2025-03-27 ENCOUNTER — OFFICE VISIT (OUTPATIENT)
Dept: VASCULAR LAB | Facility: MEDICAL CENTER | Age: 45
End: 2025-03-27
Attending: FAMILY MEDICINE
Payer: MEDICAID

## 2025-03-27 VITALS
BODY MASS INDEX: 27.16 KG/M2 | HEIGHT: 65 IN | SYSTOLIC BLOOD PRESSURE: 110 MMHG | HEART RATE: 68 BPM | WEIGHT: 163 LBS | DIASTOLIC BLOOD PRESSURE: 76 MMHG

## 2025-03-27 DIAGNOSIS — Z82.49 FAMILY HISTORY OF PREMATURE CORONARY HEART DISEASE: ICD-10-CM

## 2025-03-27 DIAGNOSIS — Z79.02 LONG TERM (CURRENT) USE OF ANTITHROMBOTICS/ANTIPLATELETS: ICD-10-CM

## 2025-03-27 DIAGNOSIS — E78.5 DYSLIPIDEMIA: ICD-10-CM

## 2025-03-27 DIAGNOSIS — E78.41 ELEVATED LIPOPROTEIN(A): ICD-10-CM

## 2025-03-27 DIAGNOSIS — R73.03 PREDIABETES: ICD-10-CM

## 2025-03-27 DIAGNOSIS — D84.9 IMMUNOSUPPRESSED STATUS (HCC): ICD-10-CM

## 2025-03-27 DIAGNOSIS — M34.1 CREST SYNDROME (HCC): ICD-10-CM

## 2025-03-27 DIAGNOSIS — I27.21 PULMONARY ARTERIAL HYPERTENSION (HCC): ICD-10-CM

## 2025-03-27 DIAGNOSIS — I10 PRIMARY HYPERTENSION: ICD-10-CM

## 2025-03-27 DIAGNOSIS — I73.00 RAYNAUD'S PHENOMENON WITHOUT GANGRENE: ICD-10-CM

## 2025-03-27 PROCEDURE — 99212 OFFICE O/P EST SF 10 MIN: CPT

## 2025-03-27 PROCEDURE — 99214 OFFICE O/P EST MOD 30 MIN: CPT | Performed by: FAMILY MEDICINE

## 2025-03-27 PROCEDURE — 3078F DIAST BP <80 MM HG: CPT | Performed by: FAMILY MEDICINE

## 2025-03-27 PROCEDURE — 3074F SYST BP LT 130 MM HG: CPT | Performed by: FAMILY MEDICINE

## 2025-03-27 ASSESSMENT — FIBROSIS 4 INDEX: FIB4 SCORE: .938083151964685911

## 2025-03-27 ASSESSMENT — ENCOUNTER SYMPTOMS
WHEEZING: 0
FEVER: 0
COUGH: 0
SHORTNESS OF BREATH: 0
PALPITATIONS: 0
SPUTUM PRODUCTION: 0
PND: 0
ORTHOPNEA: 0
HEMOPTYSIS: 0
BRUISES/BLEEDS EASILY: 0
CLAUDICATION: 0
CHILLS: 0

## 2025-03-27 NOTE — PROGRESS NOTES
"FOLLOW-UP VASCULAR MEDICINE CLINIC  03/27/25      Christine Miguel,  for eval for vasc etiology of RUE non-healing finger wounds, CREST and associated PAH with Raynaud's, est 4/24/22   initially referred by Lilly Abreu A.P.RN     Subjective        Interval hx/concerns:  last seen 12/2024, has been using topical nitro-bid to trophic lesions and using every days, improving, minimal HAs.  Still pending another trial for sildenafil per rheum MD.  No adjustments to plaquenil, MTX 3 tab/week.  Using pilocarpine with good results.   Pending for new Rheum MD.      noting new bilat plantar aspect of toes with trophic lesions.  Had ambrisentan rx sent at last visit.  Prior auth and appeal have been placed since initially denied however she meets prespecified criteria including PAH (WHO class I).   Our pharm coordinator continues to work diligently on f/u of the appeal and reports the following as of 3/26/25:     \"Called [facility] PA department @ [phone] and s/w Sagrario to obtain another status. Per Sagrario, there was a PA denial sent out on 02/24. Explained to agent that we issued an appeal back in 03/18, including the missing information that they needed to submit in order to initiate the appeal process.     Call was re routed to [phone] and s/w Aleyda, to obtain the appeal status that was submitted on 03/18. Per Aleyda, pt's account was handled by [employer] and will be able to provide status with the medication requested for PA.     Call was transferred to [phone] (for [facility]) and s/w Shavon to provide the appeal process. Per Shavon, they just received completed PHI form part signed by pt via [employer] and completed appeal form letter 2 days ago.     At this time, they are still in pending review and was advised to give them a call within 48 business hours.     Will do a follow up call on 03/28 to seek follow up with appeal status.\"    Pulmonary arterial HTN (PAH) - group " 1 based upon WHO class as associated with CREST syndrome. Has persistent NGO and intermittent pallor, cyanosis of fingers. Denies overt edema but noting increased fatigue     Secondary raynaud's (known CREST syndrome) : stable, persistent pain w/o new lesions. Has limited foot sensation so not painful.  No bleeding or pus noted.  Finger lesions are stable and mainly resolved. R index and middle finger, L index with shallow ulcerations.   Pending with rheum MD 12/27/24.  No recent med changes.   Still having lifestyle limiting pain and impact on social and occupational life - inability to type due to pain in future.   pmhx:  Started age 39 with episodes, can be fingers and toes and has been progressive with ulcerations forms.   Seen by  4/2022 for R index finger non-healing wound.  Had subung lac 3mo prior.  Reports prior episodes of non-healing finger wounds in the past.  Hx of recurrent cold/painful fingers with intermittent cyanosis.  Has WBI normal in past 2021 and has apparently been seen by vasc specialist w/o formal dx or tx in the past.   (No prior visits of [facility] vasc med or available  Has seen by Dr. Pierre in latter half of 2021 and informed no interventions available but started nifedipine, DAPT, atorva.  Currently on ASA only, stopped nifedipine due to HA     HTN: Stable , home BP 100s/60s most days.  Mild dizz with positional changes   HLD: Stable, on current treatment: lifestyle modifications and High intensity statin atorva   Baseline lipid    Latest Reference Range & Units 01/03/12 12:10   Cholesterol,Tot 100 - 199 mg/dL 218 (H)   Triglycerides 0 - 149 mg/dL 86   HDL >=40 mg/dL 37 !   LDL <100 mg/dL 164     Antiplatelet/anticoagulation: ASA 81mg QOD     Current Outpatient Medications on File Prior to Visit   Medication Sig Dispense Refill    ambrisentan (LETAIRIS) 5 MG tablet Take 1 Tablet by mouth every day. 30 Tablet 5    traZODone (DESYREL) 50 MG Tab Take 1-2 Tablets by mouth every evening. 60  Tablet 2    guaiFENesin (ROBITUSSIN) 100 MG/5ML liquid Take 10 mL by mouth every four hours as needed for Cough. 840 mL 0    amLODIPine (NORVASC) 10 MG Tab TAKE 1 TABLET BY MOUTH ONCE DAILY 100 Tablet 3    losartan (COZAAR) 25 MG Tab Take 1 Tablet by mouth every day. 100 Tablet 3    zolpidem (AMBIEN CR) 6.25 MG CR tablet       VITAMIN E PO Take 1 Tablet by mouth every day. FOR PROCEDURE SCHEDULED 11/25/2024 - DO NOT TAKE 7 DAYS PRIOR TO SURGERY      Cholecalciferol (VITAMIN D-3 PO) Take 1 Tablet by mouth every day. FOR PROCEDURE SCHEDULED 11/25/2024 - DO NOT TAKE 7 DAYS PRIOR TO SURGERY      NON SPECIFIED Take 1 Tablet by mouth every day. BARIATRIC FUSION - HAIR SKIN AND NAILS    FOR PROCEDURE SCHEDULED 11/25/2024 - DO NOT TAKE 7 DAYS PRIOR TO SURGERY      NON SPECIFIED Take 2 Tablets by mouth every day. BARIATRIC FUSION TROPICAL - COMPLETE MINERAL AND VITAMIN SUPPLEMENT    FOR PROCEDURE SCHEDULED 11/25/2024 - DO NOT TAKE 7 DAYS PRIOR TO SURGERY      Cyanocobalamin (B-12 PO) Take 1 Tablet by mouth every day. FOR PROCEDURE SCHEDULED 11/25/2024 - DO NOT TAKE 7 DAYS PRIOR TO SURGERY      BIOTIN PO Take 1 Tablet by mouth every day. [employer]URE'S BOUNTY - HAIR SKIN AND NAILS    FOR PROCEDURE SCHEDULED 11/25/2024 - DO NOT TAKE 7 DAYS PRIOR TO SURGERY      Glucosamine HCl (GLUCOSAMINE PO) Take 1 Tablet by mouth every day. [facility]    FOR PROCEDURE SCHEDULED 11/25/2024 - DO NOT TAKE 7 DAYS PRIOR TO SURGERY      NON SPECIFIED Apply 1 Application topically as needed (raynauds). NITROBID 2% ON HANDS FOR RAYNAUD'S      pilocarpine (SALAGEN) 5 MG Tab Take 1 Tablet by mouth 3 times a day. FOR PROCEDURE SCHEDULED 11/25/2024 - CONTINUE AS PRESCRIBED      aspirin 81 MG EC tablet Take 1 Tablet by mouth every 48 hours. (Patient taking differently: Take 81 mg by mouth every 48 hours. FOR PROCEDURE SCHEDULED 11/25/2024 - FOLLOW DOCTOR INSTRUCTIONS)      atorvastatin (LIPITOR) 40 MG Tab Take 1 Tablet by mouth every evening. (Patient  "taking differently: Take 40 mg by mouth every evening. FOR PROCEDURE SCHEDULED 11/25/2024 - CONTINUE AS PRESCRIBED) 90 Tablet 1    omeprazole (PRILOSEC) 40 MG delayed-release capsule Take 1 Capsule by mouth 2 times a day. (Patient taking differently: Take 40 mg by mouth 2 times a day. FOR PROCEDURE SCHEDULED 11/25/2024 - CONTINUE AS PRESCRIBED) 90 Capsule 0    albuterol 108 (90 Base) MCG/ACT Aero Soln inhalation aerosol Inhale 2 Puffs every four hours as needed for Shortness of Breath. (Patient taking differently: Inhale 2 Puffs every four hours as needed for Shortness of Breath. FOR PROCEDURE SCHEDULED 11/25/2024 - CONTINUE AS PRESCRIBED) 1 Each 0    hydroxychloroquine (PLAQUENIL) 200 MG Tab Take 200 mg by mouth 2 times a day. FOR PROCEDURE SCHEDULED 11/25/2024 - FOLLOW DOCTOR INSTRUCTIONS       No current facility-administered medications on file prior to visit.      Allergies   Allergen Reactions    Latex Rash and Itching    Duloxetine      HA, dizz     Fluoxetine Vomiting and Nausea     Nausea, vomiting, \"made me feel woozy\"    Nifedipine Vomiting     Headache, vomiting    Sildenafil      HA, nausea      Social History     Tobacco Use    Smoking status: Never     Passive exposure: Never    Smokeless tobacco: Never   Vaping Use    Vaping status: Never Used   Substance Use Topics    Alcohol use: No    Drug use: No      Review of Systems   Constitutional:  Positive for malaise/fatigue. Negative for chills and fever.   Respiratory:  Negative for cough, hemoptysis, sputum production, shortness of breath and wheezing.    Cardiovascular:  Negative for chest pain, palpitations, orthopnea, claudication, leg swelling and PND.   Skin:  Positive for itching and rash.   Endo/Heme/Allergies:  Does not bruise/bleed easily.       Objective   Vitals:    03/27/25 1544   BP: 110/76   BP Location: Left arm   Patient Position: Sitting   BP Cuff Size: Large adult   Pulse: 68   Weight: 73.9 kg (163 lb)   Height: 1.651 m (5' 5\")    "   BP Readings from Last 5 Encounters:   03/27/25 110/76   02/07/25 132/86   01/21/25 110/69   01/06/25 104/70   12/12/24 104/70      BMI Readings from Last 1 Encounters:   03/27/25 27.12 kg/m²      Wt Readings from Last 3 Encounters:   03/27/25 73.9 kg (163 lb)   02/07/25 72.6 kg (160 lb)   01/21/25 73.8 kg (162 lb 12.8 oz)      Physical Exam  Constitutional:       Appearance: Normal appearance.   HENT:      Head: Normocephalic.   Eyes:      Extraocular Movements: Extraocular movements intact.      Conjunctiva/sclera: Conjunctivae normal.   Pulmonary:      Effort: Pulmonary effort is normal.   Musculoskeletal:      Cervical back: Normal range of motion.        Feet:    Skin:     General: Skin is dry.      Coloration: Skin is not pale.      Findings: No rash.   Neurological:      General: No focal deficit present.      Mental Status: She is alert and oriented to person, place, and time.   Psychiatric:         Mood and Affect: Mood normal.         Behavior: Behavior normal.       Labs:    [facility] 6/27/22   Lp(a) 181      HDL 42  LDL 62   CMP wnl   a1c wnl  Cbc wnl   ESR, CRP, ANCA (MPO ab, PR3 ab) - wnl   GEOVANY - positive >1:1280 (centromere pattern)  CCP ab 71 (elevated)   HCV ab neg     [facility] 7/20/22   CMP - wnl   SCL70 ab - neg   AT III activity wnl   FVL neg   Prot C wnl   PGM neg   B2GP1 ab wnl  ACL ab wnl   Lupus AC neg   D-dimer 0.32       [facility] 8/2022   C3, C4, RA factor - wnl            Latest Reference Range & Units 09/16/14 04:15 10/28/15 12:04 01/30/16 11:08 04/15/16 14:17 12/26/17 11:05   Hepatitis B Surface Antigen Negative   Negative      HIV 1/0/2 Non Reactive   see below      Rubella IgG Antibody IU/mL  135.40      Strep Gp B DNA PCR Negative     Negative    Syphilis, Treponemal Qual Non Reactive   Non Reactive Non Reactive     Stat C-Reactive Protein 0.00 - 0.75 mg/dL 0.32    0.35     2/2025 [facility] labs   TSH 1.04   CBC wnl               IMAGING    WBI 8/2021  Normal WBI.   Absent 3, 4, 5th  digit flow could be artifactual or secondary to embolic    disease.  Recommend clinical correlation.   Right.    Arterial doppler waveforms are of high amplitude and triphasic.   Wrist brachial index is within normal limits. WBI=1.05   PPG of the digits demonstrates absent flow in the right 3rd, 4th, and 5th    digits. The 1st and 2nd digits appear to have slightly diminished flow in    comparsion to the contralateral digits.    CT chest 8/2021   1.  No thoracic aortic aneurysm or dissection.  2.  Subclavian arteries are normal in caliber and patent.  LEFT subclavian artery is partially secured.  3.  Probable medial RIGHT lower lobe atelectasis.  Thoracic aorta is normal in caliber.  No dissection demonstrated.  Great vessel origins are intact.  RIGHT subclavian artery is normal in caliber and patent.  LEFT subclavian artery is partially obscured by streak artifact however also appears normal in caliber and patent.    Echo 9/2021   No prior study is available for comparison.   Normal left ventricular systolic function.  The left ventricular ejection fraction is visually estimated to be 65%.  No significant valve abnormalities.     WBI/art duplex 8/2022    Wrist-brachial indices are normal.   FBI:   Right-  1.07   Left-     0.98   Right 3rd and 4th finger waveform are diminished, most likely due to    microvascular disease.   Left 2nd and 3rd finger waveforms are diminished, most likely due to    microvascular disease.    Echo 12/2022  Normal left ventricular size, thickness, systolic function, and   diastolic function.  The left ventricular ejection fraction is visually estimated to be 60-65%.  The right ventricle is borderline dilated in size with preserved   systolic function.  Estimated right ventricular systolic pressure is mildly elevated at 40 mmHg.  Normal left atrial size.  No significant valvular abnormalities.   Normal inferior vena cava size and inspiratory collapse.    CT chest 12/2022  1.  No CT  evidence of interstitial lung disease.   2.  No significant incidental findings are identified.    8/9/24 Echo with bubble  1. The left ventricle is normal in size, thickness, and systolic function with an ejection fraction of 60 to 65% by Delacruz's biplane. There is normal LV segmental wall motion. The left ventricular diastolic function is normal.  2. Mild tricuspid insufficiency.  3. Injection of bubbles documented a very small interatrial shunt.  4. The right ventricle is normal in size and function. Estimated RVSP = 37 mmHg (RAP = 3 mmHg) is mildly elevated.    11/2024 NM stress   1. Negative pharmacological stress test for ischemia or infarction.  2. Normal left ventricular systolic function with a calculated ejection fraction of 70% with normal wall motion.  3. No ischemic EKG changes seen with pharmacologic stress.  4. No chest pain with pharmacologic stress.    11/2024 RUE venous    No superficial or deep venous thrombosis.      PROCEDURES:    DATE OF SERVICE:  03/27/2023    POSTOPERATIVE DIAGNOSES:  1.  Recent left sided pelvic pain, which seems to be resolving.  2.  Recent left sided adnexal mass, which has since resolved.  During today's   laparoscopy no ovarian/adnexal mass or cyst is seen either on the right or the   left.  At laparoscopy both ovaries are well visualized and both ovaries are   found during today's laparoscopy to be normal.  3.  Dyspareunia.  4.  Dysmenorrhea.  5.  The patient desires permanent tubal sterilization.  6.  Obesity.   PROCEDURES:  Laparoscopy, diagnostic and operative, with laparoscopic   bilateral salpingectomy.   SURGEON:  Nolan Rodriguez MD            Medical Decision Making:  Today's Assessment / Status / Plan:     1. Pulmonary arterial hypertension (HCC)        2. Immunosuppressed status (HCC)        3. CREST syndrome (HCC)        4. Raynaud's phenomenon without gangrene        5. Primary hypertension        6. Dyslipidemia        7. Elevated lipoprotein(a)        8.  Long term (current) use of antithrombotics/antiplatelets        9. Family history of premature coronary heart disease        10. Prediabetes           Established CVD:     1) PULMONARY ARTERIAL HYPERTENSION (PAH, WHO group 1) associated with CREST - currently symptomatic with chronic NGO, fatigue, intermittent finger cyanosis, prior failed PDE5i (revatio (sildenafil))  Echo with chronic elevated RVSP = 37-40mmHg, indicating PAH  Plan:   - intolerant to sildenafil,   - pending start on ambrisentan 5mg daily   FOR PRIOR AUTH:  qualifies for treatment due to WHO group 1 PAH as per [employer]  PAH agents  - I am REMS-certified and we will report her to the REMS program   - pt has been sterilized with bilat salpigectomies as of 3/2023   - pt advised of ADRs and fetotoxicity risks as per REMS  - may benefit from further eval with pulmonology-based PH clinic (Dr. Pizano)    2) Raynaud's, secondary to CREST syndrome, refractory - persistent symptoms, overall some improvements in trophic lesions and reduce pain   4/2022  - recurrent non-healing finger wounds, most recently right index - improving  - prior normal WBI with PPGs showing absent flow   8/2022 duplex = shows nl WBI/FBI with reduced waveforms to R 3r/4th and L 2nd/3rd fingers   - thrombophilia w/u neg,  ANCA neg   - no COVID prior to initial episode   - CTA chest wnl w/o proximal plaque or stenosis, no indications of thoracic Ao diss, plaque  Plan:  - defer further CREST-specific tx to rheum MD for definitive dx and tx - needs intensified tx, see below  - continue warming practices    Meds:  - continued nitro-bid ointment- has provided some relief   - continue amlodipine 10mg daily - failed nifedipine  - continue losartan 25mg daily to increase vasodilation   - failed fluoxetine due to lethargy, mood disturbance  - failed sildenafil due to HA, nausea but rheum has recommended a re-trial and she is pending initiation   - pending start on ambrisentan 5mg daily, may  titrate to 10mg daily  (ok to use with sildenafil)  - consider cilostazol as next agent if new ulcerations     Counter-measures:  Stay warm and avoid exposure to cold  - Wear mittens (or gloves) and wool socks  - Use a avitia when drinking from cold cans or bottles  - Wear layers to keep your whole body warm, including a hat  - Avoid tight-fitting socks and shoes  - parafin wax dips  - warming pads for feet in cold weather or after exposure   Avoid the use of vibrating hand tools  Take good care of your skin  - Use moisturizers to prevent drying and cracking  - Inspect fingers and toes regularly for ulcers  Avoid smoking or exposure to smoke and illicit substances (eg. Cocaine)  Limit caffeine if it worsens your symptoms - Switch to decaffeinated drinks  Avoid vasoconstricting medications / substances   - best to avoid narcotics, estrogen, BB, clonidine, ergots/triptans, bromocriptine, stimulants (including phentermine and ADHD meds), and OTC sympathomimetics  Reduce stress  - Exercise helps to reduce stress  - Consider yoga or meditation    BLOOD PRESSURE MANAGEMENT:  Office BP goal per ACC/AHA <130/80  Home BP at goal: yes  Office BP at goal:  yes - mild hypoTN  - hx of preg-induced HTN on nifedipine   Plan:   - continue healthy diet, activity, weight mgmt   - routine clinic-based BP measurements at least once annually   Medications:   - continue amlodipine 10mg daily for Raynaud's   - continue losartan 25mg daily for vasodilt    LIPID MANAGEMENT:   Qualifies for Statin Therapy per ACC/AHA Guidelines: yes, Primary Prevention - 40-74yo, LDLc >70, <190 w/o DM  The ASCVD Risk score (Cong KUHN, et al., 2019) failed to calculate.   Major ASCVD events: None    High-risk condition: N/A  Risk-enhancers: Metabolic syndrome  and Lp(a) >50   - reviewed with patient this is an independent risk factor for ASCVD but is currently controversial if lowering has impact on outcomes in primary prevention, so targeting LDLc lowering  is primary objective for now and consider lp(a) lowering if worsening ascvd risk or if ascvd event occurs - definitely prothrombotic and needs ASA   Currently on Statin: Yes  Tx goals: LDL-C <100   At goal? Yes, 2/2025  Plan:   - reinforced ongoing lifestyle mgmt   - monitor labs   Meds:   - continue atorva 40mg daily     ANTITHROMBOTIC THERAPY: continue ASA 81mg daily for potential antithromb benefit due to non-healing wounds and high lp(a)     GLYCEMIC STATUS: Prediabetic        Plan:  - continue healthy diet, weight reduction, daily physical activity   - consider metformin up to 850mg BID to slow or offset progression to T2D as per DPP trial   - monitor labs Q6-12mo    LIFESTYLE INTERVENTIONS  TOBACCO: continued complete avoidance of all tobacco products   PHYSICAL ACTIVITY: >150min/week of mod-intensity activity or as much as tolerated  NUTRITION: Mediterranean   ETOH: complete abstinence recommended  WT MGMT: maintain healthy weight     OTHER:     # CREST syndrome with associated PAH (group 1) - stable, on immunosupp meds   2022 Echo with high RVSP  - serology testing indicative of CREST syndrome, possible RA overlay per CCP ab elevations as well   - strong centromere ab pattern and GEOVANY 1:1280 with high CCP ab  Plan: ongoing care with rheum MD for intensified care, pending with new rheum MD     # GERD - stable, continue PPI     # family planning - s/p bilat salpingectomy (6/2024)    STUDIES: none   LABS: none  Follow up: 3 months     Scott Acuña M.D.  Vascular Medicine Clinic   [facility]   [phone]

## 2025-03-28 PROBLEM — Z79.02 LONG TERM (CURRENT) USE OF ANTITHROMBOTICS/ANTIPLATELETS: Status: ACTIVE | Noted: 2025-03-28

## 2025-03-28 PROBLEM — D84.9 IMMUNOSUPPRESSED STATUS (HCC): Status: ACTIVE | Noted: 2025-03-28

## 2025-04-07 ENCOUNTER — OFFICE VISIT (OUTPATIENT)
Facility: MEDICAL CENTER | Age: 45
End: 2025-04-07
Payer: MEDICAID

## 2025-04-07 VITALS
SYSTOLIC BLOOD PRESSURE: 114 MMHG | WEIGHT: 160.94 LBS | TEMPERATURE: 98.1 F | BODY MASS INDEX: 26.81 KG/M2 | HEART RATE: 74 BPM | HEIGHT: 65 IN | DIASTOLIC BLOOD PRESSURE: 62 MMHG | OXYGEN SATURATION: 95 %

## 2025-04-07 DIAGNOSIS — Z98.84 S/P GASTRIC BYPASS: ICD-10-CM

## 2025-04-07 DIAGNOSIS — E66.01 MORBID OBESITY DUE TO EXCESS CALORIES (HCC): ICD-10-CM

## 2025-04-07 DIAGNOSIS — Z72.4 INAPPROPRIATE DIET AND EATING HABITS: ICD-10-CM

## 2025-04-07 PROCEDURE — 3074F SYST BP LT 130 MM HG: CPT | Performed by: SURGERY

## 2025-04-07 PROCEDURE — 3078F DIAST BP <80 MM HG: CPT | Performed by: SURGERY

## 2025-04-07 PROCEDURE — 99213 OFFICE O/P EST LOW 20 MIN: CPT | Performed by: SURGERY

## 2025-04-07 ASSESSMENT — FIBROSIS 4 INDEX: FIB4 SCORE: .938083151964685911

## 2025-04-08 NOTE — Clinical Note
REFERRAL APPROVAL NOTICE         Sent on April 8, 2025                   Christine Miguel  [address] [address]                   Dear Ms. Vu Miguel,    After a careful review of the medical information and benefit coverage, [facility] has processed your referral. See below for additional details.    If applicable, you must be actively enrolled with your insurance for coverage of the authorized service. If you have any questions regarding your coverage, please contact your insurance directly.    REFERRAL INFORMATION   Referral #:  [id number]  Referred-To Provider    Referred-By Provider:  Ramila Oliveros M.D.   [facility] NUTRITION      [address]  [address]  [place] [address]  [phone] [address]  [address]  [place] [address]  [phone]    Referral Start Date:  04/07/2025  Referral End Date:   04/07/2026             SCHEDULING  If you do not already have an appointment, please call [phone] to make an appointment.     MORE INFORMATION  If you do not already have a [employer] account, sign up at: [email or web address]  You can access your medical information, make appointments, see lab results, billing information, and more.  If you have questions regarding this referral, please contact  the [facility] Referrals department at:             [phone]. Monday - Friday 8:00AM - 5:00PM.     Sincerely,    [facility]

## 2025-04-09 ENCOUNTER — HOSPITAL ENCOUNTER (OUTPATIENT)
Dept: RADIOLOGY | Facility: MEDICAL CENTER | Age: 45
End: 2025-04-09
Attending: FAMILY MEDICINE
Payer: MEDICAID

## 2025-04-09 DIAGNOSIS — I73.00 RAYNAUD'S PHENOMENON WITHOUT GANGRENE: ICD-10-CM

## 2025-04-09 PROCEDURE — 93922 UPR/L XTREMITY ART 2 LEVELS: CPT | Mod: 26 | Performed by: INTERNAL MEDICINE

## 2025-04-09 PROCEDURE — 93922 UPR/L XTREMITY ART 2 LEVELS: CPT

## 2025-04-10 ENCOUNTER — RESULTS FOLLOW-UP (OUTPATIENT)
Dept: VASCULAR LAB | Facility: MEDICAL CENTER | Age: 45
End: 2025-04-10
Payer: MEDICAID

## 2025-04-15 ENCOUNTER — TELEPHONE (OUTPATIENT)
Facility: MEDICAL CENTER | Age: 45
End: 2025-04-15
Payer: MEDICAID

## 2025-04-15 NOTE — PROGRESS NOTES
"    DEPARTMENT OF SURGERY  BARIATRIC AND GASTROESOPHAGEAL SURGERY      DATE OF SERVICE:  4/15/2025    REASON FOR VISIT:  Follow up evaluation    HISTORY OF PRESENT ILLNESS:  Ms. Vu Miguel returns for a repeat follow up evaluation, in the setting of prior robotic Jayna-en-Y gastric bypass with hiatal hernia repair in 2023 for history of morbid obesity and GERD. She has lost ~ 110 lbs since her operation but has developed occasional dysphagia (to certain solid foods) with regurgitation over the last 6+ months. Patient then underwent an endoscopic evaluation in 10/2024 by Dr. Evans that demonstrated a mild GJ anastomotic stricture that was dilated to 20 mm.    Today, she returns to discuss her persistent, occasional, symptoms of dysphagia with regurgitation. Patient notes that her symptoms have improved slightly since our last evaluation a few months ago. O/w, she notes partial weight regain over the last few months due to social issues. Patient continues to maintain her water and protein intake, and is taking her vitamins as previously directed. No recent history of fevers/chills, jaundice/icterus, heartburn/reflux, odynophagia, hematemesis, bloating/distention, worsening abdominal pain, dysuria/hematuria, BRBPR/melena, or diarrhea. All abdominal incisions have healed well, without evidence of infection or bulges.     Past Medical History:   Diagnosis Date    Acid reflux     GEOVANY positive 2022    Anesthesia     Family Hx-father ; was always told it was anesthesia \"related\"; not sure what the actual reaction was; pt-PONV    Arthritis 2023    osteoarthritis    Asthma     inhaler prn    Breath shortness 2023    on exertion    CREST syndrome (HCC)     Cyclic citrullinated peptide (CCP) antibody positive 2022    Daytime sleepiness     Degenerative joint disease involving multiple joints 2023    Dyslipidemia 2022    Dyspareunia, female 2023    Enlargement - tonsil/adenoid " "05/14/2013    Fatty liver     Heart burn     medicated    Hiatus hernia syndrome 07/06/2023    High cholesterol 03/20/2023    on medication    Hypertension 03/20/2023    on medication, 11/12/2024 pt states that she has been on this same dose of medication even when she weighed more but was told to stay on same dose d/t Raynaud's    Indigestion     Insomnia     Intractable back pain 12/26/2017    Left knee pain 02/07/2023    Morning headache     Obesity due to excess calories with serious comorbidity 02/07/2023    Oligohydramnios antepartum-IOL 4/24 9 AM 04/15/2016    IMO load March 2020    Osteoporosis     Pain 08/22/2014    lower back/L leg=7/10    Pain 03/20/2023    Chronic pain \"all over my body\" rates pain 7 out of 10    Patellofemoral instability of left knee with pain 02/07/2023    Pelvic pain 03/27/2023    PFO (patent foramen ovale) 2024    pt sees [facility] cardiologist (Dr. Coronado)    Right ventricular enlargement     Sciatica of right side 12/26/2017    Sleep apnea 03/20/2023    uses cpap    Snoring     sleep study done    Tachycardia, unspecified     Unspecified urinary incontinence 03/20/2023    stress incontinence     Past Surgical History:   Procedure Laterality Date    PA LAP,DIAGNOSTIC ABDOMEN N/A 11/25/2024    Procedure: DIAGNOSTIC AND OPERATIVE LAPAROSCOPY, HYSTEROSCOPY, DILATION AND CURETTAGE, REMOVAL OF ENDOMETRIAL POLYP, POSSIBLE LYSIS OF ADHESIONS, AND ALSO ANY AND ALL OTHER INDICATED PROCEDURES;  Surgeon: Nolan Rodriguez M.D.;  Location: SURGERY SAME DAY [facility];  Service: Gynecology    PA HYSTEROSCOPY,DX,SEP PROC N/A 11/25/2024    Procedure: HYSTEROSCOPY, DIAGNOSTIC;  Surgeon: Nolan Rodriguez M.D.;  Location: SURGERY SAME DAY [facility];  Service: Gynecology    DILATION AND CURETTAGE N/A 11/25/2024    Procedure: DILATION AND CURETTAGE;  Surgeon: Nolan Rodriguez M.D.;  Location: SURGERY SAME DAY [facility];  Service: Gynecology    PA UPPER GI ENDOSCOPY,DIAGNOSIS N/A 10/4/2024    Procedure: " ESOPHAGOGASTRODUODENOSCOPY (EGD) WITH BRUSHINGS AND DILATATION;  Surgeon: Uriel Evans M.D.;  Location: SURGERY [facility];  Service: General    GA LAP NILS RESTRICT PROC LONGITUDINAL GAS*  12/26/2023    Procedure: ROBOTIC SONNY EN Y GASTROENTEROSTOMY;  Surgeon: Olivier Oliveros M.D.;  Location: SURGERY [facility];  Service: Gen Robotic    ROBOTIC ASSISTED LAP HIATAL HERNIA REP  12/26/2023    Procedure: REPAIR, HERNIA, HIATAL, ROBOT-ASSISTED, LAPAROSCOPIC;  Surgeon: Olivier Oliveros M.D.;  Location: SURGERY [facility];  Service: Gen Robotic    GA LAP NILS RESTRICT PROC LONGITUDINAL GAS*  7/14/2023    Procedure: ROBOTIC PARTIAL SLEEVE GASTRECTOMY;  Surgeon: Olivier Oliveros M.D.;  Location: [facility];  Service: Gen Robotic    ROBOTIC ASSISTED LAP HIATAL HERNIA REP  7/14/2023    Procedure: REPAIR, HERNIA, HIATAL, ROBOT-ASSISTED, LAPAROSCOPIC;  Surgeon: Olivier Oliveros M.D.;  Location: SURGERY [facility];  Service: Gen Robotic    GA LAP,DIAGNOSTIC ABDOMEN N/A 3/27/2023    Procedure: LAPAROSCOPY, DIAGNOSTIC AND OPERATIVE AND LAPAROSCOPIC BILATERAL SALPINGECTOMY;  Surgeon: Nolan Rodriguez M.D.;  Location: SURGERY SAME DAY [facility];  Service: Gynecology    IRRIGATION & DEBRIDEMENT ORTHO  9/7/2014    Performed by El Gomez M.D. at [facility] ORS    IRRIGATION & DEBRIDEMENT NEURO  9/5/2014    Performed by El Gomez M.D. at [facility]    LUMBAR LAMINECTOMY DISKECTOMY  8/27/2014    Performed by El Gomez M.D. at [facility]    INJ,EPIDURAL/LUMB/SAC SINGLE  6/5/2014    Performed by Alexis Cornell M.D. at [facility] ORS    TONSILLECTOMY  5/14/2013    Performed by Aaron Hutton M.D. at SURGERY SAME DAY [facility] ORS    NASAL SEPTAL RECONSTRUCTION  5/14/2013    Performed by Aaron Hutton M.D. at SURGERY SAME DAY [facility] ORS    TURBINOPLASTY  5/14/2013    Performed by Aaron Hutton M.D. at SURGERY SAME DAY  "[facility]     Allergies   Allergen Reactions    Latex Rash and Itching    Duloxetine      HA, dizz     Fluoxetine Vomiting and Nausea     Nausea, vomiting, \"made me feel woozy\"    Nifedipine Vomiting     Headache, vomiting    Sildenafil      HA, nausea       Current Outpatient Medications   Medication Sig Dispense Refill    ambrisentan (LETAIRIS) 5 MG tablet Take 1 Tablet by mouth every day. 30 Tablet 5    traZODone (DESYREL) 50 MG Tab Take 1-2 Tablets by mouth every evening. 60 Tablet 2    amLODIPine (NORVASC) 10 MG Tab TAKE 1 TABLET BY MOUTH ONCE DAILY 100 Tablet 3    losartan (COZAAR) 25 MG Tab Take 1 Tablet by mouth every day. 100 Tablet 3    zolpidem (AMBIEN CR) 6.25 MG CR tablet       VITAMIN E PO Take 1 Tablet by mouth every day. FOR PROCEDURE SCHEDULED 11/25/2024 - DO NOT TAKE 7 DAYS PRIOR TO SURGERY      Cholecalciferol (VITAMIN D-3 PO) Take 1 Tablet by mouth every day. FOR PROCEDURE SCHEDULED 11/25/2024 - DO NOT TAKE 7 DAYS PRIOR TO SURGERY      NON SPECIFIED Take 1 Tablet by mouth every day. BARIATRIC FUSION - HAIR SKIN AND NAILS    FOR PROCEDURE SCHEDULED 11/25/2024 - DO NOT TAKE 7 DAYS PRIOR TO SURGERY      NON SPECIFIED Take 2 Tablets by mouth every day. BARIATRIC FUSION TROPICAL - COMPLETE MINERAL AND VITAMIN SUPPLEMENT    FOR PROCEDURE SCHEDULED 11/25/2024 - DO NOT TAKE 7 DAYS PRIOR TO SURGERY      Cyanocobalamin (B-12 PO) Take 1 Tablet by mouth every day. FOR PROCEDURE SCHEDULED 11/25/2024 - DO NOT TAKE 7 DAYS PRIOR TO SURGERY      BIOTIN PO Take 1 Tablet by mouth every day. [place]URE[place] BOUNTY - HAIR SKIN AND NAILS    FOR PROCEDURE SCHEDULED 11/25/2024 - DO NOT TAKE 7 DAYS PRIOR TO SURGERY      Glucosamine HCl (GLUCOSAMINE PO) Take 1 Tablet by mouth every day. [employer]    FOR PROCEDURE SCHEDULED 11/25/2024 - DO NOT TAKE 7 DAYS PRIOR TO SURGERY      NON SPECIFIED Apply 1 Application topically as needed (raynauds). NITROBID 2% ON HANDS FOR RAYNAUD'S      pilocarpine (SALAGEN) 5 MG Tab Take 1 " "Tablet by mouth 3 times a day. FOR PROCEDURE SCHEDULED 11/25/2024 - CONTINUE AS PRESCRIBED      aspirin 81 MG EC tablet Take 1 Tablet by mouth every 48 hours. (Patient taking differently: Take 81 mg by mouth every 48 hours. FOR PROCEDURE SCHEDULED 11/25/2024 - FOLLOW DOCTOR INSTRUCTIONS)      atorvastatin (LIPITOR) 40 MG Tab Take 1 Tablet by mouth every evening. (Patient taking differently: Take 40 mg by mouth every evening. FOR PROCEDURE SCHEDULED 11/25/2024 - CONTINUE AS PRESCRIBED) 90 Tablet 1    omeprazole (PRILOSEC) 40 MG delayed-release capsule Take 1 Capsule by mouth 2 times a day. (Patient taking differently: Take 40 mg by mouth 2 times a day. FOR PROCEDURE SCHEDULED 11/25/2024 - CONTINUE AS PRESCRIBED) 90 Capsule 0    albuterol 108 (90 Base) MCG/ACT Aero Soln inhalation aerosol Inhale 2 Puffs every four hours as needed for Shortness of Breath. (Patient taking differently: Inhale 2 Puffs every four hours as needed for Shortness of Breath. FOR PROCEDURE SCHEDULED 11/25/2024 - CONTINUE AS PRESCRIBED) 1 Each 0    hydroxychloroquine (PLAQUENIL) 200 MG Tab Take 200 mg by mouth 2 times a day. FOR PROCEDURE SCHEDULED 11/25/2024 - FOLLOW DOCTOR INSTRUCTIONS      guaiFENesin (ROBITUSSIN) 100 MG/5ML liquid Take 10 mL by mouth every four hours as needed for Cough. (Patient not taking: Reported on 4/7/2025) 840 mL 0     No current facility-administered medications for this visit.     PHYSICAL EXAM:  /62 (BP Location: Right arm, Patient Position: Sitting, BP Cuff Size: Adult)   Pulse 74   Temp 36.7 °C (98.1 °F) (Temporal)   Ht 1.651 m (5' 5\")   Wt 73 kg (160 lb 15 oz)   SpO2 95%   BMI 26.78 kg/m²   Body mass index is 26.78 kg/m².    General: healthy, alert, oriented, no distress, relaxed, cooperative  Abdomen: soft, no TTP, no distention, no palpable bulges or masses  Incisions: healed well, no evidence of infection or bulges  Drains/Tubes: none  Extremities: warm and well-perfused, without clubbing, cyanosis " or edema bilaterally  Skin: no rashes, no ecchymoses, no petechiae, no jaundice, no open wounds  Psych: good judgement, mood and affect are appropriate, excellent hygiene    ASSESSMENT:  Ms. Vu Miguel returns to our clinic today for a 1.5 year postoperative visit after undergoing her robotic Jayna-en-Y gastric bypass with hiatal hernia repair in 12/2023 for history of morbid obesity and GERD. She has generally done well after surgery and has voiced approval of this procedure and its outcome thus far. We discussed the expected postoperative course and potential long-term complications of bariatric surgery (such as vitamin/nutrient deficiencies, heartburn/reflux, and inadequate weight loss/weight regain).     Her symptoms of dysphagia with regurgitation are most likely due to her recently diagnosed GJ anastomotic stricture. Initial dilation in 10/2024 helped alleviate these symptoms partially, but it's likely that she may need another one (or more) dilation. During today's appointment, patient has voiced her desire to continue managing her symptoms conservatively, with dietary modifications.    PLAN:  1.  The following issues were addressed during this visit:  A. Continue daily PPI for at least another month and continue daily Bariatric vitamins/supplements indefinitely  B. Continue to follow Post-Bariatric Surgery diet  C. NO restrictions on lifting, strenuous activity, or physical exercise  D. Recommend 150 minutes of exercise per week - importance of resistance training was emphasized    2.  Postoperative labs will be ordered yearly:    CBC  CMP (including Liver panel)  Iron panel (including Ferritin/Transferrin)  Lipid panel (fasting)  Pre-albumin  Vit B1, B6, B9 (Folate), B12  Vit D,25  iPTH  DHEA  HgA1C (if diabetic only)  TSH, T4 (if history of thyroid disease)  Vit A, Vit E, Copper, Zinc (only if having difficulties with absorption)    3.  Will refer to our Bariatric RD and to our Pharmacotherapy service to  assist with additional weight loss in the setting of recent bariatric surgery.    4.  Ms. Vu Miguel has been instructed to return to our clinic in 6 months for a repeat evaluation.    I advised the patient to call or return to our office ASAP if new symptoms or concerns arise in the near future.    Thank you for giving us the opportunity to care for your patient.      Sincerely,    Olivier Oliveros MD MPH FACS [facility]  Bariatric and Gastroesophageal Surgery  Department of Surgery, [facility]  Clinical Assistant Professor of Surgery  [facility]    4/15/2025

## 2025-04-16 ENCOUNTER — OFFICE VISIT (OUTPATIENT)
Dept: INTERNAL MEDICINE | Facility: OTHER | Age: 45
End: 2025-04-16
Payer: MEDICAID

## 2025-04-16 VITALS
TEMPERATURE: 98.2 F | DIASTOLIC BLOOD PRESSURE: 60 MMHG | HEART RATE: 81 BPM | WEIGHT: 162.4 LBS | SYSTOLIC BLOOD PRESSURE: 99 MMHG | BODY MASS INDEX: 27.06 KG/M2 | HEIGHT: 65 IN | OXYGEN SATURATION: 98 %

## 2025-04-16 DIAGNOSIS — N95.1 PERIMENOPAUSE: ICD-10-CM

## 2025-04-16 DIAGNOSIS — M34.1 CREST SYNDROME (HCC): ICD-10-CM

## 2025-04-16 PROCEDURE — 3078F DIAST BP <80 MM HG: CPT | Mod: GC

## 2025-04-16 PROCEDURE — 99213 OFFICE O/P EST LOW 20 MIN: CPT | Mod: GE

## 2025-04-16 PROCEDURE — 1126F AMNT PAIN NOTED NONE PRSNT: CPT | Mod: GC

## 2025-04-16 PROCEDURE — 3074F SYST BP LT 130 MM HG: CPT | Mod: GC

## 2025-04-16 ASSESSMENT — ENCOUNTER SYMPTOMS
EYES NEGATIVE: 1
NEUROLOGICAL NEGATIVE: 1
GASTROINTESTINAL NEGATIVE: 1
RESPIRATORY NEGATIVE: 1
MUSCULOSKELETAL NEGATIVE: 1
PSYCHIATRIC NEGATIVE: 1
CONSTITUTIONAL NEGATIVE: 1
CARDIOVASCULAR NEGATIVE: 1

## 2025-04-16 ASSESSMENT — PAIN SCALES - GENERAL: PAINLEVEL_OUTOF10: NO PAIN

## 2025-04-16 ASSESSMENT — FIBROSIS 4 INDEX: FIB4 SCORE: .938083151964685911

## 2025-04-16 NOTE — PROGRESS NOTES
Teaching Physician Attestation      Level of Participation    I discussed with the resident physician the patient's history, exam, assessment and plan in detail.  Topics listed in my addendum were the focus of the visit.  Healthcare maintenance was not addressed this visit unless listed as a topic in my addendum.  I agree with plan as written along with the following additions/modifications:    ? Perimenopausal symptoms  -resolved, no complaints.  Encouraged GYN follow-up, appreciate support    Referral to rheumatology issued for crest syndrome given her prior one retired, appreciate support    On review, appears hyperlipidemia is managed by vascular and GERD/PPIs managed by bariatrics, appreciate subspecialty support    Appreciate subspeciatly support.    Return to clinic 3 months for annual exam.  PCR

## 2025-04-16 NOTE — PROGRESS NOTES
ESTABLISHED PATIENT VISIT    PATIENT ID:  Name: Christine Heaton  YOB: 1980  Patient Care Team:  Ana Paula Grant M.D. as PCP - General (Internal Medicine)  isleep (DME Supplier)    CHIEF COMPLAINT(s):  Requesting Labs, Hyperlipidemia, and Follow-Up    Follow up for The encounter diagnosis was CREST syndrome (HCC).    History of Present Illness:    This is a pleasant 44 y.o. female clinic patient established with me as PCP who presents today for regular follow-up visit.  She has a past medical history ofcrest syndrome, dyslipidemia, GERD, FRANK on CPAP, Raynaud's, status post gastric sleeve, status post bilateral salpingectomy, chronic polyarthritis, pulmonary hypertension, chronic endometriosis.  She is following with sleep medicine, cardiology, vascular medicine, gynecology, rheumatology regarding her comorbidities.     Other appointments since last visit with me:  02/07: Evaluated by sleep medicine who reviewed CPAP compliance and reordered supplies to continue CPAP.  Started trazodone for insomnia    3/27: Evaluated by vascular medicine    3/20: Laboratory rheumatology for management of syndrome    4/7: Went for for yearly postoperative evaluation with bariatric surgery    4/9: had doppler u/s of BL hands which showed: Bilateral wrist-brachial indices are normal.    The patient reported having completed previous lab work to check for/perimenopause menopause, with results showing normal LH and FSH levels.     She has no complaints this visit and is requesting referral to new rheumatologist for management of her crest syndrome, given that her last rheumatologist has now closed this practice.    Patient Active Problem List    Diagnosis Date Noted    Immunosuppressed status (HCC) 03/28/2025    Long term (current) use of antithrombotics/antiplatelets 03/28/2025    Pulmonary arterial hypertension (HCC) 03/05/2025    Raynaud's phenomenon without gangrene 03/05/2025    Metrorrhagia  "2024    Hypercholesterolemia 2024    Osteoarthrosis 2024    Gastroesophageal reflux disease 2024    Status post sleeve gastrectomy 2024    Benign hypertension 2024    Asthma 2023    Sleep apnea 2023    GERD (gastroesophageal reflux disease) 2023    Hx of gastric bypass 2023    H/O gastric sleeve 2023    Pelvic pain 2023    Dysmenorrhea 2023    Dyspareunia in female 2023    HTN (hypertension) 03/10/2023    Elevated lipoprotein(a) 03/10/2023    Degenerative joint disease involving multiple joints 2023    Family history of premature coronary heart disease 2022    Dyslipidemia 2022    Prediabetes 2022    CREST syndrome (HCC) 2022    Obesity (BMI 30-39.9) 2022    Postprocedural pseudomeningocele 2014    Displacement of lumbar intervertebral disc without myelopathy 2014    Cataract 2014     Review of Systems   Constitutional: Negative.    HENT: Negative.     Eyes: Negative.    Respiratory: Negative.     Cardiovascular: Negative.    Gastrointestinal: Negative.    Genitourinary: Negative.    Musculoskeletal: Negative.    Skin: Negative.    Neurological: Negative.    Endo/Heme/Allergies: Negative.    Psychiatric/Behavioral: Negative.       Past Medical History:   Diagnosis Date    Acid reflux     GEOVANY positive 2022    Anesthesia     Family Hx-father ; was always told it was anesthesia \"related\"; not sure what the actual reaction was; pt-PONV    Arthritis 2023    osteoarthritis    Asthma     inhaler prn    Breath shortness 2023    on exertion    CREST syndrome (HCC)     Cyclic citrullinated peptide (CCP) antibody positive 2022    Daytime sleepiness     Degenerative joint disease involving multiple joints 2023    Dyslipidemia 2022    Dyspareunia, female 2023    Enlargement - tonsil/adenoid 2013    Fatty liver     Heart burn     medicated    " "Hiatus hernia syndrome 07/06/2023    High cholesterol 03/20/2023    on medication    Hypertension 03/20/2023    on medication, 11/12/2024 pt states that she has been on this same dose of medication even when she weighed more but was told to stay on same dose d/t Raynaud's    Indigestion     Insomnia     Intractable back pain 12/26/2017    Left knee pain 02/07/2023    Morning headache     Obesity due to excess calories with serious comorbidity 02/07/2023    Oligohydramnios antepartum-IOL 4/24 9 AM 04/15/2016    IMO load March 2020    Osteoporosis     Pain 08/22/2014    lower back/L leg=7/10    Pain 03/20/2023    Chronic pain \"all over my body\" rates pain 7 out of 10    Patellofemoral instability of left knee with pain 02/07/2023    Pelvic pain 03/27/2023    PFO (patent foramen ovale) 2024    pt sees [facility] cardiologist (Dr. Coronado)    Right ventricular enlargement     Sciatica of right side 12/26/2017    Sleep apnea 03/20/2023    uses cpap    Snoring     sleep study done    Tachycardia, unspecified     Unspecified urinary incontinence 03/20/2023    stress incontinence     Past Surgical History:   Procedure Laterality Date    TX LAP,DIAGNOSTIC ABDOMEN N/A 11/25/2024    Procedure: DIAGNOSTIC AND OPERATIVE LAPAROSCOPY, HYSTEROSCOPY, DILATION AND CURETTAGE, REMOVAL OF ENDOMETRIAL POLYP, POSSIBLE LYSIS OF ADHESIONS, AND ALSO ANY AND ALL OTHER INDICATED PROCEDURES;  Surgeon: Nolan Rodriguez M.D.;  Location: SURGERY SAME DAY [facility];  Service: Gynecology    TX HYSTEROSCOPY,DX,SEP PROC N/A 11/25/2024    Procedure: HYSTEROSCOPY, DIAGNOSTIC;  Surgeon: Nolan Rodriguez M.D.;  Location: SURGERY SAME DAY [facility];  Service: Gynecology    DILATION AND CURETTAGE N/A 11/25/2024    Procedure: DILATION AND CURETTAGE;  Surgeon: Nolan Rodriguez M.D.;  Location: SURGERY SAME DAY [facility];  Service: Gynecology    TX UPPER GI ENDOSCOPY,DIAGNOSIS N/A 10/4/2024    Procedure: ESOPHAGOGASTRODUODENOSCOPY (EGD) WITH BRUSHINGS AND " DILATATION;  Surgeon: Uriel Evans M.D.;  Location: SURGERY [facility];  Service: General    KY LAP NILS RESTRICT PROC LONGITUDINAL GAS*  12/26/2023    Procedure: ROBOTIC SONNY EN Y GASTROENTEROSTOMY;  Surgeon: Olivier Oliveros M.D.;  Location: SURGERY [facility];  Service: Gen Robotic    ROBOTIC ASSISTED LAP HIATAL HERNIA REP  12/26/2023    Procedure: REPAIR, HERNIA, HIATAL, ROBOT-ASSISTED, LAPAROSCOPIC;  Surgeon: Olivier Oliveros M.D.;  Location: SURGERY [facility];  Service: Gen Robotic    KY LAP NILS RESTRICT PROC LONGITUDINAL GAS*  7/14/2023    Procedure: ROBOTIC PARTIAL SLEEVE GASTRECTOMY;  Surgeon: Olivier Oliveros M.D.;  Location: SURGERY [facility];  Service: Gen Robotic    ROBOTIC ASSISTED LAP HIATAL HERNIA REP  7/14/2023    Procedure: REPAIR, HERNIA, HIATAL, ROBOT-ASSISTED, LAPAROSCOPIC;  Surgeon: Olivier Oliveros M.D.;  Location: [facility];  Service: Gen Robotic    KY LAP,DIAGNOSTIC ABDOMEN N/A 3/27/2023    Procedure: LAPAROSCOPY, DIAGNOSTIC AND OPERATIVE AND LAPAROSCOPIC BILATERAL SALPINGECTOMY;  Surgeon: Nolan Rodriguez M.D.;  Location: SURGERY SAME DAY [facility];  Service: Gynecology    IRRIGATION & DEBRIDEMENT ORTHO  9/7/2014    Performed by El Gomez M.D. at [facility] ORS    IRRIGATION & DEBRIDEMENT NEURO  9/5/2014    Performed by El Gomez M.D. at [facility] ORS    LUMBAR LAMINECTOMY DISKECTOMY  8/27/2014    Performed by El Gomez M.D. at [facility] ORS    INJ,EPIDURAL/LUMB/SAC SINGLE  6/5/2014    Performed by Alexis Cornell M.D. at [facility] ORS    TONSILLECTOMY  5/14/2013    Performed by Aaron Hutton M.D. at SURGERY SAME DAY [facility] ORS    NASAL SEPTAL RECONSTRUCTION  5/14/2013    Performed by Aaron Hutton M.D. at SURGERY SAME DAY [facility] ORS    TURBINOPLASTY  5/14/2013    Performed by Aaron Hutton M.D. at SURGERY SAME DAY [facility] ORS     Family History   Problem Relation Age of  Onset    Rheumatologic Disease Mother         RA    Heart Attack Father          MI (age 45)    Kidney Disease Father         ESRD on HD    Diabetes Father     Heart Disease Sister      Latex, Duloxetine, Fluoxetine, Nifedipine, and Sildenafil  Social History     Tobacco Use    Smoking status: Never     Passive exposure: Never    Smokeless tobacco: Never   Vaping Use    Vaping status: Never Used   Substance Use Topics    Alcohol use: No    Drug use: No     Current Outpatient Medications   Medication Sig Dispense Refill    nitroglycerin (NITRO-BID) 2 % Ointment Place 0.5 Inches on the skin.      ambrisentan (LETAIRIS) 5 MG tablet Take 1 Tablet by mouth every day. 30 Tablet 5    traZODone (DESYREL) 50 MG Tab Take 1-2 Tablets by mouth every evening. 60 Tablet 2    guaiFENesin (ROBITUSSIN) 100 MG/5ML liquid Take 10 mL by mouth every four hours as needed for Cough. 840 mL 0    amLODIPine (NORVASC) 10 MG Tab TAKE 1 TABLET BY MOUTH ONCE DAILY 100 Tablet 3    losartan (COZAAR) 25 MG Tab Take 1 Tablet by mouth every day. 100 Tablet 3    zolpidem (AMBIEN CR) 6.25 MG CR tablet       VITAMIN E PO Take 1 Tablet by mouth every day. FOR PROCEDURE SCHEDULED 2024 - DO NOT TAKE 7 DAYS PRIOR TO SURGERY      Cholecalciferol (VITAMIN D-3 PO) Take 1 Tablet by mouth every day. FOR PROCEDURE SCHEDULED 2024 - DO NOT TAKE 7 DAYS PRIOR TO SURGERY      NON SPECIFIED Take 1 Tablet by mouth every day. BARIATRIC FUSION - HAIR SKIN AND NAILS    FOR PROCEDURE SCHEDULED 2024 - DO NOT TAKE 7 DAYS PRIOR TO SURGERY      NON SPECIFIED Take 2 Tablets by mouth every day. BARIATRIC FUSION TROPICAL - COMPLETE MINERAL AND VITAMIN SUPPLEMENT    FOR PROCEDURE SCHEDULED 2024 - DO NOT TAKE 7 DAYS PRIOR TO SURGERY      Cyanocobalamin (B-12 PO) Take 1 Tablet by mouth every day. FOR PROCEDURE SCHEDULED 2024 - DO NOT TAKE 7 DAYS PRIOR TO SURGERY      BIOTIN PO Take 1 Tablet by mouth every day. NATURE'S BOUNTY - HAIR SKIN AND  "NAILS    FOR PROCEDURE SCHEDULED 11/25/2024 - DO NOT TAKE 7 DAYS PRIOR TO SURGERY      Glucosamine HCl (GLUCOSAMINE PO) Take 1 Tablet by mouth every day. JO[facility]ALTH    FOR PROCEDURE SCHEDULED 11/25/2024 - DO NOT TAKE 7 DAYS PRIOR TO SURGERY      NON SPECIFIED Apply 1 Application topically as needed (raynauds). NITROBID 2% ON HANDS FOR RAYNAUD'S      pilocarpine (SALAGEN) 5 MG Tab Take 1 Tablet by mouth 3 times a day. FOR PROCEDURE SCHEDULED 11/25/2024 - CONTINUE AS PRESCRIBED      aspirin 81 MG EC tablet Take 1 Tablet by mouth every 48 hours. (Patient taking differently: Take 81 mg by mouth every 48 hours. FOR PROCEDURE SCHEDULED 11/25/2024 - FOLLOW DOCTOR INSTRUCTIONS)      atorvastatin (LIPITOR) 40 MG Tab Take 1 Tablet by mouth every evening. (Patient taking differently: Take 40 mg by mouth every evening. FOR PROCEDURE SCHEDULED 11/25/2024 - CONTINUE AS PRESCRIBED) 90 Tablet 1    omeprazole (PRILOSEC) 40 MG delayed-release capsule Take 1 Capsule by mouth 2 times a day. (Patient taking differently: Take 40 mg by mouth 2 times a day. FOR PROCEDURE SCHEDULED 11/25/2024 - CONTINUE AS PRESCRIBED) 90 Capsule 0    albuterol 108 (90 Base) MCG/ACT Aero Soln inhalation aerosol Inhale 2 Puffs every four hours as needed for Shortness of Breath. (Patient taking differently: Inhale 2 Puffs every four hours as needed for Shortness of Breath. FOR PROCEDURE SCHEDULED 11/25/2024 - CONTINUE AS PRESCRIBED) 1 Each 0    hydroxychloroquine (PLAQUENIL) 200 MG Tab Take 200 mg by mouth 2 times a day. FOR PROCEDURE SCHEDULED 11/25/2024 - FOLLOW DOCTOR INSTRUCTIONS       No current facility-administered medications for this visit.     OBJECTIVE:  BP 99/60 (BP Location: Right arm, Patient Position: Sitting, BP Cuff Size: Large adult)   Pulse 81   Temp 36.8 °C (98.2 °F) (Temporal)   Ht 1.651 m (5' 5\")   Wt 73.7 kg (162 lb 6.4 oz)   SpO2 98%   Breastfeeding No   BMI 27.02 kg/m²   Physical Exam  Constitutional:       Appearance: Normal " appearance. She is normal weight.   HENT:      Head: Normocephalic.      Right Ear: External ear normal.      Left Ear: External ear normal.      Nose: Nose normal.      Mouth/Throat:      Mouth: Mucous membranes are moist.   Cardiovascular:      Rate and Rhythm: Normal rate and regular rhythm.      Pulses: Normal pulses.      Heart sounds: Normal heart sounds.   Pulmonary:      Effort: Pulmonary effort is normal.      Breath sounds: Normal breath sounds.   Abdominal:      General: Abdomen is flat. Bowel sounds are normal.   Musculoskeletal:         General: Normal range of motion.   Skin:     General: Skin is warm.      Capillary Refill: Capillary refill takes less than 2 seconds.   Neurological:      General: No focal deficit present.      Mental Status: She is alert. Mental status is at baseline.   Psychiatric:         Thought Content: Thought content normal.         Judgment: Judgment normal.       ASSESSMENT AND PLAN:     1. CREST syndrome (HCC)  Requested referral to new rheumatologist for management of her syndrome.  - Referral to Rheumatology    2. Perimenopause  Previously experiencing hot flashes/chills/night sweats in around December of last year.  States that symptoms have resolved.  Reviewed FSH and LH levels that she got around to doing this month which were WNL.  - Encouraged follow-up with OB/GYN team, especially in the setting of symptoms recurring in the future.    Problem List Items Addressed This Visit       CREST syndrome (HCC)    Relevant Medications    nitroglycerin (NITRO-BID) 2 % Ointment    Other Relevant Orders    Referral to Rheumatology     Orders Placed This Encounter    Referral to Rheumatology    nitroglycerin (NITRO-BID) 2 % Ointment     Return in about 3 months (around 7/16/2025) for annual wellness visit.    Ana Paula Grant M.D. PGY-1  [facility] Internal [facility] Resident

## 2025-04-17 ENCOUNTER — TELEPHONE (OUTPATIENT)
Dept: HEALTH INFORMATION MANAGEMENT | Facility: OTHER | Age: 45
End: 2025-04-17
Payer: MEDICAID

## 2025-04-22 ENCOUNTER — TELEPHONE (OUTPATIENT)
Dept: VASCULAR LAB | Facility: MEDICAL CENTER | Age: 45
End: 2025-04-22
Payer: MEDICAID

## 2025-04-22 DIAGNOSIS — I27.20 PULMONARY HYPERTENSION (HCC): ICD-10-CM

## 2025-04-22 RX ORDER — AMBRISENTAN 5 MG/1
5 TABLET, FILM COATED ORAL DAILY
Qty: 30 TABLET | Refills: 5 | Status: CANCELLED | OUTPATIENT
Start: 2025-04-22

## 2025-04-22 NOTE — TELEPHONE ENCOUNTER
Patient called about  ambrisentan (LETAIRIS) 5 MG tablet and stated that insurance approved of it through [facility].    Patient has not started medication.  Will resend medication to [facility] to be filled.      Catrachita Martinez, Med Ass't  [facility]  Ph. [phone]  Fx. [phone]

## 2025-04-22 NOTE — TELEPHONE ENCOUNTER
Received request via: Patient    Patient has not received medication.  Insurance previously denied it. But it is approved at [facility].  Please resend.    Was the patient seen in the last year in this department? Yes    Does the patient have an active prescription (recently filled or refills available) for medication(s) requested? No    Pharmacy Name: [facility]     Does the patient have [facility] and need 100-day supply? (This applies to ALL medications) Patient does not have SCP    Jose M Nick Ass't  [facility]  Ph. [phone]  Fx. [phone]

## 2025-04-22 NOTE — TELEPHONE ENCOUNTER
Caller: Christine Miguel    Topic/issue: Patient calling requesting to speak with Dr. Acuña's MA to confirm where medication was sent.  Patient does not know the name of it but was told the prior authorization was approved and can not be filled at [facility].    Callback Number: [phone]    Thank you,  Veena CEDENO

## 2025-04-23 DIAGNOSIS — I27.20 PULMONARY HYPERTENSION (HCC): ICD-10-CM

## 2025-04-23 RX ORDER — AMBRISENTAN 5 MG/1
5 TABLET, FILM COATED ORAL DAILY
Qty: 90 TABLET | Refills: 1 | Status: SHIPPED | OUTPATIENT
Start: 2025-04-23

## 2025-04-24 NOTE — Clinical Note
REFERRAL APPROVAL NOTICE         Sent on April 24, 2025                   Christine Miguel  [address] [place] [address]                   Dear Ms. Vu Miguel,    After a careful review of the medical information and benefit coverage, [facility] has processed your referral. See below for additional details.    If applicable, you must be actively enrolled with your insurance for coverage of the authorized service. If you have any questions regarding your coverage, please contact your insurance directly.    REFERRAL INFORMATION   Referral #:  [id number]  Referred-To Department    Referred-By Provider:  Rheumatology    Ana Paula Grant M.D.   Rheumatology [facility]      [address] [facility] [address]  [phone] [address], [address]  [facility] [id number] [phone]    Referral Start Date:  04/16/2025  Referral End Date:   04/16/2026           SCHEDULING  If you do not already have an appointment, please call [phone] to make an appointment.   MORE INFORMATION  As a reminder, [facility] ownership has changed, meaning this location is now owned and operated by [facility]. As such, we want to clarify that our patients should expect to receive two separate bills for the services received at [facility] - one representing the [facility] facility fees as the owner of the establishment, and the other to represent the physician's services and subsequent fees. You can speak with your insurance carrier for a pricing estimate by calling the customer service number on the back of your card and ask about charges for a hospital outpatient visit.  If you do not already have a [employer] account, sign up at: [email or web address]  You can access your medical information, make appointments, see lab results, billing information, and more.  If you have questions regarding this referral, please contact  the [facility] Referrals department at:             [phone].  Monday - Friday 7:30AM - 5:00PM.      Sincerely,  [facility]

## 2025-05-16 ENCOUNTER — NON-PROVIDER VISIT (OUTPATIENT)
Dept: VASCULAR LAB | Facility: MEDICAL CENTER | Age: 45
End: 2025-05-16
Attending: INTERNAL MEDICINE
Payer: MEDICAID

## 2025-05-16 DIAGNOSIS — I10 HYPERTENSION, UNSPECIFIED TYPE: ICD-10-CM

## 2025-05-16 DIAGNOSIS — F51.01 PRIMARY INSOMNIA: ICD-10-CM

## 2025-05-16 DIAGNOSIS — E66.3 OVERWEIGHT (BMI 25.0-29.9): Primary | ICD-10-CM

## 2025-05-16 PROCEDURE — 99213 OFFICE O/P EST LOW 20 MIN: CPT

## 2025-05-16 RX ORDER — SEMAGLUTIDE 0.68 MG/ML
0.25 INJECTION, SOLUTION SUBCUTANEOUS
Qty: 3 ML | Refills: 1 | Status: SHIPPED | OUTPATIENT
Start: 2025-05-16

## 2025-05-16 RX ORDER — METHOTREXATE 2.5 MG/1
2.5 TABLET ORAL
COMMUNITY

## 2025-05-16 NOTE — PROGRESS NOTES
"Patient Consult Note    Primary care physician: Ana Paula Grant M.D.    Reason for consult: Obesity/Weight Management    Date Referral Placed: 4/7/2025    HPI:  Christine Miguel is a 44 y.o. old patient who comes in today for evaluation of above stated problem.    Initial Weight: 164 lbs  Initial BMI: 27 kg/m2      Most Recent HbA1c: No results found for: \"HBA1C\"     Most Recent TSH:   TSH   Date Value Ref Range Status   10/10/2012 2.020 0.300 - 3.700 uIU/mL Final     Comment:     The normal ranges for TSH have been updated effective  01/19/2012.  The narrower range will help with early  detection of mild thryoid disease.       Most Recent SrCr and GFR:  Lab Results   Component Value Date/Time    CREATININE 0.47 (L) 10/01/2024 03:40 PM    CREATININE 0.9 01/04/2008 02:42 AM      GFR (CKD-EPI)   Date Value Ref Range Status   10/01/2024 120 >60 mL/min/1.73 m 2 Final     Comment:     Estimated Glomerular Filtration Rate is calculated using  race neutral CKD-EPI 2021 equation per NKF-ASN recommendations.         Current Obesity Medication Regimen  Not currently on weight reduction medications     Previous Obesity Medication(s) and Reason for Discontinuation  Phentermine: Lost 20 lbs prior to surgeries     Lifestyle  Physical Activity:  Current Exercise - Requires knee and hip replacements, limited exercise  Exercise Goal - At least 150 min/week of anything aerobic.    Dietary:  Breakfast - Fruits, oatmeals  Lunch - Salads  Dinner - Protein with veggies  Snack - Limited   Beverage - Water, diet sodas     Patient reports she has previously had gastric bypass and gastric sleeve procedures. Eats very small portions through out the day.     Past Medical History:  Patient Active Problem List    Diagnosis Date Noted    Immunosuppressed status (HCC) 03/28/2025    Long term (current) use of antithrombotics/antiplatelets 03/28/2025    Pulmonary arterial hypertension (HCC) 03/05/2025    Raynaud's phenomenon without " gangrene 03/05/2025    Metrorrhagia 11/25/2024    Hypercholesterolemia 08/07/2024    Osteoarthrosis 08/07/2024    Gastroesophageal reflux disease 08/07/2024    Status post sleeve gastrectomy 08/07/2024    Benign hypertension 08/07/2024    Asthma 11/22/2023    Sleep apnea 11/22/2023    GERD (gastroesophageal reflux disease) 11/22/2023    Hx of gastric bypass 09/08/2023    H/O gastric sleeve 09/08/2023    Pelvic pain 03/27/2023    Dysmenorrhea 03/27/2023    Dyspareunia in female 03/27/2023    HTN (hypertension) 03/10/2023    Elevated lipoprotein(a) 03/10/2023    Degenerative joint disease involving multiple joints 02/07/2023    Family history of premature coronary heart disease 08/01/2022    Dyslipidemia 08/01/2022    Prediabetes 08/01/2022    CREST syndrome (HCC) 08/01/2022    Obesity (BMI 30-39.9) 04/28/2022    Postprocedural pseudomeningocele 09/02/2014    Displacement of lumbar intervertebral disc without myelopathy 08/27/2014    Cataract 06/05/2014       Past Surgical History:  Past Surgical History[1]    Allergies:  Latex, Duloxetine, Fluoxetine, Nifedipine, and Sildenafil    Social History:  Social History     Socioeconomic History    Marital status: Single     Spouse name: Not on file    Number of children: Not on file    Years of education: Not on file    Highest education level: Some college, no degree   Occupational History    Not on file   Tobacco Use    Smoking status: Never     Passive exposure: Never    Smokeless tobacco: Never   Vaping Use    Vaping status: Never Used   Substance and Sexual Activity    Alcohol use: No    Drug use: No    Sexual activity: Not on file   Other Topics Concern    Not on file   Social History Narrative    ** Merged History Encounter **          Social Drivers of Health     Financial Resource Strain: Low Risk  (4/27/2022)    Overall Financial Resource Strain (CARDIA)     Difficulty of Paying Living Expenses: Not hard at all   Food Insecurity: No Food Insecurity (4/27/2022)     Hunger Vital Sign     Worried About Running Out of Food in the Last Year: Never true     Ran Out of Food in the Last Year: Never true   Transportation Needs: No Transportation Needs (2022)    PRAPARE - Transportation     Lack of Transportation (Medical): No     Lack of Transportation (Non-Medical): No   Physical Activity: Insufficiently Active (2022)    Exercise Vital Sign     Days of Exercise per Week: 3 days     Minutes of Exercise per Session: 30 min   Stress: No Stress Concern Present (2022)    [employer] [place] [employer] - Occupational Stress Questionnaire     Feeling of Stress : Not at all   Social Connections: Moderately Integrated (2022)    Social Connection and Isolation Panel [NHANES]     Frequency of Communication with Friends and Family: More than three times a week     Frequency of Social Gatherings with Friends and Family: Once a week     Attends Mandaen Services: More than 4 times per year     Active Member of Clubs or Organizations: Yes     Attends Club or Organization Meetings: More than 4 times per year     Marital Status: Never    Intimate Partner Violence: Not on file   Housing Stability: Low Risk  (2022)    Housing Stability Vital Sign     Unable to Pay for Housing in the Last Year: No     Number of Places Lived in the Last Year: 1     Unstable Housing in the Last Year: No       Family History:  Family History   Problem Relation Age of Onset    Rheumatologic Disease Mother         RA    Heart Attack Father          MI (age 45)    Kidney Disease Father         ESRD on HD    Diabetes Father     Heart Disease Sister        Medications:  Current Medications[2]    Physical Examination:  Vital signs: There were no vitals taken for this visit.     Assessment and Plan:    1. Obesity/Weight Management  Patient reports that she previously weighed 270 lbs initially. She is scarred to gain the weight back. She previously had gastric bypass and gastric  sleeve procedures and had a tremendous weight loss associated with that. She eats healthy but physical activity is limited.   Patient currently tracks calories intake using [employer]. On average patient consumes approximately 1500 calories daily.    Patient reports that she has recently gained 10 pounds over the last 3 months without any changes to lifestyle or medications. Upon review of medications, none stand out for increased weight gain.   Physcial activity limited due to joint issues. Recommended chair exercises and walking as tolerated. Patient is interested in joining aquatics which she thinks would help with physical activity without worsening joint pain.     Will initiate GLP1 medication for patient, she meets criteria as her BMI is 27 and she has HTN and FRANK as weight related comorbidities.     - Medication changes:  Start ozempic 0.25 mg  For use as an adjunct to diet and exercise in patients with a BMI >=27 kg/m2 and >=1 weight-associated comorbidity (HTN, FRANK).       - Lifestyle changes:  Diet: Monitor caloric intake - aim for deficit. Maximize lean proteins and veggies. Cut out/down on carbs. Avoid simple sugars.   Referral to Nutrition Services placed: No   Exercise: Increase as tolerated. Goal of at least 150 min/week of anything aerobic.    Follow Up:  5 weeks     Carolina Bailey, OscarD    CC:   Ana Paula Grant M.D.            [1]   Past Surgical History:  Procedure Laterality Date    DE LAP,DIAGNOSTIC ABDOMEN N/A 11/25/2024    Procedure: DIAGNOSTIC AND OPERATIVE LAPAROSCOPY, HYSTEROSCOPY, DILATION AND CURETTAGE, REMOVAL OF ENDOMETRIAL POLYP, POSSIBLE LYSIS OF ADHESIONS, AND ALSO ANY AND ALL OTHER INDICATED PROCEDURES;  Surgeon: Nolan Rodriguez M.D.;  Location: SURGERY SAME DAY [facility];  Service: Gynecology    DE HYSTEROSCOPY,DX,SEP PROC N/A 11/25/2024    Procedure: HYSTEROSCOPY, DIAGNOSTIC;  Surgeon: Nolan Rodriguez M.D.;  Location: SURGERY SAME DAY [facility];  Service: Gynecology    DILATION  AND CURETTAGE N/A 11/25/2024    Procedure: DILATION AND CURETTAGE;  Surgeon: Nolan Rodriguez M.D.;  Location: SURGERY SAME DAY [facility];  Service: Gynecology    AZ UPPER GI ENDOSCOPY,DIAGNOSIS N/A 10/4/2024    Procedure: ESOPHAGOGASTRODUODENOSCOPY (EGD) WITH BRUSHINGS AND DILATATION;  Surgeon: Uriel Evans M.D.;  Location: SURGERY [facility];  Service: General    AZ LAP NILS RESTRICT PROC LONGITUDINAL GAS*  12/26/2023    Procedure: ROBOTIC SONNY EN Y GASTROENTEROSTOMY;  Surgeon: Olivier Oliveros M.D.;  Location: SURGERY [facility];  Service: Gen Robotic    ROBOTIC ASSISTED LAP HIATAL HERNIA REP  12/26/2023    Procedure: REPAIR, HERNIA, HIATAL, ROBOT-ASSISTED, LAPAROSCOPIC;  Surgeon: Olivier Oliveros M.D.;  Location: SURGERY [facility];  Service: Gen Robotic    AZ LAP NILS RESTRICT PROC LONGITUDINAL GAS*  7/14/2023    Procedure: ROBOTIC PARTIAL SLEEVE GASTRECTOMY;  Surgeon: Olivier Oliveros M.D.;  Location: [facility];  Service: Gen Robotic    ROBOTIC ASSISTED LAP HIATAL HERNIA REP  7/14/2023    Procedure: REPAIR, HERNIA, HIATAL, ROBOT-ASSISTED, LAPAROSCOPIC;  Surgeon: Olivier Oliveros M.D.;  Location: [facility];  Service: Gen Robotic    AZ LAP,DIAGNOSTIC ABDOMEN N/A 3/27/2023    Procedure: LAPAROSCOPY, DIAGNOSTIC AND OPERATIVE AND LAPAROSCOPIC BILATERAL SALPINGECTOMY;  Surgeon: Nolan Rodriguez M.D.;  Location: SURGERY SAME DAY [facility];  Service: Gynecology    IRRIGATION & DEBRIDEMENT ORTHO  9/7/2014    Performed by El Gomez M.D. at [facility] ORS    IRRIGATION & DEBRIDEMENT NEURO  9/5/2014    Performed by El Gomez M.D. at [facility] ORS    LUMBAR LAMINECTOMY DISKECTOMY  8/27/2014    Performed by El Gomez M.D. at [facility] ORS    INJ,EPIDURAL/LUMB/SAC SINGLE  6/5/2014    Performed by Alexis Cornell M.D. at [facility] ORS    TONSILLECTOMY  5/14/2013    Performed by Aaron Hutton M.D. at SURGERY  SAME DAY [facility] ORS    NASAL SEPTAL RECONSTRUCTION  5/14/2013    Performed by Aaron Hutton M.D. at SURGERY SAME DAY [facility] ORS    TURBINOPLASTY  5/14/2013    Performed by Aaron Hutton M.D. at SURGERY SAME DAY [facility] ORS   [2]   Current Outpatient Medications:     methotrexate 2.5 MG tablet, Take 2.5 mg by mouth every 7 days., Disp: , Rfl:     Semaglutide,0.25 or 0.5MG/DOS, (OZEMPIC, 0.25 OR 0.5 MG/DOSE,) 2 MG/3ML Solution Pen-injector, Inject 0.25 mg under the skin every 7 days., Disp: 3 mL, Rfl: 1    ambrisentan (LETAIRIS) 5 MG tablet, Take 1 Tablet by mouth every day., Disp: 90 Tablet, Rfl: 1    nitroglycerin (NITRO-BID) 2 % Ointment, Place 0.5 Inches on the skin., Disp: , Rfl:     traZODone (DESYREL) 50 MG Tab, Take 1-2 Tablets by mouth every evening. (Patient taking differently: Take  mg by mouth at bedtime as needed for Sleep.), Disp: 60 Tablet, Rfl: 2    amLODIPine (NORVASC) 10 MG Tab, TAKE 1 TABLET BY MOUTH ONCE DAILY, Disp: 100 Tablet, Rfl: 3    losartan (COZAAR) 25 MG Tab, Take 1 Tablet by mouth every day., Disp: 100 Tablet, Rfl: 3    VITAMIN E PO, Take 1 Tablet by mouth every day. FOR PROCEDURE SCHEDULED 11/25/2024 - DO NOT TAKE 7 DAYS PRIOR TO SURGERY, Disp: , Rfl:     Cholecalciferol (VITAMIN D-3 PO), Take 1 Tablet by mouth every day. FOR PROCEDURE SCHEDULED 11/25/2024 - DO NOT TAKE 7 DAYS PRIOR TO SURGERY, Disp: , Rfl:     NON SPECIFIED, Take 1 Tablet by mouth every day. BARIATRIC FUSION - HAIR SKIN AND NAILS  FOR PROCEDURE SCHEDULED 11/25/2024 - DO NOT TAKE 7 DAYS PRIOR TO SURGERY, Disp: , Rfl:     Cyanocobalamin (B-12 PO), Take 1 Tablet by mouth every day. FOR PROCEDURE SCHEDULED 11/25/2024 - DO NOT TAKE 7 DAYS PRIOR TO SURGERY, Disp: , Rfl:     BIOTIN PO, Take 1 Tablet by mouth every day. [place] - HAIR SKIN AND NAILS  FOR PROCEDURE SCHEDULED 11/25/2024 - DO NOT TAKE 7 DAYS PRIOR TO SURGERY, Disp: , Rfl:     Glucosamine HCl (GLUCOSAMINE PO), Take 1 Tablet by mouth every day.  [facility]  FOR PROCEDURE SCHEDULED 11/25/2024 - DO NOT TAKE 7 DAYS PRIOR TO SURGERY, Disp: , Rfl:     NON SPECIFIED, Apply 1 Application topically as needed (raynauds). NITROBID 2% ON HANDS FOR RAYNAUD'S, Disp: , Rfl:     pilocarpine (SALAGEN) 5 MG Tab, Take 1 Tablet by mouth 3 times a day. FOR PROCEDURE SCHEDULED 11/25/2024 - CONTINUE AS PRESCRIBED, Disp: , Rfl:     aspirin 81 MG EC tablet, Take 1 Tablet by mouth every 48 hours., Disp: , Rfl:     atorvastatin (LIPITOR) 40 MG Tab, Take 1 Tablet by mouth every evening., Disp: 90 Tablet, Rfl: 1    omeprazole (PRILOSEC) 40 MG delayed-release capsule, Take 1 Capsule by mouth 2 times a day., Disp: 90 Capsule, Rfl: 0    albuterol 108 (90 Base) MCG/ACT Aero Soln inhalation aerosol, Inhale 2 Puffs every four hours as needed for Shortness of Breath., Disp: 1 Each, Rfl: 0    hydroxychloroquine (PLAQUENIL) 200 MG Tab, Take 200 mg by mouth 2 times a day. FOR PROCEDURE SCHEDULED 11/25/2024 - FOLLOW DOCTOR INSTRUCTIONS, Disp: , Rfl:     guaiFENesin (ROBITUSSIN) 100 MG/5ML liquid, Take 10 mL by mouth every four hours as needed for Cough. (Patient not taking: Reported on 5/16/2025), Disp: 840 mL, Rfl: 0    zolpidem (AMBIEN CR) 6.25 MG CR tablet, , Disp: , Rfl:     NON SPECIFIED, Take 2 Tablets by mouth every day. BARIATRIC FUSION TROPICAL - COMPLETE MINERAL AND VITAMIN SUPPLEMENT  FOR PROCEDURE SCHEDULED 11/25/2024 - DO NOT TAKE 7 DAYS PRIOR TO SURGERY, Disp: , Rfl:

## 2025-05-23 ENCOUNTER — TELEPHONE (OUTPATIENT)
Dept: VASCULAR LAB | Facility: MEDICAL CENTER | Age: 45
End: 2025-05-23
Payer: MEDICAID

## 2025-05-23 NOTE — TELEPHONE ENCOUNTER
Caller: Christine Miguel     Topic/issue: Pt called in regards to being prescribed   Semaglutide,0.25 or 0.5MG/DOS, (OZEMPIC, 0.25 OR 0.5 MG/DOSE,) 2 MG/3ML  the medication has yet to be released to the pharmacy, please advise.    Callback Number: [phone]    Thank you,    Obey MERAZ

## 2025-05-23 NOTE — TELEPHONE ENCOUNTER
Returned patient call to let her know that we are still working on insurance authorization for her Ozempic. Patient to check in next Friday if she has not heard from us.   Nicole Gong, PharmD

## 2025-05-29 PROCEDURE — RXMED WILLOW AMBULATORY MEDICATION CHARGE

## 2025-05-29 NOTE — TELEPHONE ENCOUNTER
PA is not initiated as pt's plan is excluding weight loss for medical diagnosis. At this time, pt can be offered for alternative option  (specialty pricing through [facility]).     Called and spoke to pt today in regards to the status update of her Ozempic. Per pt, she was waiting for the status, as the script was already been placed since almost 2 weeks ago. Explained to pt that her insurance excludes this medication for weight loss use, which pt verbally understood. Went over to discuss other price alternatives, and she is okay to use our [facility] specialty pricing and the cost is affordable for her.     Pt decided to use our [place] location due to convenience and her time  availability.     Notified the pharmacy for the status.   Updated and notified provider and clinic staff for the update.     RAMONITA Colin, PhT  Vascular Pharmacy Liaison (Rx Coordinator)  P: [phone]  5/29/2025 2:45 PM

## 2025-05-29 NOTE — TELEPHONE ENCOUNTER
Received New Start request via [employer]  for Semaglutide (OZEMPIC, 0.25 OR 0.5 MG/DOSE,) 2 MG/3ML Solution Pen-injector. (Quantity:3 mls, Day Supply:56)     Insurance: [employer] [place]   Member ID:  [id number]  BIN: [id number]  PCN: [id number]  Group: [id number]     Ran Test claim via [place] & medication Rejects stating prior authorization is required.    RAMONITA Colin, PhT  Vascular Pharmacy Liaison (Rx Coordinator)  P: [phone]  5/29/2025 2:36 PM

## 2025-05-29 NOTE — TELEPHONE ENCOUNTER
Caller: Christine Miguel    Topic/issue: Patient called to get an update on the medication Semaglutide,0.25 or 0.5MG/DOS, (OZEMPIC, 0.25 OR 0.5 MG/DOSE,) 2 MG/3ML Solution Pen-injector.     Please advise.     Callback Number: [phone]    Thank you,     Brooke CASIANO

## 2025-06-01 ENCOUNTER — PHARMACY VISIT (OUTPATIENT)
Dept: PHARMACY | Facility: MEDICAL CENTER | Age: 45
End: 2025-06-01
Payer: COMMERCIAL

## 2025-06-01 DIAGNOSIS — F51.01 PRIMARY INSOMNIA: ICD-10-CM

## 2025-06-02 ENCOUNTER — OFFICE VISIT (OUTPATIENT)
Dept: SLEEP MEDICINE | Facility: MEDICAL CENTER | Age: 45
End: 2025-06-02
Attending: STUDENT IN AN ORGANIZED HEALTH CARE EDUCATION/TRAINING PROGRAM
Payer: MEDICAID

## 2025-06-02 VITALS
BODY MASS INDEX: 25.95 KG/M2 | HEIGHT: 64 IN | RESPIRATION RATE: 16 BRPM | HEART RATE: 80 BPM | SYSTOLIC BLOOD PRESSURE: 104 MMHG | OXYGEN SATURATION: 97 % | WEIGHT: 152 LBS | DIASTOLIC BLOOD PRESSURE: 62 MMHG

## 2025-06-02 DIAGNOSIS — G47.33 OSA (OBSTRUCTIVE SLEEP APNEA): Primary | ICD-10-CM

## 2025-06-02 PROCEDURE — 99213 OFFICE O/P EST LOW 20 MIN: CPT | Performed by: STUDENT IN AN ORGANIZED HEALTH CARE EDUCATION/TRAINING PROGRAM

## 2025-06-02 PROCEDURE — 3078F DIAST BP <80 MM HG: CPT | Performed by: STUDENT IN AN ORGANIZED HEALTH CARE EDUCATION/TRAINING PROGRAM

## 2025-06-02 PROCEDURE — 99214 OFFICE O/P EST MOD 30 MIN: CPT | Performed by: STUDENT IN AN ORGANIZED HEALTH CARE EDUCATION/TRAINING PROGRAM

## 2025-06-02 PROCEDURE — 3074F SYST BP LT 130 MM HG: CPT | Performed by: STUDENT IN AN ORGANIZED HEALTH CARE EDUCATION/TRAINING PROGRAM

## 2025-06-02 ASSESSMENT — FIBROSIS 4 INDEX: FIB4 SCORE: .938083151964685911

## 2025-06-02 NOTE — PROGRESS NOTES
[facility] Follow-up Visit    CC: Follow-up regarding management of obstructive sleep apnea      HPI:  Christine Ita Miguel is a 44 y.o.female  with Syndrome, hypertension, GERD, insomnia, and obstructive sleep apnea on CPAP presents today to follow-up regarding management of obstructive sleep apnea.    She continues to use her CPAP machine nightly.  Since her last visit she does feel that the pressure decreases helped slightly.  She still has some bloating but is not bothersome.  She continues to have dry mouth.  She has not adjusted the humidity level to a significant degree.  She has been having difficulty with the increase in outside temperature and feeling hot on her PAP machine which is leading to her taking it off at times.    DME provider: [employer]   Device: Airsense 11  Mask: fullface   Aerophagia: Yes   Snoring: No   Dry mouth: Yes  Leak: No   Skin irritation: No   Chin strap: No     Patient Active Problem List    Diagnosis Date Noted    Immunosuppressed status (HCC) 03/28/2025    Long term (current) use of antithrombotics/antiplatelets 03/28/2025    Pulmonary arterial hypertension (HCC) 03/05/2025    Raynaud's phenomenon without gangrene 03/05/2025    Metrorrhagia 11/25/2024    Hypercholesterolemia 08/07/2024    Osteoarthrosis 08/07/2024    Gastroesophageal reflux disease 08/07/2024    Status post sleeve gastrectomy 08/07/2024    Benign hypertension 08/07/2024    Asthma 11/22/2023    Sleep apnea 11/22/2023    GERD (gastroesophageal reflux disease) 11/22/2023    Hx of gastric bypass 09/08/2023    H/O gastric sleeve 09/08/2023    Pelvic pain 03/27/2023    Dysmenorrhea 03/27/2023    Dyspareunia in female 03/27/2023    HTN (hypertension) 03/10/2023    Elevated lipoprotein(a) 03/10/2023    Degenerative joint disease involving multiple joints 02/07/2023    Family history of premature coronary heart disease 08/01/2022    Dyslipidemia 08/01/2022    Prediabetes 08/01/2022    CREST syndrome (HCC)  2022    Obesity (BMI 30-39.9) 2022    Postprocedural pseudomeningocele 2014    Displacement of lumbar intervertebral disc without myelopathy 2014    Cataract 2014       Past Medical History[1]     Past Surgical History[2]    Family History   Problem Relation Age of Onset    Rheumatologic Disease Mother         RA    Heart Attack Father          MI (age 45)    Kidney Disease Father         ESRD on HD    Diabetes Father     Heart Disease Sister        Social History     Socioeconomic History    Marital status: Single     Spouse name: Not on file    Number of children: Not on file    Years of education: Not on file    Highest education level: Some college, no degree   Occupational History    Not on file   Tobacco Use    Smoking status: Never     Passive exposure: Never    Smokeless tobacco: Never   Vaping Use    Vaping status: Never Used   Substance and Sexual Activity    Alcohol use: No    Drug use: No    Sexual activity: Not on file   Other Topics Concern    Not on file   Social History Narrative    ** Merged History Encounter **          Social Drivers of Health     Financial Resource Strain: Low Risk  (2022)    Overall Financial Resource Strain (CARDIA)     Difficulty of Paying Living Expenses: Not hard at all   Food Insecurity: No Food Insecurity (2022)    Hunger Vital Sign     Worried About Running Out of Food in the Last Year: Never true     Ran Out of Food in the Last Year: Never true   Transportation Needs: No Transportation Needs (2022)    PRAPARE - Transportation     Lack of Transportation (Medical): No     Lack of Transportation (Non-Medical): No   Physical Activity: Insufficiently Active (2022)    Exercise Vital Sign     Days of Exercise per Week: 3 days     Minutes of Exercise per Session: 30 min   Stress: No Stress Concern Present (2022)    [employer] [place] [employer] - Occupational Stress Questionnaire     Feeling of Stress : Not at  "all   Social Connections: Moderately Integrated (4/27/2022)    Social Connection and Isolation Panel [NHANES]     Frequency of Communication with Friends and Family: More than three times a week     Frequency of Social Gatherings with Friends and Family: Once a week     Attends Muslim Services: More than 4 times per year     Active Member of Clubs or Organizations: Yes     Attends Club or Organization Meetings: More than 4 times per year     Marital Status: Never    Intimate Partner Violence: Not on file   Housing Stability: Low Risk  (4/27/2022)    Housing Stability Vital Sign     Unable to Pay for Housing in the Last Year: No     Number of Places Lived in the Last Year: 1     Unstable Housing in the Last Year: No       Current Medications[3]     ALLERGIES: Latex, Duloxetine, Fluoxetine, Nifedipine, and Sildenafil    ROS  Constitutional: Denies fevers, Denies weight changes  Ears/Nose/Throat/Mouth: Denies nasal congestion or sore throat   Cardiovascular: Denies chest pain  Respiratory: Denies shortness of breath, Denies cough  Gastrointestinal/Hepatic: Denies nausea, vomiting  Sleep: see HPI      PHYSICAL EXAM  /62 (BP Location: Left arm, Patient Position: Sitting, BP Cuff Size: Adult)   Pulse 80   Resp 16   Ht 1.626 m (5' 4\")   Wt 68.9 kg (152 lb)   SpO2 97%   BMI 26.09 kg/m²   Appearance: Well-nourished, well-developed, no acute distress  Eyes:  No scleral icterus , EOMI  Musculoskeletal:  Grossly normal; gait and station normal; digits and nails normal  Skin:  No rashes, petechiae, cyanosis  Neurologic: without focal signs; oriented to person, time, place, and purpose; judgement intact      Medical Decision Making   Assessment and Plan  Wall Ita Miguel is a 44 y.o.female  with Syndrome, hypertension, GERD, insomnia, and obstructive sleep apnea on CPAP presents today to follow-up regarding management of obstructive sleep apnea.    The medical record was reviewed.    Obstructive " "sleep apnea  Compliance data reviewed showing 53% usage > 4hours in last 30  days. Average AHI 0.2 events/hour. Pt continues to use and benefit from machine.      Current Settings auto CPAP 5-9    Discussed humidity and temperature.  Advised him to keep a chair.  Given that she is feeling hot at night we will turn off tube temperature for the summer.  Advised to continue to adjust her humidity level.  She has tried Biotene and act mouthwash.  She has tried pilocarpine for dry mouth.  May consider XyliMelts.    PLAN:   -Order placed for mask and supplies   -Advised to reach out via MyChart with questions     Has been advised to continue the current CPAP, clean equipment frequently, and get new mask and supplies as allowed by insurance and DME. Recommend an earlier appointment, if significant treatment barriers develop.    Patients with FRANK are at increased risk of cardiovascular disease including coronary artery disease, systemic arterial hypertension, pulmonary arterial hypertension, cardiac arrythmias, and stroke.     Return in about 1 year (around 2026).      Please note portions of this record was created using voice recognition software. I have made every reasonable attempt to correct obvious errors, but I expect that there are errors of grammar and possibly content I did not discover before finalizing the note.           [1]   Past Medical History:  Diagnosis Date    Acid reflux     GEOVANY positive 2022    Anesthesia     Family Hx-father ; was always told it was anesthesia \"related\"; not sure what the actual reaction was; pt-PONV    Arthritis 2023    osteoarthritis    Asthma     inhaler prn    Breath shortness 2023    on exertion    CREST syndrome (HCC)     Cyclic citrullinated peptide (CCP) antibody positive 2022    Daytime sleepiness     Degenerative joint disease involving multiple joints 2023    Dyslipidemia 2022    Dyspareunia, female 2023    Enlargement - " "tonsil/adenoid 05/14/2013    Fatty liver     Heart burn     medicated    Hiatus hernia syndrome 07/06/2023    High cholesterol 03/20/2023    on medication    Hypertension 03/20/2023    on medication, 11/12/2024 pt states that she has been on this same dose of medication even when she weighed more but was told to stay on same dose d/t Raynaud's    Indigestion     Insomnia     Intractable back pain 12/26/2017    Left knee pain 02/07/2023    Morning headache     Obesity due to excess calories with serious comorbidity 02/07/2023    Oligohydramnios antepartum-IOL 4/24 9 AM 04/15/2016    IMO load March 2020    Osteoporosis     Pain 08/22/2014    lower back/L leg=7/10    Pain 03/20/2023    Chronic pain \"all over my body\" rates pain 7 out of 10    Patellofemoral instability of left knee with pain 02/07/2023    Pelvic pain 03/27/2023    PFO (patent foramen ovale) 2024    pt sees [facility] cardiologist (Dr. Coronado)    Right ventricular enlargement     Sciatica of right side 12/26/2017    Sleep apnea 03/20/2023    uses cpap    Snoring     sleep study done    Tachycardia, unspecified     Unspecified urinary incontinence 03/20/2023    stress incontinence   [2]   Past Surgical History:  Procedure Laterality Date    MO LAP,DIAGNOSTIC ABDOMEN N/A 11/25/2024    Procedure: DIAGNOSTIC AND OPERATIVE LAPAROSCOPY, HYSTEROSCOPY, DILATION AND CURETTAGE, REMOVAL OF ENDOMETRIAL POLYP, POSSIBLE LYSIS OF ADHESIONS, AND ALSO ANY AND ALL OTHER INDICATED PROCEDURES;  Surgeon: Nolan Rodriguez M.D.;  Location: SURGERY SAME DAY [facility];  Service: Gynecology    MO HYSTEROSCOPY,DX,SEP PROC N/A 11/25/2024    Procedure: HYSTEROSCOPY, DIAGNOSTIC;  Surgeon: Nolan Rodriguez M.D.;  Location: SURGERY SAME DAY [facility];  Service: Gynecology    DILATION AND CURETTAGE N/A 11/25/2024    Procedure: DILATION AND CURETTAGE;  Surgeon: Nolan Rodriguez M.D.;  Location: SURGERY SAME DAY [facility];  Service: Gynecology    MO UPPER GI ENDOSCOPY,DIAGNOSIS N/A " 10/4/2024    Procedure: ESOPHAGOGASTRODUODENOSCOPY (EGD) WITH BRUSHINGS AND DILATATION;  Surgeon: Uriel Evans M.D.;  Location: SURGERY [facility];  Service: General    TN LAP NILS RESTRICT PROC LONGITUDINAL GAS*  12/26/2023    Procedure: ROBOTIC SONNY EN Y GASTROENTEROSTOMY;  Surgeon: Olivier Oliveros M.D.;  Location: SURGERY [facility];  Service: Gen Robotic    ROBOTIC ASSISTED LAP HIATAL HERNIA REP  12/26/2023    Procedure: REPAIR, HERNIA, HIATAL, ROBOT-ASSISTED, LAPAROSCOPIC;  Surgeon: Olivier Oliveros M.D.;  Location: SURGERY [facility];  Service: Gen Robotic    TN LAP NILS RESTRICT PROC LONGITUDINAL GAS*  7/14/2023    Procedure: ROBOTIC PARTIAL SLEEVE GASTRECTOMY;  Surgeon: Olivier Oliveros M.D.;  Location: [facility];  Service: Gen Robotic    ROBOTIC ASSISTED LAP HIATAL HERNIA REP  7/14/2023    Procedure: REPAIR, HERNIA, HIATAL, ROBOT-ASSISTED, LAPAROSCOPIC;  Surgeon: Olivier Oliveros M.D.;  Location: [facility];  Service: Gen Robotic    TN LAP,DIAGNOSTIC ABDOMEN N/A 3/27/2023    Procedure: LAPAROSCOPY, DIAGNOSTIC AND OPERATIVE AND LAPAROSCOPIC BILATERAL SALPINGECTOMY;  Surgeon: Nolan Rodriguez M.D.;  Location: SURGERY SAME DAY [facility];  Service: Gynecology    IRRIGATION & DEBRIDEMENT ORTHO  9/7/2014    Performed by El Gomez M.D. at [facility] ORS    IRRIGATION & DEBRIDEMENT NEURO  9/5/2014    Performed by El Gomez M.D. at [facility] ORS    LUMBAR LAMINECTOMY DISKECTOMY  8/27/2014    Performed by El Gomez M.D. at [facility] ORS    INJ,EPIDURAL/LUMB/SAC SINGLE  6/5/2014    Performed by Alexis Cornell M.D. at [facility] ORS    TONSILLECTOMY  5/14/2013    Performed by Aaron Hutton M.D. at SURGERY SAME DAY [facility] ORS    NASAL SEPTAL RECONSTRUCTION  5/14/2013    Performed by Aaron Hutton M.D. at SURGERY SAME DAY [facility] ORS    TURBINOPLASTY  5/14/2013    Performed by Aaron Hutton M.D.  at SURGERY SAME DAY [facility] ORS   [3]   Current Outpatient Medications   Medication Sig Dispense Refill    methotrexate 2.5 MG tablet Take 2.5 mg by mouth every 7 days.      Semaglutide,0.25 or 0.5MG/DOS, (OZEMPIC, 0.25 OR 0.5 MG/DOSE,) 2 MG/3ML Solution Pen-injector Inject 0.25 mg under the skin every 7 days. 3 mL 1    ambrisentan (LETAIRIS) 5 MG tablet Take 1 Tablet by mouth every day. 90 Tablet 1    nitroglycerin (NITRO-BID) 2 % Ointment Place 0.5 Inches on the skin.      traZODone (DESYREL) 50 MG Tab Take 1-2 Tablets by mouth every evening. (Patient taking differently: Take  mg by mouth at bedtime as needed for Sleep.) 60 Tablet 2    guaiFENesin (ROBITUSSIN) 100 MG/5ML liquid Take 10 mL by mouth every four hours as needed for Cough. 840 mL 0    amLODIPine (NORVASC) 10 MG Tab TAKE 1 TABLET BY MOUTH ONCE DAILY 100 Tablet 3    losartan (COZAAR) 25 MG Tab Take 1 Tablet by mouth every day. 100 Tablet 3    zolpidem (AMBIEN CR) 6.25 MG CR tablet       VITAMIN E PO Take 1 Tablet by mouth every day. FOR PROCEDURE SCHEDULED 11/25/2024 - DO NOT TAKE 7 DAYS PRIOR TO SURGERY      Cholecalciferol (VITAMIN D-3 PO) Take 1 Tablet by mouth every day. FOR PROCEDURE SCHEDULED 11/25/2024 - DO NOT TAKE 7 DAYS PRIOR TO SURGERY      NON SPECIFIED Take 1 Tablet by mouth every day. BARIATRIC FUSION - HAIR SKIN AND NAILS    FOR PROCEDURE SCHEDULED 11/25/2024 - DO NOT TAKE 7 DAYS PRIOR TO SURGERY      NON SPECIFIED Take 2 Tablets by mouth every day. BARIATRIC FUSION TROPICAL - COMPLETE MINERAL AND VITAMIN SUPPLEMENT    FOR PROCEDURE SCHEDULED 11/25/2024 - DO NOT TAKE 7 DAYS PRIOR TO SURGERY      Cyanocobalamin (B-12 PO) Take 1 Tablet by mouth every day. FOR PROCEDURE SCHEDULED 11/25/2024 - DO NOT TAKE 7 DAYS PRIOR TO SURGERY      BIOTIN PO Take 1 Tablet by mouth every day. [place] - HAIR SKIN AND NAILS    FOR PROCEDURE SCHEDULED 11/25/2024 - DO NOT TAKE 7 DAYS PRIOR TO SURGERY      Glucosamine HCl (GLUCOSAMINE PO) Take 1  Tablet by mouth every day. [facility]    FOR PROCEDURE SCHEDULED 11/25/2024 - DO NOT TAKE 7 DAYS PRIOR TO SURGERY      NON SPECIFIED Apply 1 Application topically as needed (raynauds). NITROBID 2% ON HANDS FOR RAYNAUD'S      pilocarpine (SALAGEN) 5 MG Tab Take 1 Tablet by mouth 3 times a day. FOR PROCEDURE SCHEDULED 11/25/2024 - CONTINUE AS PRESCRIBED      aspirin 81 MG EC tablet Take 1 Tablet by mouth every 48 hours.      atorvastatin (LIPITOR) 40 MG Tab Take 1 Tablet by mouth every evening. 90 Tablet 1    omeprazole (PRILOSEC) 40 MG delayed-release capsule Take 1 Capsule by mouth 2 times a day. 90 Capsule 0    albuterol 108 (90 Base) MCG/ACT Aero Soln inhalation aerosol Inhale 2 Puffs every four hours as needed for Shortness of Breath. 1 Each 0    hydroxychloroquine (PLAQUENIL) 200 MG Tab Take 200 mg by mouth 2 times a day. FOR PROCEDURE SCHEDULED 11/25/2024 - FOLLOW DOCTOR INSTRUCTIONS       No current facility-administered medications for this visit.

## 2025-06-16 RX ORDER — TRAZODONE HYDROCHLORIDE 50 MG/1
50-100 TABLET ORAL NIGHTLY PRN
Qty: 30 TABLET | Refills: 3 | Status: SHIPPED | OUTPATIENT
Start: 2025-06-16

## 2025-06-16 NOTE — TELEPHONE ENCOUNTER
Have we ever prescribed this med? Yes.  If yes, what date? 2/07/25    Last OV: 6/02/25 with Dr Stoner    Next OV: No Pending apptl .    DX: primarty insomnia  Medications:   Requested Prescriptions     Pending Prescriptions Disp Refills    traZODone (DESYREL) 50 MG Tab [Pharmacy Med Name: traZODone HCl 50 MG Oral Tablet] 60 Tablet 0     Sig: TAKE 1 TO 2 TABLETS BY MOUTH IN THE EVENING

## 2025-06-20 ENCOUNTER — NON-PROVIDER VISIT (OUTPATIENT)
Dept: VASCULAR LAB | Facility: MEDICAL CENTER | Age: 45
End: 2025-06-20
Attending: INTERNAL MEDICINE
Payer: MEDICAID

## 2025-06-20 VITALS — HEIGHT: 64 IN | BODY MASS INDEX: 26.98 KG/M2 | WEIGHT: 158 LBS

## 2025-06-20 DIAGNOSIS — E66.3 OVERWEIGHT (BMI 25.0-29.9): ICD-10-CM

## 2025-06-20 DIAGNOSIS — I10 HYPERTENSION, UNSPECIFIED TYPE: ICD-10-CM

## 2025-06-20 PROCEDURE — 99212 OFFICE O/P EST SF 10 MIN: CPT

## 2025-06-20 ASSESSMENT — FIBROSIS 4 INDEX: FIB4 SCORE: .938083151964685911

## 2025-06-20 NOTE — PROGRESS NOTES
"Patient Consult Note    Primary care physician: Ana Paula Grant M.D.    Reason for consult: Obesity/Weight Management    Date Referral Placed: 4/7/2025    HPI:  Christine Miguel is a 44 y.o. old patient who comes in today for evaluation of above stated problem.    Initial Weight: 164 lbs  Initial BMI: 27 kg/m2    Current Weight: 158 lb  Current BMI: 27 kg/m2    Most Recent HbA1c: No results found for: \"HBA1C\"     Most Recent TSH:   TSH   Date Value Ref Range Status   10/10/2012 2.020 0.300 - 3.700 uIU/mL Final     Comment:     The normal ranges for TSH have been updated effective  01/19/2012.  The narrower range will help with early  detection of mild thryoid disease.       Most Recent SrCr and GFR:  Lab Results   Component Value Date/Time    CREATININE 0.47 (L) 10/01/2024 03:40 PM    CREATININE 0.9 01/04/2008 02:42 AM      GFR (CKD-EPI)   Date Value Ref Range Status   10/01/2024 120 >60 mL/min/1.73 m 2 Final     Comment:     Estimated Glomerular Filtration Rate is calculated using  race neutral CKD-EPI 2021 equation per NKF-ASN recommendations.         Current Obesity Medication Regimen  Ozempic 0.25 mg weekly (Tuesday)     Previous Obesity Medication(s) and Reason for Discontinuation  Phentermine: Lost 20 lbs prior to surgeries     Lifestyle  Physical Activity:  Current Exercise - Requires knee and hip replacements, limited exercise  Exercise Goal - At least 150 min/week of anything aerobic.    Dietary:  Breakfast - Fruits, oatmeals  Lunch - Salads  Dinner - Protein with veggies  Snack - Limited   Beverage - Water, diet sodas     Patient reports she has previously had gastric bypass and gastric sleeve procedures. Eats very small portions through out the day.     Past Medical History:  Patient Active Problem List    Diagnosis Date Noted    Immunosuppressed status (HCC) 03/28/2025    Long term (current) use of antithrombotics/antiplatelets 03/28/2025    Pulmonary arterial hypertension (HCC) 03/05/2025 "    Raynaud's phenomenon without gangrene 03/05/2025    Metrorrhagia 11/25/2024    Hypercholesterolemia 08/07/2024    Osteoarthrosis 08/07/2024    Gastroesophageal reflux disease 08/07/2024    Status post sleeve gastrectomy 08/07/2024    Benign hypertension 08/07/2024    Asthma 11/22/2023    Sleep apnea 11/22/2023    GERD (gastroesophageal reflux disease) 11/22/2023    Hx of gastric bypass 09/08/2023    H/O gastric sleeve 09/08/2023    Pelvic pain 03/27/2023    Dysmenorrhea 03/27/2023    Dyspareunia in female 03/27/2023    HTN (hypertension) 03/10/2023    Elevated lipoprotein(a) 03/10/2023    Degenerative joint disease involving multiple joints 02/07/2023    Family history of premature coronary heart disease 08/01/2022    Dyslipidemia 08/01/2022    Prediabetes 08/01/2022    CREST syndrome (HCC) 08/01/2022    Obesity (BMI 30-39.9) 04/28/2022    Postprocedural pseudomeningocele 09/02/2014    Displacement of lumbar intervertebral disc without myelopathy 08/27/2014    Cataract 06/05/2014       Past Surgical History:  Past Surgical History[1]    Allergies:  Latex, Duloxetine, Fluoxetine, Nifedipine, and Sildenafil    Social History:  Social History     Socioeconomic History    Marital status: Single     Spouse name: Not on file    Number of children: Not on file    Years of education: Not on file    Highest education level: Some college, no degree   Occupational History    Not on file   Tobacco Use    Smoking status: Never     Passive exposure: Never    Smokeless tobacco: Never   Vaping Use    Vaping status: Never Used   Substance and Sexual Activity    Alcohol use: No    Drug use: No    Sexual activity: Not on file   Other Topics Concern    Not on file   Social History Narrative    ** Merged History Encounter **          Social Drivers of Health     Financial Resource Strain: Low Risk  (4/27/2022)    Overall Financial Resource Strain (CARDIA)     Difficulty of Paying Living Expenses: Not hard at all   Food Insecurity: No  "Food Insecurity (2022)    Hunger Vital Sign     Worried About Running Out of Food in the Last Year: Never true     Ran Out of Food in the Last Year: Never true   Transportation Needs: No Transportation Needs (2022)    PRAPARE - Transportation     Lack of Transportation (Medical): No     Lack of Transportation (Non-Medical): No   Physical Activity: Insufficiently Active (2022)    Exercise Vital Sign     Days of Exercise per Week: 3 days     Minutes of Exercise per Session: 30 min   Stress: No Stress Concern Present (2022)    [place] [employer] - Occupational Stress Questionnaire     Feeling of Stress : Not at all   Social Connections: Moderately Integrated (2022)    Social Connection and Isolation Panel [NHANES]     Frequency of Communication with Friends and Family: More than three times a week     Frequency of Social Gatherings with Friends and Family: Once a week     Attends [employer] Services: More than 4 times per year     Active Member of Clubs or Organizations: Yes     Attends Club or Organization Meetings: More than 4 times per year     Marital Status: Never    Intimate Partner Violence: Not on file   Housing Stability: Low Risk  (2022)    Housing Stability Vital Sign     Unable to Pay for Housing in the Last Year: No     Number of Places Lived in the Last Year: 1     Unstable Housing in the Last Year: No       Family History:  Family History   Problem Relation Age of Onset    Rheumatologic Disease Mother         RA    Heart Attack Father          MI (age 45)    Kidney Disease Father         ESRD on HD    Diabetes Father     Heart Disease Sister        Medications:  Current Medications[2]    Physical Examination:  Vital signs: Ht 1.626 m (5' 4\")   Wt 71.7 kg (158 lb)   BMI 27.12 kg/m²      Assessment and Plan:    1. Obesity/Weight Management  Patient was unable to get medication covered through insurance, but is utilizing [employer] pricing on " Ozempic. She has lost six pounds in the 3 weeks since initiating  She has tolerated 0.25 mg dose well, but does experience some slight nausea and constipation which she manages with drinking water and using glycerin suppositories.   Patient currently tracks calories intake using My[employer]itness[employer]. On average patient consumes approximately 1500 calories daily.    Physcial activity limited due to joint issues. Recommended chair exercises and walking as tolerated. Patient is interested in joining aquatics which she thinks would help with physical activity without worsening joint pain.     Patient would like to titrate to next dose of 0.5 mg weekly. Instructed her to go back down to 0.25 mg if she feels that her side effects are not manageable.    - Medication changes:  Increase to Ozempic 0.5 mg  For use as an adjunct to diet and exercise in patients with a BMI >=27 kg/m2 and >=1 weight-associated comorbidity (HTN, FRANK).       - Lifestyle changes:  Diet: Monitor caloric intake - aim for deficit. Maximize lean proteins and veggies. Cut out/down on carbs. Avoid simple sugars.   Referral to Nutrition Services placed: No   Exercise: Increase as tolerated. Goal of at least 150 min/week of anything aerobic.    Follow Up:  5 weeks     Nicole Gong, PharmD    CC:   JONATHON Patrick PharmD            [1]   Past Surgical History:  Procedure Laterality Date    NH LAP,DIAGNOSTIC ABDOMEN N/A 11/25/2024    Procedure: DIAGNOSTIC AND OPERATIVE LAPAROSCOPY, HYSTEROSCOPY, DILATION AND CURETTAGE, REMOVAL OF ENDOMETRIAL POLYP, POSSIBLE LYSIS OF ADHESIONS, AND ALSO ANY AND ALL OTHER INDICATED PROCEDURES;  Surgeon: Nolan Rodriguez M.D.;  Location: SURGERY SAME DAY [facility];  Service: Gynecology    NH HYSTEROSCOPY,DX,SEP PROC N/A 11/25/2024    Procedure: HYSTEROSCOPY, DIAGNOSTIC;  Surgeon: Nolan Rodriguez M.D.;  Location: SURGERY SAME DAY [facility];  Service: Gynecology    DILATION AND CURETTAGE N/A 11/25/2024    Procedure:  DILATION AND CURETTAGE;  Surgeon: Nolan Rodriguez M.D.;  Location: SURGERY SAME DAY [facility];  Service: Gynecology    ME UPPER GI ENDOSCOPY,DIAGNOSIS N/A 10/4/2024    Procedure: ESOPHAGOGASTRODUODENOSCOPY (EGD) WITH BRUSHINGS AND DILATATION;  Surgeon: Uriel Evans M.D.;  Location: [facility];  Service: General    ME LAP NILS RESTRICT PROC LONGITUDINAL GAS*  12/26/2023    Procedure: ROBOTIC SONNY EN Y GASTROENTEROSTOMY;  Surgeon: Olivier Oliveros M.D.;  Location: [facility];  Service: Gen Robotic    ROBOTIC ASSISTED LAP HIATAL HERNIA REP  12/26/2023    Procedure: REPAIR, HERNIA, HIATAL, ROBOT-ASSISTED, LAPAROSCOPIC;  Surgeon: Olivier Oliveros M.D.;  Location: [facility];  Service: Gen Robotic    ME LAP NILS RESTRICT PROC LONGITUDINAL GAS*  7/14/2023    Procedure: ROBOTIC PARTIAL SLEEVE GASTRECTOMY;  Surgeon: Olivier Oliveros M.D.;  Location: [facility];  Service: Gen Robotic    ROBOTIC ASSISTED LAP HIATAL HERNIA REP  7/14/2023    Procedure: REPAIR, HERNIA, HIATAL, ROBOT-ASSISTED, LAPAROSCOPIC;  Surgeon: Olivier Oliveros M.D.;  Location: [facility];  Service: Gen Robotic    ME LAP,DIAGNOSTIC ABDOMEN N/A 3/27/2023    Procedure: LAPAROSCOPY, DIAGNOSTIC AND OPERATIVE AND LAPAROSCOPIC BILATERAL SALPINGECTOMY;  Surgeon: Nolan Rodriguez M.D.;  Location: SURGERY SAME DAY [facility];  Service: Gynecology    IRRIGATION & DEBRIDEMENT ORTHO  9/7/2014    Performed by El Gomez M.D. at [facility] ORS    IRRIGATION & DEBRIDEMENT NEURO  9/5/2014    Performed by El Gomez M.D. at [facility] ORS    LUMBAR LAMINECTOMY DISKECTOMY  8/27/2014    Performed by El Gomez M.D. at [facility] ORS    INJ,EPIDURAL/LUMB/SAC SINGLE  6/5/2014    Performed by Alexis Cornell M.D. at [facility] ORS    TONSILLECTOMY  5/14/2013    Performed by Aaron Hutton M.D. at SURGERY SAME DAY [facility] ORS    NASAL SEPTAL  RECONSTRUCTION  5/14/2013    Performed by Aaron Hutton M.D. at SURGERY SAME DAY [facility]    TURBINOPLASTY  5/14/2013    Performed by Aaron Hutton M.D. at SURGERY SAME DAY [facility]   [2]   Current Outpatient Medications:     traZODone (DESYREL) 50 MG Tab, Take 1-2 Tablets by mouth at bedtime as needed for Sleep., Disp: 30 Tablet, Rfl: 3    methotrexate 2.5 MG tablet, Take 2.5 mg by mouth every 7 days., Disp: , Rfl:     Semaglutide,0.25 or 0.5MG/DOS, (OZEMPIC, 0.25 OR 0.5 MG/DOSE,) 2 MG/3ML Solution Pen-injector, Inject 0.25 mg under the skin every 7 days., Disp: 3 mL, Rfl: 1    ambrisentan (LETAIRIS) 5 MG tablet, Take 1 Tablet by mouth every day., Disp: 90 Tablet, Rfl: 1    nitroglycerin (NITRO-BID) 2 % Ointment, Place 0.5 Inches on the skin., Disp: , Rfl:     guaiFENesin (ROBITUSSIN) 100 MG/5ML liquid, Take 10 mL by mouth every four hours as needed for Cough., Disp: 840 mL, Rfl: 0    amLODIPine (NORVASC) 10 MG Tab, TAKE 1 TABLET BY MOUTH ONCE DAILY, Disp: 100 Tablet, Rfl: 3    losartan (COZAAR) 25 MG Tab, Take 1 Tablet by mouth every day., Disp: 100 Tablet, Rfl: 3    zolpidem (AMBIEN CR) 6.25 MG CR tablet, , Disp: , Rfl:     VITAMIN E PO, Take 1 Tablet by mouth every day. FOR PROCEDURE SCHEDULED 11/25/2024 - DO NOT TAKE 7 DAYS PRIOR TO SURGERY, Disp: , Rfl:     Cholecalciferol (VITAMIN D-3 PO), Take 1 Tablet by mouth every day. FOR PROCEDURE SCHEDULED 11/25/2024 - DO NOT TAKE 7 DAYS PRIOR TO SURGERY, Disp: , Rfl:     NON SPECIFIED, Take 1 Tablet by mouth every day. BARIATRIC FUSION - HAIR SKIN AND NAILS  FOR PROCEDURE SCHEDULED 11/25/2024 - DO NOT TAKE 7 DAYS PRIOR TO SURGERY, Disp: , Rfl:     NON SPECIFIED, Take 2 Tablets by mouth every day. BARIATRIC FUSION TROPICAL - COMPLETE MINERAL AND VITAMIN SUPPLEMENT  FOR PROCEDURE SCHEDULED 11/25/2024 - DO NOT TAKE 7 DAYS PRIOR TO SURGERY, Disp: , Rfl:     Cyanocobalamin (B-12 PO), Take 1 Tablet by mouth every day. FOR PROCEDURE SCHEDULED 11/25/2024 - DO NOT  TAKE 7 DAYS PRIOR TO SURGERY, Disp: , Rfl:     BIOTIN PO, Take 1 Tablet by mouth every day. [place] - HAIR SKIN AND NAILS  FOR PROCEDURE SCHEDULED 11/25/2024 - DO NOT TAKE 7 DAYS PRIOR TO SURGERY, Disp: , Rfl:     Glucosamine HCl (GLUCOSAMINE PO), Take 1 Tablet by mouth every day. JOINT [facility]  FOR PROCEDURE SCHEDULED 11/25/2024 - DO NOT TAKE 7 DAYS PRIOR TO SURGERY, Disp: , Rfl:     NON SPECIFIED, Apply 1 Application topically as needed (raynauds). NITROBID 2% ON HANDS FOR RAYNAUD'S, Disp: , Rfl:     pilocarpine (SALAGEN) 5 MG Tab, Take 1 Tablet by mouth 3 times a day. FOR PROCEDURE SCHEDULED 11/25/2024 - CONTINUE AS PRESCRIBED, Disp: , Rfl:     aspirin 81 MG EC tablet, Take 1 Tablet by mouth every 48 hours., Disp: , Rfl:     atorvastatin (LIPITOR) 40 MG Tab, Take 1 Tablet by mouth every evening., Disp: 90 Tablet, Rfl: 1    omeprazole (PRILOSEC) 40 MG delayed-release capsule, Take 1 Capsule by mouth 2 times a day., Disp: 90 Capsule, Rfl: 0    albuterol 108 (90 Base) MCG/ACT Aero Soln inhalation aerosol, Inhale 2 Puffs every four hours as needed for Shortness of Breath., Disp: 1 Each, Rfl: 0    hydroxychloroquine (PLAQUENIL) 200 MG Tab, Take 200 mg by mouth 2 times a day. FOR PROCEDURE SCHEDULED 11/25/2024 - FOLLOW DOCTOR INSTRUCTIONS, Disp: , Rfl:

## 2025-06-30 DIAGNOSIS — F51.01 PRIMARY INSOMNIA: ICD-10-CM

## 2025-07-01 RX ORDER — TRAZODONE HYDROCHLORIDE 50 MG/1
50-100 TABLET ORAL NIGHTLY PRN
Qty: 30 TABLET | Refills: 3 | Status: SHIPPED | OUTPATIENT
Start: 2025-07-01

## 2025-07-01 NOTE — TELEPHONE ENCOUNTER
Have we ever prescribed this med? Yes.  If yes, what date? 6.16.25    Last OV: 6.2.25    Next OV: no pending    DX: FRANK    Medications: traZODone (DESYREL) 50 MG Tab

## 2025-07-07 ENCOUNTER — PHARMACY VISIT (OUTPATIENT)
Dept: PHARMACY | Facility: MEDICAL CENTER | Age: 45
End: 2025-07-07
Payer: COMMERCIAL

## 2025-07-07 ENCOUNTER — OFFICE VISIT (OUTPATIENT)
Dept: INTERNAL MEDICINE | Facility: OTHER | Age: 45
End: 2025-07-07
Payer: MEDICAID

## 2025-07-07 VITALS
TEMPERATURE: 98.6 F | DIASTOLIC BLOOD PRESSURE: 68 MMHG | HEART RATE: 93 BPM | SYSTOLIC BLOOD PRESSURE: 109 MMHG | WEIGHT: 159 LBS | BODY MASS INDEX: 27.29 KG/M2 | OXYGEN SATURATION: 98 %

## 2025-07-07 DIAGNOSIS — Z11.3 SCREENING FOR STD (SEXUALLY TRANSMITTED DISEASE): Primary | ICD-10-CM

## 2025-07-07 PROBLEM — I73.00 RAYNAUD'S SYNDROME WITHOUT GANGRENE: Status: ACTIVE | Noted: 2025-07-07

## 2025-07-07 PROCEDURE — 3074F SYST BP LT 130 MM HG: CPT

## 2025-07-07 PROCEDURE — RXMED WILLOW AMBULATORY MEDICATION CHARGE

## 2025-07-07 PROCEDURE — 3078F DIAST BP <80 MM HG: CPT

## 2025-07-07 PROCEDURE — 99213 OFFICE O/P EST LOW 20 MIN: CPT | Mod: GE

## 2025-07-07 ASSESSMENT — ENCOUNTER SYMPTOMS
MUSCULOSKELETAL NEGATIVE: 1
NEUROLOGICAL NEGATIVE: 1
GASTROINTESTINAL NEGATIVE: 1
CONSTITUTIONAL NEGATIVE: 1
RESPIRATORY NEGATIVE: 1
EYES NEGATIVE: 1
PSYCHIATRIC NEGATIVE: 1
CARDIOVASCULAR NEGATIVE: 1

## 2025-07-07 ASSESSMENT — FIBROSIS 4 INDEX: FIB4 SCORE: .938083151964685911

## 2025-07-07 NOTE — PROGRESS NOTES
ESTABLISHED PATIENT VISIT    PATIENT ID:  Name: Christine Heaton  YOB: 1980  Patient Care Team:  Ana Paula Grant M.D. as PCP - General (Internal Medicine)  isleep (DME Supplier)    CHIEF COMPLAINT(s):  Follow-Up    Follow up for The encounter diagnosis was Screening for STD (sexually transmitted disease).    History of Present Illness:    She has a past medical history of crest syndrome, dyslipidemia, GERD, FRANK on CPAP, Raynaud's, status post gastric sleeve, status post bilateral salpingectomy, chronic polyarthritis, pulmonary hypertension, chronic endometriosis.  She is following with sleep medicine, cardiology, vascular medicine, gynecology, rheumatology regarding her comorbidities.     The patient reported feeling overall pretty good with no current concerns. The patient mentioned being due for a pap smear and not having seen an OBGYN for a long time. The patient also noted not having had a hepatitis C screening and was unsure about the need for blood work related to Ozempic usage, which is being taken for weight loss. The patient reported not having diabetes and did not report any symptoms such as nausea, vomiting, food aversion, or early satiety.    Patient stated that she would like to come in for a pap smear sooner than later, offered 1 week follow up for pap smear.     Patient Active Problem List    Diagnosis Date Noted    Immunosuppressed status (HCC) 03/28/2025    Long term (current) use of antithrombotics/antiplatelets 03/28/2025    Pulmonary arterial hypertension (HCC) 03/05/2025    Raynaud's phenomenon without gangrene 03/05/2025    Metrorrhagia 11/25/2024    Hypercholesterolemia 08/07/2024    Osteoarthrosis 08/07/2024    Gastroesophageal reflux disease 08/07/2024    Status post sleeve gastrectomy 08/07/2024    Benign hypertension 08/07/2024    Asthma 11/22/2023    Sleep apnea 11/22/2023    GERD (gastroesophageal reflux disease) 11/22/2023    Hx of gastric bypass  2023    H/O gastric sleeve 2023    Pelvic pain 2023    Dysmenorrhea 2023    Dyspareunia in female 2023    HTN (hypertension) 03/10/2023    Elevated lipoprotein(a) 03/10/2023    Degenerative joint disease involving multiple joints 2023    Family history of premature coronary heart disease 2022    Dyslipidemia 2022    Prediabetes 2022    CREST syndrome (HCC) 2022    Obesity (BMI 30-39.9) 2022    Postprocedural pseudomeningocele 2014    Displacement of lumbar intervertebral disc without myelopathy 2014    Cataract 2014     Review of Systems   Constitutional: Negative.    HENT: Negative.     Eyes: Negative.    Respiratory: Negative.     Cardiovascular: Negative.    Gastrointestinal: Negative.    Genitourinary: Negative.    Musculoskeletal: Negative.    Skin: Negative.    Neurological: Negative.    Endo/Heme/Allergies: Negative.    Psychiatric/Behavioral: Negative.       Past Medical History[1]  Past Surgical History[2]  Family History   Problem Relation Age of Onset    Rheumatologic Disease Mother         RA    Heart Attack Father          MI (age 45)    Kidney Disease Father         ESRD on HD    Diabetes Father     Heart Disease Sister      Latex, Duloxetine, Fluoxetine, Nifedipine, and Sildenafil  Social History[3]  Current Medications[4]    OBJECTIVE:  /68 (BP Location: Left arm, Patient Position: Sitting, BP Cuff Size: Adult)   Pulse 93   Temp 37 °C (98.6 °F) (Temporal)   Wt 72.1 kg (159 lb)   SpO2 98%   BMI 27.29 kg/m²   Physical Exam  Constitutional:       Appearance: Normal appearance. She is normal weight.   HENT:      Head: Normocephalic.      Right Ear: External ear normal.      Left Ear: External ear normal.      Nose: Nose normal.      Mouth/Throat:      Mouth: Mucous membranes are moist.   Cardiovascular:      Rate and Rhythm: Normal rate and regular rhythm.      Pulses: Normal pulses.      Heart sounds:  "Normal heart sounds.   Pulmonary:      Effort: Pulmonary effort is normal.      Breath sounds: Normal breath sounds.   Abdominal:      General: Abdomen is flat. Bowel sounds are normal.   Musculoskeletal:         General: Normal range of motion.   Skin:     General: Skin is warm.      Capillary Refill: Capillary refill takes less than 2 seconds.   Neurological:      General: No focal deficit present.      Mental Status: She is alert. Mental status is at baseline.   Psychiatric:         Thought Content: Thought content normal.         Judgment: Judgment normal.       ASSESSMENT AND PLAN:     1. Screening for STD (sexually transmitted disease) (Primary)  - HEP C VIRUS ANTIBODY; Future    Problem List Items Addressed This Visit    None  Visit Diagnoses         Screening for STD (sexually transmitted disease)    -  Primary    Relevant Orders    HEP C VIRUS ANTIBODY          Orders Placed This Encounter    HEP C VIRUS ANTIBODY     Return in about 1 week (around 2025) for pap smear.    Ana Paula Grant M.D. PGY-2  [facility] Internal Medicine Resident       [1]   Past Medical History:  Diagnosis Date    Acid reflux     GEOVANY positive 2022    Anesthesia     Family Hx-father ; was always told it was anesthesia \"related\"; not sure what the actual reaction was; pt-PONV    Arthritis 2023    osteoarthritis    Asthma     inhaler prn    Breath shortness 2023    on exertion    CREST syndrome (HCC)     Cyclic citrullinated peptide (CCP) antibody positive 2022    Daytime sleepiness     Degenerative joint disease involving multiple joints 2023    Dyslipidemia 2022    Dyspareunia, female 2023    Enlargement - tonsil/adenoid 2013    Fatty liver     Heart burn     medicated    Hiatus hernia syndrome 2023    High cholesterol 2023    on medication    Hypertension 2023    on medication, 2024 pt states that she has been on this same dose of medication even when she " "weighed more but was told to stay on same dose d/t Raynaud's    Indigestion     Insomnia     Intractable back pain 12/26/2017    Left knee pain 02/07/2023    Morning headache     Obesity due to excess calories with serious comorbidity 02/07/2023    Oligohydramnios antepartum-IOL 4/24 9 AM 04/15/2016    IMO load March 2020    Osteoporosis     Pain 08/22/2014    lower back/L leg=7/10    Pain 03/20/2023    Chronic pain \"all over my body\" rates pain 7 out of 10    Patellofemoral instability of left knee with pain 02/07/2023    Pelvic pain 03/27/2023    PFO (patent foramen ovale) 2024    pt sees [facility] cardiologist (Dr. Coronado)    Right ventricular enlargement     Sciatica of right side 12/26/2017    Sleep apnea 03/20/2023    uses cpap    Snoring     sleep study done    Tachycardia, unspecified     Unspecified urinary incontinence 03/20/2023    stress incontinence   [2]   Past Surgical History:  Procedure Laterality Date    WA LAP,DIAGNOSTIC ABDOMEN N/A 11/25/2024    Procedure: DIAGNOSTIC AND OPERATIVE LAPAROSCOPY, HYSTEROSCOPY, DILATION AND CURETTAGE, REMOVAL OF ENDOMETRIAL POLYP, POSSIBLE LYSIS OF ADHESIONS, AND ALSO ANY AND ALL OTHER INDICATED PROCEDURES;  Surgeon: Nolan Rodriguez M.D.;  Location: SURGERY SAME DAY [facility];  Service: Gynecology    WA HYSTEROSCOPY,DX,SEP PROC N/A 11/25/2024    Procedure: HYSTEROSCOPY, DIAGNOSTIC;  Surgeon: Nolan Rodriguez M.D.;  Location: SURGERY SAME DAY [facility];  Service: Gynecology    DILATION AND CURETTAGE N/A 11/25/2024    Procedure: DILATION AND CURETTAGE;  Surgeon: Nolan Rodriguez M.D.;  Location: SURGERY SAME DAY [facility];  Service: Gynecology    WA UPPER GI ENDOSCOPY,DIAGNOSIS N/A 10/4/2024    Procedure: ESOPHAGOGASTRODUODENOSCOPY (EGD) WITH BRUSHINGS AND DILATATION;  Surgeon: Uriel Evans M.D.;  Location: SURGERY [facility];  Service: General    WA LAP NILS RESTRICT PROC LONGITUDINAL GAS*  12/26/2023    Procedure: ROBOTIC SONNY EN Y GASTROENTEROSTOMY;  Surgeon: " Olivier Oliveros M.D.;  Location: SURGERY [facility];  Service: Gen Robotic    ROBOTIC ASSISTED LAP HIATAL HERNIA REP  12/26/2023    Procedure: REPAIR, HERNIA, HIATAL, ROBOT-ASSISTED, LAPAROSCOPIC;  Surgeon: Olivier Oliveros M.D.;  Location: SURGERY [facility];  Service: Gen Robotic    WA LAP NILS RESTRICT PROC LONGITUDINAL GAS*  7/14/2023    Procedure: ROBOTIC PARTIAL SLEEVE GASTRECTOMY;  Surgeon: Olivier Oliveros M.D.;  Location: SURGERY [facility];  Service: Gen Robotic    ROBOTIC ASSISTED LAP HIATAL HERNIA REP  7/14/2023    Procedure: REPAIR, HERNIA, HIATAL, ROBOT-ASSISTED, LAPAROSCOPIC;  Surgeon: Olivier Oliveros M.D.;  Location: SURGERY [facility];  Service: Gen Robotic    WA LAP,DIAGNOSTIC ABDOMEN N/A 3/27/2023    Procedure: LAPAROSCOPY, DIAGNOSTIC AND OPERATIVE AND LAPAROSCOPIC BILATERAL SALPINGECTOMY;  Surgeon: Nolan Rodriguez M.D.;  Location: SURGERY SAME DAY [facility];  Service: Gynecology    IRRIGATION & DEBRIDEMENT ORTHO  9/7/2014    Performed by El Gomez M.D. at [facility] ORS    IRRIGATION & DEBRIDEMENT NEURO  9/5/2014    Performed by El Gomez M.D. at [facility] ORS    LUMBAR LAMINECTOMY DISKECTOMY  8/27/2014    Performed by El Gomez M.D. at [facility] ORS    INJ,EPIDURAL/LUMB/SAC SINGLE  6/5/2014    Performed by Alexis Cornell M.D. at [facility] ORS    TONSILLECTOMY  5/14/2013    Performed by Aaron Hutton M.D. at SURGERY SAME DAY [facility] ORS    NASAL SEPTAL RECONSTRUCTION  5/14/2013    Performed by Aaron Hutton M.D. at SURGERY SAME DAY [facility] ORS    TURBINOPLASTY  5/14/2013    Performed by Aaron Hutton M.D. at SURGERY SAME DAY [facility] ORS   [3]   Social History  Tobacco Use    Smoking status: Never     Passive exposure: Never    Smokeless tobacco: Never   Vaping Use    Vaping status: Never Used   Substance Use Topics    Alcohol use: No    Drug use: No   [4]   Current Outpatient Medications    Medication Sig Dispense Refill    traZODone (DESYREL) 50 MG Tab Take 1-2 Tablets by mouth at bedtime as needed for Sleep. 30 Tablet 3    methotrexate 2.5 MG tablet Take 2.5 mg by mouth every 7 days.      Semaglutide,0.25 or 0.5MG/DOS, (OZEMPIC, 0.25 OR 0.5 MG/DOSE,) 2 MG/3ML Solution Pen-injector Inject 0.25 mg under the skin every 7 days. 3 mL 1    ambrisentan (LETAIRIS) 5 MG tablet Take 1 Tablet by mouth every day. 90 Tablet 1    nitroglycerin (NITRO-BID) 2 % Ointment Place 0.5 Inches on the skin.      amLODIPine (NORVASC) 10 MG Tab TAKE 1 TABLET BY MOUTH ONCE DAILY 100 Tablet 3    losartan (COZAAR) 25 MG Tab Take 1 Tablet by mouth every day. 100 Tablet 3    zolpidem (AMBIEN CR) 6.25 MG CR tablet       VITAMIN E PO Take 1 Tablet by mouth every day. FOR PROCEDURE SCHEDULED 11/25/2024 - DO NOT TAKE 7 DAYS PRIOR TO SURGERY      Cholecalciferol (VITAMIN D-3 PO) Take 1 Tablet by mouth every day. FOR PROCEDURE SCHEDULED 11/25/2024 - DO NOT TAKE 7 DAYS PRIOR TO SURGERY      Cyanocobalamin (B-12 PO) Take 1 Tablet by mouth every day. FOR PROCEDURE SCHEDULED 11/25/2024 - DO NOT TAKE 7 DAYS PRIOR TO SURGERY      BIOTIN PO Take 1 Tablet by mouth every day. [employer] B[employer] - HAIR SKIN AND NAILS    FOR PROCEDURE SCHEDULED 11/25/2024 - DO NOT TAKE 7 DAYS PRIOR TO SURGERY      Glucosamine HCl (GLUCOSAMINE PO) Take 1 Tablet by mouth every day. [facility]    FOR PROCEDURE SCHEDULED 11/25/2024 - DO NOT TAKE 7 DAYS PRIOR TO SURGERY      pilocarpine (SALAGEN) 5 MG Tab Take 1 Tablet by mouth 3 times a day. FOR PROCEDURE SCHEDULED 11/25/2024 - CONTINUE AS PRESCRIBED      aspirin 81 MG EC tablet Take 1 Tablet by mouth every 48 hours.      atorvastatin (LIPITOR) 40 MG Tab Take 1 Tablet by mouth every evening. 90 Tablet 1    omeprazole (PRILOSEC) 40 MG delayed-release capsule Take 1 Capsule by mouth 2 times a day. 90 Capsule 0    albuterol 108 (90 Base) MCG/ACT Aero Soln inhalation aerosol Inhale 2 Puffs every four hours as needed for  Shortness of Breath. 1 Each 0    hydroxychloroquine (PLAQUENIL) 200 MG Tab Take 200 mg by mouth 2 times a day. FOR PROCEDURE SCHEDULED 11/25/2024 - FOLLOW DOCTOR INSTRUCTIONS      guaiFENesin (ROBITUSSIN) 100 MG/5ML liquid Take 10 mL by mouth every four hours as needed for Cough. (Patient not taking: Reported on 7/7/2025) 840 mL 0    NON SPECIFIED Take 1 Tablet by mouth every day. BARIATRIC FUSION - HAIR SKIN AND NAILS    FOR PROCEDURE SCHEDULED 11/25/2024 - DO NOT TAKE 7 DAYS PRIOR TO SURGERY (Patient not taking: Reported on 7/7/2025)      NON SPECIFIED Take 2 Tablets by mouth every day. BARIATRIC FUSION TROPICAL - COMPLETE MINERAL AND VITAMIN SUPPLEMENT    FOR PROCEDURE SCHEDULED 11/25/2024 - DO NOT TAKE 7 DAYS PRIOR TO SURGERY (Patient not taking: Reported on 7/7/2025)      NON SPECIFIED Apply 1 Application topically as needed (raynauds). NITROBID 2% ON HANDS FOR RAYNAUD'S (Patient not taking: Reported on 7/7/2025)       No current facility-administered medications for this visit.

## 2025-07-14 ENCOUNTER — APPOINTMENT (OUTPATIENT)
Dept: INTERNAL MEDICINE | Facility: OTHER | Age: 45
End: 2025-07-14
Payer: MEDICAID

## 2025-07-25 ENCOUNTER — OFFICE VISIT (OUTPATIENT)
Dept: VASCULAR LAB | Facility: MEDICAL CENTER | Age: 45
End: 2025-07-25
Attending: INTERNAL MEDICINE
Payer: MEDICAID

## 2025-07-25 VITALS — WEIGHT: 155.6 LBS | BODY MASS INDEX: 26.71 KG/M2

## 2025-07-25 DIAGNOSIS — E66.3 OVERWEIGHT (BMI 25.0-29.9): ICD-10-CM

## 2025-07-25 DIAGNOSIS — I10 HYPERTENSION, UNSPECIFIED TYPE: ICD-10-CM

## 2025-07-25 PROCEDURE — 99213 OFFICE O/P EST LOW 20 MIN: CPT

## 2025-07-25 RX ORDER — SEMAGLUTIDE 0.68 MG/ML
0.25 INJECTION, SOLUTION SUBCUTANEOUS
Qty: 3 ML | Refills: 2 | Status: SHIPPED | OUTPATIENT
Start: 2025-07-25

## 2025-07-25 ASSESSMENT — FIBROSIS 4 INDEX: FIB4 SCORE: .938083151964685911

## 2025-07-25 NOTE — PROGRESS NOTES
"Patient Consult Note    TIME IN: 4.34  TIME OUT: 4:50    Primary care physician: Ana Paula Grant M.D.    Reason for consult: Obesity/Weight Management    Date Referral Placed: 04/07/2025    HPI:  Christine Miguel is a 44 y.o. old patient who comes in today for evaluation of above stated problem.    Initial Weight: 164 lbs  Initial BMI: 27 kg/m2    Current Weight: 155.6 lbs   Current BMI: 26.7 kg/m2      Most Recent HbA1c: No results found for: \"HBA1C\"     Most Recent TSH:   TSH   Date Value Ref Range Status   10/10/2012 2.020 0.300 - 3.700 uIU/mL Final     Comment:     The normal ranges for TSH have been updated effective  01/19/2012.  The narrower range will help with early  detection of mild thryoid disease.       Most Recent SrCr and GFR:  Lab Results   Component Value Date/Time    CREATININE 0.47 (L) 10/01/2024 03:40 PM    CREATININE 0.9 01/04/2008 02:42 AM      GFR (CKD-EPI)   Date Value Ref Range Status   10/01/2024 120 >60 mL/min/1.73 m 2 Final     Comment:     Estimated Glomerular Filtration Rate is calculated using  race neutral CKD-EPI 2021 equation per NKF-ASN recommendations.         Current Obesity Medication Regimen  GIP and/or GLP-1 Analog: semaglutide (Ozempic) 0.25/ 0.5 mg mg subQ once weekly  For use as an adjunct to diet and exercise in patients with a BMI >=30 kg/m2, or in patients with a BMI >=27 kg/m2 and >=1 weight-associated comorbidity (eg, HTN, HLD, FRANK, T2DM, etc).  Pt denies personal/family hx of medullary thyroid carcinoma or multiple endocrine neoplasia syndrome type 2 (MEN 2).    Previous Obesity Medication(s) and Reason for Discontinuation  None    Lifestyle  Physical Activity:  Current Exercise - Walking 60 mins daily  Exercise Goal - At least 150 min/week of anything aerobic.    Dietary:  Breakfast - 2 eggs  Lunch - Tuna sandwich with egg  Dinner - Small portion of regular adult meals or broth  Snack - Low fat Jello     Beverage - Water with electrolytes      Past " Medical History:  Patient Active Problem List    Diagnosis Date Noted    Raynaud's syndrome without gangrene 07/07/2025    Immunosuppressed status (HCC) 03/28/2025    Long term (current) use of antithrombotics/antiplatelets 03/28/2025    Pulmonary arterial hypertension (HCC) 03/05/2025    Raynaud's phenomenon without gangrene 03/05/2025    Metrorrhagia 11/25/2024    Hypercholesterolemia 08/07/2024    Osteoarthrosis 08/07/2024    Gastroesophageal reflux disease 08/07/2024    Status post sleeve gastrectomy 08/07/2024    Benign hypertension 08/07/2024    Asthma 11/22/2023    Sleep apnea 11/22/2023    GERD (gastroesophageal reflux disease) 11/22/2023    Hx of gastric bypass 09/08/2023    H/O gastric sleeve 09/08/2023    Pelvic pain 03/27/2023    Dysmenorrhea 03/27/2023    Dyspareunia in female 03/27/2023    HTN (hypertension) 03/10/2023    Elevated lipoprotein(a) 03/10/2023    Degenerative joint disease involving multiple joints 02/07/2023    Family history of premature coronary heart disease 08/01/2022    Dyslipidemia 08/01/2022    Prediabetes 08/01/2022    CREST syndrome (HCC) 08/01/2022    Obesity (BMI 30-39.9) 04/28/2022    Postprocedural pseudomeningocele 09/02/2014    Displacement of lumbar intervertebral disc without myelopathy 08/27/2014    Cataract 06/05/2014       Past Surgical History:  Past Surgical History[1]    Allergies:  Latex, Duloxetine, Fluoxetine, Nifedipine, and Sildenafil    Social History:  Social History     Socioeconomic History    Marital status: Single     Spouse name: Not on file    Number of children: Not on file    Years of education: Not on file    Highest education level: Some college, no degree   Occupational History    Not on file   Tobacco Use    Smoking status: Never     Passive exposure: Never    Smokeless tobacco: Never   Vaping Use    Vaping status: Never Used   Substance and Sexual Activity    Alcohol use: No    Drug use: No    Sexual activity: Not on file   Other Topics Concern     Not on file   Social History Narrative    ** Merged History Encounter **          Social Drivers of Health     Financial Resource Strain: Low Risk  (2022)    Overall Financial Resource Strain (CARDIA)     Difficulty of Paying Living Expenses: Not hard at all   Food Insecurity: No Food Insecurity (2022)    Hunger Vital Sign     Worried About Running Out of Food in the Last Year: Never true     Ran Out of Food in the Last Year: Never true   Transportation Needs: No Transportation Needs (2022)    PRAPARE - Transportation     Lack of Transportation (Medical): No     Lack of Transportation (Non-Medical): No   Physical Activity: Insufficiently Active (2022)    Exercise Vital Sign     Days of Exercise per Week: 3 days     Minutes of Exercise per Session: 30 min   Stress: No Stress Concern Present (2022)    [employer] [place] [employer] - Occupational Stress Questionnaire     Feeling of Stress : Not at all   Social Connections: Moderately Integrated (2022)    Social Connection and Isolation Panel [NHANES]     Frequency of Communication with Friends and Family: More than three times a week     Frequency of Social Gatherings with Friends and Family: Once a week     Attends Faith Services: More than 4 times per year     Active Member of Clubs or Organizations: Yes     Attends Club or Organization Meetings: More than 4 times per year     Marital Status: Never    Intimate Partner Violence: Not on file   Housing Stability: Low Risk  (2022)    Housing Stability Vital Sign     Unable to Pay for Housing in the Last Year: No     Number of Places Lived in the Last Year: 1     Unstable Housing in the Last Year: No       Family History:  Family History   Problem Relation Age of Onset    Rheumatologic Disease Mother         RA    Heart Attack Father          MI (age 45)    Kidney Disease Father         ESRD on HD    Diabetes Father     Heart Disease Sister         Medications:  Current Medications[2]    Physical Examination:  Vital signs: There were no vitals taken for this visit.     Assessment and Plan:    1. Obesity/Weight Management  Christine only injected 1 dose of 0.5 mg Ozempic, but then decreased her dose back down to 0.25 mg due to some stomach discomfort.   She has lost an additional 3.5 lbs since the previous visit. She walks 60 mins daily and uses as a special serving plate to unsure smaller serving sizes.   She will like to re-challenge 0.5 mg Ozempic once more for additional wt loss.    - Medication changes:  Increase Ozempic to 0.5 mg once weekly if tolerated        - Lifestyle changes:  Diet: Monitor caloric intake - aim for deficit. Maximize lean proteins and veggies. Cut out/down on carbs. Avoid simple sugars.   Referral to Nutrition Services placed: No   Exercise: Increase as tolerated. Goal of at least 150 min/week of anything aerobic.  Continue 60 mins of daily activity    Follow Up:  4 weeks    Belinda Cadet, PharmD, BCACP    CC:   Ana Paula Grant M.D.          [1]   Past Surgical History:  Procedure Laterality Date    FL LAP,DIAGNOSTIC ABDOMEN N/A 11/25/2024    Procedure: DIAGNOSTIC AND OPERATIVE LAPAROSCOPY, HYSTEROSCOPY, DILATION AND CURETTAGE, REMOVAL OF ENDOMETRIAL POLYP, POSSIBLE LYSIS OF ADHESIONS, AND ALSO ANY AND ALL OTHER INDICATED PROCEDURES;  Surgeon: Nolan Rodriguez M.D.;  Location: SURGERY SAME DAY [facility];  Service: Gynecology    FL HYSTEROSCOPY,DX,SEP PROC N/A 11/25/2024    Procedure: HYSTEROSCOPY, DIAGNOSTIC;  Surgeon: Nolan Rodriguez M.D.;  Location: SURGERY SAME DAY [facility];  Service: Gynecology    DILATION AND CURETTAGE N/A 11/25/2024    Procedure: DILATION AND CURETTAGE;  Surgeon: Nolan Rodriguez M.D.;  Location: SURGERY SAME DAY [facility];  Service: Gynecology    FL UPPER GI ENDOSCOPY,DIAGNOSIS N/A 10/4/2024    Procedure: ESOPHAGOGASTRODUODENOSCOPY (EGD) WITH BRUSHINGS AND DILATATION;  Surgeon: Uriel Evans M.D.;   Location: SURGERY [facility];  Service: General    CO LAP NILS RESTRICT PROC LONGITUDINAL GAS*  12/26/2023    Procedure: ROBOTIC SONNY EN Y GASTROENTEROSTOMY;  Surgeon: Olivier Oliveros M.D.;  Location: SURGERY [facility];  Service: Gen Robotic    ROBOTIC ASSISTED LAP HIATAL HERNIA REP  12/26/2023    Procedure: REPAIR, HERNIA, HIATAL, ROBOT-ASSISTED, LAPAROSCOPIC;  Surgeon: Olivier Oliveros M.D.;  Location: SURGERY [facility];  Service: Gen Robotic    CO LAP NILS RESTRICT PROC LONGITUDINAL GAS*  7/14/2023    Procedure: ROBOTIC PARTIAL SLEEVE GASTRECTOMY;  Surgeon: Olivier Oliveros M.D.;  Location: SURGERY [facility];  Service: Gen Robotic    ROBOTIC ASSISTED LAP HIATAL HERNIA REP  7/14/2023    Procedure: REPAIR, HERNIA, HIATAL, ROBOT-ASSISTED, LAPAROSCOPIC;  Surgeon: Olivier Oliveros M.D.;  Location: SURGERY [facility];  Service: Gen Robotic    CO LAP,DIAGNOSTIC ABDOMEN N/A 3/27/2023    Procedure: LAPAROSCOPY, DIAGNOSTIC AND OPERATIVE AND LAPAROSCOPIC BILATERAL SALPINGECTOMY;  Surgeon: Nolan Rodriguez M.D.;  Location: SURGERY SAME DAY [facility];  Service: Gynecology    IRRIGATION & DEBRIDEMENT ORTHO  9/7/2014    Performed by El Gomez M.D. at [facility] ORS    IRRIGATION & DEBRIDEMENT NEURO  9/5/2014    Performed by El Gomez M.D. at [facility] ORS    LUMBAR LAMINECTOMY DISKECTOMY  8/27/2014    Performed by El Gomez M.D. at [facility] ORS    INJ,EPIDURAL/LUMB/SAC SINGLE  6/5/2014    Performed by Alexis Cornell M.D. at [facility] ORS    TONSILLECTOMY  5/14/2013    Performed by Aaron Hutton M.D. at SURGERY SAME DAY [facility] ORS    NASAL SEPTAL RECONSTRUCTION  5/14/2013    Performed by Aaron Hutton M.D. at SURGERY SAME DAY [facility] ORS    TURBINOPLASTY  5/14/2013    Performed by Aaron Hutton M.D. at SURGERY SAME DAY [facility] ORS   [2]   Current Outpatient Medications:     traZODone (DESYREL) 50 MG Tab, Take 1-2  Tablets by mouth at bedtime as needed for Sleep., Disp: 30 Tablet, Rfl: 3    methotrexate 2.5 MG tablet, Take 2.5 mg by mouth every 7 days., Disp: , Rfl:     Semaglutide,0.25 or 0.5MG/DOS, (OZEMPIC, 0.25 OR 0.5 MG/DOSE,) 2 MG/3ML Solution Pen-injector, Inject 0.25 mg under the skin every 7 days., Disp: 3 mL, Rfl: 1    ambrisentan (LETAIRIS) 5 MG tablet, Take 1 Tablet by mouth every day., Disp: 90 Tablet, Rfl: 1    nitroglycerin (NITRO-BID) 2 % Ointment, Place 0.5 Inches on the skin., Disp: , Rfl:     amLODIPine (NORVASC) 10 MG Tab, TAKE 1 TABLET BY MOUTH ONCE DAILY, Disp: 100 Tablet, Rfl: 3    losartan (COZAAR) 25 MG Tab, Take 1 Tablet by mouth every day., Disp: 100 Tablet, Rfl: 3    zolpidem (AMBIEN CR) 6.25 MG CR tablet, , Disp: , Rfl:     VITAMIN E PO, Take 1 Tablet by mouth every day. FOR PROCEDURE SCHEDULED 11/25/2024 - DO NOT TAKE 7 DAYS PRIOR TO SURGERY, Disp: , Rfl:     Cholecalciferol (VITAMIN D-3 PO), Take 1 Tablet by mouth every day. FOR PROCEDURE SCHEDULED 11/25/2024 - DO NOT TAKE 7 DAYS PRIOR TO SURGERY, Disp: , Rfl:     Cyanocobalamin (B-12 PO), Take 1 Tablet by mouth every day. FOR PROCEDURE SCHEDULED 11/25/2024 - DO NOT TAKE 7 DAYS PRIOR TO SURGERY, Disp: , Rfl:     BIOTIN PO, Take 1 Tablet by mouth every day. [place] - HAIR SKIN AND NAILS  FOR PROCEDURE SCHEDULED 11/25/2024 - DO NOT TAKE 7 DAYS PRIOR TO SURGERY, Disp: , Rfl:     Glucosamine HCl (GLUCOSAMINE PO), Take 1 Tablet by mouth every day. JOINT [facility]  FOR PROCEDURE SCHEDULED 11/25/2024 - DO NOT TAKE 7 DAYS PRIOR TO SURGERY, Disp: , Rfl:     pilocarpine (SALAGEN) 5 MG Tab, Take 1 Tablet by mouth 3 times a day. FOR PROCEDURE SCHEDULED 11/25/2024 - CONTINUE AS PRESCRIBED, Disp: , Rfl:     aspirin 81 MG EC tablet, Take 1 Tablet by mouth every 48 hours., Disp: , Rfl:     atorvastatin (LIPITOR) 40 MG Tab, Take 1 Tablet by mouth every evening., Disp: 90 Tablet, Rfl: 1    omeprazole (PRILOSEC) 40 MG delayed-release capsule, Take 1 Capsule by  mouth 2 times a day., Disp: 90 Capsule, Rfl: 0    albuterol 108 (90 Base) MCG/ACT Aero Soln inhalation aerosol, Inhale 2 Puffs every four hours as needed for Shortness of Breath., Disp: 1 Each, Rfl: 0    hydroxychloroquine (PLAQUENIL) 200 MG Tab, Take 200 mg by mouth 2 times a day. FOR PROCEDURE SCHEDULED 11/25/2024 - FOLLOW DOCTOR INSTRUCTIONS, Disp: , Rfl:

## 2025-08-20 ENCOUNTER — TELEPHONE (OUTPATIENT)
Dept: VASCULAR LAB | Facility: MEDICAL CENTER | Age: 45
End: 2025-08-20
Payer: MEDICAID

## 2025-08-25 ENCOUNTER — OFFICE VISIT (OUTPATIENT)
Dept: INTERNAL MEDICINE | Facility: OTHER | Age: 45
End: 2025-08-25
Payer: MEDICAID

## 2025-08-25 ENCOUNTER — HOSPITAL ENCOUNTER (OUTPATIENT)
Facility: MEDICAL CENTER | Age: 45
End: 2025-08-25
Payer: MEDICAID

## 2025-08-25 VITALS
HEIGHT: 64 IN | BODY MASS INDEX: 27.31 KG/M2 | HEART RATE: 67 BPM | OXYGEN SATURATION: 98 % | DIASTOLIC BLOOD PRESSURE: 74 MMHG | WEIGHT: 160 LBS | TEMPERATURE: 99.9 F | SYSTOLIC BLOOD PRESSURE: 112 MMHG

## 2025-08-25 DIAGNOSIS — Z12.4 PAP SMEAR FOR CERVICAL CANCER SCREENING: ICD-10-CM

## 2025-08-25 DIAGNOSIS — R10.2 VULVOVAGINAL DISCOMFORT: ICD-10-CM

## 2025-08-25 DIAGNOSIS — Z11.3 SCREENING FOR STD (SEXUALLY TRANSMITTED DISEASE): ICD-10-CM

## 2025-08-25 DIAGNOSIS — R31.9 HEMATURIA, UNSPECIFIED TYPE: ICD-10-CM

## 2025-08-25 DIAGNOSIS — R30.0 DYSURIA: Primary | ICD-10-CM

## 2025-08-25 LAB
AMBIGUOUS DTTM AMBI4: NORMAL
APPEARANCE UR: CLEAR
APPEARANCE UR: NORMAL
BACTERIA #/AREA URNS HPF: ABNORMAL /HPF
BILIRUB UR QL STRIP.AUTO: NEGATIVE
BILIRUB UR STRIP-MCNC: NEGATIVE MG/DL
CASTS URNS QL MICRO: ABNORMAL /LPF (ref 0–2)
COLOR UR AUTO: YELLOW
COLOR UR: YELLOW
EPITHELIAL CELLS 1715: ABNORMAL /HPF (ref 0–5)
GLUCOSE UR STRIP.AUTO-MCNC: NEGATIVE MG/DL
GLUCOSE UR STRIP.AUTO-MCNC: NEGATIVE MG/DL
KETONES UR STRIP.AUTO-MCNC: NEGATIVE MG/DL
KETONES UR STRIP.AUTO-MCNC: NEGATIVE MG/DL
LEUKOCYTE ESTERASE UR QL STRIP.AUTO: ABNORMAL
LEUKOCYTE ESTERASE UR QL STRIP.AUTO: NORMAL
MICRO URNS: ABNORMAL
NITRITE UR QL STRIP.AUTO: NEGATIVE
NITRITE UR QL STRIP.AUTO: NEGATIVE
PH UR STRIP.AUTO: 6 [PH] (ref 5–8)
PH UR STRIP.AUTO: 6 [PH] (ref 5–8)
PROT UR QL STRIP: NEGATIVE MG/DL
PROT UR QL STRIP: NEGATIVE MG/DL
RBC # URNS HPF: ABNORMAL /HPF (ref 0–2)
RBC UR QL AUTO: ABNORMAL
RBC UR QL AUTO: NORMAL
SP GR UR STRIP.AUTO: 1.02
SP GR UR STRIP.AUTO: 1.02
UROBILINOGEN UR STRIP-MCNC: NORMAL MG/DL
UROBILINOGEN UR STRIP.AUTO-MCNC: 0.2 EU/DL
WBC #/AREA URNS HPF: ABNORMAL /HPF

## 2025-08-25 PROCEDURE — 81001 URINALYSIS AUTO W/SCOPE: CPT

## 2025-08-25 ASSESSMENT — FIBROSIS 4 INDEX: FIB4 SCORE: .938083151964685911

## 2025-08-26 ENCOUNTER — OFFICE VISIT (OUTPATIENT)
Dept: VASCULAR LAB | Facility: MEDICAL CENTER | Age: 45
End: 2025-08-26
Attending: FAMILY MEDICINE
Payer: MEDICAID

## 2025-08-26 VITALS
BODY MASS INDEX: 26.8 KG/M2 | WEIGHT: 157 LBS | HEIGHT: 64 IN | DIASTOLIC BLOOD PRESSURE: 74 MMHG | HEART RATE: 71 BPM | SYSTOLIC BLOOD PRESSURE: 109 MMHG

## 2025-08-26 DIAGNOSIS — M34.1 CREST SYNDROME (HCC): ICD-10-CM

## 2025-08-26 DIAGNOSIS — I10 HYPERTENSION, UNSPECIFIED TYPE: Primary | ICD-10-CM

## 2025-08-26 DIAGNOSIS — E78.5 DYSLIPIDEMIA: ICD-10-CM

## 2025-08-26 DIAGNOSIS — I73.00 RAYNAUD'S PHENOMENON WITHOUT GANGRENE: ICD-10-CM

## 2025-08-26 PROCEDURE — 99213 OFFICE O/P EST LOW 20 MIN: CPT | Performed by: FAMILY MEDICINE

## 2025-08-26 PROCEDURE — 99214 OFFICE O/P EST MOD 30 MIN: CPT | Performed by: FAMILY MEDICINE

## 2025-08-26 PROCEDURE — 3074F SYST BP LT 130 MM HG: CPT | Performed by: FAMILY MEDICINE

## 2025-08-26 PROCEDURE — 3078F DIAST BP <80 MM HG: CPT | Performed by: FAMILY MEDICINE

## 2025-08-26 ASSESSMENT — FIBROSIS 4 INDEX: FIB4 SCORE: .938083151964685911

## 2025-08-26 ASSESSMENT — ENCOUNTER SYMPTOMS
FEVER: 0
PND: 0
PALPITATIONS: 0
SHORTNESS OF BREATH: 0
CHILLS: 0
ORTHOPNEA: 0
CLAUDICATION: 0
HEMOPTYSIS: 0
SPUTUM PRODUCTION: 0
WHEEZING: 0
BRUISES/BLEEDS EASILY: 0
COUGH: 0

## 2025-08-29 ENCOUNTER — OFFICE VISIT (OUTPATIENT)
Dept: VASCULAR LAB | Facility: MEDICAL CENTER | Age: 45
End: 2025-08-29
Attending: INTERNAL MEDICINE
Payer: MEDICAID

## 2025-08-29 VITALS — HEIGHT: 64 IN | WEIGHT: 158.2 LBS | BODY MASS INDEX: 27.01 KG/M2

## 2025-08-29 DIAGNOSIS — Z12.4 PAP SMEAR FOR CERVICAL CANCER SCREENING: ICD-10-CM

## 2025-08-29 DIAGNOSIS — R30.0 DYSURIA: ICD-10-CM

## 2025-08-29 DIAGNOSIS — E66.3 OVERWEIGHT (BMI 25.0-29.9): Primary | ICD-10-CM

## 2025-08-29 DIAGNOSIS — Z11.3 SCREENING FOR STD (SEXUALLY TRANSMITTED DISEASE): ICD-10-CM

## 2025-08-29 PROCEDURE — 99213 OFFICE O/P EST LOW 20 MIN: CPT

## 2025-08-29 ASSESSMENT — FIBROSIS 4 INDEX: FIB4 SCORE: .938083151964685911

## (undated) DEVICE — KIT CUSTOM PROCEDURE SINGLE FOR ENDO (15/CA)

## (undated) DEVICE — SUTURE 2-0 20CM STRATAFIX SPIRAL SH NEEDLE (12/BX)

## (undated) DEVICE — SLEEVE VASO DVT COMPRESSION CALF MED - (10PR/CA)

## (undated) DEVICE — SUTURE 4-0 MONOCRYL PLUS PS-2 - 27 INCH (36/BX)

## (undated) DEVICE — GLOVE SZ 6.5 BIOGEL PI MICRO - PF LF (50PR/BX)

## (undated) DEVICE — NEEDLE INSUFFLATION FOR STEP - (12/BX)

## (undated) DEVICE — GOWN WARMING STANDARD FLEX - (30/CA)

## (undated) DEVICE — AIRLESS LAP SPRAY TIP VISTASEAL (3EA/BX)

## (undated) DEVICE — NEEDLE INSFL 120MM 14GA VRRS - (20/BX)

## (undated) DEVICE — DERMABOND ADVANCED - (12EA/BX)

## (undated) DEVICE — LACTATED RINGERS INJ 1000 ML - (14EA/CA 60CA/PF)

## (undated) DEVICE — CANISTER SUCTION 3000ML MECHANICAL FILTER AUTO SHUTOFF MEDI-VAC NONSTERILE LF DISP (40EA/CA)

## (undated) DEVICE — TOWEL STOP TIMEOUT SAFETY FLAG (40EA/CA)

## (undated) DEVICE — SODIUM CHL IRRIGATION 0.9% 1000ML (12EA/CA)

## (undated) DEVICE — GOWN SURGEONS X-LARGE - DISP. (30/CA)

## (undated) DEVICE — SEAL 5MM-8MM UNIVERSAL  BOX OF 10

## (undated) DEVICE — SEALER VESSEL EXTEND FROM DAVINCI ENERGY (6EA/BX)

## (undated) DEVICE — GLOVE BIOGEL PI INDICATOR SZ 7.0 SURGICAL PF LF - (50/BX 4BX/CA)

## (undated) DEVICE — TRAY FOLEY CATHETER STATLOCK 16FR SURESTEP  (10EA/CA)

## (undated) DEVICE — BLADE SURGICAL #15 - (50/BX 3BX/CA)

## (undated) DEVICE — SUTURE 2-0 30CM STRATAFIX SPIRAL PDO ***WAS PART #SXPD1B401 *****

## (undated) DEVICE — BIPOLAR FORCE DA VINCI 12X'S REISABLE

## (undated) DEVICE — RELOAD STAPLER SUREFORM 60 WHITE (12EA/BX)

## (undated) DEVICE — DRAIN PENROSE STERILE 1/4 X - 18 IN  (25EA/BX)

## (undated) DEVICE — CHLORAPREP 26 ML APPLICATOR - ORANGE TINT(25/CA)

## (undated) DEVICE — ELECTRODE DUAL RETURN W/ CORD - (50/PK)

## (undated) DEVICE — SUTURE 3-0 STRATAFIX SPIRAL PDS PLUS SH 15CM (12EA/BX)

## (undated) DEVICE — STAPLER SUREFORM 60 12 MM (6EA/BX)

## (undated) DEVICE — SUTURE 3.5-0 ETHIBOND GREEN CT-2 TAPER (36PK/BX)

## (undated) DEVICE — DRAPE ARM  BOX OF 20

## (undated) DEVICE — SENSOR OXIMETER ADULT SPO2 RD SET (20EA/BX)

## (undated) DEVICE — SEAL CANNULA STAPLER 12 MM (10EA/BX)

## (undated) DEVICE — SEALANT TISSUE CLOSURE KIT FIBIRIN VISTASEAL 10ML (1EA/BX)

## (undated) DEVICE — CANNULA O2 COMFORT SOFT EAR ADULT 7 FT TUBING (50/CA)

## (undated) DEVICE — OBTURATOR BLADELESS STANDARD 8MM (6EA/BX)

## (undated) DEVICE — SUTURE 2-0 VICRYL PLUS SH - 27 INCH (36/BX)

## (undated) DEVICE — Device

## (undated) DEVICE — SET TUBING PNEUMOCLEAR HIGH FLOW SMOKE EVACUATION (10EA/BX)

## (undated) DEVICE — SET LEADWIRE 5 LEAD BEDSIDE DISPOSABLE ECG (1SET OF 5/EA)

## (undated) DEVICE — SYSTEM CLEARIFY VISUALIZATION (10EA/PK)

## (undated) DEVICE — MASK OXYGEN VNYL ADLT MED CONC WITH 7 FOOT TUBING  - (50EA/CA)

## (undated) DEVICE — TUBE E-T HI-LO CUFF 7.0MM (10EA/PK)

## (undated) DEVICE — TUBE NG SALEM SUMP 16FR (50EA/CA)

## (undated) DEVICE — SET EXTENSION WITH 2 PORTS (48EA/CA) ***PART #2C8610 IS A SUBSTITUTE*****

## (undated) DEVICE — GLOVE BIOGEL PI INDICATOR SZ 8.0 SURGICAL PF LF -(50/BX 4BX/CA)

## (undated) DEVICE — SLEEVE VASO CALF MED - (10PR/CA)

## (undated) DEVICE — GLOVE BIOGEL SZ 7.5 SURGICAL PF LTX - (50PR/BX 4BX/CA)

## (undated) DEVICE — RELOAD STAPLER SUREFORM 60 BLUE (12EA/BX)

## (undated) DEVICE — CATHERTER CLEAR SINGLE USE INJECTION THERAPY NEEDLE 25GA X 4MM 2.3MM X 240CM (5EA/BX)

## (undated) DEVICE — NEEDLE DRIVER MEGA SUTURECUT DA VINCI 15X'S REUSABLE

## (undated) DEVICE — DRESSING TRANSPARENT FILM TEGADERM 2.375 X 2.75" (100EA/BX)"

## (undated) DEVICE — GLOVE BIOGEL PI INDICATOR SZ 6.5 SURGICAL PF LF - (50/BX 4BX/CA)

## (undated) DEVICE — SUCTION INSTRUMENT YANKAUER BULBOUS TIP W/O VENT (50EA/CA)

## (undated) DEVICE — DRAPE COLUMN  BOX OF 20

## (undated) DEVICE — TUBING CLEARLINK DUO-VENT - C-FLO (48EA/CA)

## (undated) DEVICE — SUTURE 1 STRATAFIX SYMMETRIC PDS PLUS CT-1 45CM (12EA/BX)

## (undated) DEVICE — PAD SANITARY 11IN MAXI IND WRAPPED (12EA/PK 24PK/CA)

## (undated) DEVICE — BLOCK BITE ENDOSCOPIC 2809 - (100/BX) INTERMEDIATE

## (undated) DEVICE — SOLUTION SORBITOL 3000ML (4/CA)

## (undated) DEVICE — TROCAR STEP 11MM - (3/CA)

## (undated) DEVICE — GLOVE SZ 8 BIOGEL PI MICRO - PF LF (50PR/BX)

## (undated) DEVICE — PAD SANITARY 11IN MAXI IND WRAPPED  (12EA/PK 24PK/CA)

## (undated) DEVICE — BALLOON CRE WG 18-20MM 240CM 5.5 F G

## (undated) DEVICE — KIT  I.V. START (100EA/CA)

## (undated) DEVICE — COVER TIP ENDOWRIST HOT SHEAR - (10EA/BX) DA VINCI

## (undated) DEVICE — TRAY FOLEY CATHETER STATLOCK 16FR SURESTEP (10EA/CA)

## (undated) DEVICE — SUTURE GENERAL

## (undated) DEVICE — TROCAR 5X100 NON BLADED Z-TH - READ KII (6/BX)

## (undated) DEVICE — IRRIGATOR SUCTION ENDOWRIST DISPOSABLE OD8 MM (6EA/BX)

## (undated) DEVICE — SET TUBING AQUILEX IN-FLOW MYOSURE (10EA/BX)

## (undated) DEVICE — DRESSING NON-ADHERING 8 X 3 - (50/BX)

## (undated) DEVICE — COVER LIGHT HANDLE ALC PLUS DISP (18EA/BX)

## (undated) DEVICE — PAD OR TABLE DA VINCI 2IN X 20IN X 72IN - (12EA/CA)

## (undated) DEVICE — SUTURE 0 VICRYL PLUS UR-6 - 27 INCH (36/BX)

## (undated) DEVICE — ROBOTIC SURGERY SERVICES

## (undated) DEVICE — REDUCER XI STAPLER 12MM TO 8MM (6EA/BX)

## (undated) DEVICE — CANISTER SUCTION RIGID RED 1500CC (40EA/CA)

## (undated) DEVICE — UTERINE MANIP RUMI 6.7X8 - (5/BX)

## (undated) DEVICE — SOLUTION PREP PVP IODINE 3/4 OZ POUCH PACKET CONTAINER STERILE LATEX FREE

## (undated) DEVICE — TUBE CONNECTING SUCTION - CLEAR PLASTIC STERILE 72 IN (50EA/CA)

## (undated) DEVICE — SUTURE 4-0 MONOCRYL PLUS PS-1 - 27 INCH (36/BX)

## (undated) DEVICE — MASK OXYGEN VNYL ADLT MED CONC WITH 7 FOOT TUBING - (50EA/CA)

## (undated) DEVICE — GLOVE SZ 7 BIOGEL PI MICRO - PF LF (50PR/BX 4BX/CA)

## (undated) DEVICE — WATER IRRIGATION STERILE 1000ML (12EA/CA)

## (undated) DEVICE — SUTURE2-0 27IN VCRL ANTI VIOL (36PK/BX)

## (undated) DEVICE — BAG RETRIEVAL 10ML (10EA/BX)

## (undated) DEVICE — BANDAID SHEER STRIP 3/4 IN (100EA/BX 12BX/CA)

## (undated) DEVICE — SYRINGE 30 ML LS (56/BX)

## (undated) DEVICE — SET TUBING AQUILEX OUT-FLOW MYOSURE (10EA/BX)

## (undated) DEVICE — SYRINGE NON SAFETY 3 CC 21 GA X 1 1/2 IN (100/BX 8BX/CA)

## (undated) DEVICE — GLOVE SZ 7.5 BIOGEL PI MICRO - PF LF (50PR/BX)

## (undated) DEVICE — CANISTER SUCTION 3000ML MECHANICAL FILTER AUTO SHUTOFF MEDI-VAC NONSTERILE LF DISP  (40EA/CA)

## (undated) DEVICE — BOVIE BLADE COATED - (50/PK)

## (undated) DEVICE — SPONGE GAUZESTER. 2X2 4-PL - (2/PK 50PK/BX 30BX/CS)

## (undated) DEVICE — GLOVE SZ 6 BIOGEL PI MICRO - PF LF (50PR/BX 4BX/CA)

## (undated) DEVICE — BANDAID X-LARGE 2 X 4 IN LF (50EA/BX)

## (undated) DEVICE — ARMREST CRADLE FOAM - (2PR/PK 12PR/CA)

## (undated) DEVICE — MEDICINE CUP STERILE 2 OZ - (100/CA)

## (undated) DEVICE — SHEARS MONOPOLAR CURVED  DA VINCI 10X'S REUSABLE

## (undated) DEVICE — GLOVE BIOGEL INDICATOR SZ 8 SURGICAL PF LTX - (50/BX 4BX/CA)

## (undated) DEVICE — FORCEP CADIERE REUSABLE (18 USES)

## (undated) DEVICE — DRAPE UNDER BUTTOCKS FLUID - (20/CA)

## (undated) DEVICE — CANNULA W/ SUPPLY TUBING O2 - (50/CA)